# Patient Record
Sex: MALE | Race: WHITE | NOT HISPANIC OR LATINO | Employment: FULL TIME | URBAN - METROPOLITAN AREA
[De-identification: names, ages, dates, MRNs, and addresses within clinical notes are randomized per-mention and may not be internally consistent; named-entity substitution may affect disease eponyms.]

---

## 2017-07-31 ENCOUNTER — TRANSCRIBE ORDERS (OUTPATIENT)
Dept: ENDOCRINOLOGY | Facility: CLINIC | Age: 57
End: 2017-07-31

## 2017-07-31 DIAGNOSIS — IMO0001 DIABETES MELLITUS TYPE 1, UNCONTROLLED, WITHOUT COMPLICATIONS: Primary | ICD-10-CM

## 2018-02-09 ENCOUNTER — OFFICE VISIT (OUTPATIENT)
Dept: ENDOCRINOLOGY | Facility: CLINIC | Age: 58
End: 2018-02-09

## 2018-02-09 VITALS
OXYGEN SATURATION: 99 % | BODY MASS INDEX: 26.4 KG/M2 | WEIGHT: 174.2 LBS | HEIGHT: 68 IN | HEART RATE: 60 BPM | DIASTOLIC BLOOD PRESSURE: 72 MMHG | SYSTOLIC BLOOD PRESSURE: 130 MMHG

## 2018-02-09 DIAGNOSIS — I25.810 CORONARY ARTERY DISEASE INVOLVING CORONARY BYPASS GRAFT OF NATIVE HEART WITHOUT ANGINA PECTORIS: ICD-10-CM

## 2018-02-09 DIAGNOSIS — Z79.4 UNCONTROLLED TYPE 2 DIABETES MELLITUS WITH HYPERGLYCEMIA, WITH LONG-TERM CURRENT USE OF INSULIN (HCC): Primary | ICD-10-CM

## 2018-02-09 DIAGNOSIS — E78.5 HYPERLIPIDEMIA, UNSPECIFIED HYPERLIPIDEMIA TYPE: ICD-10-CM

## 2018-02-09 DIAGNOSIS — E03.9 HYPOTHYROIDISM, ADULT: ICD-10-CM

## 2018-02-09 DIAGNOSIS — E11.65 UNCONTROLLED TYPE 2 DIABETES MELLITUS WITH HYPERGLYCEMIA, WITH LONG-TERM CURRENT USE OF INSULIN (HCC): Primary | ICD-10-CM

## 2018-02-09 PROCEDURE — 99245 OFF/OP CONSLTJ NEW/EST HI 55: CPT | Performed by: INTERNAL MEDICINE

## 2018-02-09 PROCEDURE — 99406 BEHAV CHNG SMOKING 3-10 MIN: CPT | Performed by: INTERNAL MEDICINE

## 2018-02-09 RX ORDER — METOPROLOL TARTRATE 100 MG/1
TABLET ORAL
Refills: 0 | COMMUNITY
Start: 2017-11-27 | End: 2018-07-27 | Stop reason: SDUPTHER

## 2018-02-09 RX ORDER — LEVOTHYROXINE SODIUM 0.03 MG/1
TABLET ORAL
Refills: 0 | COMMUNITY
Start: 2017-11-27 | End: 2018-07-27 | Stop reason: SDUPTHER

## 2018-02-09 RX ORDER — VALSARTAN 320 MG/1
TABLET ORAL
Refills: 0 | COMMUNITY
Start: 2018-01-10 | End: 2019-04-17

## 2018-02-09 RX ORDER — INSULIN ASPART 100 [IU]/ML
40 INJECTION, SUSPENSION SUBCUTANEOUS 2 TIMES DAILY
Refills: 0 | COMMUNITY
Start: 2018-01-22 | End: 2018-05-21 | Stop reason: SDUPTHER

## 2018-02-09 RX ORDER — PEN NEEDLE, DIABETIC 29 G X1/2"
NEEDLE, DISPOSABLE MISCELLANEOUS
Refills: 0 | COMMUNITY
Start: 2017-12-19 | End: 2019-10-03 | Stop reason: SDUPTHER

## 2018-02-09 RX ORDER — BLOOD-GLUCOSE METER
KIT MISCELLANEOUS
Refills: 0 | COMMUNITY
Start: 2018-01-23 | End: 2022-04-18 | Stop reason: SDUPTHER

## 2018-02-09 RX ORDER — LEVOTHYROXINE SODIUM 0.2 MG/1
TABLET ORAL
Refills: 0 | COMMUNITY
Start: 2017-11-27 | End: 2018-07-27 | Stop reason: SDUPTHER

## 2018-02-09 RX ORDER — EZETIMIBE 10 MG/1
10 TABLET ORAL DAILY
Qty: 30 TABLET | Refills: 6 | Status: SHIPPED | OUTPATIENT
Start: 2018-02-09 | End: 2018-05-31 | Stop reason: SDUPTHER

## 2018-02-09 NOTE — PROGRESS NOTES
Subjective:     Chief Complaint   Patient presents with   • Diabetes     New pt for management of diabetes. Testing bs 2-3x qd. C/o having alot of fluctuations      Laron Pandey is a 57 y.o. male who is is being seen for consultation today at the request of  Nacho Omer MD for management of  Type 2 diabetes mellitus.  as  The initial diagnosis of diabetes was made in 2003. He was initially controlled with metformin and started insulin in 2010.   Diabetic complications: cardiovascular disease s/p CABG. Developed mild neuropathy sx with tingling and pain in the legs lately.      Current diabetic medications include novolog 70/30, morning and bedtime. He administers it after meals   Metformin BID     Monitoring  - checks glucose  1-2 times per day. In the last few weeks he reported large variability with glucose ranges 500 to 59.       Nutrition:   discussed  carb consistent diet 45-60 gm carb per meal.   Current diet: eats breakfast  (light) at 10 am, then lunch is at 11 am, snack 2:30-3 pm. AT that time he is more likely to get hypoglycemia. Dinner is the largest meal and occur at 6-8 pm.     Other med problems:CAD/CABG,   HL- intolerant to statins in the past. Then he was on Zetia and stopped it because of cost. LDL is 160 in the last year without medication.   Hypothyroidism - 225 mcg of levothyroxine. In July labs when TSh was abnormal he ran out of the medication. Otherwise stable on a current dose.       Patient c/o blurry vision with the high numbers. Numbness in the feet and pain in the feet for several months. Increased thirs, muscle cramps and weight loss.   He has a friend with DM type 1 who uses carb counting method. Asked about his goals of carbs.      HPI  Past Medical History:   Diagnosis Date   • Diabetes    • Heart disease    • Hyperlipidemia    • Hypertension    • Thyroid disease      The following portions of the patient's history were reviewed and updated as appropriate: allergies,  current medications, past family history, past social history, past surgical history and problem list.    MEDICATIONS    Current Outpatient Prescriptions:   •  BD ULTRA-FINE PEN NEEDLES 29G X 12.7MM misc, USE 3 TIMES A DAY AS DIRECTED, Disp: , Rfl: 0  •  Blood Glucose Monitoring Suppl (ONE TOUCH ULTRA MINI) w/Device kit, USE TO TEST TWICE A DAY AS DIRECTED, Disp: , Rfl: 0  •  ezetimibe (ZETIA) 10 MG tablet, Take 1 tablet by mouth Daily., Disp: 30 tablet, Rfl: 6  •  levothyroxine (SYNTHROID, LEVOTHROID) 200 MCG tablet, TAKE 1 TABLET BY MOUTH EVERY DAY, Disp: , Rfl: 0  •  levothyroxine (SYNTHROID, LEVOTHROID) 25 MCG tablet, TAKE 1 TABLET BY MOUTH EVERY DAY, Disp: , Rfl: 0  •  metFORMIN (GLUCOPHAGE) 1000 MG tablet, TAKE 1 TABLET BY MOUTH DAILY, Disp: , Rfl: 0  •  metoprolol tartrate (LOPRESSOR) 100 MG tablet, TAKE 1 TABLET BY MOUTH EVERY DAY, Disp: , Rfl: 0  •  NOVOLOG MIX 70/30 FLEXPEN (70-30) 100 UNIT/ML suspension pen-injector injection, Inject 40 Units under the skin 2 (Two) Times a Day., Disp: , Rfl: 0  •  ONE TOUCH ULTRA TEST test strip, 1 each by Other route 3 (Three) Times a Day. Use as instructed, Disp: 100 each, Rfl: 6  •  ONETOUCH DELICA LANCETS FINE misc, USE TO TEST TWICE A DAY AS DIRECTED, Disp: , Rfl: 6  •  valsartan (DIOVAN) 320 MG tablet, TAKE 1 TABLET BY MOUTH DAILY, Disp: , Rfl: 0    Review of Systems  Review of Systems   Constitutional: Positive for fatigue and unexpected weight change (weight loss). Negative for activity change, appetite change, chills and diaphoresis.   HENT: Negative for congestion, ear pain, facial swelling, hearing loss, postnasal drip, sore throat, trouble swallowing and voice change.    Eyes: Negative.  Negative for redness, itching and visual disturbance.   Respiratory: Negative for cough and shortness of breath.    Cardiovascular: Positive for palpitations. Negative for chest pain and leg swelling.   Gastrointestinal: Negative for abdominal distention, abdominal pain,  "constipation, diarrhea, nausea and vomiting.   Endocrine: Positive for polydipsia.        As listed in HPI   Genitourinary: Positive for frequency.   Musculoskeletal: Positive for myalgias. Negative for arthralgias, back pain, joint swelling, neck pain and neck stiffness.   Skin: Negative.    Allergic/Immunologic: Negative.    Neurological: Positive for weakness and numbness (tingling). Negative for dizziness, tremors, syncope, light-headedness and headaches.   Hematological: Negative.    Psychiatric/Behavioral: Negative.    All other systems reviewed and are negative.         Objective:      /72  Pulse 60  Ht 172.7 cm (68\")  Wt 79 kg (174 lb 3.2 oz)  SpO2 99%  BMI 26.49 kg/m2Body mass index is 26.49 kg/(m^2).  Physical Exam   Constitutional: He is oriented to person, place, and time. He appears well-developed and well-nourished.   HENT:   Head: Normocephalic and atraumatic.   Mouth/Throat: Oropharynx is clear and moist.   Eyes: Conjunctivae are normal.   Neck: Normal range of motion. No muscular tenderness present. Carotid bruit is not present. No thyroid mass and no thyromegaly present.   The neck is supple and no assimetry visualized   Cardiovascular: Normal rate, regular rhythm and normal heart sounds.    Pulmonary/Chest: Effort normal and breath sounds normal.   Musculoskeletal: He exhibits no edema.   Lymphadenopathy:     He has no cervical adenopathy.   Neurological: He is alert and oriented to person, place, and time.   Skin: Skin is warm. No rash noted.   Psychiatric: He has a normal mood and affect. Thought content normal.   Vitals reviewed.          LABS AND IMAGING    Labs:  A1C was >14 in 1/2018.   No results found for: HGBA1C  No results found for any previous visit.          Assessment:         Diagnoses and all orders for this visit:    Uncontrolled type 2 diabetes mellitus with hyperglycemia, with long-term current use of insulin  -     Cancel: POC Glucose Fingerstick  -     Cancel: POC " Glycosylated Hemoglobin (Hb A1C)    Hyperlipidemia, unspecified hyperlipidemia type    Hypothyroidism, adult    Coronary artery disease involving coronary bypass graft of native heart without angina pectoris    Other orders  -     Blood Glucose Monitoring Suppl (ONE TOUCH ULTRA MINI) w/Device kit; USE TO TEST TWICE A DAY AS DIRECTED  -     Discontinue: ONE TOUCH ULTRA TEST test strip; USE TO TEST TWICE A DAY AS DIRECTED  -     NOVOLOG MIX 70/30 FLEXPEN (70-30) 100 UNIT/ML suspension pen-injector injection; Inject 40 Units under the skin 2 (Two) Times a Day.  -     BD ULTRA-FINE PEN NEEDLES 29G X 12.7MM misc; USE 3 TIMES A DAY AS DIRECTED  -     levothyroxine (SYNTHROID, LEVOTHROID) 200 MCG tablet; TAKE 1 TABLET BY MOUTH EVERY DAY  -     levothyroxine (SYNTHROID, LEVOTHROID) 25 MCG tablet; TAKE 1 TABLET BY MOUTH EVERY DAY  -     ONETOUCH DELICA LANCETS FINE misc; USE TO TEST TWICE A DAY AS DIRECTED  -     metFORMIN (GLUCOPHAGE) 1000 MG tablet; TAKE 1 TABLET BY MOUTH DAILY  -     metoprolol tartrate (LOPRESSOR) 100 MG tablet; TAKE 1 TABLET BY MOUTH EVERY DAY  -     valsartan (DIOVAN) 320 MG tablet; TAKE 1 TABLET BY MOUTH DAILY  -     ezetimibe (ZETIA) 10 MG tablet; Take 1 tablet by mouth Daily.  -     ONE TOUCH ULTRA TEST test strip; 1 each by Other route 3 (Three) Times a Day. Use as instructed        Plan:       Patient has poorly controlled diabetes. He administers premixed insulin at random times, not associated with meals. I have explained to him in details the mechanism of action of Novolog 70/30, titration and the importance of carb consistent diet while on this medication. Dietary recommendations reviewed and he was advised 60-75 gm carbs per meal.            Patient Instructions   Diabetes Treatment Recommendations    [unfilled]                                                                                              Laron Pandey 1960     Your A1C is: > 14  ADA General Goals: A1c: < 7%                                                   Fasting/before meal glucose: <150 mg/dL                                    2 Hour after meal glucoses: < 180 mg/dL                                        Bedtime glucose:120-180                   Premixed Insulin Dose:         Novolog Mix 70/30  40 units before breakfast and 40 units before supper.     This insulin has a combination of meal insulin and long acting insulin. It should be taken before meals.        Adjust insulin doses every 3 - 4 days (on Mondays and Thursdays) based on the following:    For Breakfast Dose (controls blood sugar until supper)    If before supper blood sugar readings are consistently higher than 200 for 3-4 days, raise breakfast insulin dose by 2 units.    For Supper Dose (controls blood sugar until breakfast)    If before breakfast blood sugar readings are consistently higher than 200 for 3-4 days, raise supper insulin dose by 2  units.    If after 2 weeks, before meal blood sugars are not lower than 200, call the office.    If you are having frequent blood sugars lower than 70, call the office.    Meka Swan MD    Recent labs reviewed: LDL is not at goal.   -restart Zetia.     Importance of A1C reduction for cardiac risk prevention reviewed.   He smokes 10 cig a day and I have counseled him on the harm of smoking with CAD and diabetes. Advised to slowly reduce the consumption. 4 min spent counselling on smoking cessation.    Education performed during this visit: long term diabetic complications discussed. , annual eye examinations at Ophthalmology discussed, foot care discussed and Podiatry evaluation recommended, dental hygiene discussed  and foot care reviewed., diabetic Sick Day rules reviewed, handout given: maintain increased fluid intake, check glucose frequently and call the office if glucose is >400. If nausea/vomiting occurs, present to the ER. , home glucose monitoring emphasized, carbohydrate counting discussed in detail, written  exchange diet given, all medications, side effects and compliance discussed carefully, use and side effects of insulin is taught, Hypoglycemia management and prevention reviewed. and Dietary recommendations explained and examples of healthy meals provided       Follow up:  3 months.       42  min  of  80 min face-to-face visit time spent for coordination of care and counselling regarding identified problems as outlined in the objective, assessment and discussion portions of the documentation.

## 2018-02-09 NOTE — PATIENT INSTRUCTIONS
Diabetes Treatment Recommendations    [unfilled]                                                                                              Laron Pandey 1960     Your A1C is: > 14  ADA General Goals: A1c: < 7%                                                  Fasting/before meal glucose: <150 mg/dL                                    2 Hour after meal glucoses: < 180 mg/dL                                        Bedtime glucose:120-180                   Premixed Insulin Dose:         Novolog Mix 70/30  40 units before breakfast and 40 units before supper.     This insulin has a combination of meal insulin and long acting insulin. It should be taken before meals.        Adjust insulin doses every 3 - 4 days (on Mondays and Thursdays) based on the following:    For Breakfast Dose (controls blood sugar until supper)    If before supper blood sugar readings are consistently higher than 200 for 3-4 days, raise breakfast insulin dose by 2 units.    For Supper Dose (controls blood sugar until breakfast)    If before breakfast blood sugar readings are consistently higher than 200 for 3-4 days, raise supper insulin dose by 2  units.    If after 2 weeks, before meal blood sugars are not lower than 200, call the office.    If you are having frequent blood sugars lower than 70, call the office.    Meka Swan MD

## 2018-03-30 ENCOUNTER — OFFICE VISIT (OUTPATIENT)
Dept: ENDOCRINOLOGY | Facility: CLINIC | Age: 58
End: 2018-03-30

## 2018-03-30 VITALS
DIASTOLIC BLOOD PRESSURE: 82 MMHG | HEART RATE: 68 BPM | HEIGHT: 68 IN | OXYGEN SATURATION: 99 % | SYSTOLIC BLOOD PRESSURE: 132 MMHG | BODY MASS INDEX: 26.61 KG/M2 | WEIGHT: 175.6 LBS

## 2018-03-30 DIAGNOSIS — N52.9 ERECTILE DYSFUNCTION, UNSPECIFIED ERECTILE DYSFUNCTION TYPE: ICD-10-CM

## 2018-03-30 DIAGNOSIS — E78.5 HYPERLIPIDEMIA, UNSPECIFIED HYPERLIPIDEMIA TYPE: ICD-10-CM

## 2018-03-30 DIAGNOSIS — Z79.4 UNCONTROLLED TYPE 2 DIABETES MELLITUS WITH HYPERGLYCEMIA, WITH LONG-TERM CURRENT USE OF INSULIN (HCC): Primary | ICD-10-CM

## 2018-03-30 DIAGNOSIS — E11.65 UNCONTROLLED TYPE 2 DIABETES MELLITUS WITH HYPERGLYCEMIA, WITH LONG-TERM CURRENT USE OF INSULIN (HCC): Primary | ICD-10-CM

## 2018-03-30 DIAGNOSIS — E03.9 HYPOTHYROIDISM, ADULT: ICD-10-CM

## 2018-03-30 LAB
GLUCOSE BLDC GLUCOMTR-MCNC: 313 MG/DL (ref 70–130)
HBA1C MFR BLD: 9.3 %

## 2018-03-30 PROCEDURE — 99214 OFFICE O/P EST MOD 30 MIN: CPT | Performed by: INTERNAL MEDICINE

## 2018-03-30 PROCEDURE — 83036 HEMOGLOBIN GLYCOSYLATED A1C: CPT | Performed by: INTERNAL MEDICINE

## 2018-03-30 PROCEDURE — 82947 ASSAY GLUCOSE BLOOD QUANT: CPT | Performed by: INTERNAL MEDICINE

## 2018-03-30 NOTE — PATIENT INSTRUCTIONS
Diabetes Treatment Recommendations    [unfilled]                                                                                              Laron Pandey 1960     Your A1C is: > 14  Results for orders placed or performed in visit on 03/30/18   POC Glucose Fingerstick   Result Value Ref Range    Glucose 313 (A) 70 - 130 mg/dL   POC Glycosylated Hemoglobin (Hb A1C)   Result Value Ref Range    Hemoglobin A1C 9.3 %     ADA General Goals: A1c: < 7%                                                  Fasting/before meal glucose: <150 mg/dL                                    2 Hour after meal glucoses: < 180 mg/dL                                        Bedtime glucose:120-180                   Premixed Insulin Dose:         Novolog Mix 70/30  40 units before breakfast and 40 units before supper.     This insulin has a combination of meal insulin and long acting insulin. It should be taken before meals.        Adjust insulin doses every 3 - 4 days (on Mondays and Thursdays) based on the following:    For Breakfast Dose (controls blood sugar until supper)    If before supper blood sugar readings are consistently higher than 200 for 3-4 days, raise breakfast insulin dose by 2 units.    For Supper Dose (controls blood sugar until breakfast)    If before breakfast blood sugar readings are consistently higher than 200 for 3-4 days, raise supper insulin dose by 2  units.    If after 2 weeks, before meal blood sugars are not lower than 200, call the office.    If you are having frequent blood sugars lower than 70, call the office.    Meka Swan MD

## 2018-03-30 NOTE — PROGRESS NOTES
Subjective:     Chief Complaint   Patient presents with   • Diabetes     F/u for type 2 diabetes and hypothyroidism   • Hypothyroidism      Laron Pandey is a 57 y.o. male who is is being seen for follow-up for management of  Type 2 diabetes mellitus.  The initial diagnosis of diabetes was made in 2003. He was initially controlled with metformin and started insulin in 2010.   Diabetic complications: cardiovascular disease s/p CABG. Developed mild neuropathy sx with tingling and pain in the legs lately.      Current diabetic medications include novolog 70/30, morning and bedtime. He administers it after meals   Metformin BID     Monitoring  - checks glucose  1-2 times per day. In the last few weeks he reported large variability with glucose ranges 200 on average. 400 when forgets to take insulin.       Nutrition:   discussed  carb consistent diet 45-60 gm carb per meal.   Current diet: eats breakfast  (light) at 10 am, then lunch is at 11 am, snack 2:30-3 pm. AT that time he is more likely to get hypoglycemia. Dinner is the largest meal and occur at 6-8 pm.     Other med problems:CAD/CABG,     HL- intolerant to statins in the past. Restarted zetia last visit.     Hypothyroidism - 225 mcg of levothyroxine, stable on a current dose.     He is more consistent with the medications.     Patient c/o blurry vision with the high numbers. Numbness in the feet and pain in the feet for several months. Increased thirst, muscle cramps and weight loss resolved.   He is more consistent with the medications and the glucose improved. He feels better.     C/o erectile dysfunction for about a year.         Diabetes   Pertinent negatives for hypoglycemia include no dizziness, headaches or tremors. Associated symptoms include fatigue and weakness. Pertinent negatives for diabetes include no chest pain.   Hypothyroidism   Associated symptoms include fatigue, myalgias, numbness (tingling) and weakness. Pertinent negatives include no  abdominal pain, arthralgias, chest pain, chills, congestion, coughing, diaphoresis, headaches, joint swelling, nausea, neck pain, sore throat or vomiting.     Past Medical History:   Diagnosis Date   • Diabetes    • Heart disease    • Hyperlipidemia    • Hypertension    • Thyroid disease      The following portions of the patient's history were reviewed and updated as appropriate: allergies, current medications, past family history, past social history, past surgical history and problem list.    MEDICATIONS    Current Outpatient Prescriptions:   •  BD ULTRA-FINE PEN NEEDLES 29G X 12.7MM misc, USE 3 TIMES A DAY AS DIRECTED, Disp: , Rfl: 0  •  Blood Glucose Monitoring Suppl (ONE TOUCH ULTRA MINI) w/Device kit, USE TO TEST TWICE A DAY AS DIRECTED, Disp: , Rfl: 0  •  ezetimibe (ZETIA) 10 MG tablet, Take 1 tablet by mouth Daily., Disp: 30 tablet, Rfl: 6  •  levothyroxine (SYNTHROID, LEVOTHROID) 200 MCG tablet, TAKE 1 TABLET BY MOUTH EVERY DAY, Disp: , Rfl: 0  •  levothyroxine (SYNTHROID, LEVOTHROID) 25 MCG tablet, TAKE 1 TABLET BY MOUTH EVERY DAY, Disp: , Rfl: 0  •  metFORMIN (GLUCOPHAGE) 1000 MG tablet, TAKE 1 TABLET BY MOUTH DAILY, Disp: , Rfl: 0  •  metoprolol tartrate (LOPRESSOR) 100 MG tablet, TAKE 1 TABLET BY MOUTH EVERY DAY, Disp: , Rfl: 0  •  NOVOLOG MIX 70/30 FLEXPEN (70-30) 100 UNIT/ML suspension pen-injector injection, Inject 40 Units under the skin 2 (Two) Times a Day., Disp: , Rfl: 0  •  ONE TOUCH ULTRA TEST test strip, 1 each by Other route 3 (Three) Times a Day. Use as instructed, Disp: 100 each, Rfl: 6  •  ONETOUCH DELICA LANCETS FINE misc, USE TO TEST TWICE A DAY AS DIRECTED, Disp: , Rfl: 6  •  valsartan (DIOVAN) 320 MG tablet, TAKE 1 TABLET BY MOUTH DAILY, Disp: , Rfl: 0    Review of Systems  Review of Systems   Constitutional: Positive for fatigue. Negative for activity change, appetite change, chills and diaphoresis.   HENT: Negative for congestion, ear pain, facial swelling, hearing loss, postnasal  "drip, sore throat, trouble swallowing and voice change.    Eyes: Negative.  Negative for redness, itching and visual disturbance.   Respiratory: Negative for cough and shortness of breath.    Cardiovascular: Negative for chest pain and leg swelling.   Gastrointestinal: Negative for abdominal distention, abdominal pain, constipation, diarrhea, nausea and vomiting.   Endocrine:        As listed in HPI   Musculoskeletal: Positive for myalgias. Negative for arthralgias, back pain, joint swelling, neck pain and neck stiffness.   Skin: Negative.    Allergic/Immunologic: Negative.    Neurological: Positive for weakness and numbness (tingling). Negative for dizziness, tremors, syncope, light-headedness and headaches.   Hematological: Negative.    Psychiatric/Behavioral: Negative.    All other systems reviewed and are negative.         Objective:      /82   Pulse 68   Ht 172.7 cm (68\")   Wt 79.7 kg (175 lb 9.6 oz)   SpO2 99%   BMI 26.70 kg/m² Body mass index is 26.7 kg/m².  Physical Exam   Constitutional: He is oriented to person, place, and time. He appears well-developed and well-nourished.   HENT:   Head: Normocephalic and atraumatic.   Mouth/Throat: Oropharynx is clear and moist.   Eyes: Conjunctivae are normal.   Neck: Normal range of motion. No muscular tenderness present. Carotid bruit is not present. No thyroid mass and no thyromegaly present.   The neck is supple and no assimetry visualized   Cardiovascular: Normal rate, regular rhythm and normal heart sounds.    Pulmonary/Chest: Effort normal and breath sounds normal.   Musculoskeletal: He exhibits no edema.   Lymphadenopathy:     He has no cervical adenopathy.   Neurological: He is alert and oriented to person, place, and time.   Skin: Skin is warm. No rash noted.   Psychiatric: He has a normal mood and affect. Thought content normal.   Vitals reviewed.          LABS AND IMAGING    Labs:  A1C was >14 in 1/2018.   Lab Results   Component Value Date    " HGBA1C 9.3 03/30/2018     Office Visit on 03/30/2018   Component Date Value Ref Range Status   • Glucose 03/30/2018 313* 70 - 130 mg/dL Final   • Hemoglobin A1C 03/30/2018 9.3  % Final             Assessment:         Diagnoses and all orders for this visit:    Uncontrolled type 2 diabetes mellitus with hyperglycemia, with long-term current use of insulin  -     POC Glucose Fingerstick  -     POC Glycosylated Hemoglobin (Hb A1C)  -     Comprehensive Metabolic Panel; Future  -     Lipid Panel; Future  -     Hemoglobin A1c; Future    Hyperlipidemia, unspecified hyperlipidemia type    Hypothyroidism, adult  -     TSH; Future  -     T4, Free; Future    Erectile dysfunction, unspecified erectile dysfunction type        Plan:       Patient has poorly controlled diabetes.His A1C improved from > 14 to 9.3   I have explained to him in details the mechanism of action of Novolog 70/30, titration and the importance of carb consistent diet while on this medication. Dietary recommendations reviewed and he was advised 60-75 gm carbs per meal.            Patient Instructions     Diabetes Treatment Recommendations    [unfilled]                                                                                              Laron Pandey 1960     Your A1C is: > 14  Results for orders placed or performed in visit on 03/30/18   POC Glucose Fingerstick   Result Value Ref Range    Glucose 313 (A) 70 - 130 mg/dL   POC Glycosylated Hemoglobin (Hb A1C)   Result Value Ref Range    Hemoglobin A1C 9.3 %     ADA General Goals: A1c: < 7%                                                  Fasting/before meal glucose: <150 mg/dL                                    2 Hour after meal glucoses: < 180 mg/dL                                        Bedtime glucose:120-180                   Premixed Insulin Dose:         Novolog Mix 70/30  40 units before breakfast and 40 units before supper.     This insulin has a combination of meal insulin and long acting  insulin. It should be taken before meals.        Adjust insulin doses every 3 - 4 days (on Mondays and Thursdays) based on the following:    For Breakfast Dose (controls blood sugar until supper)    If before supper blood sugar readings are consistently higher than 200 for 3-4 days, raise breakfast insulin dose by 2 units.    For Supper Dose (controls blood sugar until breakfast)    If before breakfast blood sugar readings are consistently higher than 200 for 3-4 days, raise supper insulin dose by 2  units.    If after 2 weeks, before meal blood sugars are not lower than 200, call the office.    If you are having frequent blood sugars lower than 70, call the office.    Meka Swan MD    -cont [facility].   -fasting labs next visit   - for erectile dysfunction I have given a sample of Stendra. Coupon and rx provided as well. .      Follow up:  3 months.

## 2018-05-22 RX ORDER — INSULIN ASPART 100 [IU]/ML
40 INJECTION, SUSPENSION SUBCUTANEOUS 2 TIMES DAILY
Qty: 60 ML | Refills: 1 | Status: SHIPPED | OUTPATIENT
Start: 2018-05-22 | End: 2018-05-31 | Stop reason: SDUPTHER

## 2018-05-22 NOTE — TELEPHONE ENCOUNTER
PATIENT CALLED TO CHECK THE STATUS OF THE REFILL REQUEST ON HIS NOVOLOG 70/30 FLEXPEN AND WOULD LIKE THIS SENT TO HIS PHARMACY FOR A 90 DAY REFILL. IF THERE IS ANY QUESTIONS OR CONCERNS THE PATIENT CAN BE REACHED [phone]

## 2018-05-31 ENCOUNTER — OFFICE VISIT (OUTPATIENT)
Dept: ENDOCRINOLOGY | Facility: CLINIC | Age: 58
End: 2018-05-31

## 2018-05-31 VITALS
DIASTOLIC BLOOD PRESSURE: 76 MMHG | OXYGEN SATURATION: 99 % | HEIGHT: 68 IN | HEART RATE: 62 BPM | SYSTOLIC BLOOD PRESSURE: 130 MMHG | BODY MASS INDEX: 26.34 KG/M2 | WEIGHT: 173.8 LBS

## 2018-05-31 DIAGNOSIS — E03.9 HYPOTHYROIDISM, ADULT: ICD-10-CM

## 2018-05-31 DIAGNOSIS — Z79.4 UNCONTROLLED TYPE 2 DIABETES MELLITUS WITH HYPERGLYCEMIA, WITH LONG-TERM CURRENT USE OF INSULIN (HCC): Primary | ICD-10-CM

## 2018-05-31 DIAGNOSIS — E11.65 UNCONTROLLED TYPE 2 DIABETES MELLITUS WITH HYPERGLYCEMIA, WITH LONG-TERM CURRENT USE OF INSULIN (HCC): ICD-10-CM

## 2018-05-31 DIAGNOSIS — E78.5 HYPERLIPIDEMIA, UNSPECIFIED HYPERLIPIDEMIA TYPE: ICD-10-CM

## 2018-05-31 DIAGNOSIS — E11.65 UNCONTROLLED TYPE 2 DIABETES MELLITUS WITH HYPERGLYCEMIA, WITH LONG-TERM CURRENT USE OF INSULIN (HCC): Primary | ICD-10-CM

## 2018-05-31 DIAGNOSIS — Z79.4 UNCONTROLLED TYPE 2 DIABETES MELLITUS WITH HYPERGLYCEMIA, WITH LONG-TERM CURRENT USE OF INSULIN (HCC): ICD-10-CM

## 2018-05-31 PROCEDURE — 82043 UR ALBUMIN QUANTITATIVE: CPT | Performed by: INTERNAL MEDICINE

## 2018-05-31 PROCEDURE — 99214 OFFICE O/P EST MOD 30 MIN: CPT | Performed by: INTERNAL MEDICINE

## 2018-05-31 PROCEDURE — 82570 ASSAY OF URINE CREATININE: CPT | Performed by: INTERNAL MEDICINE

## 2018-05-31 RX ORDER — EZETIMIBE 10 MG/1
10 TABLET ORAL DAILY
Qty: 30 TABLET | Refills: 6 | Status: SHIPPED | OUTPATIENT
Start: 2018-05-31 | End: 2018-07-27 | Stop reason: SDUPTHER

## 2018-05-31 RX ORDER — INSULIN ASPART 100 [IU]/ML
46 INJECTION, SUSPENSION SUBCUTANEOUS 2 TIMES DAILY
Qty: 30 PEN | Refills: 6 | Status: SHIPPED | OUTPATIENT
Start: 2018-05-31 | End: 2018-08-09 | Stop reason: SDUPTHER

## 2018-05-31 NOTE — PROGRESS NOTES
Subjective:     Chief Complaint   Patient presents with   • Diabetes     F/u for type 2 diabetes and hypothyroidis, would like to discuss results of recent labs   • Hypothyroidism      Laron Pandey is a 57 y.o. male who is is being seen for follow-up for management of  Type 2 diabetes mellitus.  The initial diagnosis of diabetes was made in 2003. He was initially controlled with metformin and started insulin in 2010.   Diabetic complications: cardiovascular disease s/p CABG. Developed mild neuropathy sx with tingling and pain in the legs lately.      Current diabetic medications include novolog 70/30, morning and bedtime. He administers it after meals   Metformin BID     Monitoring  - checks glucose  1 times per day. In the last few weeks he reported large variability with glucose ranges 200 on average. >400 when forgets to take insulin.       Nutrition:   discussed  carb consistent diet 45-60 gm carb per meal.   Current diet: eats breakfast  (light) at 10 am, then lunch is at 11 am, snack 2:30-3 pm. AT that time he is more likely to get hypoglycemia. Dinner is the largest meal and occur at 6-8 pm.     Other med problems:CAD/CABG,     HL- intolerant to statins in the past. Restarted zetia last visit and LDL is 100 on 5/2018.     Hypothyroidism - 225 mcg of levothyroxine, stable on a current dose. TFT are  Normal 5/30/18    He is more consistent with the medications.     Patient c/o blurry vision with the high numbers. Numbness in the feet and pain in the feet for several months. Increased thirst, muscle cramps and weight loss resolved.   He is more consistent with the medications and the glucose improved. He feels better.     C/o erectile dysfunction for about a year.  Samples of stendra not effective,       Diabetes   Pertinent negatives for hypoglycemia include no dizziness, headaches or tremors. Associated symptoms include fatigue and weakness. Pertinent negatives for diabetes include no chest pain.    Hypothyroidism   Associated symptoms include fatigue, myalgias, numbness (tingling) and weakness. Pertinent negatives include no abdominal pain, arthralgias, chest pain, chills, congestion, coughing, diaphoresis, headaches, joint swelling, nausea, neck pain, sore throat or vomiting.     Past Medical History:   Diagnosis Date   • Diabetes    • Heart disease    • Hyperlipidemia    • Hypertension    • Thyroid disease      The following portions of the patient's history were reviewed and updated as appropriate: allergies, current medications, past family history, past social history, past surgical history and problem list.    MEDICATIONS    Current Outpatient Prescriptions:   •  BD ULTRA-FINE PEN NEEDLES 29G X 12.7MM misc, USE 3 TIMES A DAY AS DIRECTED, Disp: , Rfl: 0  •  Blood Glucose Monitoring Suppl (ONE TOUCH ULTRA MINI) w/Device kit, USE TO TEST TWICE A DAY AS DIRECTED, Disp: , Rfl: 0  •  ezetimibe (ZETIA) 10 MG tablet, Take 1 tablet by mouth Daily., Disp: 30 tablet, Rfl: 6  •  levothyroxine (SYNTHROID, LEVOTHROID) 200 MCG tablet, TAKE 1 TABLET BY MOUTH EVERY DAY, Disp: , Rfl: 0  •  levothyroxine (SYNTHROID, LEVOTHROID) 25 MCG tablet, TAKE 1 TABLET BY MOUTH EVERY DAY, Disp: , Rfl: 0  •  metFORMIN (GLUCOPHAGE) 1000 MG tablet, TAKE 1 TABLET BY MOUTH DAILY, Disp: , Rfl: 0  •  metoprolol tartrate (LOPRESSOR) 100 MG tablet, TAKE 1 TABLET BY MOUTH EVERY DAY, Disp: , Rfl: 0  •  NOVOLOG MIX 70/30 FLEXPEN (70-30) 100 UNIT/ML suspension pen-injector injection, Inject 0.46 mL under the skin 2 (Two) Times a Day., Disp: 30 pen, Rfl: 6  •  ONE TOUCH ULTRA TEST test strip, 1 each by Other route 3 (Three) Times a Day. Use as instructed, Disp: 100 each, Rfl: 6  •  ONETOUCH DELICA LANCETS FINE misc, USE TO TEST TWICE A DAY AS DIRECTED, Disp: , Rfl: 6  •  valsartan (DIOVAN) 320 MG tablet, TAKE 1 TABLET BY MOUTH DAILY, Disp: , Rfl: 0    Review of Systems  Review of Systems   Constitutional: Positive for fatigue. Negative for  "activity change, appetite change, chills and diaphoresis.   HENT: Negative for congestion, ear pain, facial swelling, hearing loss, postnasal drip, sore throat, trouble swallowing and voice change.    Eyes: Negative.  Negative for redness, itching and visual disturbance.   Respiratory: Negative for cough and shortness of breath.    Cardiovascular: Negative for chest pain and leg swelling.   Gastrointestinal: Negative for abdominal distention, abdominal pain, constipation, diarrhea, nausea and vomiting.   Endocrine:        As listed in HPI   Musculoskeletal: Positive for myalgias. Negative for arthralgias, back pain, joint swelling, neck pain and neck stiffness.   Skin: Negative.    Allergic/Immunologic: Negative.    Neurological: Positive for weakness and numbness (tingling). Negative for dizziness, tremors, syncope, light-headedness and headaches.   Hematological: Negative.    Psychiatric/Behavioral: Negative.    All other systems reviewed and are negative.         Objective:      /76   Pulse 62   Ht 172.7 cm (68\")   Wt 78.8 kg (173 lb 12.8 oz)   SpO2 99%   BMI 26.43 kg/m² Body mass index is 26.43 kg/m².  Physical Exam   Constitutional: He is oriented to person, place, and time. He appears well-developed and well-nourished.   HENT:   Head: Normocephalic and atraumatic.   Mouth/Throat: Oropharynx is clear and moist.   Eyes: Conjunctivae are normal.   Neck: Normal range of motion. No JVD present. No muscular tenderness present. Carotid bruit is not present. No thyroid mass and no thyromegaly present.   The neck is supple and no assimetry visualized   Cardiovascular: Normal rate, regular rhythm and normal heart sounds.    Pulmonary/Chest: Effort normal and breath sounds normal.   Musculoskeletal: Normal range of motion. He exhibits no edema.    Laron had a diabetic foot exam performed today.   During the foot exam he had a monofilament test performed.    Neurological Sensory Findings -  Unaltered " sharp/dull right ankle/foot discrimination and unaltered sharp/dull left ankle/foot discrimination. No right ankle/foot altered proprioception and no left ankle/foot altered proprioception  Vascular Status -  His right foot exhibits normal foot vasculature  and no edema. His left foot exhibits normal foot vasculature  and no edema.  Skin Integrity  -  His right foot skin is intact.His left foot skin is intact..  Lymphadenopathy:     He has no cervical adenopathy.   Neurological: He is alert and oriented to person, place, and time. He has normal reflexes.   Skin: Skin is warm and dry. No rash noted.   Psychiatric: He has a normal mood and affect. Thought content normal.   Vitals reviewed.          LABS AND IMAGING    Labs:  A1C was >14 in 1/2018.   Lab Results   Component Value Date    HGBA1C 9.3 03/30/2018     Office Visit on 03/30/2018   Component Date Value Ref Range Status   • Glucose 03/30/2018 313* 70 - 130 mg/dL Final   • Hemoglobin A1C 03/30/2018 9.3  % Final             Assessment:         Diagnoses and all orders for this visit:    Uncontrolled type 2 diabetes mellitus with hyperglycemia, with long-term current use of insulin  -     Microalbumin / Creatinine Urine Ratio - Urine, Clean Catch    Hypothyroidism, adult    Hyperlipidemia, unspecified hyperlipidemia type    Other orders  -     NOVOLOG MIX 70/30 FLEXPEN (70-30) 100 UNIT/ML suspension pen-injector injection; Inject 0.46 mL under the skin 2 (Two) Times a Day.  -     ezetimibe (ZETIA) 10 MG tablet; Take 1 tablet by mouth Daily.        Plan:       Patient has poorly controlled diabetes.His A1C improved from > 14 to 9.3   I have explained to him in details the mechanism of action of Novolog 70/30, titration and the importance of carb consistent diet while on this medication. Dietary recommendations reviewed and he was advised 60-75 gm carbs per meal.            Patient Instructions   44 UNits in am and 46 Units before supper.     -cont Zetia, LDL is  100  -fasting labs reviewed.   - for erectile dysfunction I have given a sample of Stendra and it was not effective..      Follow up:  3 months.

## 2018-06-02 LAB
CREAT 24H UR-MCNC: 40.1 MG/DL
MICROALBUMIN UR-MCNC: 7.6 UG/ML
MICROALBUMIN/CREAT UR: 19 MG/G CREAT (ref 0–30)

## 2018-07-31 RX ORDER — LEVOTHYROXINE SODIUM 0.03 MG/1
25 TABLET ORAL DAILY
Qty: 90 TABLET | Refills: 0 | Status: SHIPPED | OUTPATIENT
Start: 2018-07-31 | End: 2018-11-17 | Stop reason: SDUPTHER

## 2018-07-31 RX ORDER — LEVOTHYROXINE SODIUM 0.2 MG/1
200 TABLET ORAL DAILY
Qty: 90 TABLET | Refills: 0 | Status: SHIPPED | OUTPATIENT
Start: 2018-07-31 | End: 2021-12-28 | Stop reason: SDUPTHER

## 2018-07-31 RX ORDER — METOPROLOL TARTRATE 100 MG/1
100 TABLET ORAL DAILY
Qty: 90 TABLET | Refills: 0 | Status: SHIPPED | OUTPATIENT
Start: 2018-07-31 | End: 2018-11-17 | Stop reason: SDUPTHER

## 2018-07-31 RX ORDER — EZETIMIBE 10 MG/1
10 TABLET ORAL DAILY
Qty: 90 TABLET | Refills: 0 | Status: SHIPPED | OUTPATIENT
Start: 2018-07-31 | End: 2019-06-05 | Stop reason: SDUPTHER

## 2018-08-09 RX ORDER — INSULIN ASPART 100 [IU]/ML
46 INJECTION, SUSPENSION SUBCUTANEOUS 2 TIMES DAILY
Qty: 30 PEN | Refills: 2 | Status: SHIPPED | OUTPATIENT
Start: 2018-08-09 | End: 2019-01-03 | Stop reason: SDUPTHER

## 2018-09-28 NOTE — TELEPHONE ENCOUNTER
LOV: 05/31/18  Med refilled (so pt doesn't go without med over the weekend), Dr. Swan pls advise if ok to refill under your name for further refills? Med is under historical prescriber.

## 2018-11-19 RX ORDER — LEVOTHYROXINE SODIUM 0.03 MG/1
25 TABLET ORAL DAILY
Qty: 90 TABLET | Refills: 0 | Status: SHIPPED | OUTPATIENT
Start: 2018-11-19 | End: 2021-12-28 | Stop reason: SDUPTHER

## 2018-11-19 RX ORDER — METOPROLOL TARTRATE 100 MG/1
100 TABLET ORAL DAILY
Qty: 90 TABLET | Refills: 0 | Status: SHIPPED | OUTPATIENT
Start: 2018-11-19 | End: 2019-10-23

## 2019-01-05 RX ORDER — INSULIN ASPART 100 [IU]/ML
46 INJECTION, SUSPENSION SUBCUTANEOUS 2 TIMES DAILY
Qty: 30 PEN | Refills: 2 | Status: SHIPPED | OUTPATIENT
Start: 2019-01-05 | End: 2019-10-03 | Stop reason: SDUPTHER

## 2019-04-17 ENCOUNTER — OFFICE VISIT (OUTPATIENT)
Dept: ENDOCRINOLOGY | Facility: CLINIC | Age: 59
End: 2019-04-17

## 2019-04-17 VITALS
BODY MASS INDEX: 26.36 KG/M2 | HEIGHT: 68 IN | HEART RATE: 71 BPM | SYSTOLIC BLOOD PRESSURE: 114 MMHG | WEIGHT: 173.9 LBS | DIASTOLIC BLOOD PRESSURE: 64 MMHG | OXYGEN SATURATION: 99 %

## 2019-04-17 DIAGNOSIS — E78.5 HYPERLIPIDEMIA, UNSPECIFIED HYPERLIPIDEMIA TYPE: ICD-10-CM

## 2019-04-17 DIAGNOSIS — E03.9 HYPOTHYROIDISM, ADULT: ICD-10-CM

## 2019-04-17 DIAGNOSIS — Z79.4 UNCONTROLLED TYPE 2 DIABETES MELLITUS WITH HYPERGLYCEMIA, WITH LONG-TERM CURRENT USE OF INSULIN (HCC): Primary | ICD-10-CM

## 2019-04-17 DIAGNOSIS — E11.65 UNCONTROLLED TYPE 2 DIABETES MELLITUS WITH HYPERGLYCEMIA, WITH LONG-TERM CURRENT USE OF INSULIN (HCC): Primary | ICD-10-CM

## 2019-04-17 LAB
GLUCOSE BLDC GLUCOMTR-MCNC: 251 MG/DL (ref 70–130)
HBA1C MFR BLD: 9.1 %

## 2019-04-17 PROCEDURE — 99214 OFFICE O/P EST MOD 30 MIN: CPT | Performed by: INTERNAL MEDICINE

## 2019-04-17 PROCEDURE — 82962 GLUCOSE BLOOD TEST: CPT | Performed by: INTERNAL MEDICINE

## 2019-04-17 PROCEDURE — 83036 HEMOGLOBIN GLYCOSYLATED A1C: CPT | Performed by: INTERNAL MEDICINE

## 2019-04-17 RX ORDER — LOSARTAN POTASSIUM AND HYDROCHLOROTHIAZIDE 12.5; 1 MG/1; MG/1
TABLET ORAL
COMMUNITY
Start: 2019-04-16 | End: 2020-03-13

## 2019-04-17 RX ORDER — AMLODIPINE BESYLATE 10 MG/1
10 TABLET ORAL DAILY
Refills: 0 | COMMUNITY
Start: 2019-01-22 | End: 2021-05-04 | Stop reason: ALTCHOICE

## 2019-04-17 NOTE — PROGRESS NOTES
Subjective:     Chief Complaint   Patient presents with   • Diabetes     FOllow up  Blood sugars have been running low.  This AM was 58      Laron Pandey is a 58 y.o. male who is is being seen for follow-up for management of  Type 2 diabetes mellitus.  The initial diagnosis of diabetes was made in 2003. He was initially controlled with metformin and started insulin in 2010.   Diabetic complications: cardiovascular disease s/p CABG. Developed mild neuropathy sx with tingling and pain in the legs bilately.      Current diabetic medications include novolog 70/30, morning and bedtime. He administers it after meals   Metformin BID     Monitoring  - checks glucose  1 times per day. In the last few weeks he reported large variability with glucose ranges 200 on average. >400 when forgets to take insulin.       Nutrition:   discussed  carb consistent diet 45-60 gm carb per meal.   Current diet: eats breakfast  (light) at 10 am, then lunch is at 11 am, snack 2:30-3 pm. AT that time he is more likely to get hypoglycemia. Dinner is the largest meal and occur at 6-8 pm.     Other med problems:CAD/CABG,     HL- intolerant to statins in the past. Restarted zetia last visit and LDL is 100 on 5/2018.     Hypothyroidism - 225 mcg of levothyroxine, stable on a current dose. TFT are  Normal 5/30/18    He is more consistent with the medications.     Patient c/o blurry vision with the high numbers. Numbness in the feet and pain in the feet for several months. Increased thirst, muscle cramps and weight loss resolved.   He is more consistent with the medications and the glucose improved. He feels better.       Past Medical History:   Diagnosis Date   • Diabetes (CMS/[facility])    • Heart disease    • Hyperlipidemia    • Hypertension    • Thyroid disease      The following portions of the patient's history were reviewed and updated as appropriate: allergies, current medications, past family history, past social history, past surgical history and  problem list.    MEDICATIONS    Current Outpatient Medications:   •  amLODIPine (NORVASC) 10 MG tablet, Take 10 mg by mouth 2 (Two) Times a Day., Disp: , Rfl: 0  •  BD ULTRA-FINE PEN NEEDLES 29G X 12.7MM misc, USE 3 TIMES A DAY AS DIRECTED, Disp: , Rfl: 0  •  Blood Glucose Monitoring Suppl (ONE TOUCH ULTRA MINI) w/Device kit, USE TO TEST TWICE A DAY AS DIRECTED, Disp: , Rfl: 0  •  ezetimibe (ZETIA) 10 MG tablet, Take 1 tablet by mouth Daily., Disp: 90 tablet, Rfl: 0  •  levothyroxine (SYNTHROID, LEVOTHROID) 200 MCG tablet, Take 1 tablet by mouth Daily., Disp: 90 tablet, Rfl: 0  •  levothyroxine (SYNTHROID, LEVOTHROID) 25 MCG tablet, Take 1 tablet by mouth Daily. LAST REFILL WITHOUT APPT, Disp: 90 tablet, Rfl: 0  •  losartan-hydrochlorothiazide (HYZAAR) 100-12.5 MG per tablet, , Disp: , Rfl:   •  metFORMIN (GLUCOPHAGE) 1000 MG tablet, TAKE 1 TABLET BY MOUTH EVERY DAY, Disp: 90 tablet, Rfl: 0  •  metoprolol tartrate (LOPRESSOR) 100 MG tablet, Take 1 tablet by mouth Daily. LAST REFILL WITHOUT APPT, Disp: 90 tablet, Rfl: 0  •  NOVOLOG MIX 70/30 FLEXPEN (70-30) 100 UNIT/ML suspension pen-injector injection, Inject 0.46 mL under the skin into the appropriate area as directed 2 (Two) Times a Day., Disp: 30 pen, Rfl: 2  •  ONE TOUCH ULTRA TEST test strip, 1 each by Other route 3 (Three) Times a Day. Use as instructed, Disp: 100 each, Rfl: 6  •  ONETOUCH DELICA LANCETS FINE misc, USE TO TEST TWICE A DAY AS DIRECTED, Disp: , Rfl: 6    Review of Systems  Review of Systems   Constitutional: Positive for fatigue. Negative for activity change, appetite change, chills and diaphoresis.   HENT: Negative for congestion, ear pain, facial swelling, hearing loss, postnasal drip, sore throat, trouble swallowing and voice change.    Eyes: Negative.  Negative for redness, itching and visual disturbance.   Respiratory: Negative for cough and shortness of breath.    Cardiovascular: Negative for chest pain and leg swelling.  "  Gastrointestinal: Negative for abdominal distention, abdominal pain, constipation, diarrhea, nausea and vomiting.   Endocrine:        As listed in HPI   Musculoskeletal: Positive for myalgias. Negative for arthralgias, back pain, joint swelling, neck pain and neck stiffness.   Skin: Negative.    Allergic/Immunologic: Negative.    Neurological: Positive for weakness and numbness (tingling). Negative for dizziness, tremors, syncope, light-headedness and headaches.   Hematological: Negative.    Psychiatric/Behavioral: Negative.    All other systems reviewed and are negative.         Objective:      /64   Pulse 71   Ht 172.7 cm (68\")   Wt 78.9 kg (173 lb 14.4 oz)   SpO2 99%   BMI 26.44 kg/m² Body mass index is 26.44 kg/m².  Physical Exam   Constitutional: He is oriented to person, place, and time. He appears well-developed and well-nourished.   HENT:   Head: Normocephalic and atraumatic.   Mouth/Throat: Oropharynx is clear and moist.   Eyes: Conjunctivae are normal.   Neck: Normal range of motion. No JVD present. No muscular tenderness present. Carotid bruit is not present. No thyroid mass and no thyromegaly present.   The neck is supple and no assimetry visualized   Cardiovascular: Normal rate, regular rhythm and normal heart sounds.   Pulmonary/Chest: Effort normal and breath sounds normal.   Musculoskeletal: Normal range of motion. He exhibits no edema.      During the foot exam he had a monofilament test performed.  Lymphadenopathy:     He has no cervical adenopathy.   Neurological: He is alert and oriented to person, place, and time. He has normal reflexes.   Skin: Skin is warm and dry. No rash noted.   Psychiatric: He has a normal mood and affect. Thought content normal.   Vitals reviewed.          LABS AND IMAGING      Lab Results   Component Value Date    HGBA1C 9.1 04/17/2019    HGBA1C 9.3 03/30/2018     Office Visit on 04/17/2019   Component Date Value Ref Range Status   • Glucose 04/17/2019 251* " 70 - 130 mg/dL Final   • Hemoglobin A1C 04/17/2019 9.1  % Final             Assessment:         Diagnoses and all orders for this visit:    Uncontrolled type 2 diabetes mellitus with hyperglycemia, with long-term current use of insulin (CMS/[facility])  -     POC Glucose Fingerstick  -     POC Glycosylated Hemoglobin (Hb A1C)    Hypothyroidism, adult    Hyperlipidemia, unspecified hyperlipidemia type    Other orders  -     amLODIPine (NORVASC) 10 MG tablet; Take 10 mg by mouth 2 (Two) Times a Day.  -     losartan-hydrochlorothiazide (HYZAAR) 100-12.5 MG per tablet        Plan:       Patient has poorly controlled diabetes.   I have explained to him in details the mechanism of action of Novolog 70/30, titration and the importance of carb consistent diet while on this medication. Dietary recommendations reviewed and he was advised 60-75 gm carbs per meal.   -cont Zetia  -cont thyroid medications levothyroxine. Labs today.        Follow up:  3 months.

## 2019-06-05 RX ORDER — EZETIMIBE 10 MG/1
TABLET ORAL
Qty: 30 TABLET | Refills: 3 | Status: SHIPPED | OUTPATIENT
Start: 2019-06-05 | End: 2020-12-03 | Stop reason: HOSPADM

## 2019-10-03 RX ORDER — PEN NEEDLE, DIABETIC 29 G X1/2"
NEEDLE, DISPOSABLE MISCELLANEOUS
Qty: 300 EACH | Refills: 0 | Status: SHIPPED | OUTPATIENT
Start: 2019-10-03 | End: 2021-12-28 | Stop reason: SDUPTHER

## 2019-10-03 RX ORDER — INSULIN ASPART 100 [IU]/ML
46 INJECTION, SUSPENSION SUBCUTANEOUS 2 TIMES DAILY
Qty: 84 PEN | Refills: 0 | Status: SHIPPED | OUTPATIENT
Start: 2019-10-03 | End: 2020-03-13 | Stop reason: SDUPTHER

## 2019-10-23 ENCOUNTER — OFFICE VISIT (OUTPATIENT)
Dept: ENDOCRINOLOGY | Facility: CLINIC | Age: 59
End: 2019-10-23

## 2019-10-23 VITALS
SYSTOLIC BLOOD PRESSURE: 124 MMHG | BODY MASS INDEX: 26.01 KG/M2 | WEIGHT: 171.6 LBS | HEART RATE: 74 BPM | DIASTOLIC BLOOD PRESSURE: 72 MMHG | OXYGEN SATURATION: 98 % | HEIGHT: 68 IN

## 2019-10-23 DIAGNOSIS — E11.65 UNCONTROLLED TYPE 2 DIABETES MELLITUS WITH HYPERGLYCEMIA, WITH LONG-TERM CURRENT USE OF INSULIN (HCC): Primary | ICD-10-CM

## 2019-10-23 DIAGNOSIS — E03.9 HYPOTHYROIDISM, ADULT: ICD-10-CM

## 2019-10-23 DIAGNOSIS — Z79.4 UNCONTROLLED TYPE 2 DIABETES MELLITUS WITH HYPERGLYCEMIA, WITH LONG-TERM CURRENT USE OF INSULIN (HCC): Primary | ICD-10-CM

## 2019-10-23 DIAGNOSIS — I10 ESSENTIAL HYPERTENSION: ICD-10-CM

## 2019-10-23 LAB
GLUCOSE BLDC GLUCOMTR-MCNC: 240 MG/DL (ref 70–130)
HBA1C MFR BLD: 9.6 %

## 2019-10-23 PROCEDURE — 82947 ASSAY GLUCOSE BLOOD QUANT: CPT | Performed by: INTERNAL MEDICINE

## 2019-10-23 PROCEDURE — 83036 HEMOGLOBIN GLYCOSYLATED A1C: CPT | Performed by: INTERNAL MEDICINE

## 2019-10-23 PROCEDURE — 99214 OFFICE O/P EST MOD 30 MIN: CPT | Performed by: INTERNAL MEDICINE

## 2019-10-23 RX ORDER — VALSARTAN AND HYDROCHLOROTHIAZIDE 320; 12.5 MG/1; MG/1
1 TABLET, FILM COATED ORAL DAILY
Qty: 30 TABLET | Refills: 11 | Status: SHIPPED | OUTPATIENT
Start: 2019-10-23 | End: 2020-10-28

## 2019-10-23 NOTE — PROGRESS NOTES
Subjective:     Chief Complaint   Patient presents with   • Diabetes     Follow Up: Sometmes forgets to take a shot, falls asleep.  Needs to know how to adjust Insulin when goes low or high.  Needs losartan changed due to back order.  Needs [facility] fordms filled out    • Hypothyroidism      Laron Pandey is a 59 y.o. male who is is being seen for follow-up for management of  Type 2 diabetes mellitus.  The initial diagnosis of diabetes was made in 2003. He was initially controlled with metformin and started insulin in 2010.   Diabetic complications: cardiovascular disease s/p CABG. Developed mild neuropathy sx with tingling and pain in the legs bilately.      Current diabetic medications include novolog 70/30, morning and bedtime. He administers it after meals, frequently forgets to take evening dose and takes it at bedtime. He has hypoglycemia at night after that. His first dose is at lunch and not in the morning.  He also would like to know the instructions on insulin in the event he is not eating a large meal.  Metformin BID     Monitoring  - checks glucose  1 times per day.  In the morning and glucose is 100-180      Nutrition:   discussed  carb consistent diet 45-60 gm carb per meal.   Current diet: eats breakfast  (light) at 10 am, then lunch is at 11 am, snack 2:30-3 pm.  Dinner is the largest meal and occur at 6-8 pm.     Other med problems:CAD/CABG, HL, HTN    HL- intolerant to statins in the past. Restarted zetia last visit and LDL is 100 . He had recent fasting labs with the primary physician    Hypothyroidism - 225 mcg of levothyroxine, stable on a current dose. TFT were Normal on recent labs    HTN - uncontrolled when he ran out of the losartan. Pharmacy didn't have in stock.          Past Medical History:   Diagnosis Date   • Diabetes (CMS/[facility])    • Heart disease    • Hyperlipidemia    • Hypertension    • Thyroid disease      The following portions of the patient's history were reviewed and updated as  appropriate: allergies, current medications, past family history, past social history, past surgical history and problem list.    MEDICATIONS    Current Outpatient Medications:   •  amLODIPine (NORVASC) 10 MG tablet, Take 10 mg by mouth 2 (Two) Times a Day., Disp: , Rfl: 0  •  BD ULTRA-FINE PEN NEEDLES 29G X 12.7MM misc, Use with Insulin 3 times daily, Disp: 300 each, Rfl: 0  •  Blood Glucose Monitoring Suppl (ONE TOUCH ULTRA MINI) w/Device kit, USE TO TEST TWICE A DAY AS DIRECTED, Disp: , Rfl: 0  •  ezetimibe (ZETIA) 10 MG tablet, TAKE 1 TABLET BY MOUTH EVERY DAY, Disp: 30 tablet, Rfl: 3  •  levothyroxine (SYNTHROID, LEVOTHROID) 200 MCG tablet, Take 1 tablet by mouth Daily., Disp: 90 tablet, Rfl: 0  •  levothyroxine (SYNTHROID, LEVOTHROID) 25 MCG tablet, Take 1 tablet by mouth Daily. LAST REFILL WITHOUT APPT, Disp: 90 tablet, Rfl: 0  •  losartan-hydrochlorothiazide (HYZAAR) 100-12.5 MG per tablet, , Disp: , Rfl:   •  metFORMIN (GLUCOPHAGE) 1000 MG tablet, TAKE 1 TABLET BY MOUTH EVERY DAY, Disp: 90 tablet, Rfl: 0  •  NOVOLOG MIX 70/30 FLEXPEN (70-30) 100 UNIT/ML suspension pen-injector injection, Inject 0.46 mL under the skin into the appropriate area as directed 2 (Two) Times a Day., Disp: 84 pen, Rfl: 0  •  ONE TOUCH ULTRA TEST test strip, 1 each by Other route 3 (Three) Times a Day. Use as instructed, Disp: 300 each, Rfl: 0  •  ONETOUCH DELICA LANCETS FINE misc, USE TO TEST TWICE A DAY AS DIRECTED, Disp: , Rfl: 6  •  valsartan-hydrochlorothiazide (DIOVAN-HCT) 320-12.5 MG per tablet, Take 1 tablet by mouth Daily., Disp: 30 tablet, Rfl: 11    Review of Systems  Review of Systems   Constitutional: Positive for fatigue. Negative for activity change, appetite change, chills and diaphoresis.   HENT: Negative for congestion, ear pain, facial swelling, hearing loss, postnasal drip, sore throat, trouble swallowing and voice change.    Eyes: Negative.  Negative for redness, itching and visual disturbance.   Respiratory:  "Negative for cough and shortness of breath.    Cardiovascular: Negative for chest pain and leg swelling.   Gastrointestinal: Negative for abdominal distention, abdominal pain, constipation, diarrhea, nausea and vomiting.   Endocrine:        As listed in HPI   Musculoskeletal: Positive for myalgias. Negative for arthralgias, back pain, joint swelling, neck pain and neck stiffness.   Skin: Negative.    Allergic/Immunologic: Negative.    Neurological: Positive for weakness and numbness (tingling). Negative for dizziness, tremors, syncope, light-headedness and headaches.   Hematological: Negative.    Psychiatric/Behavioral: Negative.    All other systems reviewed and are negative.         Objective:      /72   Pulse 74   Ht 172.7 cm (68\")   Wt 77.8 kg (171 lb 9.6 oz)   SpO2 98%   BMI 26.09 kg/m² Body mass index is 26.09 kg/m².  Physical Exam   Constitutional: He is oriented to person, place, and time. He appears well-developed and well-nourished.   HENT:   Head: Normocephalic and atraumatic.   Mouth/Throat: Oropharynx is clear and moist.   Eyes: Conjunctivae are normal.   Neck: Normal range of motion. No JVD present. No muscular tenderness present. Carotid bruit is not present. No thyroid mass and no thyromegaly present.   The neck is supple and no assimetry visualized   Cardiovascular: Normal rate, regular rhythm and normal heart sounds.   Pulmonary/Chest: Effort normal and breath sounds normal.   Musculoskeletal: Normal range of motion. He exhibits no edema.   Lymphadenopathy:     He has no cervical adenopathy.   Neurological: He is alert and oriented to person, place, and time. He has normal reflexes.   Skin: Skin is warm and dry. No rash noted.   Psychiatric: He has a normal mood and affect. Thought content normal.   Vitals reviewed.          LABS AND IMAGING      Lab Results   Component Value Date    HGBA1C 9.6 10/23/2019    HGBA1C 9.1 04/17/2019    HGBA1C 9.3 03/30/2018     Office Visit on 10/23/2019 "   Component Date Value Ref Range Status   • Glucose 10/23/2019 240* 70 - 130 mg/dL Final   • Hemoglobin A1C 10/23/2019 9.6  % Final             Assessment:         Diagnoses and all orders for this visit:    Uncontrolled type 2 diabetes mellitus with hyperglycemia, with long-term current use of insulin (CMS/[facility])  -     POC Glucose Fingerstick  -     POC Glycosylated Hemoglobin (Hb A1C)  -     Cancel: Comprehensive Metabolic Panel  -     Cancel: Lipid Panel    Hypothyroidism, adult  -     Cancel: TSH    Essential hypertension  -     valsartan-hydrochlorothiazide (DIOVAN-HCT) 320-12.5 MG per tablet; Take 1 tablet by mouth Daily.        Plan:       Patient has poorly controlled diabetes. This is likely due to not taking it at appropriate time, I have reviewed in details the schedule of insulin administration, the mechanism of action of Novolog 70/30, titration and the importance of carb consistent diet while on this medication. Dietary recommendations reviewed. Insulin titration instructions provided.   Patient Instructions     Diabetes Treatment Recommendations    [unfilled]                                                                                              Laron Pandey 1960     Your A1C is:   Lab Results   Component Value Date    HGBA1C 9.6 10/23/2019    HGBA1C 9.1 04/17/2019    HGBA1C 9.3 03/30/2018       ADA General Goals: A1c: < 7%                                                  Fasting/before meal glucose: <150 mg/dL                                    2 Hour after meal glucoses: < 180 mg/dL                                        Bedtime glucose:120-180                   Premixed Insulin Dose:         Novolog Mix 70/30  46 units before breakfast and 46 units before supper. If you forgot to take supper dose and taking it later, take 20-30 units instead. If your meal is smaller than usual, then take 30 - 35 units of evening insulin.     If your sugars are high, then you can increase the dose as  stated:     Change insulin doses every 3 - 4 days (on Mondays and Thursdays) based on the following:    For Breakfast Dose (controls blood sugar until supper)    If before supper blood sugar readings are consistently higher than 150 for 3-4 days, raise breakfast insulin dose by 2 units.    For Supper Dose (controls blood sugar until breakfast)    If before breakfast blood sugar readings are consistently higher than 150 for 3-4 days, raise supper insulin dose by 2  units.    If after 2 weeks, before meal blood sugars are not lower than 150, call the office.    If you are having frequent blood sugars lower than 70, call the office.    Meka Swan MD    -cont Zetia  -cont thyroid medications levothyroxine. Labs today.   -change losartan to valsartan/HCT    FMLA paperwork completed. WIll fax to his HR department.     Hypoglycemia precautions reviewed.          Follow up:  3 -4 months.     17     min  of  25 min face-to-face visit time spent for coordination of care and counselling regarding identified problems as outlined in the objective, assessment and discussion portions of the documentation.

## 2019-10-23 NOTE — PATIENT INSTRUCTIONS
Diabetes Treatment Recommendations    [unfilled]                                                                                              Laron Pandey 1960     Your A1C is:   Lab Results   Component Value Date    HGBA1C 9.6 10/23/2019    HGBA1C 9.1 04/17/2019    HGBA1C 9.3 03/30/2018       ADA General Goals: A1c: < 7%                                                  Fasting/before meal glucose: <150 mg/dL                                    2 Hour after meal glucoses: < 180 mg/dL                                        Bedtime glucose:120-180                   Premixed Insulin Dose:         Novolog Mix 70/30  46 units before breakfast and 46 units before supper. If you forgot to take supper dose and taking it later, take 20-30 units instead. If your meal is smaller than usual, then take 30 - 35 units of evening insulin.     If your sugars are high, then you can increase the dose as stated:     Change insulin doses every 3 - 4 days (on Mondays and Thursdays) based on the following:    For Breakfast Dose (controls blood sugar until supper)    If before supper blood sugar readings are consistently higher than 150 for 3-4 days, raise breakfast insulin dose by 2 units.    For Supper Dose (controls blood sugar until breakfast)    If before breakfast blood sugar readings are consistently higher than 150 for 3-4 days, raise supper insulin dose by 2  units.    If after 2 weeks, before meal blood sugars are not lower than 150, call the office.    If you are having frequent blood sugars lower than 70, call the office.    Meka Swan MD

## 2020-03-13 ENCOUNTER — OFFICE VISIT (OUTPATIENT)
Dept: ENDOCRINOLOGY | Facility: CLINIC | Age: 60
End: 2020-03-13

## 2020-03-13 VITALS
WEIGHT: 181.6 LBS | BODY MASS INDEX: 27.52 KG/M2 | DIASTOLIC BLOOD PRESSURE: 64 MMHG | HEART RATE: 70 BPM | HEIGHT: 68 IN | OXYGEN SATURATION: 99 % | SYSTOLIC BLOOD PRESSURE: 110 MMHG

## 2020-03-13 DIAGNOSIS — E11.65 UNCONTROLLED TYPE 2 DIABETES MELLITUS WITH HYPERGLYCEMIA, WITH LONG-TERM CURRENT USE OF INSULIN (HCC): Primary | ICD-10-CM

## 2020-03-13 DIAGNOSIS — E78.5 HYPERLIPIDEMIA, UNSPECIFIED HYPERLIPIDEMIA TYPE: ICD-10-CM

## 2020-03-13 DIAGNOSIS — Z79.4 UNCONTROLLED TYPE 2 DIABETES MELLITUS WITH HYPERGLYCEMIA, WITH LONG-TERM CURRENT USE OF INSULIN (HCC): Primary | ICD-10-CM

## 2020-03-13 DIAGNOSIS — E03.9 HYPOTHYROIDISM, ADULT: ICD-10-CM

## 2020-03-13 LAB
EXPIRATION DATE: ABNORMAL
EXPIRATION DATE: NORMAL
GLUCOSE BLDC GLUCOMTR-MCNC: 187 MG/DL (ref 70–130)
HBA1C MFR BLD: 10.9 %
Lab: ABNORMAL
Lab: NORMAL

## 2020-03-13 PROCEDURE — 83036 HEMOGLOBIN GLYCOSYLATED A1C: CPT | Performed by: INTERNAL MEDICINE

## 2020-03-13 PROCEDURE — 82947 ASSAY GLUCOSE BLOOD QUANT: CPT | Performed by: INTERNAL MEDICINE

## 2020-03-13 PROCEDURE — 99214 OFFICE O/P EST MOD 30 MIN: CPT | Performed by: INTERNAL MEDICINE

## 2020-03-13 RX ORDER — FLASH GLUCOSE SCANNING READER
1 EACH MISCELLANEOUS ONCE
Qty: 1 DEVICE | Refills: 0 | Status: SHIPPED | OUTPATIENT
Start: 2020-03-13 | End: 2020-03-13

## 2020-03-13 RX ORDER — INSULIN ASPART 100 [IU]/ML
46 INJECTION, SUSPENSION SUBCUTANEOUS 2 TIMES DAILY
Qty: 30 PEN | Refills: 11 | Status: SHIPPED | OUTPATIENT
Start: 2020-03-13 | End: 2020-10-07 | Stop reason: SDUPTHER

## 2020-03-13 RX ORDER — FLASH GLUCOSE SENSOR
1 KIT MISCELLANEOUS
Qty: 2 EACH | Refills: 6 | Status: SHIPPED | OUTPATIENT
Start: 2020-03-13 | End: 2020-10-07

## 2020-03-13 NOTE — PROGRESS NOTES
Subjective:     Chief Complaint   Patient presents with   • Diabetes     Follow Up      Laron Pandey is a 59 y.o. male who is is being seen for follow-up for management of  Type 2 diabetes mellitus.  The initial diagnosis of diabetes was made in 2003. He was initially controlled with metformin and started insulin in 2010.   Diabetic complications: cardiovascular disease s/p CABG. Developed mild neuropathy sx with tingling and pain in the legs bilately.      Current diabetic medications include novolog 70/30, morning and bedtime. He administers it after meals, frequently forgets to take evening dose and takes it at bedtime. He has hypoglycemia at night after that. His first dose is at lunch and not in the morning.  He also would like to know the instructions on insulin in the event he is not eating a large meal.  Metformin BID     Monitoring  - checks glucose  1 times per day.  Variable glucose between 100-240  Nutrition:   discussed  carb consistent diet 45-60 gm carb per meal.   Current diet: eats breakfast  (light) at 10 am, then lunch is at 11 am, snack 2:30-3 pm.  Dinner is the largest meal and occur at 6-8 pm.     Other med problems:CAD/CABG, HL, HTN    HL- intolerant to statins in the past. Restarted zetia last visit and LDL is 100 . He had recent fasting labs with the primary physician    Hypothyroidism - 225 mcg of levothyroxine, stable on a current dose. TFT were Normal on recent labs    HTN -improved after changing to valsartan.     He works 6 days a week, frequently forgets to give insulin before meals. Glucose is variable       Past Medical History:   Diagnosis Date   • Diabetes (CMS/[facility])    • Heart disease    • Hyperlipidemia    • Hypertension    • Thyroid disease      The following portions of the patient's history were reviewed and updated as appropriate: allergies, current medications, past family history, past social history, past surgical history and problem list.    MEDICATIONS    Current  Outpatient Medications:   •  amLODIPine (NORVASC) 10 MG tablet, Take 10 mg by mouth 2 (Two) Times a Day., Disp: , Rfl: 0  •  BD ULTRA-FINE PEN NEEDLES 29G X 12.7MM misc, Use with Insulin 3 times daily, Disp: 300 each, Rfl: 0  •  Blood Glucose Monitoring Suppl (ONE TOUCH ULTRA MINI) w/Device kit, USE TO TEST TWICE A DAY AS DIRECTED, Disp: , Rfl: 0  •  ezetimibe (ZETIA) 10 MG tablet, TAKE 1 TABLET BY MOUTH EVERY DAY, Disp: 30 tablet, Rfl: 3  •  levothyroxine (SYNTHROID, LEVOTHROID) 200 MCG tablet, Take 1 tablet by mouth Daily., Disp: 90 tablet, Rfl: 0  •  levothyroxine (SYNTHROID, LEVOTHROID) 25 MCG tablet, Take 1 tablet by mouth Daily. LAST REFILL WITHOUT APPT, Disp: 90 tablet, Rfl: 0  •  metFORMIN (GLUCOPHAGE) 1000 MG tablet, TAKE 1 TABLET BY MOUTH EVERY DAY, Disp: 90 tablet, Rfl: 0  •  NOVOLOG MIX 70/30 FLEXPEN (70-30) 100 UNIT/ML suspension pen-injector injection, Inject 0.46 mL under the skin into the appropriate area as directed 2 (Two) Times a Day., Disp: 30 pen, Rfl: 11  •  ONE TOUCH ULTRA TEST test strip, USE TO TEST 3 TIMES A DAY AS DIRECTED, Disp: 300 each, Rfl: 2  •  ONETOUCH DELICA LANCETS FINE misc, USE TO TEST TWICE A DAY AS DIRECTED, Disp: , Rfl: 6  •  valsartan-hydrochlorothiazide (DIOVAN-HCT) 320-12.5 MG per tablet, Take 1 tablet by mouth Daily., Disp: 30 tablet, Rfl: 11  •  Continuous Blood Gluc  (FREESTYLE JS 14 DAY READER) device, 1 each 1 (One) Time for 1 dose., Disp: 1 Device, Rfl: 0  •  Continuous Blood Gluc Sensor (FREESTYLE JS 14 DAY SENSOR) misc, 1 each Every 14 (Fourteen) Days., Disp: 2 each, Rfl: 6    Review of Systems  Review of Systems   Constitutional: Positive for fatigue. Negative for activity change, appetite change, chills and diaphoresis.   HENT: Negative for congestion, ear pain, facial swelling, hearing loss, postnasal drip, sore throat, trouble swallowing and voice change.    Eyes: Negative.  Negative for redness, itching and visual disturbance.   Respiratory:  "Negative for cough and shortness of breath.    Cardiovascular: Negative for chest pain and leg swelling.   Gastrointestinal: Negative for abdominal distention, abdominal pain, constipation, diarrhea, nausea and vomiting.   Endocrine:        As listed in HPI   Musculoskeletal: Positive for myalgias (muscle cramps at night ). Negative for arthralgias, back pain, joint swelling, neck pain and neck stiffness.   Skin: Negative.    Allergic/Immunologic: Negative.    Neurological: Positive for weakness and numbness (tingling). Negative for dizziness, tremors, syncope, light-headedness and headaches.   Hematological: Negative.    Psychiatric/Behavioral: Negative.    All other systems reviewed and are negative.         Objective:      /64   Pulse 70   Ht 172.7 cm (68\")   Wt 82.4 kg (181 lb 9.6 oz)   SpO2 99%   BMI 27.61 kg/m² Body mass index is 27.61 kg/m².  Physical Exam   Constitutional: He is oriented to person, place, and time. He appears well-developed and well-nourished.   HENT:   Head: Normocephalic and atraumatic.   Mouth/Throat: Oropharynx is clear and moist.   Eyes: Conjunctivae are normal.   Neck: No muscular tenderness present. Carotid bruit is not present. No thyroid mass present.   The neck is supple and no assimetry visualized   Cardiovascular: Normal rate, regular rhythm and normal heart sounds.   Pulmonary/Chest: Effort normal and breath sounds normal.   Musculoskeletal: He exhibits no edema.   Lymphadenopathy:     He has no cervical adenopathy.   Neurological: He is alert and oriented to person, place, and time. He has normal reflexes.   Skin: Skin is warm and dry. No rash noted.   Psychiatric: He has a normal mood and affect. Thought content normal.   Vitals reviewed.          LABS AND IMAGING      Lab Results   Component Value Date    HGBA1C 10.9 03/13/2020    HGBA1C 9.6 10/23/2019    HGBA1C 9.1 04/17/2019     Office Visit on 03/13/2020   Component Date Value Ref Range Status   • Glucose " 03/13/2020 187* 70 - 130 mg/dL Final   • Lot Number 03/13/2020 [id number]   Final   • Expiration Date 03/13/2020 2020-09-19   Final   • Hemoglobin A1C 03/13/2020 10.9  % Final   • Lot Number 03/13/2020 [id number]   Final   • Expiration Date 03/13/2020 2021-12-26   Final             Assessment:         Diagnoses and all orders for this visit:    Uncontrolled type 2 diabetes mellitus with hyperglycemia, with long-term current use of insulin (CMS/[facility])  -     POC Glucose Fingerstick  -     POC Glycosylated Hemoglobin (Hb A1C)    Hypothyroidism, adult    Hyperlipidemia, unspecified hyperlipidemia type    Other orders  -     Continuous Blood Gluc  (FREESTYLE JS 14 DAY READER) device; 1 each 1 (One) Time for 1 dose.  -     Continuous Blood Gluc Sensor (FREESTYLE JS 14 DAY SENSOR) misc; 1 each Every 14 (Fourteen) Days.  -     NOVOLOG MIX 70/30 FLEXPEN (70-30) 100 UNIT/ML suspension pen-injector injection; Inject 0.46 mL under the skin into the appropriate area as directed 2 (Two) Times a Day.        Plan:       Patient has poorly controlled diabetes. This is likely due to not taking it at appropriate time, I have reviewed in details the schedule of insulin administration, the mechanism of action of Novolog 70/30, titration and the importance of carb consistent diet while on this medication. Dietary recommendations reviewed. Insulin titration instructions provided.   Patient Instructions     Diabetes Treatment Recommendations    [unfilled]                                                                                              Laron Pandey 1960     Your A1C is:   Lab Results   Component Value Date    HGBA1C 10.9 03/13/2020    HGBA1C 9.6 10/23/2019    HGBA1C 9.1 04/17/2019       ADA General Goals: A1c: < 7%                                                  Fasting/before meal glucose: <150 mg/dL                                    2 Hour after meal glucoses: < 180 mg/dL                                          Bedtime glucose:120-180                   Premixed Insulin Dose:         Novolog Mix 70/30  46 units before breakfast at 9 am and 40 units before supper. If you forgot to take supper dose and taking it later > 2 hours, take 20 units instead. If your meal is smaller than usual, then take 35 units of evening insulin.       If after 2 weeks, before meal blood sugars are not lower than 150, call the office.    If you are having frequent blood sugars lower than 70, call the office.    Meka Swan MD    -cont Zetia  -cont thyroid medications levothyroxine 225 mcg . Labs were done yesterday and were normal.     Hypoglycemia precautions reviewed.   He has hypoglycemia when takes insulin several hours after meals.        Follow up:  3 -4 months.     17     min  of  25 min face-to-face visit time spent for coordination of care and counselling regarding identified problems as outlined in the objective, assessment and discussion portions of the documentation.

## 2020-03-13 NOTE — PATIENT INSTRUCTIONS
Diabetes Treatment Recommendations    [unfilled]                                                                                              Laron Pandey 1960     Your A1C is:   Lab Results   Component Value Date    HGBA1C 10.9 03/13/2020    HGBA1C 9.6 10/23/2019    HGBA1C 9.1 04/17/2019       ADA General Goals: A1c: < 7%                                                  Fasting/before meal glucose: <150 mg/dL                                    2 Hour after meal glucoses: < 180 mg/dL                                        Bedtime glucose:120-180                   Premixed Insulin Dose:         Novolog Mix 70/30  46 units before breakfast at 9 am and 40 units before supper. If you forgot to take supper dose and taking it later > 2 hours, take 20 units instead. If your meal is smaller than usual, then take 35 units of evening insulin.       If after 2 weeks, before meal blood sugars are not lower than 150, call the office.    If you are having frequent blood sugars lower than 70, call the office.    Meka Swan MD

## 2020-06-29 ENCOUNTER — OFFICE VISIT (OUTPATIENT)
Dept: NEUROSURGERY | Facility: CLINIC | Age: 60
End: 2020-06-29

## 2020-06-29 VITALS
WEIGHT: 180.6 LBS | BODY MASS INDEX: 27.37 KG/M2 | HEIGHT: 68 IN | DIASTOLIC BLOOD PRESSURE: 90 MMHG | SYSTOLIC BLOOD PRESSURE: 120 MMHG | TEMPERATURE: 98.2 F

## 2020-06-29 DIAGNOSIS — M54.50 CHRONIC MIDLINE LOW BACK PAIN, UNSPECIFIED WHETHER SCIATICA PRESENT: Primary | ICD-10-CM

## 2020-06-29 DIAGNOSIS — G89.29 CHRONIC MIDLINE LOW BACK PAIN, UNSPECIFIED WHETHER SCIATICA PRESENT: Primary | ICD-10-CM

## 2020-06-29 PROCEDURE — 99213 OFFICE O/P EST LOW 20 MIN: CPT | Performed by: PHYSICIAN ASSISTANT

## 2020-06-29 NOTE — PROGRESS NOTES
Patient: Laron Pandey  : 1960  Gender: male    Primary Care Provider: Nacho Omer MD    Requesting Provider: As above    Chief Complaint:   Chief Complaint   Patient presents with   • Back Pain     chronic   • Leg Pain       History of Present Illness:  Laron Pandey is a 59-year-old gentleman who works for [employer] who presents today for evaluation of back pain.  He has a history of lumbar intervention by Dr. Dobbs in .  Patient cannot recall what surgery was performed but states that he has done well for greater than 20 years.  He reports a six-month history of increased low back pain with no fall or trauma prior to onset.  Pain radiates to the left hip and occasionally the lateral leg.  He reports some radiation to the right leg as well.  Over the last 3 months he has noted difficulty ambulating for greater than 15 minutes.  He also reports some paresthesias of his left foot.  He denies saddle anesthesia or bowel or bladder dysfunction.  Patient states that since  he has had significant standing and walking intolerance.  He was prescribed baclofen and steroids with some relief.  Patient has not been to formal therapy.  Of note, patient has a significant cardiovascular history including CABG and bilateral CEAs.  He is a diabetic with a recent A1c of 10.1.  He smokes 1/2 pack/day.      Past Medical and Surgical History:  Past Medical History:   Diagnosis Date   • Back pain    • Diabetes (CMS/HCC)    • Heart disease    • Hyperlipidemia    • Hypertension    • Thyroid disease    • Thyroid disease      Past Surgical History:   Procedure Laterality Date   • BACK SURGERY     • CARDIAC SURGERY         Current Medications:    Current Outpatient Medications:   •  amLODIPine (NORVASC) 10 MG tablet, Take 10 mg by mouth 2 (Two) Times a Day., Disp: , Rfl: 0  •  BD ULTRA-FINE PEN NEEDLES 29G X 12.7MM misc, Use with Insulin 3 times daily, Disp: 300 each, Rfl: 0  •  Blood Glucose Monitoring Suppl  (ONE TOUCH ULTRA MINI) w/Device kit, USE TO TEST TWICE A DAY AS DIRECTED, Disp: , Rfl: 0  •  Continuous Blood Gluc Sensor (FREESTYLE JS 14 DAY SENSOR) [facility] [place] 1 each Every 14 (Fourteen) Days., Disp: 2 each, Rfl: 6  •  ezetimibe (ZETIA) 10 MG tablet, TAKE 1 TABLET BY MOUTH EVERY DAY, Disp: 30 tablet, Rfl: 3  •  levothyroxine (SYNTHROID, LEVOTHROID) 200 MCG tablet, Take 1 tablet by mouth Daily., Disp: 90 tablet, Rfl: 0  •  levothyroxine (SYNTHROID, LEVOTHROID) 25 MCG tablet, Take 1 tablet by mouth Daily. LAST REFILL WITHOUT APPT, Disp: 90 tablet, Rfl: 0  •  metFORMIN (GLUCOPHAGE) 1000 MG tablet, TAKE 1 TABLET BY MOUTH EVERY DAY, Disp: 90 tablet, Rfl: 0  •  NOVOLOG MIX 70/30 FLEXPEN (70-30) 100 UNIT/ML suspension pen-injector injection, Inject 0.46 mL under the skin into the appropriate area as directed 2 (Two) Times a Day., Disp: 30 pen, Rfl: 11  •  ONE TOUCH ULTRA TEST test strip, USE TO TEST 3 TIMES A DAY AS DIRECTED, Disp: 300 each, Rfl: 2  •  ONETOUCH DELICA LANCETS FINE misc, USE TO TEST TWICE A DAY AS DIRECTED, Disp: , Rfl: 6  •  valsartan-hydrochlorothiazide (DIOVAN-HCT) 320-12.5 MG per tablet, Take 1 tablet by mouth Daily., Disp: 30 tablet, Rfl: 11    Allergies:  Allergies   Allergen Reactions   • Celebrex [Celecoxib] Swelling   • Morphine And Related Nausea Only         Review of Systems   Constitutional: Positive for activity change and fatigue.   HENT: Negative.    Eyes: Negative.    Respiratory: Negative.    Cardiovascular: Negative.    Gastrointestinal: Negative.    Endocrine: Negative.    Genitourinary: Negative.    Musculoskeletal: Positive for back pain and myalgias.   Skin: Negative.    Allergic/Immunologic: Negative.    Neurological: Positive for weakness and numbness.   Hematological: Negative.    Psychiatric/Behavioral: Negative.    All other systems reviewed and are negative.        Physical Exam   Constitutional: He is oriented to person, place, and time. He appears well-developed and  "well-nourished. No distress.   HENT:   Head: Normocephalic and atraumatic.   Neck: Normal range of motion. Neck supple.   Musculoskeletal: Normal range of motion. He exhibits no edema, tenderness or deformity.   Neurological: He is alert and oriented to person, place, and time. He has normal strength. He displays no atrophy and no tremor. No sensory deficit. He displays a negative Romberg sign. Coordination and gait normal.   Reflex Scores:       Bicep reflexes are 2+ on the right side and 2+ on the left side.       Brachioradialis reflexes are 2+ on the right side and 2+ on the left side.       Patellar reflexes are 1+ on the right side and 1+ on the left side.       Achilles reflexes are 1+ on the right side and 1+ on the left side.  Skin: Skin is warm and dry. He is not diaphoretic.   Psychiatric: He has a normal mood and affect.         Vitals:    06/29/20 1315   BP: 120/90   BP Location: Left arm   Patient Position: Sitting   Cuff Size: Adult   Temp: 98.2 °F (36.8 °C)   Weight: 81.9 kg (180 lb 9.6 oz)   Height: 172.7 cm (68\")       Patient's Body mass index is 27.46 kg/m². BMI is within normal parameters. No follow-up required..    Imaging Review:  X-ray lumbar spine dated 6/5/2020 demonstrates degenerative discs throughout the lumbar spine most notably at L4-5 and L5-S1.    Assessment:  1.  Low back pain with standing/walking intolerance    Plan:  Mr. Pandey is a 59-year-old gentleman with a history of lumbar surgery in 1999 by Dr. Dobbs who presents today for evaluation of back pain.  Symptoms appear to be progressive over the last 6 months with recent symptoms of standing and walking intolerance.  I have ordered a lumbar MRI with and without contrast for further evaluation.  I have also referred him to physical therapy.  I counseled him on the importance of diabetic control and lowering his A1c level as well as smoking cessation should surgical intervention become necessary.  Patient will return in 4 weeks " following some therapy for reevaluation.  Will attempt to obtain op note from 1999.  He will contact our office with any concerns or worsening symptoms in the interim.      Margarita Diallo PA-C

## 2020-07-09 ENCOUNTER — TELEPHONE (OUTPATIENT)
Dept: NEUROSURGERY | Facility: CLINIC | Age: 60
End: 2020-07-09

## 2020-07-09 NOTE — TELEPHONE ENCOUNTER
[facility] CALLED TO REQUEST ORDER FOR MRI TO BE RE-ROUTED TO [facility] ON  [address].  [phone].  PLEASE FAX A  COPY TO [facility] [phone]. PLEASE INCLUDE THE PROVIDER AND NPI# OF PATIENT'S DOCTOR.  IF YOU HAVE ANY QUESTIONS PLEASE PATRICIA @ [phone].  PATIENT CAN USE THIS NETWORK FREE OF CHARGE. ORDER HAS NOT BEEN RECEIVED BY [facility] AS OF TODAY

## 2020-07-30 ENCOUNTER — OFFICE VISIT (OUTPATIENT)
Dept: NEUROSURGERY | Facility: CLINIC | Age: 60
End: 2020-07-30

## 2020-07-30 VITALS
WEIGHT: 181 LBS | HEIGHT: 68 IN | DIASTOLIC BLOOD PRESSURE: 64 MMHG | BODY MASS INDEX: 27.43 KG/M2 | TEMPERATURE: 98 F | SYSTOLIC BLOOD PRESSURE: 140 MMHG

## 2020-07-30 DIAGNOSIS — M48.062 SPINAL STENOSIS, LUMBAR REGION, WITH NEUROGENIC CLAUDICATION: Primary | ICD-10-CM

## 2020-07-30 PROCEDURE — 99214 OFFICE O/P EST MOD 30 MIN: CPT | Performed by: PHYSICIAN ASSISTANT

## 2020-07-30 NOTE — PROGRESS NOTES
Patient: Laron Pandey  : 1960  Gender: male    Primary Care Provider: Nacho Omer MD      Chief Complaint:   Chief Complaint   Patient presents with   • Follow-up       History of Present Illness:  Mr. Pandey is a 59-year-old gentleman who works for [employer] who presents today for evaluation of back pain.  He has a history of previous lumbar intervention by Dr. Dobbs in .  He is seen in follow-up regarding a 6-month history of increased low back pain.  He denied fall or trauma prior to onset.  Pain radiates to his left hip and occasionally the left lateral leg.  He has occasional right leg pain.  Over the last 3-4 months he has noted increased symptoms of neurogenic claudication.  He also notes some paresthesias of the left foot.  He denies saddle anesthesia, bowel or bladder dysfunction.  He presents today with a new lumbar MRI.      Past Medical and Surgical History:  Past Medical History:   Diagnosis Date   • Back pain    • Diabetes (CMS/HCC)    • Heart disease    • Hyperlipidemia    • Hypertension    • Thyroid disease    • Thyroid disease      Past Surgical History:   Procedure Laterality Date   • BACK SURGERY     • CARDIAC SURGERY         Current Medications:    Current Outpatient Medications:   •  amLODIPine (NORVASC) 10 MG tablet, Take 10 mg by mouth 2 (Two) Times a Day., Disp: , Rfl: 0  •  BD ULTRA-FINE PEN NEEDLES 29G X 12.7MM misc, Use with Insulin 3 times daily, Disp: 300 each, Rfl: 0  •  Blood Glucose Monitoring Suppl (ONE TOUCH ULTRA MINI) w/Device kit, USE TO TEST TWICE A DAY AS DIRECTED, Disp: , Rfl: 0  •  Continuous Blood Gluc Sensor (FREESTYLE JS 14 DAY SENSOR) [facility] [place], 1 each Every 14 (Fourteen) Days., Disp: 2 each, Rfl: 6  •  ezetimibe (ZETIA) 10 MG tablet, TAKE 1 TABLET BY MOUTH EVERY DAY, Disp: 30 tablet, Rfl: 3  •  levothyroxine (SYNTHROID, LEVOTHROID) 200 MCG tablet, Take 1 tablet by mouth Daily., Disp: 90 tablet, Rfl: 0  •  levothyroxine (SYNTHROID, LEVOTHROID) 25 MCG  "tablet, Take 1 tablet by mouth Daily. LAST REFILL WITHOUT APPT, Disp: 90 tablet, Rfl: 0  •  metFORMIN (GLUCOPHAGE) 1000 MG tablet, TAKE 1 TABLET BY MOUTH EVERY DAY, Disp: 90 tablet, Rfl: 0  •  NOVOLOG MIX 70/30 FLEXPEN (70-30) 100 UNIT/ML suspension pen-injector injection, Inject 0.46 mL under the skin into the appropriate area as directed 2 (Two) Times a Day., Disp: 30 pen, Rfl: 11  •  ONE TOUCH ULTRA TEST test strip, USE TO TEST 3 TIMES A DAY AS DIRECTED, Disp: 300 each, Rfl: 2  •  ONETOUCH DELICA LANCETS FINE misc, USE TO TEST TWICE A DAY AS DIRECTED, Disp: , Rfl: 6  •  valsartan-hydrochlorothiazide (DIOVAN-HCT) 320-12.5 MG per tablet, Take 1 tablet by mouth Daily., Disp: 30 tablet, Rfl: 11    Allergies:  Allergies   Allergen Reactions   • Celebrex [Celecoxib] Swelling   • Morphine And Related Nausea Only         Review of Systems   Musculoskeletal: Positive for back pain.   All other systems reviewed and are negative.        Physical Exam   Constitutional: He is oriented to person, place, and time. He appears well-developed and well-nourished. No distress.   HENT:   Head: Normocephalic and atraumatic.   Neck: Normal range of motion. Neck supple.   Musculoskeletal: Normal range of motion. He exhibits no edema, tenderness or deformity.   Neurological: He is alert and oriented to person, place, and time.   Skin: Skin is warm and dry. He is not diaphoretic.   Psychiatric: He has a normal mood and affect.         Vitals:    07/30/20 1505   BP: 140/64   Temp: 98 °F (36.7 °C)   Weight: 82.1 kg (181 lb)   Height: 172.7 cm (68\")         Imaging Review:  MRI lumbar spine performed 7/21/2020 demonstrates severe disc space narrowing at the L4-5 level.  Likely bilateral laminotomy defects at this level consistent with previous bilateral minimal access laminectomies.  Severe degenerative changes at the L4-5 level resulting in moderate to severe canal stenosis as well as moderate bilateral foraminal narrowing with effacement " of bilateral nerve roots.    Assessment:  1.  Lumbar stenosis with neurogenic claudication  2.  Low back pain      Plan:  Mr. Pandey is seen today in follow-up.  I reviewed his new imaging with Dr. Keene with the above-noted findings.  I have ordered lumbar flexion-extension films to assess for instability at the L4-5 level.  Ultimately patient may be candidate for lumbar decompression due to progressive symptoms of neurogenic claudication.  I had a long discussion with him regarding smoking cessation and diabetic control should surgery become warranted.  I referred him to pain management for consideration of FARZANA.  He will follow-up with Dr. Keene after an injection with x-rays.  He will contact us with any concerns or worsening symptoms in the interim.        Margarita Diallo PA-C

## 2020-07-31 PROBLEM — M48.062 SPINAL STENOSIS, LUMBAR REGION, WITH NEUROGENIC CLAUDICATION: Status: ACTIVE | Noted: 2020-07-31

## 2020-09-11 PROBLEM — Z98.890 HISTORY OF LUMBAR SURGERY: Status: ACTIVE | Noted: 2020-09-11

## 2020-09-11 PROBLEM — M96.1 LUMBAR POSTLAMINECTOMY SYNDROME: Status: ACTIVE | Noted: 2020-09-11

## 2020-09-11 NOTE — PROGRESS NOTES
"Chief Complaint: \"Pain in my lower back and in my legs.\"      History of Present Illness:   Patient: Mr. Laron Pandey, 59 y.o. male   Referring Physician: Margarita Diallo PA-C  Reason for Referral: Consultation for chronic intractable lower back and lower extremity pain.   Pain History: Patient reports a several year history of lower back pain, which began without incident. Patient has a history of lumbar surgery by Dr. Yury Dobbs in 1999.  Approximately 8 months ago, he started experiencing increasing lower back pain radiating into his hips and lower extremities and associated with neurogenic claudication. MRI of the lumbar spine on 07/21/2020 revealed severe disc space narrowing at L4-L5.  Previous discectomy and laminectomies. Moderate to severe canal and neuroforaminal stenosis. Patient underwent neurosurgical consultation with Margarita Diallo PA-C on July 30, 2020, who recommended continuation of conservative measures and the addition of interventional pain management measures with future follow-up with Dr. Laron Keene. Patient needs to quit smoking, achieve control of his diabetes, and obtain cardiology clearance prior to consideration of surgery. Pain has progressed in intensity over the past months.   Pain Description: Constant pain with intermittent exacerbation, described as aching, burning and throbbing sensation.   Radiation of Pain: The pain radiates from the lower back into the hips, and into the posterior aspect of the thighs, left more than right, calves and the dorsum of his feet  Pain intensity today: 8/10  Average pain intensity last week: 8/10  Pain intensity ranges from: 5/10 to 10/10  Aggravating factors: Pain increases with standing, walking. Patient describes neurogenic claudication at about 50 yards  Alleviating factors: Pain decreases with sitting and lying down.   Associated Symptoms:   Patient pain, numbness and weakness in the LT> RT lower extremities.   Patient denies any " new bladder or bowel problems.   Patient reports difficulties with his balance but denies recent falls.     Review of previous therapies and additional medical records:  Laron Pandey has already failed the following measures, including:   Conservative Measures: Oral analgesics, topical analgesics and physical therapy   Interventional Measures: None  Surgical Measures: Lumbar decompression L4-L5 by Dr. Dobbs in 1999  Laron Pandey underwent neurosurgical consultation with Margarita Diallo PA-C on 7/30/2020, and was found to be a potential surgical candidate.  Laron Pandey presents with significant comorbidities including nicotine addiction, diabetes, hyperlipidemia, hypertension, CABG, Bilateral CAEs  In terms of current analgesics, Laron Pandey takes: ibuprofen 800 mg TID, Tylenol  I have reviewed Jayesh Report #[id number] consistent with medication reconciliation.  SOAPP: Low Risk    Global Pain Scale 09-15  2020          Pain 18          Feelings 13          Clinical outcomes 20          Activities 17          GPS Total: 68            Review of Diagnostic Studies:    X-rays of the lumbar spine June 5, 2020: Slight or wedging of L1.  Other lumbar vertebral body heights are maintained.  There is severe narrowing of the L4-L5 disc.  Moderately severe spurring at L4-L5.  Normal alignment  MRI of the lumbar spine on 07/21/2020, I have reviewed the images.  Severe disc space narrowing at L4-L5.  Moderate to severe canal and neuroforaminal stenosis.  Postoperative changes at L4-L5.    Review of Systems   Respiratory: Positive for cough.    Genitourinary: Positive for frequency.   Musculoskeletal: Positive for arthralgias, back pain and myalgias.   Allergic/Immunologic: Positive for environmental allergies.   All other systems reviewed and are negative.        Patient Active Problem List   Diagnosis   • Uncontrolled type 2 diabetes mellitus with hyperglycemia, with long-term current use of insulin (CMS/[facility])   •  Hyperlipidemia   • Hypothyroidism, adult   • Coronary artery disease involving coronary bypass graft of native heart without angina pectoris   • Erectile dysfunction   • Hypertension   • Lumbar stenosis with neurogenic claudication   • Lumbar postlaminectomy syndrome   • History of lumbar surgery   • PAD (peripheral artery disease) (CMS/[facility])   • Current every day smoker   • Tobacco abuse counseling       Past Medical History:   Diagnosis Date   • Back pain    • Diabetes (CMS/HCC)    • Heart disease    • Hyperlipidemia    • Hypertension    • Thyroid disease    • Thyroid disease          Past Surgical History:   Procedure Laterality Date   • BACK SURGERY     • CARDIAC SURGERY           Family History   Problem Relation Age of Onset   • Diabetes Mother    • Heart disease Mother    • Diabetes Father    • Heart disease Father    • Heart attack Neg Hx    • Hypertension Neg Hx    • Thyroid disease Neg Hx    • Stroke Neg Hx          Social History     Socioeconomic History   • Marital status:      Spouse name: Not on file   • Number of children: Not on file   • Years of education: Not on file   • Highest education level: Not on file   Tobacco Use   • Smoking status: Current Every Day Smoker     Packs/day: 0.50     Types: Cigarettes   • Smokeless tobacco: Never Used   Substance and Sexual Activity   • Alcohol use: Yes   • Sexual activity: Defer           Current Outpatient Medications:   •  amLODIPine (NORVASC) 10 MG tablet, Take 10 mg by mouth 2 (Two) Times a Day., Disp: , Rfl: 0  •  BD ULTRA-FINE PEN NEEDLES 29G X 12.7MM misc, Use with Insulin 3 times daily, Disp: 300 each, Rfl: 0  •  Blood Glucose Monitoring Suppl (ONE TOUCH ULTRA MINI) w/Device kit, USE TO TEST TWICE A DAY AS DIRECTED, Disp: , Rfl: 0  •  Continuous Blood Gluc Sensor (FREESTYLE JS 14 DAY SENSOR) misc, 1 each Every 14 (Fourteen) Days., Disp: 2 each, Rfl: 6  •  ezetimibe (ZETIA) 10 MG tablet, TAKE 1 TABLET BY MOUTH EVERY DAY, Disp: 30 tablet, Rfl:  "3  •  levothyroxine (SYNTHROID, LEVOTHROID) 200 MCG tablet, Take 1 tablet by mouth Daily., Disp: 90 tablet, Rfl: 0  •  levothyroxine (SYNTHROID, LEVOTHROID) 25 MCG tablet, Take 1 tablet by mouth Daily. LAST REFILL WITHOUT APPT, Disp: 90 tablet, Rfl: 0  •  metFORMIN (GLUCOPHAGE) 1000 MG tablet, TAKE 1 TABLET BY MOUTH EVERY DAY, Disp: 90 tablet, Rfl: 0  •  NOVOLOG MIX 70/30 FLEXPEN (70-30) 100 UNIT/ML suspension pen-injector injection, Inject 0.46 mL under the skin into the appropriate area as directed 2 (Two) Times a Day., Disp: 30 pen, Rfl: 11  •  ONE TOUCH ULTRA TEST test strip, USE TO TEST 3 TIMES A DAY AS DIRECTED, Disp: 300 each, Rfl: 2  •  ONETOUCH DELICA LANCETS FINE misc, USE TO TEST TWICE A DAY AS DIRECTED, Disp: , Rfl: 6  •  valsartan-hydrochlorothiazide (DIOVAN-HCT) 320-12.5 MG per tablet, Take 1 tablet by mouth Daily., Disp: 30 tablet, Rfl: 11  •  gabapentin (NEURONTIN) 100 MG capsule, Take 1 capsule by mouth 3 (Three) Times a Day., Disp: 90 capsule, Rfl: 1      Allergies   Allergen Reactions   • Celebrex [Celecoxib] Swelling   • Morphine And Related Nausea Only         /74 (BP Location: Left arm, Patient Position: Sitting)   Temp 97.1 °F (36.2 °C) (Infrared)   Ht 172.7 cm (68\")   Wt 85.3 kg (188 lb)   BMI 28.59 kg/m²       Physical Exam:  Constitutional: Patient is oriented to person, place, and time.   Patient appears well-developed and well-nourished.   HEENT: Head: Normocephalic and atraumatic.   Eyes: Conjunctivae and lids are normal.   Pupils: Equal, round, reactive to light.   Neck: Trachea normal. Neck supple. No JVD present.   Pulmonary Respiratory effort: No increased work of breathing or signs of respiratory distress. Auscultation of lungs: Clear to auscultation.   Cardiovascular Auscultation of heart: Normal rate and rhythm, normal S1 and S2, no murmurs.   Peripheral vascular exam: Femoral: right 2+, left 1+. Posterior tibialis: right 2+ and left 0+. Dorsalis pedis: right 1+ and left " 1+. CRT delayed on left. No edema.   Musculoskeletal   Gait and station: Gait evaluation demonstrated a normal gait   Lumbar spine: Passive and active range of motion are limited secondary to pain. Extension, flexion, lateral flexion, rotation of the lumbar spine increased and reproduced pain. Lumbar facet joint loading maneuvers are positive.  Anil test and Gaenslen's test are negative   Piriformis maneuvers are negative   Hip joints: The range of motion of the hip joints is almost full, symmetrical and without pain   Neurological:   Patient is alert and oriented to person, place, and time.   Speech: speech is normal.   Cortical function: Normal mental status.   Cranial nerves: Cranial nerves 2-12 intact.   Reflex Scores:  Right patellar: 1+  Left patellar: 1+  Right Achilles: 1+  Left Achilles: 1+  Motor strength: 5/5  Motor Tone: normal tone.   Involuntary movements: none.   Superficial/Primitive Reflexes: primitive reflexes were absent.   Right Garrett: absent  Left Garrett: absent  Right ankle clonus: absent  Left ankle clonus: absent   Babinsky: absent  Negative long tract signs. Straight leg raising test is positive at 30-40 degrees.   Sensation: No sensory loss. Sensory exam: intact to light touch, intact pain and temperature sensation, intact vibration sensation and normal proprioception.   Coordination: Normal finger to nose and heel to shin. Normal balance and  negative Romberg's sign   Skin and subcutaneous tissue: Skin is warm and intact. No rash noted. No cyanosis.   Psychiatric: Judgment and insight: Normal. Orientation to person, place and time: Normal. Recent and remote memory: Intact. Mood and affect: Normal.     ASSESSMENT:   1. Lumbar stenosis with neurogenic claudication    2. Lumbar postlaminectomy syndrome    3. History of lumbar surgery    4. Uncontrolled type 2 diabetes mellitus with hyperglycemia, with long-term current use of insulin (CMS/[facility])    5. Coronary artery disease involving  coronary bypass graft of native heart without angina pectoris    6. PAD (peripheral artery disease) (CMS/[facility])    7. Current every day smoker    8. Tobacco abuse counseling        PLAN/MEDICAL DECISION MAKING: Patient presents with a several year history of lower back pain. He has a history of L4-L5 lumbar decompression surgery by Dr. Yury Dobbs in 1999.  Approximately 9 months ago, he started experiencing increasing lower back pain radiating into his hips and down into his lower extremities associated with neurogenic claudication. MRI of the lumbar spine on 07/21/2020 revealed severe disc space narrowing at L4-L5 with moderate to severe canal and neuroforaminal stenosis. Patient underwent neurosurgical consultation with Margarita Diallo PA-C on July 30, 2020, who recommended continuation of conservative measures and the addition of interventional pain management measures with future follow-up with Dr. Laron Keene. Patient also presents with significant comorbidities including diabetes, coronary artery disease s/p CABG, history of carotid endarterectomy, PAD, nicotine addiction.  It appears that he may be facing some lumbar stenosis with claudication with superimposed vascular claudication in the left lower extremity and peripheral polyneuropathy.  Pain has progressed in intensity over the past months. Patient has failed to obtain pain relief with conservative measures, as referenced above. I have reviewed all available patient's medical records as well as previous therapies as referenced above. I had a lengthy conversation with Mr. Laron Pandey regarding his chronic pain condition and potential therapeutic options including risks, benefits, alternative therapies, to name a few. Therefore, I have proposed the following plan:  1. Diagnostic studies:  A.  Referral to Dr. Mychal Block in consultation for PAD and arterial duplex Doppler of the lower extremities with ABIs  B. EMG/NCV of the bilateral lower  extremities   C. Random UDS   2. Pharmacological measures: Reviewed. Discussed.   A. Patient takes ibuprofen 800 mg TID, Tylenol  B. Trial with gabapentin 100 mg three times per day, #90, 1 refill  Screening and compliance with controlled substances:  Patient has completed SOAPP and ORT questionnaires (revealing low risk). Jayesh report has been reviewed and appropriate. Patient has signed a consent for treatment with controlled substances after reviewing the document and verbalizing full understanding of the risks, benefits, alternatives, and consequences of compliance and adherence with treatment and comprehensive plan of care. Patient received in hand a copy of this document.  3. Interventional pain management measures: Patient will be scheduled for diagnostic and therapeutic bilateral L4-L5 transforaminal epidural steroid injections. We may repeat epidurals depending on patient's outcome.  Patient will follow-up with Dr. Keene thereafter for possible lumbar decompression.  Patient will need to achieve appropriate diabetic control and smoking cessation prior to surgical consideration.  If patient is found not to a surgical candidate, then, he could be a potential candidate for a spinal cord stimulator trial  4. Long-term rehabilitation efforts:  A. The patient does not have a history of falls. I did complete a risk assessment for falls  B. Patient will start a comprehensive physical therapy program for water therapy, therapeutic exercise, core strengthening, gait and balance training, neurodynamics, myofascial release, cupping and dry needling once his pain is under control  C. Start an exercise program such as Chair yoga, water therapy, swimming and daily walks for fitness  D. Contrast therapy: Apply ice-packs for 15-20 minutes, followed by heating pads for 15-20 minutes to affected area   E. Patient has been screened for tobacco use: Current tobacco user. Smoking Cessation: I have advised the patient regarding  the long-term deleterious effects of smoking and have provided the patient lifestyle modification strategies to facilitate smoking cessation.  In addition, we discussed nicotine replacement therapy.  Start nicotine patches 21 mg for 4 weeks, and then continue nicotine patches 14 mg for 2 weeks, then, continue nicotine patches 7 mg for 2 weeks, then discontinue.  I spent approximately 5 minutes advising the patient.   5. The patient has been instructed to contact my office with any questions or difficulties. The patient understands the plan and agrees to proceed accordingly.      Patient Care Team:  Nacho Omer MD as PCP - General (Family Medicine)     New Medications Ordered This Visit   Medications   • gabapentin (NEURONTIN) 100 MG capsule     Sig: Take 1 capsule by mouth 3 (Three) Times a Day.     Dispense:  90 capsule     Refill:  1         Future Appointments   Date Time Provider Department Center   10/7/2020 10:00 AM Meka Swan MD [facility] None         Marlon Muniz MD     EMR Dragon/Transcription disclaimer:  Much of this encounter note is an electronic transcription of spoken language to printed text. Electronic transcription of spoken language may permit erroneous, or at times, nonsensical words or phrases to be inadvertently transcribed. Although I have reviewed the note for such errors, some may still exist.

## 2020-09-15 ENCOUNTER — LAB (OUTPATIENT)
Dept: LAB | Facility: HOSPITAL | Age: 60
End: 2020-09-15

## 2020-09-15 ENCOUNTER — OFFICE VISIT (OUTPATIENT)
Dept: PAIN MEDICINE | Facility: CLINIC | Age: 60
End: 2020-09-15

## 2020-09-15 VITALS
DIASTOLIC BLOOD PRESSURE: 74 MMHG | TEMPERATURE: 97.1 F | HEIGHT: 68 IN | BODY MASS INDEX: 28.49 KG/M2 | SYSTOLIC BLOOD PRESSURE: 150 MMHG | WEIGHT: 188 LBS

## 2020-09-15 DIAGNOSIS — Z79.4 UNCONTROLLED TYPE 2 DIABETES MELLITUS WITH HYPERGLYCEMIA, WITH LONG-TERM CURRENT USE OF INSULIN (HCC): ICD-10-CM

## 2020-09-15 DIAGNOSIS — I73.9 PAD (PERIPHERAL ARTERY DISEASE) (HCC): ICD-10-CM

## 2020-09-15 DIAGNOSIS — Z98.890 HISTORY OF LUMBAR SURGERY: ICD-10-CM

## 2020-09-15 DIAGNOSIS — Z51.81 THERAPEUTIC DRUG MONITORING: ICD-10-CM

## 2020-09-15 DIAGNOSIS — M48.062 LUMBAR STENOSIS WITH NEUROGENIC CLAUDICATION: ICD-10-CM

## 2020-09-15 DIAGNOSIS — Z51.81 THERAPEUTIC DRUG MONITORING: Primary | ICD-10-CM

## 2020-09-15 DIAGNOSIS — E11.65 UNCONTROLLED TYPE 2 DIABETES MELLITUS WITH HYPERGLYCEMIA, WITH LONG-TERM CURRENT USE OF INSULIN (HCC): ICD-10-CM

## 2020-09-15 DIAGNOSIS — M96.1 LUMBAR POSTLAMINECTOMY SYNDROME: ICD-10-CM

## 2020-09-15 DIAGNOSIS — I25.810 CORONARY ARTERY DISEASE INVOLVING CORONARY BYPASS GRAFT OF NATIVE HEART WITHOUT ANGINA PECTORIS: ICD-10-CM

## 2020-09-15 DIAGNOSIS — F17.200 CURRENT EVERY DAY SMOKER: ICD-10-CM

## 2020-09-15 DIAGNOSIS — Z71.6 TOBACCO ABUSE COUNSELING: ICD-10-CM

## 2020-09-15 PROCEDURE — 80307 DRUG TEST PRSMV CHEM ANLYZR: CPT

## 2020-09-15 PROCEDURE — 99406 BEHAV CHNG SMOKING 3-10 MIN: CPT | Performed by: ANESTHESIOLOGY

## 2020-09-15 PROCEDURE — 99204 OFFICE O/P NEW MOD 45 MIN: CPT | Performed by: ANESTHESIOLOGY

## 2020-09-15 RX ORDER — GABAPENTIN 100 MG/1
100 CAPSULE ORAL 3 TIMES DAILY
Qty: 90 CAPSULE | Refills: 1 | Status: SHIPPED | OUTPATIENT
Start: 2020-09-15 | End: 2021-05-04 | Stop reason: SDUPTHER

## 2020-09-16 LAB
AMPHETAMINES UR QL SCN: NEGATIVE NG/ML
BARBITURATES UR QL SCN: NEGATIVE NG/ML
BENZODIAZ UR QL SCN: NEGATIVE NG/ML
BZE UR QL SCN: NEGATIVE NG/ML
CANNABINOIDS UR QL SCN: NEGATIVE NG/ML
CREAT 24H UR-MCNC: 50.5 MG/DL (ref 20–300)
FENTANYL+NORFENTANYL UR QL SCN: NEGATIVE PG/ML
Lab: NORMAL
MEPERIDINE UR CFM-MCNC: NEGATIVE NG/ML
METHADONE UR QL SCN: NEGATIVE NG/ML
OPIATES TESTED UR SCN: NEGATIVE NG/ML
OXYCODONE/OXYMORPHONE, URINE: NEGATIVE NG/ML
PCP UR QL: NEGATIVE NG/ML
PH UR STRIP.AUTO: 6.2 [PH] (ref 4.5–8.9)
PROPOXYPH UR QL SCN: NEGATIVE NG/ML
SP GR UR: 1.01
TRAMADOL UR QL SCN: NEGATIVE NG/ML

## 2020-09-30 DIAGNOSIS — I73.9 PAD (PERIPHERAL ARTERY DISEASE) (HCC): Primary | ICD-10-CM

## 2020-10-01 ENCOUNTER — TELEPHONE (OUTPATIENT)
Dept: NEUROSURGERY | Facility: CLINIC | Age: 60
End: 2020-10-01

## 2020-10-01 NOTE — TELEPHONE ENCOUNTER
Patient is calling about his disability forms. Margarita only took him from until he was seen by Dr. Muniz. Dr. Muniz's office will now have to take over the time off.

## 2020-10-02 ENCOUNTER — APPOINTMENT (OUTPATIENT)
Dept: PREADMISSION TESTING | Facility: HOSPITAL | Age: 60
End: 2020-10-02

## 2020-10-02 PROCEDURE — C9803 HOPD COVID-19 SPEC COLLECT: HCPCS

## 2020-10-02 PROCEDURE — U0004 COV-19 TEST NON-CDC HGH THRU: HCPCS

## 2020-10-03 LAB — SARS-COV-2 RNA NOSE QL NAA+PROBE: NOT DETECTED

## 2020-10-05 ENCOUNTER — OUTSIDE FACILITY SERVICE (OUTPATIENT)
Dept: PAIN MEDICINE | Facility: CLINIC | Age: 60
End: 2020-10-05

## 2020-10-05 PROCEDURE — 64483 NJX AA&/STRD TFRM EPI L/S 1: CPT | Performed by: ANESTHESIOLOGY

## 2020-10-05 PROCEDURE — 99152 MOD SED SAME PHYS/QHP 5/>YRS: CPT | Performed by: ANESTHESIOLOGY

## 2020-10-06 ENCOUNTER — TELEPHONE (OUTPATIENT)
Dept: PAIN MEDICINE | Facility: CLINIC | Age: 60
End: 2020-10-06

## 2020-10-06 ENCOUNTER — TELEPHONE (OUTPATIENT)
Dept: NEUROSURGERY | Facility: CLINIC | Age: 60
End: 2020-10-06

## 2020-10-06 NOTE — TELEPHONE ENCOUNTER
Spoke with patient and advised that we had filled out his paperwork for STD until his follow up appointment with our office 11/17/2020 and that it would be faxed after it was signed. Patient verbalizes understanding. No further needs expresed.

## 2020-10-06 NOTE — TELEPHONE ENCOUNTER
diagnostic and therapeutic bilateral L4-L5 transforaminal epidural steroid injections. 10/05/2020.    Spoke with patient. He reports he is doing well. No questions/concerns.  Appointment card placed in outgoing mail.

## 2020-10-06 NOTE — TELEPHONE ENCOUNTER
Patient was to follow-up with Dr. Keene after an injection.  Can we please arrange this follow-up?  We can extend his time off until that visit.

## 2020-10-06 NOTE — TELEPHONE ENCOUNTER
PATIENT CALL STATING HE NEEDS DIRECTION AS WHETHER HE IS TO GO BACK TO WORK OR NOT.  HE WAS EVALUATED BY DR ZAMORA AND WAS STARTED ON  EPIDURALS (WHICH HIS FIRST WAS ONE WAS 10/05/20).  DR ZAMORA WILL NOT RENDER A DECISION ON WHETHER TO RETURN BACK TO WORK OR NOT.  HE EITHER NEEDS DOCUMENTATION TO REMAIN OFF OF WORK OR RETURN TO WORK.  DR. ZAMORA IS ALSO REFERRING HIM TO A CARDIOLOGIST.  HIS SHORT TERM DISABILITY WAS TERMINATED ON 9/29.  PLEASE REVIEW HIS CASE AND DETERMINE.  HIS NEXT VISIT WITH DR ZAMORA IS IN 6 WEEKS.    [phone]

## 2020-10-07 ENCOUNTER — OFFICE VISIT (OUTPATIENT)
Dept: ENDOCRINOLOGY | Facility: CLINIC | Age: 60
End: 2020-10-07

## 2020-10-07 ENCOUNTER — TELEPHONE (OUTPATIENT)
Dept: NEUROSURGERY | Facility: CLINIC | Age: 60
End: 2020-10-07

## 2020-10-07 VITALS
OXYGEN SATURATION: 99 % | SYSTOLIC BLOOD PRESSURE: 132 MMHG | HEIGHT: 68 IN | DIASTOLIC BLOOD PRESSURE: 82 MMHG | BODY MASS INDEX: 27.99 KG/M2 | HEART RATE: 69 BPM | WEIGHT: 184.7 LBS

## 2020-10-07 DIAGNOSIS — Z98.890 HISTORY OF LUMBAR SURGERY: Primary | ICD-10-CM

## 2020-10-07 DIAGNOSIS — E03.9 HYPOTHYROIDISM, ADULT: ICD-10-CM

## 2020-10-07 DIAGNOSIS — E11.65 UNCONTROLLED TYPE 2 DIABETES MELLITUS WITH HYPERGLYCEMIA, WITH LONG-TERM CURRENT USE OF INSULIN (HCC): Primary | ICD-10-CM

## 2020-10-07 DIAGNOSIS — Z79.4 UNCONTROLLED TYPE 2 DIABETES MELLITUS WITH HYPERGLYCEMIA, WITH LONG-TERM CURRENT USE OF INSULIN (HCC): Primary | ICD-10-CM

## 2020-10-07 DIAGNOSIS — E78.5 HYPERLIPIDEMIA, UNSPECIFIED HYPERLIPIDEMIA TYPE: ICD-10-CM

## 2020-10-07 LAB
EXPIRATION DATE: ABNORMAL
EXPIRATION DATE: NORMAL
GLUCOSE BLDC GLUCOMTR-MCNC: 165 MG/DL (ref 70–130)
HBA1C MFR BLD: 9.2 %
Lab: ABNORMAL
Lab: NORMAL

## 2020-10-07 PROCEDURE — 99214 OFFICE O/P EST MOD 30 MIN: CPT | Performed by: INTERNAL MEDICINE

## 2020-10-07 PROCEDURE — 82947 ASSAY GLUCOSE BLOOD QUANT: CPT | Performed by: INTERNAL MEDICINE

## 2020-10-07 PROCEDURE — 83036 HEMOGLOBIN GLYCOSYLATED A1C: CPT | Performed by: INTERNAL MEDICINE

## 2020-10-07 RX ORDER — INSULIN ASPART 100 [IU]/ML
50 INJECTION, SUSPENSION SUBCUTANEOUS 2 TIMES DAILY
Qty: 30 PEN | Refills: 3 | Status: SHIPPED | OUTPATIENT
Start: 2020-10-07 | End: 2021-05-04 | Stop reason: SDUPTHER

## 2020-10-07 RX ORDER — FLASH GLUCOSE SENSOR
1 KIT MISCELLANEOUS
Qty: 2 EACH | Refills: 6 | Status: SHIPPED | OUTPATIENT
Start: 2020-10-07 | End: 2021-05-04 | Stop reason: SDUPTHER

## 2020-10-07 NOTE — PROGRESS NOTES
Subjective:     Chief Complaint   Patient presents with   • Diabetes     Follow Up:  Statesd blood sugars have been up and down. No meter or log book presented.  States Blood suagrs have been between 80 and 300.  Had Steriod Injection for back on Monday       Laron Pandey is a 60 y.o. male who is is being seen for follow-up for management of  Type 2 diabetes mellitus.  The initial diagnosis of diabetes was made in 2003. He was initially controlled with metformin and started insulin in 2010.   Diabetic complications: cardiovascular disease s/p CABG. He has neuropathy sx with tingling and pain in the legs bilaterally.       Current diabetic medications include novolog 70/30, morning and bedtime. He administers it after meals, frequently forgets to take evening dose and takes it at bedtime. He has hypoglycemia at night after that. His first dose is at lunch and not in the morning.  He also would like to know the instructions on insulin in the event he is not eating a large meal.  Metformin BID     Monitoring  - checks glucose  1 times per day.  Variable glucose between 100-240  Nutrition:   discussed  carb consistent diet 45-60 gm carb per meal.   Current diet: eats breakfast  (light) at 10 am, then lunch is at 11 am, snack 2:30-3 pm.  Dinner is the largest meal and occur at 6-8 pm.     Other med problems:CAD/CABG, HL, HTN    HL- intolerant to statins in the past. Restarted zetia. Had fasting labs with physician   Hypothyroidism - 225 mcg of levothyroxine, stable on a current dose. TFT were Normal on recent labs    HTN -improved after changing to valsartan.         Past Medical History:   Diagnosis Date   • Back pain    • Diabetes (CMS/HCC)    • Heart disease    • Hyperlipidemia    • Hypertension    • Thyroid disease    • Thyroid disease      The following portions of the patient's history were reviewed and updated as appropriate: allergies, current medications, past family history, past social history, past surgical  history and problem list.    MEDICATIONS    Current Outpatient Medications:   •  amLODIPine (NORVASC) 10 MG tablet, Take 10 mg by mouth 2 (Two) Times a Day., Disp: , Rfl: 0  •  BD ULTRA-FINE PEN NEEDLES 29G X 12.7MM misc, Use with Insulin 3 times daily, Disp: 300 each, Rfl: 0  •  Blood Glucose Monitoring Suppl (ONE TOUCH ULTRA MINI) w/Device kit, USE TO TEST TWICE A DAY AS DIRECTED, Disp: , Rfl: 0  •  ezetimibe (ZETIA) 10 MG tablet, TAKE 1 TABLET BY MOUTH EVERY DAY, Disp: 30 tablet, Rfl: 3  •  gabapentin (NEURONTIN) 100 MG capsule, Take 1 capsule by mouth 3 (Three) Times a Day., Disp: 90 capsule, Rfl: 1  •  levothyroxine (SYNTHROID, LEVOTHROID) 200 MCG tablet, Take 1 tablet by mouth Daily., Disp: 90 tablet, Rfl: 0  •  levothyroxine (SYNTHROID, LEVOTHROID) 25 MCG tablet, Take 1 tablet by mouth Daily. LAST REFILL WITHOUT APPT, Disp: 90 tablet, Rfl: 0  •  metFORMIN (GLUCOPHAGE) 1000 MG tablet, TAKE 1 TABLET BY MOUTH EVERY DAY, Disp: 90 tablet, Rfl: 0  •  NOVOLOG MIX 70/30 FLEXPEN (70-30) 100 UNIT/ML suspension pen-injector injection, Inject 0.46 mL under the skin into the appropriate area as directed 2 (Two) Times a Day., Disp: 30 pen, Rfl: 11  •  ONE TOUCH ULTRA TEST test strip, USE TO TEST 3 TIMES A DAY AS DIRECTED, Disp: 300 each, Rfl: 2  •  ONETOUCH DELICA LANCETS FINE misc, USE TO TEST TWICE A DAY AS DIRECTED, Disp: , Rfl: 6  •  valsartan-hydrochlorothiazide (DIOVAN-HCT) 320-12.5 MG per tablet, Take 1 tablet by mouth Daily., Disp: 30 tablet, Rfl: 11    Review of Systems  Review of Systems   Constitutional: Positive for fatigue. Negative for activity change, appetite change, chills and diaphoresis.   HENT: Negative for congestion, ear pain, facial swelling, hearing loss, postnasal drip, sore throat, trouble swallowing and voice change.    Eyes: Negative.  Negative for redness, itching and visual disturbance.   Respiratory: Negative for cough and shortness of breath.    Cardiovascular: Negative for chest pain and  "leg swelling.   Gastrointestinal: Negative for abdominal distention, abdominal pain, constipation, diarrhea, nausea and vomiting.   Endocrine:        As listed in HPI   Musculoskeletal: Positive for back pain and myalgias (muscle cramps at night ). Negative for arthralgias, joint swelling, neck pain and neck stiffness.   Skin: Negative.    Allergic/Immunologic: Negative.    Neurological: Positive for weakness and numbness (tingling). Negative for dizziness, tremors, syncope, light-headedness and headaches.   Hematological: Negative.    Psychiatric/Behavioral: Negative.    All other systems reviewed and are negative.         Objective:      /82   Pulse 69   Ht 172.7 cm (68\")   Wt 83.8 kg (184 lb 11.2 oz)   SpO2 99%   BMI 28.08 kg/m² Body mass index is 28.08 kg/m².  Physical Exam   Constitutional: He is oriented to person, place, and time. He appears well-developed.   HENT:   Head: Normocephalic and atraumatic.   Eyes: Conjunctivae are normal.   Neck: No muscular tenderness present. Carotid bruit is not present. No thyroid mass present.   The neck is supple and no assimetry visualized   Cardiovascular: Normal rate, regular rhythm and normal heart sounds.   Pulmonary/Chest: Effort normal and breath sounds normal.   Lymphadenopathy:     He has no cervical adenopathy.   Neurological: He is alert and oriented to person, place, and time. He has normal reflexes.   Skin: Skin is warm and dry. No rash noted.   Psychiatric: Thought content normal.   Vitals reviewed.          LABS AND IMAGING      Lab Results   Component Value Date    HGBA1C 9.2 10/07/2020    HGBA1C 10.9 03/13/2020    HGBA1C 9.6 10/23/2019     Office Visit on 10/07/2020   Component Date Value Ref Range Status   • Glucose 10/07/2020 165* 70 - 130 mg/dL Final   • Lot Number 10/07/2020 [id number]   Final   • Expiration Date 10/07/2020 06/02/2021   Final   • Hemoglobin A1C 10/07/2020 9.2  % Final   • Lot Number 10/07/2020 [id number]   Final   • Expiration " Date 10/07/2020 05/06/2022   Final   Appointment on 10/02/2020   Component Date Value Ref Range Status   • SARS-CoV-2 JORDAN 10/02/2020 Not Detected  Not Detected Final   Lab on 09/15/2020   Component Date Value Ref Range Status   • Amphetamine, Urine Qual 09/15/2020 Negative  Hegzuu=1011 ng/mL Final   • Barbiturates Screen, Urine 09/15/2020 Negative  Hxexrn=852 ng/mL Final   • Benzodiazepine Screen, Urine 09/15/2020 Negative  Wkpvfh=769 ng/mL Final   • THC Screen, Urine 09/15/2020 Negative  Cutoff=20 ng/mL Final   • Cocaine Screen, Urine 09/15/2020 Negative  Ymcxtx=757 ng/mL Final   • Opiate Screen, Urine 09/15/2020 Negative  Ztbzuz=644 ng/mL Final    Opiate test includes Codeine, Morphine, Hydromorphone, Hydrocodone.   • Oxycodone/Oxymorphone, Urine 09/15/2020 Negative  Shhsex=214 ng/mL Final    Test includes Oxycodone and Oxymorphone   • Phencyclidine (PCP), Urine 09/15/2020 Negative  Cutoff=25 ng/mL Final   • Methadone Screen, Urine 09/15/2020 Negative  Arcbaz=381 ng/mL Final   • Propoxyphene Screen 09/15/2020 Negative  Mczlly=396 ng/mL Final   • Meperidine, Urine 09/15/2020 Negative  Asfzkc=441 ng/mL Final    This test was developed and its performance characteristics  determined by [facility]. It has not been cleared or approved  by the Food and Drug Administration.   • Fentanyl, Urine 09/15/2020 Negative  Qlpbvt=6326 pg/mL Final    Test includes Fentanyl and Norfentanyl  This test was developed and its performance characteristics  determined by [facility]. It has not been cleared or approved  by the Food and Drug Administration.   • Tramadol Screen, Urine 09/15/2020 Negative  Plnkdo=661 ng/mL Final   • Creatinine, Urine 09/15/2020 50.5  20.0 - 300.0 mg/dL Final   • Specific Gravity, UA 09/15/2020 1.013   Final   • pH, UA 09/15/2020 6.2  4.5 - 8.9 Final   • Please note 09/15/2020 Comment   Final    Drug-test results should be interpreted in the context of clinical  information. Patient metabolic variables, specific  drug chemistry, and  specimen characteristics can affect test outcome. Technical  consultation is available if a test result is inconsistent with an  expected outcome. ([email or web address] or call toll-free  [phone])  Drug brands, if listed herein, are trademarks of their respective  owners.             Assessment:         Diagnoses and all orders for this visit:    Uncontrolled type 2 diabetes mellitus with hyperglycemia, with long-term current use of insulin (CMS/[facility])  -     POC Glucose, Blood  -     POC Glycosylated Hemoglobin (Hb A1C)    Hypothyroidism, adult    Hyperlipidemia, unspecified hyperlipidemia type        Plan:       Patient has poorly controlled diabetes. This is likely due to not taking it at appropriate time, I have reviewed in details the schedule of insulin administration, the mechanism of action of Novolog 70/30, titration and the importance of carb consistent diet while on this medication. Dietary recommendations reviewed. Insulin titration instructions provided.     Novolog 70/30 46 units twice a day --->increase to 50 unit in the morning and 46 units with dinner.     Patient Instructions     Results for orders placed or performed in visit on 10/07/20   POC Glucose, Blood    Specimen: Blood   Result Value Ref Range    Glucose 165 (A) 70 - 130 mg/dL    Lot Number [id number]     Expiration Date 06/02/2021    POC Glycosylated Hemoglobin (Hb A1C)    Specimen: Blood   Result Value Ref Range    Hemoglobin A1C 9.2 %    Lot Number [id number]     Expiration Date 05/06/2022      Increase Novolog to 50 units in the morning and 46 units with dinner.     Hypoglycemia precautions reviewed.   He has hypoglycemia when takes insulin several hours after meals.   I have sent rx for Freestyle Jude and we would need more frequent monitoring. The goal of A1C for surgery should be lower.   -cont Zetia    -cont thyroid medications levothyroxine 225 mcg .   He had labs with primary doctor and levels  were normal. Will request labs form him.    Follow up:  3 -4 months.     17 min of  25 min face-to-face visit time spent for coordination of care and counselling regarding identified problems as outlined in the objective, assessment and discussion portions of the documentation.

## 2020-10-07 NOTE — PATIENT INSTRUCTIONS
Results for orders placed or performed in visit on 10/07/20   POC Glucose, Blood    Specimen: Blood   Result Value Ref Range    Glucose 165 (A) 70 - 130 mg/dL    Lot Number [id number]     Expiration Date 06/02/2021    POC Glycosylated Hemoglobin (Hb A1C)    Specimen: Blood   Result Value Ref Range    Hemoglobin A1C 9.2 %    Lot Number [id number]     Expiration Date 05/06/2022      Increase Novolog to 50 units in the morning and 46 units with dinner.

## 2020-10-19 ENCOUNTER — CONSULT (OUTPATIENT)
Dept: CARDIOLOGY | Facility: CLINIC | Age: 60
End: 2020-10-19

## 2020-10-19 ENCOUNTER — HOSPITAL ENCOUNTER (OUTPATIENT)
Dept: GENERAL RADIOLOGY | Facility: HOSPITAL | Age: 60
Discharge: HOME | End: 2020-10-19
Admitting: PHYSICIAN ASSISTANT

## 2020-10-19 VITALS
OXYGEN SATURATION: 98 % | DIASTOLIC BLOOD PRESSURE: 72 MMHG | HEIGHT: 68 IN | BODY MASS INDEX: 28.04 KG/M2 | WEIGHT: 185 LBS | HEART RATE: 74 BPM | SYSTOLIC BLOOD PRESSURE: 136 MMHG

## 2020-10-19 DIAGNOSIS — E78.2 MIXED HYPERLIPIDEMIA: ICD-10-CM

## 2020-10-19 DIAGNOSIS — F17.200 TOBACCO DEPENDENCE: ICD-10-CM

## 2020-10-19 DIAGNOSIS — Z98.890 HISTORY OF LUMBAR SURGERY: ICD-10-CM

## 2020-10-19 DIAGNOSIS — I25.10 CORONARY ARTERY DISEASE INVOLVING NATIVE CORONARY ARTERY OF NATIVE HEART WITHOUT ANGINA PECTORIS: ICD-10-CM

## 2020-10-19 DIAGNOSIS — I10 ESSENTIAL HYPERTENSION: ICD-10-CM

## 2020-10-19 DIAGNOSIS — I73.9 CLAUDICATION OF BOTH LOWER EXTREMITIES (HCC): Primary | ICD-10-CM

## 2020-10-19 DIAGNOSIS — I20.8 ANGINAL EQUIVALENT (HCC): ICD-10-CM

## 2020-10-19 DIAGNOSIS — R09.89 BILATERAL CAROTID BRUITS: ICD-10-CM

## 2020-10-19 DIAGNOSIS — I65.23 CAROTID STENOSIS, BILATERAL: ICD-10-CM

## 2020-10-19 PROCEDURE — 72110 X-RAY EXAM L-2 SPINE 4/>VWS: CPT

## 2020-10-19 PROCEDURE — 99245 OFF/OP CONSLTJ NEW/EST HI 55: CPT | Performed by: INTERNAL MEDICINE

## 2020-10-19 PROCEDURE — 93000 ELECTROCARDIOGRAM COMPLETE: CPT | Performed by: INTERNAL MEDICINE

## 2020-10-19 PROCEDURE — 99406 BEHAV CHNG SMOKING 3-10 MIN: CPT | Performed by: INTERNAL MEDICINE

## 2020-10-19 RX ORDER — NICOTINE 21 MG/24HR
1 PATCH, TRANSDERMAL 24 HOURS TRANSDERMAL EVERY 24 HOURS
Qty: 14 PATCH | Refills: 0 | Status: ON HOLD | OUTPATIENT
Start: 2020-10-19 | End: 2020-12-03

## 2020-10-19 NOTE — PROGRESS NOTES
[facility]  [address], [address]  [place], [place], [address] [phone]  [email or web address]           OUTPATIENT CLINIC CONSULTATION NOTE    Patient care team:  Patient Care Team:  Nacho Omer MD as PCP - General (Family Medicine)    Requesting Provider and Reason for consultation: The patient is being seen today at the request of Marlon Muniz MD for PAD.     Subjective:   Chief complaint:   Chief Complaint   Patient presents with   • Coronary Artery Disease       HPI:    Laron Pandey is a 60 y.o. male.  Partial problem list, including cardiac problems:  1. CAD  a. Status post three-vessel CABG in 2003 with Dr. Myers.  2. Carotid artery disease  a. Status post bilateral CEA in 2009 with Dr. Myers  3. Possible PVD  4. Lower back pain  a. History of lumbar surgery, Dr. Dobbs, 1999  b. Acute worsening spring 2020, severe disc space narrowing and L4/L5, moderate to severe canal and neuroforaminal stenosis  5. Hypertension  6. Hyperlipidemia  7. Diabetes mellitus  8. Hypothyroidism  9. Tobacco dependence    The patient presents today for consultation for possible PVD.    He reports he has ongoing claudication with left greater than right after walking 20 to 30 yards.  He also reports bilateral numbness and tingling in the lower extremities.  He has intermittent lower extremity edema and back pain.  He also reports intermittent lightheadedness with position changes that is limited in nature.  He denies chest pain, but is unable to walk more than 20 to 30 yards at a time.  Denies exertional shortness of breath, palpitations, syncope, orthopnea, or PND.  He is scheduled to have ABIs on Friday that were previously ordered by Dr. Muniz.  He denies any recent cardiac testing.  He is also hopeful to undergo back surgery with Dr. Keene possibly in the near future.    He is a current half pack per day smoker.  He drinks alcohol socially.    Review of  Systems:  Positive for exertional claudication, back pain  Negative for chest pain, dyspnea with exertion, orthopnea, PND, lower extremity edema, palpitations, lightheadedness, syncope. All other systems reviewed and are negative.    PFSH:  Patient Active Problem List   Diagnosis   • Uncontrolled type 2 diabetes mellitus with hyperglycemia, with long-term current use of insulin (CMS/[facility])   • Hyperlipidemia   • Hypothyroidism, adult   • Coronary artery disease involving coronary bypass graft of native heart without angina pectoris   • Erectile dysfunction   • Hypertension   • Lumbar stenosis with neurogenic claudication   • Lumbar postlaminectomy syndrome   • History of lumbar surgery   • PAD (peripheral artery disease) (CMS/[facility])   • Current every day smoker   • Tobacco abuse counseling         Current Outpatient Medications:   •  amLODIPine (NORVASC) 10 MG tablet, Take 10 mg by mouth 2 (Two) Times a Day., Disp: , Rfl: 0  •  BD ULTRA-FINE PEN NEEDLES 29G X 12.7MM misc, Use with Insulin 3 times daily, Disp: 300 each, Rfl: 0  •  Blood Glucose Monitoring Suppl (ONE TOUCH ULTRA MINI) w/Device kit, USE TO TEST TWICE A DAY AS DIRECTED, Disp: , Rfl: 0  •  Continuous Blood Gluc Sensor (FreeStyle Jude 14 Day Sensor) misc, 1 each Every 14 (Fourteen) Days., Disp: 2 each, Rfl: 6  •  ezetimibe (ZETIA) 10 MG tablet, TAKE 1 TABLET BY MOUTH EVERY DAY, Disp: 30 tablet, Rfl: 3  •  gabapentin (NEURONTIN) 100 MG capsule, Take 1 capsule by mouth 3 (Three) Times a Day., Disp: 90 capsule, Rfl: 1  •  levothyroxine (SYNTHROID, LEVOTHROID) 200 MCG tablet, Take 1 tablet by mouth Daily., Disp: 90 tablet, Rfl: 0  •  levothyroxine (SYNTHROID, LEVOTHROID) 25 MCG tablet, Take 1 tablet by mouth Daily. LAST REFILL WITHOUT APPT, Disp: 90 tablet, Rfl: 0  •  metFORMIN (GLUCOPHAGE) 1000 MG tablet, TAKE 1 TABLET BY MOUTH EVERY DAY, Disp: 90 tablet, Rfl: 0  •  NovoLOG Mix 70/30 FlexPen (70-30) 100 UNIT/ML suspension pen-injector injection, Inject 0.5 mL  "under the skin into the appropriate area as directed 2 (Two) Times a Day. (Patient taking differently: Inject 50 Units under the skin into the appropriate area as directed 2 (Two) Times a Day. 50 am 46 pm), Disp: 30 pen, Rfl: 3  •  ONE TOUCH ULTRA TEST test strip, USE TO TEST 3 TIMES A DAY AS DIRECTED, Disp: 300 each, Rfl: 2  •  ONETOUCH DELICA LANCETS FINE misc, USE TO TEST TWICE A DAY AS DIRECTED, Disp: , Rfl: 6  •  valsartan-hydrochlorothiazide (DIOVAN-HCT) 320-12.5 MG per tablet, Take 1 tablet by mouth Daily., Disp: 30 tablet, Rfl: 11    Allergies   Allergen Reactions   • Celebrex [Celecoxib] Swelling   • Morphine And Related Nausea Only       Social History     Socioeconomic History   • Marital status:      Spouse name: Not on file   • Number of children: Not on file   • Years of education: Not on file   • Highest education level: Not on file   Tobacco Use   • Smoking status: Current Every Day Smoker     Packs/day: 0.50     Types: Cigarettes   • Smokeless tobacco: Never Used   Substance and Sexual Activity   • Alcohol use: Yes   • Sexual activity: Defer     Family History   Problem Relation Age of Onset   • Diabetes Mother    • Heart disease Mother    • Diabetes Father    • Heart disease Father    • Stroke Father    • Heart attack Neg Hx    • Hypertension Neg Hx    • Thyroid disease Neg Hx          Objective:   Physical Exam:  /72 (BP Location: Right arm, Patient Position: Sitting, Cuff Size: Adult)   Pulse 74   Ht 172.7 cm (68\")   Wt 83.9 kg (185 lb)   SpO2 98%   BMI 28.13 kg/m²   CONSTITUTIONAL: Well-nourished. In no acute distress.   SKIN: Warm and dry. No rashes noted  HEENT: Head is normocephalic and atraumatic.  Mucous membranes are pink and moist.   NECK: Supple without masses or thyromegaly. There is no jugular venous distention   LUNGS: Normal effort. Clear to auscultation bilaterally without wheezing, rhonchi, or rales noted.   CARDIOVASCULAR: The heart has a regular rate and rhythm " with a normal S1 and S2. There is no murmur, gallop, rub, or click appreciated.   PERIPHERAL VASCULAR: Carotid bruits bilaterally. Radial pulses are 2+ bilaterally. There is no lower extremity edema.    ABDOMEN: Normal bowel sounds.  Soft with no tenderness with palpitation. No hepatosplenomegaly  MUSCULOSKELETAL: Normal gait. No digital cyanosis  NEUROLOGICAL: CN II - XII grossly intact  PSYCHIATRIC: Alert, orientated x 3, appropriate affect     10/2020 exam: Right posterior tibial pulse 2+, nonpalpable right dorsalis pedis pulse.  Left posterior tibial pulse 0-1+, nonpalpable dorsalis pedis pulse    Labs:  No results found for: BUN, CREATININE, K, ALT, AST  No results found for: WBC, HGB, HCT, PLT    No results found for: CHOL  No results found for: TRIG  No results found for: HDL  No results found for: LDL  No components found for: LDLDIRECTC    Diagnostic Data:      ECG 12 Lead    Date/Time: 10/19/2020 3:46 PM  Performed by: Mychal Block MD  Authorized by: Mychal Block MD   Comparison: compared with previous ECG from 4/15/2009  Comparison to previous ECG: ST-T wave abnormality now present  Rhythm: sinus rhythm  Rate: normal  BPM: 74  Pacing: atrial sensed rhythmComments: QRS 94 ms    ms  Inferolateral ST depression in T wave inversion, new compared to 2009 ECG                   Assessment and Plan:   Diagnoses and all orders for this visit:    All the diagnoses below are new to me today    1. Claudication of both lower extremities (CMS/HCC) (Primary)  -Ongoing claudication with left being greater than the right.  Likely component of PVD.  -Exercise ABIs with multilevel Doppler ordered to replace resting multilevel ABIs previously ordered by Dr. Muniz.  -Continue statin therapy.    -Discussed the option of a peripheral angiogram if his ABIs are found to be significantly abnormal.  Before proceeding with a peripheral angiogram will await nuclear stress test results.  If nuclear stress test is also  abnormal, would perform a heart catheterization concomitantly    2. Coronary artery disease involving native coronary artery of native heart without angina pectoris  3. Mixed hyperlipidemia  4. Anginal equivalent (CMS/HCC)  -Status post CABG x3 vessel in 2003.  -Begin aspirin 81 mg p.o. daily.  -Continue amlodipine, valsartan.  -Not on a beta-blocker for unclear reasons.  -Unable to assess angina fully due to limited mobility secondary to lower back pain and claudication.  EKG with new ST and T wave abnormalities concerning for ischemia in the inferolateral leads  -Recommend Lexiscan nuclear stress test to evaluate for ischemia.    -Future addition of statin depending on the above testing results    5. Bilateral carotid bruits  6. Carotid stenosis, bilateral  -Status post bilateral CEA in 2009.  -Repeat carotid duplex ultrasound due to bilateral bruits on exam  -Begin aspirin as above.  Continue statin therapy.    7. Essential hypertension  -Continue current related medications.    8. Tobacco dependence  -Laron Pandey  reports that he has been smoking cigarettes. He has been smoking about 0.50 packs per day. He has never used smokeless tobacco.. I have educated him on the risk of diseases from using tobacco products such as cancer, COPD and heart disease.     I advised him to quit and he is Willing to Quit Tobacco Products with nicotine patches.  He will be given 14 mg/24hr patch daily x2 weeks and then decrease to 7 mg daily x6 weeks.    I spent 5 minutes counseling the patient.        - Return in about 3 months (around 1/19/2021).    Scribed for Mychal Block MD by CARLI Manuel   10/19/2020  17:01 EDT    I, Mychal Block MD, personally performed the services as scribed by the above named individual. I have made any necessary edits and it is both accurate and complete.     Mychal Block MD, MSc, [facility]  Interventional Cardiology  [facility] at [facility]

## 2020-10-22 ENCOUNTER — OFFICE VISIT (OUTPATIENT)
Dept: NEUROSURGERY | Facility: CLINIC | Age: 60
End: 2020-10-22

## 2020-10-22 VITALS — WEIGHT: 185 LBS | HEIGHT: 68 IN | BODY MASS INDEX: 28.04 KG/M2

## 2020-10-22 DIAGNOSIS — I73.9 PAD (PERIPHERAL ARTERY DISEASE) (HCC): ICD-10-CM

## 2020-10-22 DIAGNOSIS — M96.1 LUMBAR POSTLAMINECTOMY SYNDROME: Primary | ICD-10-CM

## 2020-10-22 DIAGNOSIS — M48.062 LUMBAR STENOSIS WITH NEUROGENIC CLAUDICATION: ICD-10-CM

## 2020-10-22 PROCEDURE — 99441 PR PHYS/QHP TELEPHONE EVALUATION 5-10 MIN: CPT | Performed by: NEUROLOGICAL SURGERY

## 2020-10-22 NOTE — PROGRESS NOTES
Laron Pandey  1960  [id number]    Verbal permission was granted for this telemedicine telephone encounter.                    CHIEF COMPLAINT: Back and leg pain         MEDICAL HISTORY SINCE LAST ENCOUNTER: This 60-year-old male is well-known to the neurosurgical service and has been followed with spinal stenosis disc degeneration L4-L5 with evidence of instability.  He has symptoms of back and bilateral pain consistent with the clinical diagnosis of claudication.  However he has coronary vascular disease, diabetes, dyslipidemia and hypertension all of which are risk factors for peripheral vascular disease.  He has seen Dr. Muniz who has ordered arterial Dopplers for his lower extremity.  He has also been scheduled for stress test and carotid  DUS as well.           Past Medical History:   Diagnosis Date   • Arthritis    • Back pain    • Coronary artery disease    • Diabetes (CMS/[facility])    • Heart disease    • Hyperlipidemia    • Hypertension    • Thyroid disease    • Thyroid disease               Past Surgical History:   Procedure Laterality Date   • BACK SURGERY     • CARDIAC SURGERY     • CAROTID ENDARTERECTOMY     • CORONARY ARTERY BYPASS GRAFT                Family History   Problem Relation Age of Onset   • Diabetes Mother    • Heart disease Mother    • Diabetes Father    • Heart disease Father    • Stroke Father    • Heart attack Neg Hx    • Hypertension Neg Hx    • Thyroid disease Neg Hx               Social History     Socioeconomic History   • Marital status:      Spouse name: Not on file   • Number of children: Not on file   • Years of education: Not on file   • Highest education level: Not on file   Tobacco Use   • Smoking status: Current Every Day Smoker     Packs/day: 0.50     Types: Cigarettes   • Smokeless tobacco: Never Used   Substance and Sexual Activity   • Alcohol use: Yes   • Sexual activity: Defer              Allergies   Allergen Reactions   • Celebrex [Celecoxib] Swelling   •  Morphine And Related Nausea Only              Current Outpatient Medications:   •  amLODIPine (NORVASC) 10 MG tablet, Take 10 mg by mouth 2 (Two) Times a Day., Disp: , Rfl: 0  •  BD ULTRA-FINE PEN NEEDLES 29G X 12.7MM misc, Use with Insulin 3 times daily, Disp: 300 each, Rfl: 0  •  Blood Glucose Monitoring Suppl (ONE TOUCH ULTRA MINI) w/Device kit, USE TO TEST TWICE A DAY AS DIRECTED, Disp: , Rfl: 0  •  Continuous Blood Gluc Sensor (FreeStyle Jude 14 Day Sensor) [facility], 1 each Every 14 (Fourteen) Days., Disp: 2 each, Rfl: 6  •  ezetimibe (ZETIA) 10 MG tablet, TAKE 1 TABLET BY MOUTH EVERY DAY, Disp: 30 tablet, Rfl: 3  •  gabapentin (NEURONTIN) 100 MG capsule, Take 1 capsule by mouth 3 (Three) Times a Day., Disp: 90 capsule, Rfl: 1  •  levothyroxine (SYNTHROID, LEVOTHROID) 200 MCG tablet, Take 1 tablet by mouth Daily., Disp: 90 tablet, Rfl: 0  •  levothyroxine (SYNTHROID, LEVOTHROID) 25 MCG tablet, Take 1 tablet by mouth Daily. LAST REFILL WITHOUT APPT, Disp: 90 tablet, Rfl: 0  •  metFORMIN (GLUCOPHAGE) 1000 MG tablet, TAKE 1 TABLET BY MOUTH EVERY DAY, Disp: 90 tablet, Rfl: 0  •  nicotine (Nicotine Step 2) 14 MG/24HR patch, Place 1 patch on the skin as directed by provider Daily., Disp: 14 patch, Rfl: 0  •  nicotine (Nicotine Step 3) 7 MG/24HR patch, Place 1 patch on the skin as directed by provider Daily. Begin after completing 14 mg patches., Disp: 42 patch, Rfl: 0  •  NovoLOG Mix 70/30 FlexPen (70-30) 100 UNIT/ML suspension pen-injector injection, Inject 0.5 mL under the skin into the appropriate area as directed 2 (Two) Times a Day. (Patient taking differently: Inject 50 Units under the skin into the appropriate area as directed 2 (Two) Times a Day. 50 am 46 pm), Disp: 30 pen, Rfl: 3  •  ONE TOUCH ULTRA TEST test strip, USE TO TEST 3 TIMES A DAY AS DIRECTED, Disp: 300 each, Rfl: 2  •  ONETOUCH DELICA LANCETS FINE misc, USE TO TEST TWICE A DAY AS DIRECTED, Disp: , Rfl: 6  •  valsartan-hydrochlorothiazide  (DIOVAN-HCT) 320-12.5 MG per tablet, Take 1 tablet by mouth Daily., Disp: 30 tablet, Rfl: 11         Review of Systems   Constitutional: Negative for activity change, appetite change, chills, diaphoresis, fatigue, fever and unexpected weight change.   HENT: Negative for congestion, dental problem, drooling, ear discharge, ear pain, facial swelling, hearing loss, mouth sores, nosebleeds, postnasal drip, rhinorrhea, sinus pressure, sneezing, sore throat, tinnitus, trouble swallowing and voice change.    Eyes: Negative for photophobia, pain, discharge, redness, itching and visual disturbance.   Respiratory: Negative for apnea, cough, choking, chest tightness, shortness of breath, wheezing and stridor.    Cardiovascular: Negative for chest pain, palpitations and leg swelling.   Gastrointestinal: Negative for abdominal distention, abdominal pain, anal bleeding, blood in stool, constipation, diarrhea, nausea, rectal pain and vomiting.   Endocrine: Negative for cold intolerance, heat intolerance, polydipsia, polyphagia and polyuria.   Genitourinary: Negative for decreased urine volume, difficulty urinating, dysuria, enuresis, flank pain, frequency, genital sores, hematuria and urgency.   Musculoskeletal: Positive for back pain and gait problem. Negative for arthralgias, joint swelling, myalgias, neck pain and neck stiffness.        Bilateral hips and leg pain   Skin: Negative for color change, pallor, rash and wound.   Allergic/Immunologic: Negative for environmental allergies, food allergies and immunocompromised state.   Neurological: Positive for weakness and numbness. Negative for dizziness, tremors, seizures, syncope, facial asymmetry, speech difficulty, light-headedness and headaches.   Hematological: Negative for adenopathy. Does not bruise/bleed easily.   Psychiatric/Behavioral: Negative for agitation, behavioral problems, confusion, decreased concentration, dysphoric mood, hallucinations, self-injury, sleep  "disturbance and suicidal ideas. The patient is not nervous/anxious and is not hyperactive.                Vitals:    10/22/20 1622   Weight: 83.9 kg (185 lb)   Height: 172.7 cm (68\")               EXAMINATION: N/A            MEDICAL DECISION MAKING: I have reviewed his diagnostic studies and discussed the differential diagnosis of claudication.           ASSESSMENT/DISPOSITION: Review of his diagnostic studies indicate that he is a candidate for PLIF L4-L5 for neurogenic claudication should the diagnosis be confirmed by additional studies to rule out peripheral vascular disease.  Epidural steroid injection has helped the back pain somewhat but have not helped claudication.  He is clearly is at risk for peripheral vascular disease as he remains a smoker.  I discussed this with him and stressed the necessity of tobacco cessation for successful spinal surgery.  I will continue to follow him and keep you informed.              I APPRECIATE THE OPPORTUNITY OF THIS REFERRAL. PLEASE CALL IF ANY       QUESTIONS [phone]          "

## 2020-10-23 ENCOUNTER — APPOINTMENT (OUTPATIENT)
Dept: CARDIOLOGY | Facility: HOSPITAL | Age: 60
End: 2020-10-23

## 2020-10-23 ENCOUNTER — HOSPITAL ENCOUNTER (OUTPATIENT)
Dept: CARDIOLOGY | Facility: HOSPITAL | Age: 60
Discharge: HOME | End: 2020-10-23
Admitting: NURSE PRACTITIONER

## 2020-10-23 DIAGNOSIS — I73.9 CLAUDICATION OF BOTH LOWER EXTREMITIES (HCC): ICD-10-CM

## 2020-10-23 PROCEDURE — 93924 LWR XTR VASC STDY BILAT: CPT

## 2020-10-23 PROCEDURE — 93924 LWR XTR VASC STDY BILAT: CPT | Performed by: INTERNAL MEDICINE

## 2020-10-25 LAB
BH CV LOWER ARTERIAL LEFT ABI RATIO: 0.64
BH CV LOWER ARTERIAL LEFT DORSALIS PEDIS SYS MAX: 64 MMHG
BH CV LOWER ARTERIAL LEFT GREAT TOE SYS MAX: 73 MMHG
BH CV LOWER ARTERIAL LEFT POST EX ABI RATIO: 0.37
BH CV LOWER ARTERIAL LEFT POST TIBIAL SYS MAX: 112 MMHG
BH CV LOWER ARTERIAL LEFT TBI RATIO: 0.42
BH CV LOWER ARTERIAL RIGHT ABI RATIO: 0.82
BH CV LOWER ARTERIAL RIGHT DORSALIS PEDIS SYS MAX: 120 MMHG
BH CV LOWER ARTERIAL RIGHT GREAT TOE SYS MAX: 109 MMHG
BH CV LOWER ARTERIAL RIGHT POST EX ABI RATIO: 0.58
BH CV LOWER ARTERIAL RIGHT POST TIBIAL SYS MAX: 142 MMHG
BH CV LOWER ARTERIAL RIGHT TBI RATIO: 0.63
UPPER ARTERIAL LEFT ARM BRACHIAL SYS MAX: 174 MMHG
UPPER ARTERIAL RIGHT ARM BRACHIAL SYS MAX: 158 MMHG

## 2020-10-26 ENCOUNTER — DOCUMENTATION (OUTPATIENT)
Dept: NEUROSURGERY | Facility: CLINIC | Age: 60
End: 2020-10-26

## 2020-10-26 NOTE — PROGRESS NOTES
Per Dr. Keene' request, I called and informed pt that his arterial duplex was normal.  We will work on moving pt's appt for Echo and cardiology eval up so pt may proceed with laminectomy sooner.  Pt verbalized understanding.

## 2020-10-27 ENCOUNTER — TELEPHONE (OUTPATIENT)
Dept: CARDIOLOGY | Facility: CLINIC | Age: 60
End: 2020-10-27

## 2020-10-27 DIAGNOSIS — I10 ESSENTIAL HYPERTENSION: ICD-10-CM

## 2020-10-27 NOTE — TELEPHONE ENCOUNTER
----- Message from Casimiro Li sent at 10/27/2020 12:06 PM EDT -----  Pt LOV on 10/19/20. Pt has a Corotid sched on 10/30 & stress test on 11/2. Dr Keene office is requesting for CC for spine SX. Once the patient has completed these testing. Can you call Dr Keene office to give the CC.  Sabino [phone] ( Neurosurgery)

## 2020-10-28 RX ORDER — VALSARTAN AND HYDROCHLOROTHIAZIDE 320; 12.5 MG/1; MG/1
TABLET, FILM COATED ORAL
Qty: 90 TABLET | Refills: 3 | Status: SHIPPED | OUTPATIENT
Start: 2020-10-28 | End: 2021-12-28 | Stop reason: SDUPTHER

## 2020-10-30 ENCOUNTER — HOSPITAL ENCOUNTER (OUTPATIENT)
Dept: CARDIOLOGY | Facility: HOSPITAL | Age: 60
Discharge: HOME | End: 2020-10-30
Admitting: NURSE PRACTITIONER

## 2020-10-30 ENCOUNTER — APPOINTMENT (OUTPATIENT)
Dept: PREADMISSION TESTING | Facility: HOSPITAL | Age: 60
End: 2020-10-30

## 2020-10-30 DIAGNOSIS — I65.23 CAROTID STENOSIS, BILATERAL: ICD-10-CM

## 2020-10-30 DIAGNOSIS — R09.89 BILATERAL CAROTID BRUITS: ICD-10-CM

## 2020-10-30 LAB
BH CV XLRA MEAS LEFT DIST CCA EDV: 29.9 CM/SEC
BH CV XLRA MEAS LEFT DIST CCA PSV: 149 CM/SEC
BH CV XLRA MEAS LEFT DIST ICA EDV: 50.1 CM/SEC
BH CV XLRA MEAS LEFT DIST ICA PSV: 154 CM/SEC
BH CV XLRA MEAS LEFT ICA/CCA RATIO: 2.5
BH CV XLRA MEAS LEFT MID CCA EDV: 27.5 CM/SEC
BH CV XLRA MEAS LEFT MID CCA PSV: 99.8 CM/SEC
BH CV XLRA MEAS LEFT MID ICA EDV: 108 CM/SEC
BH CV XLRA MEAS LEFT MID ICA PSV: 373 CM/SEC
BH CV XLRA MEAS LEFT PROX CCA EDV: 17.3 CM/SEC
BH CV XLRA MEAS LEFT PROX CCA PSV: 126 CM/SEC
BH CV XLRA MEAS LEFT PROX ECA EDV: 26.7 CM/SEC
BH CV XLRA MEAS LEFT PROX ECA PSV: 266 CM/SEC
BH CV XLRA MEAS LEFT PROX ICA EDV: 22.4 CM/SEC
BH CV XLRA MEAS LEFT PROX ICA PSV: 169 CM/SEC
BH CV XLRA MEAS LEFT PROX SCLA EDV: 0 CM/SEC
BH CV XLRA MEAS LEFT PROX SCLA PSV: 112 CM/SEC
BH CV XLRA MEAS LEFT VERTEBRAL A EDV: 17.5 CM/SEC
BH CV XLRA MEAS LEFT VERTEBRAL A PSV: 58.9 CM/SEC
BH CV XLRA MEAS RIGHT DIST CCA EDV: 27.1 CM/SEC
BH CV XLRA MEAS RIGHT DIST CCA PSV: 66 CM/SEC
BH CV XLRA MEAS RIGHT DIST ICA EDV: 30.6 CM/SEC
BH CV XLRA MEAS RIGHT DIST ICA PSV: 85.6 CM/SEC
BH CV XLRA MEAS RIGHT ICA/CCA RATIO: 2.5
BH CV XLRA MEAS RIGHT MID CCA EDV: 22.8 CM/SEC
BH CV XLRA MEAS RIGHT MID CCA PSV: 64 CM/SEC
BH CV XLRA MEAS RIGHT MID ICA EDV: 56.6 CM/SEC
BH CV XLRA MEAS RIGHT MID ICA PSV: 164 CM/SEC
BH CV XLRA MEAS RIGHT PROX CCA EDV: 19.3 CM/SEC
BH CV XLRA MEAS RIGHT PROX CCA PSV: 55.8 CM/SEC
BH CV XLRA MEAS RIGHT PROX ECA EDV: 9.8 CM/SEC
BH CV XLRA MEAS RIGHT PROX ECA PSV: 30.6 CM/SEC
BH CV XLRA MEAS RIGHT PROX ICA EDV: 19.6 CM/SEC
BH CV XLRA MEAS RIGHT PROX ICA PSV: 63.3 CM/SEC
BH CV XLRA MEAS RIGHT PROX SCLA EDV: 0 CM/SEC
BH CV XLRA MEAS RIGHT PROX SCLA PSV: 214 CM/SEC
BH CV XLRA MEAS RIGHT VERTEBRAL A EDV: 20 CM/SEC
BH CV XLRA MEAS RIGHT VERTEBRAL A PSV: 59.3 CM/SEC

## 2020-10-30 PROCEDURE — U0004 COV-19 TEST NON-CDC HGH THRU: HCPCS

## 2020-10-30 PROCEDURE — 93880 EXTRACRANIAL BILAT STUDY: CPT

## 2020-10-30 PROCEDURE — 93880 EXTRACRANIAL BILAT STUDY: CPT | Performed by: INTERNAL MEDICINE

## 2020-10-30 PROCEDURE — C9803 HOPD COVID-19 SPEC COLLECT: HCPCS

## 2020-10-31 LAB — SARS-COV-2 RNA RESP QL NAA+PROBE: NOT DETECTED

## 2020-11-02 ENCOUNTER — HOSPITAL ENCOUNTER (OUTPATIENT)
Dept: CARDIOLOGY | Facility: HOSPITAL | Age: 60
Discharge: HOME | End: 2020-11-02

## 2020-11-02 VITALS — WEIGHT: 185 LBS | BODY MASS INDEX: 28.04 KG/M2 | HEIGHT: 68 IN

## 2020-11-02 DIAGNOSIS — I20.8 ANGINAL EQUIVALENT (HCC): ICD-10-CM

## 2020-11-02 LAB
BH CV NUCLEAR PRIOR STUDY: 2
BH CV STRESS BP STAGE 2: NORMAL
BH CV STRESS BP STAGE 4: NORMAL
BH CV STRESS COMMENTS STAGE 1: NORMAL
BH CV STRESS DOSE REGADENOSON STAGE 1: 0.4
BH CV STRESS DURATION MIN STAGE 1: 1
BH CV STRESS DURATION MIN STAGE 2: 1
BH CV STRESS DURATION MIN STAGE 3: 1
BH CV STRESS DURATION MIN STAGE 4: 1
BH CV STRESS DURATION SEC STAGE 1: 0
BH CV STRESS DURATION SEC STAGE 2: 0
BH CV STRESS DURATION SEC STAGE 3: 0
BH CV STRESS DURATION SEC STAGE 4: 0
BH CV STRESS HR STAGE 1: 82
BH CV STRESS HR STAGE 2: 98
BH CV STRESS HR STAGE 3: 91
BH CV STRESS HR STAGE 4: 95
BH CV STRESS PROTOCOL 1: NORMAL
BH CV STRESS RECOVERY BP: NORMAL MMHG
BH CV STRESS RECOVERY HR: 95 BPM
BH CV STRESS STAGE 1: 1
BH CV STRESS STAGE 2: 2
BH CV STRESS STAGE 3: 3
BH CV STRESS STAGE 4: 4
LV EF NUC BP: 51 %
MAXIMAL PREDICTED HEART RATE: 160 BPM
PERCENT MAX PREDICTED HR: 63.13 %
STRESS BASELINE BP: NORMAL MMHG
STRESS BASELINE HR: 69 BPM
STRESS O2 SAT REST: 98 %
STRESS PERCENT HR: 74 %
STRESS POST PEAK BP: NORMAL MMHG
STRESS POST PEAK HR: 101 BPM
STRESS TARGET HR: 136 BPM

## 2020-11-02 PROCEDURE — 25010000002 REGADENOSON 0.4 MG/5ML SOLUTION: Performed by: INTERNAL MEDICINE

## 2020-11-02 PROCEDURE — 93018 CV STRESS TEST I&R ONLY: CPT | Performed by: INTERNAL MEDICINE

## 2020-11-02 PROCEDURE — 0 TECHNETIUM SESTAMIBI: Performed by: NURSE PRACTITIONER

## 2020-11-02 PROCEDURE — 93017 CV STRESS TEST TRACING ONLY: CPT

## 2020-11-02 PROCEDURE — 78452 HT MUSCLE IMAGE SPECT MULT: CPT

## 2020-11-02 PROCEDURE — 78452 HT MUSCLE IMAGE SPECT MULT: CPT | Performed by: INTERNAL MEDICINE

## 2020-11-02 PROCEDURE — A9500 TC99M SESTAMIBI: HCPCS | Performed by: NURSE PRACTITIONER

## 2020-11-02 RX ADMIN — TECHNETIUM TC 99M SESTAMIBI 1 DOSE: 1 INJECTION INTRAVENOUS at 09:51

## 2020-11-02 RX ADMIN — TECHNETIUM TC 99M SESTAMIBI 1 DOSE: 1 INJECTION INTRAVENOUS at 08:05

## 2020-11-02 RX ADMIN — REGADENOSON 0.4 MG: 0.08 INJECTION, SOLUTION INTRAVENOUS at 09:45

## 2020-11-05 ENCOUNTER — HOSPITAL ENCOUNTER (OUTPATIENT)
Dept: NEUROLOGY | Facility: HOSPITAL | Age: 60
Discharge: HOME | End: 2020-11-05
Admitting: ANESTHESIOLOGY

## 2020-11-05 ENCOUNTER — TELEPHONE (OUTPATIENT)
Dept: CARDIOLOGY | Facility: CLINIC | Age: 60
End: 2020-11-05

## 2020-11-05 DIAGNOSIS — I73.9 CLAUDICATION OF BOTH LOWER EXTREMITIES (HCC): Primary | ICD-10-CM

## 2020-11-05 DIAGNOSIS — R94.39 ABNORMAL STRESS TEST: ICD-10-CM

## 2020-11-05 PROCEDURE — 95886 MUSC TEST DONE W/N TEST COMP: CPT

## 2020-11-05 PROCEDURE — 95910 NRV CNDJ TEST 7-8 STUDIES: CPT

## 2020-11-05 NOTE — TELEPHONE ENCOUNTER
Patient contacted to review results and recommendations per MJS. Patient agreeable to left heart cath and peripheral angiogram. All questions were answered at this time.

## 2020-11-05 NOTE — TELEPHONE ENCOUNTER
----- Message from Mychal Block MD sent at 11/3/2020  4:14 PM EST -----  Please let the patient know that his stress test showed some mild abnormalities.  I recommend we move forward with a heart/coronary angiogram as well as a peripheral angiogram.  Please let the patient know that if there are blockages in his heart arteries/grafts, we will fix those first and then bring him back to fix the leg arteries at a separate nereyda due to contrast limits and the length of the procedure e.  If his heart arteries/grafts look okay, then will work on his legs during the first procedure.  Thanks

## 2020-11-06 PROBLEM — I73.9 CLAUDICATION OF BOTH LOWER EXTREMITIES: Status: ACTIVE | Noted: 2020-11-06

## 2020-11-06 PROBLEM — R94.39 ABNORMAL STRESS TEST: Status: ACTIVE | Noted: 2020-11-06

## 2020-11-08 ENCOUNTER — APPOINTMENT (OUTPATIENT)
Dept: PREADMISSION TESTING | Facility: HOSPITAL | Age: 60
End: 2020-11-08

## 2020-11-08 PROCEDURE — C9803 HOPD COVID-19 SPEC COLLECT: HCPCS

## 2020-11-08 PROCEDURE — U0004 COV-19 TEST NON-CDC HGH THRU: HCPCS

## 2020-11-09 ENCOUNTER — PREP FOR SURGERY (OUTPATIENT)
Dept: OTHER | Facility: HOSPITAL | Age: 60
End: 2020-11-09

## 2020-11-09 DIAGNOSIS — I73.9 PVD (PERIPHERAL VASCULAR DISEASE) WITH CLAUDICATION (HCC): Primary | ICD-10-CM

## 2020-11-09 LAB — SARS-COV-2 RNA RESP QL NAA+PROBE: NOT DETECTED

## 2020-11-09 RX ORDER — ASPIRIN 81 MG/1
81 TABLET ORAL DAILY
Status: CANCELLED | OUTPATIENT
Start: 2020-11-10

## 2020-11-09 RX ORDER — SODIUM CHLORIDE 0.9 % (FLUSH) 0.9 %
10 SYRINGE (ML) INJECTION AS NEEDED
Status: CANCELLED | OUTPATIENT
Start: 2020-11-09

## 2020-11-09 RX ORDER — ASPIRIN 81 MG/1
324 TABLET, CHEWABLE ORAL ONCE
Status: CANCELLED | OUTPATIENT
Start: 2020-11-09 | End: 2020-11-09

## 2020-11-09 RX ORDER — SODIUM CHLORIDE 0.9 % (FLUSH) 0.9 %
3 SYRINGE (ML) INJECTION EVERY 12 HOURS SCHEDULED
Status: CANCELLED | OUTPATIENT
Start: 2020-11-09

## 2020-11-11 ENCOUNTER — HOSPITAL ENCOUNTER (OUTPATIENT)
Facility: HOSPITAL | Age: 60
Discharge: HOME | End: 2020-11-12
Attending: INTERNAL MEDICINE | Admitting: INTERNAL MEDICINE

## 2020-11-11 DIAGNOSIS — I73.9 CLAUDICATION OF BOTH LOWER EXTREMITIES (HCC): ICD-10-CM

## 2020-11-11 DIAGNOSIS — I73.9 PVD (PERIPHERAL VASCULAR DISEASE) WITH CLAUDICATION (HCC): ICD-10-CM

## 2020-11-11 DIAGNOSIS — R94.39 ABNORMAL STRESS TEST: ICD-10-CM

## 2020-11-11 LAB
ALBUMIN SERPL-MCNC: 4.3 G/DL (ref 3.5–5.2)
ALBUMIN/GLOB SERPL: 2 G/DL
ALP SERPL-CCNC: 117 U/L (ref 39–117)
ALT SERPL W P-5'-P-CCNC: 17 U/L (ref 1–41)
ANION GAP SERPL CALCULATED.3IONS-SCNC: 10 MMOL/L (ref 5–15)
AST SERPL-CCNC: 25 U/L (ref 1–40)
BILIRUB SERPL-MCNC: 0.3 MG/DL (ref 0–1.2)
BUN SERPL-MCNC: 13 MG/DL (ref 8–23)
BUN/CREAT SERPL: 19.7 (ref 7–25)
CALCIUM SPEC-SCNC: 9.3 MG/DL (ref 8.6–10.5)
CHLORIDE SERPL-SCNC: 101 MMOL/L (ref 98–107)
CHOLEST SERPL-MCNC: 201 MG/DL (ref 0–200)
CO2 SERPL-SCNC: 22 MMOL/L (ref 22–29)
CREAT SERPL-MCNC: 0.66 MG/DL (ref 0.76–1.27)
DEPRECATED RDW RBC AUTO: 43.8 FL (ref 37–54)
ERYTHROCYTE [DISTWIDTH] IN BLOOD BY AUTOMATED COUNT: 12.4 % (ref 12.3–15.4)
GFR SERPL CREATININE-BSD FRML MDRD: 123 ML/MIN/1.73
GLOBULIN UR ELPH-MCNC: 2.2 GM/DL
GLUCOSE BLDC GLUCOMTR-MCNC: 129 MG/DL (ref 70–130)
GLUCOSE BLDC GLUCOMTR-MCNC: 325 MG/DL (ref 70–130)
GLUCOSE SERPL-MCNC: 171 MG/DL (ref 65–99)
HBA1C MFR BLD: 10.1 % (ref 4.8–5.6)
HCT VFR BLD AUTO: 43.5 % (ref 37.5–51)
HDLC SERPL-MCNC: 47 MG/DL (ref 40–60)
HGB BLD-MCNC: 14.6 G/DL (ref 13–17.7)
LDLC SERPL CALC-MCNC: 139 MG/DL (ref 0–100)
LDLC/HDLC SERPL: 2.93 {RATIO}
MCH RBC QN AUTO: 32.2 PG (ref 26.6–33)
MCHC RBC AUTO-ENTMCNC: 33.6 G/DL (ref 31.5–35.7)
MCV RBC AUTO: 96 FL (ref 79–97)
PLATELET # BLD AUTO: 272 10*3/MM3 (ref 140–450)
PMV BLD AUTO: 10 FL (ref 6–12)
POTASSIUM SERPL-SCNC: 4.6 MMOL/L (ref 3.5–5.2)
PROT SERPL-MCNC: 6.5 G/DL (ref 6–8.5)
RBC # BLD AUTO: 4.53 10*6/MM3 (ref 4.14–5.8)
SODIUM SERPL-SCNC: 133 MMOL/L (ref 136–145)
TRIGL SERPL-MCNC: 81 MG/DL (ref 0–150)
VLDLC SERPL-MCNC: 15 MG/DL (ref 5–40)
WBC # BLD AUTO: 8.81 10*3/MM3 (ref 3.4–10.8)

## 2020-11-11 PROCEDURE — 93571 IV DOP VEL&/PRESS C FLO 1ST: CPT | Performed by: INTERNAL MEDICINE

## 2020-11-11 PROCEDURE — 83036 HEMOGLOBIN GLYCOSYLATED A1C: CPT | Performed by: INTERNAL MEDICINE

## 2020-11-11 PROCEDURE — 25010000002 FENTANYL CITRATE (PF) 100 MCG/2ML SOLUTION: Performed by: INTERNAL MEDICINE

## 2020-11-11 PROCEDURE — C1894 INTRO/SHEATH, NON-LASER: HCPCS | Performed by: INTERNAL MEDICINE

## 2020-11-11 PROCEDURE — 99152 MOD SED SAME PHYS/QHP 5/>YRS: CPT | Performed by: INTERNAL MEDICINE

## 2020-11-11 PROCEDURE — C1887 CATHETER, GUIDING: HCPCS | Performed by: INTERNAL MEDICINE

## 2020-11-11 PROCEDURE — 75625 CONTRAST EXAM ABDOMINL AORTA: CPT | Performed by: INTERNAL MEDICINE

## 2020-11-11 PROCEDURE — C1725 CATH, TRANSLUMIN NON-LASER: HCPCS | Performed by: INTERNAL MEDICINE

## 2020-11-11 PROCEDURE — 75716 ARTERY X-RAYS ARMS/LEGS: CPT | Performed by: INTERNAL MEDICINE

## 2020-11-11 PROCEDURE — 82962 GLUCOSE BLOOD TEST: CPT

## 2020-11-11 PROCEDURE — 36245 INS CATH ABD/L-EXT ART 1ST: CPT | Performed by: INTERNAL MEDICINE

## 2020-11-11 PROCEDURE — 93005 ELECTROCARDIOGRAM TRACING: CPT | Performed by: INTERNAL MEDICINE

## 2020-11-11 PROCEDURE — 25010000002 MIDAZOLAM PER 1 MG: Performed by: INTERNAL MEDICINE

## 2020-11-11 PROCEDURE — C1874 STENT, COATED/COV W/DEL SYS: HCPCS | Performed by: INTERNAL MEDICINE

## 2020-11-11 PROCEDURE — C1769 GUIDE WIRE: HCPCS | Performed by: INTERNAL MEDICINE

## 2020-11-11 PROCEDURE — C9600 PERC DRUG-EL COR STENT SING: HCPCS | Performed by: INTERNAL MEDICINE

## 2020-11-11 PROCEDURE — 0 IOPAMIDOL PER 1 ML: Performed by: INTERNAL MEDICINE

## 2020-11-11 PROCEDURE — 85347 COAGULATION TIME ACTIVATED: CPT

## 2020-11-11 PROCEDURE — 25010000002 HEPARIN (PORCINE) PER 1000 UNITS: Performed by: INTERNAL MEDICINE

## 2020-11-11 PROCEDURE — 85027 COMPLETE CBC AUTOMATED: CPT | Performed by: INTERNAL MEDICINE

## 2020-11-11 PROCEDURE — 93459 L HRT ART/GRFT ANGIO: CPT | Performed by: INTERNAL MEDICINE

## 2020-11-11 PROCEDURE — 99153 MOD SED SAME PHYS/QHP EA: CPT | Performed by: INTERNAL MEDICINE

## 2020-11-11 PROCEDURE — 80061 LIPID PANEL: CPT | Performed by: INTERNAL MEDICINE

## 2020-11-11 PROCEDURE — 92928 PRQ TCAT PLMT NTRAC ST 1 LES: CPT | Performed by: INTERNAL MEDICINE

## 2020-11-11 PROCEDURE — 80053 COMPREHEN METABOLIC PANEL: CPT | Performed by: INTERNAL MEDICINE

## 2020-11-11 DEVICE — EVEROLIMUS-ELUTING PLATINUM CHROMIUM CORONARY STENT SYSTEM
Type: IMPLANTABLE DEVICE | Status: FUNCTIONAL
Brand: SYNERGY™

## 2020-11-11 RX ORDER — CLOPIDOGREL BISULFATE 75 MG/1
75 TABLET ORAL DAILY
Status: DISCONTINUED | OUTPATIENT
Start: 2020-11-12 | End: 2020-11-12 | Stop reason: HOSPADM

## 2020-11-11 RX ORDER — METOPROLOL SUCCINATE 25 MG/1
25 TABLET, EXTENDED RELEASE ORAL
Status: DISCONTINUED | OUTPATIENT
Start: 2020-11-12 | End: 2020-11-12 | Stop reason: HOSPADM

## 2020-11-11 RX ORDER — HEPARIN SODIUM 1000 [USP'U]/ML
INJECTION, SOLUTION INTRAVENOUS; SUBCUTANEOUS AS NEEDED
Status: DISCONTINUED | OUTPATIENT
Start: 2020-11-11 | End: 2020-11-11 | Stop reason: HOSPADM

## 2020-11-11 RX ORDER — SODIUM CHLORIDE 9 MG/ML
INJECTION, SOLUTION INTRAVENOUS CONTINUOUS PRN
Status: COMPLETED | OUTPATIENT
Start: 2020-11-11 | End: 2020-11-11

## 2020-11-11 RX ORDER — ASPIRIN 81 MG/1
81 TABLET ORAL DAILY
COMMUNITY
End: 2022-06-17 | Stop reason: HOSPADM

## 2020-11-11 RX ORDER — DEXTROSE MONOHYDRATE 25 G/50ML
25 INJECTION, SOLUTION INTRAVENOUS
Status: DISCONTINUED | OUTPATIENT
Start: 2020-11-11 | End: 2020-11-12 | Stop reason: HOSPADM

## 2020-11-11 RX ORDER — SODIUM CHLORIDE 9 MG/ML
3 INJECTION, SOLUTION INTRAVENOUS CONTINUOUS
Status: ACTIVE | OUTPATIENT
Start: 2020-11-11 | End: 2020-11-11

## 2020-11-11 RX ORDER — AMLODIPINE BESYLATE 10 MG/1
10 TABLET ORAL 2 TIMES DAILY
Status: DISCONTINUED | OUTPATIENT
Start: 2020-11-11 | End: 2020-11-12 | Stop reason: HOSPADM

## 2020-11-11 RX ORDER — SODIUM CHLORIDE 0.9 % (FLUSH) 0.9 %
10 SYRINGE (ML) INJECTION AS NEEDED
Status: DISCONTINUED | OUTPATIENT
Start: 2020-11-11 | End: 2020-11-12 | Stop reason: HOSPADM

## 2020-11-11 RX ORDER — LIDOCAINE HYDROCHLORIDE 10 MG/ML
INJECTION, SOLUTION EPIDURAL; INFILTRATION; INTRACAUDAL; PERINEURAL AS NEEDED
Status: DISCONTINUED | OUTPATIENT
Start: 2020-11-11 | End: 2020-11-11 | Stop reason: HOSPADM

## 2020-11-11 RX ORDER — CLOPIDOGREL BISULFATE 75 MG/1
TABLET ORAL AS NEEDED
Status: DISCONTINUED | OUTPATIENT
Start: 2020-11-11 | End: 2020-11-11 | Stop reason: HOSPADM

## 2020-11-11 RX ORDER — SODIUM CHLORIDE 0.9 % (FLUSH) 0.9 %
3 SYRINGE (ML) INJECTION EVERY 12 HOURS SCHEDULED
Status: DISCONTINUED | OUTPATIENT
Start: 2020-11-11 | End: 2020-11-12 | Stop reason: HOSPADM

## 2020-11-11 RX ORDER — ASPIRIN 81 MG/1
81 TABLET ORAL DAILY
Status: DISCONTINUED | OUTPATIENT
Start: 2020-11-12 | End: 2020-11-12 | Stop reason: HOSPADM

## 2020-11-11 RX ORDER — MIDAZOLAM HYDROCHLORIDE 1 MG/ML
INJECTION INTRAMUSCULAR; INTRAVENOUS AS NEEDED
Status: DISCONTINUED | OUTPATIENT
Start: 2020-11-11 | End: 2020-11-11 | Stop reason: HOSPADM

## 2020-11-11 RX ORDER — LEVOTHYROXINE SODIUM 0.03 MG/1
25 TABLET ORAL
Status: DISCONTINUED | OUTPATIENT
Start: 2020-11-12 | End: 2020-11-12 | Stop reason: HOSPADM

## 2020-11-11 RX ORDER — LEVOTHYROXINE SODIUM 0.1 MG/1
200 TABLET ORAL
Status: DISCONTINUED | OUTPATIENT
Start: 2020-11-12 | End: 2020-11-12 | Stop reason: HOSPADM

## 2020-11-11 RX ORDER — ASPIRIN 81 MG/1
324 TABLET, CHEWABLE ORAL ONCE
Status: COMPLETED | OUTPATIENT
Start: 2020-11-11 | End: 2020-11-11

## 2020-11-11 RX ORDER — NICOTINE POLACRILEX 4 MG
15 LOZENGE BUCCAL
Status: DISCONTINUED | OUTPATIENT
Start: 2020-11-11 | End: 2020-11-12 | Stop reason: HOSPADM

## 2020-11-11 RX ORDER — SODIUM CHLORIDE 9 MG/ML
1 INJECTION, SOLUTION INTRAVENOUS CONTINUOUS
Status: ACTIVE | OUTPATIENT
Start: 2020-11-11 | End: 2020-11-11

## 2020-11-11 RX ORDER — ASPIRIN 81 MG/1
81 TABLET ORAL DAILY
Status: DISCONTINUED | OUTPATIENT
Start: 2020-11-12 | End: 2020-11-11

## 2020-11-11 RX ORDER — SODIUM CHLORIDE 9 MG/ML
250 INJECTION, SOLUTION INTRAVENOUS ONCE AS NEEDED
Status: DISCONTINUED | OUTPATIENT
Start: 2020-11-11 | End: 2020-11-12 | Stop reason: HOSPADM

## 2020-11-11 RX ORDER — FENTANYL CITRATE 50 UG/ML
INJECTION, SOLUTION INTRAMUSCULAR; INTRAVENOUS AS NEEDED
Status: DISCONTINUED | OUTPATIENT
Start: 2020-11-11 | End: 2020-11-11 | Stop reason: HOSPADM

## 2020-11-11 RX ORDER — ACETAMINOPHEN 325 MG/1
650 TABLET ORAL EVERY 4 HOURS PRN
Status: DISCONTINUED | OUTPATIENT
Start: 2020-11-11 | End: 2020-11-12 | Stop reason: HOSPADM

## 2020-11-11 RX ADMIN — SODIUM CHLORIDE, PRESERVATIVE FREE 3 ML: 5 INJECTION INTRAVENOUS at 21:39

## 2020-11-11 RX ADMIN — AMLODIPINE BESYLATE 10 MG: 10 TABLET ORAL at 21:39

## 2020-11-11 RX ADMIN — ASPIRIN 324 MG: 81 TABLET, CHEWABLE ORAL at 10:19

## 2020-11-11 RX ADMIN — SODIUM CHLORIDE 3 ML/KG/HR: 9 INJECTION, SOLUTION INTRAVENOUS at 09:58

## 2020-11-11 NOTE — INTERVAL H&P NOTE
H&P reviewed. The patient was examined and there are no changes to the H&P.   Today's labs pending will review when available. Right radial Barbeau type A.  Planning right femoral pulse. Will check pulse. Plan for LHC +/- PCI and AA with run off +/- CBI today with Dr. Block. The risks, benefits, and alternatives of the procedure have been reviewed and the patient wishes to proceed.     Mickie Carpenter, APRN  11/11/20 09:42 EST

## 2020-11-12 ENCOUNTER — APPOINTMENT (OUTPATIENT)
Dept: CARDIOLOGY | Facility: HOSPITAL | Age: 60
End: 2020-11-12

## 2020-11-12 VITALS
DIASTOLIC BLOOD PRESSURE: 84 MMHG | OXYGEN SATURATION: 97 % | RESPIRATION RATE: 16 BRPM | HEIGHT: 68 IN | TEMPERATURE: 98.1 F | HEART RATE: 69 BPM | SYSTOLIC BLOOD PRESSURE: 144 MMHG | BODY MASS INDEX: 28.49 KG/M2 | WEIGHT: 188 LBS

## 2020-11-12 LAB
ANION GAP SERPL CALCULATED.3IONS-SCNC: 10 MMOL/L (ref 5–15)
BH CV ECHO MEAS - AO MAX PG (FULL): 8.1 MMHG
BH CV ECHO MEAS - AO MAX PG: 13.4 MMHG
BH CV ECHO MEAS - AO MEAN PG (FULL): 4.1 MMHG
BH CV ECHO MEAS - AO MEAN PG: 6.4 MMHG
BH CV ECHO MEAS - AO ROOT AREA (BSA CORRECTED): 1.4
BH CV ECHO MEAS - AO ROOT AREA: 6 CM^2
BH CV ECHO MEAS - AO ROOT DIAM: 2.8 CM
BH CV ECHO MEAS - AO V2 MAX: 183.2 CM/SEC
BH CV ECHO MEAS - AO V2 MEAN: 118.8 CM/SEC
BH CV ECHO MEAS - AO V2 VTI: 32.2 CM
BH CV ECHO MEAS - ASC AORTA: 2.9 CM
BH CV ECHO MEAS - AVA(I,A): 2 CM^2
BH CV ECHO MEAS - AVA(I,D): 2 CM^2
BH CV ECHO MEAS - AVA(V,A): 2.1 CM^2
BH CV ECHO MEAS - AVA(V,D): 2.1 CM^2
BH CV ECHO MEAS - BSA(HAYCOCK): 2 M^2
BH CV ECHO MEAS - BSA: 2 M^2
BH CV ECHO MEAS - BZI_BMI: 28.6 KILOGRAMS/M^2
BH CV ECHO MEAS - BZI_METRIC_HEIGHT: 172.7 CM
BH CV ECHO MEAS - BZI_METRIC_WEIGHT: 85.3 KG
BH CV ECHO MEAS - EDV(CUBED): 85.8 ML
BH CV ECHO MEAS - EDV(MOD-SP2): 86 ML
BH CV ECHO MEAS - EDV(MOD-SP4): 104 ML
BH CV ECHO MEAS - EDV(TEICH): 88.2 ML
BH CV ECHO MEAS - EF(CUBED): 75.2 %
BH CV ECHO MEAS - EF(MOD-BP): 56 %
BH CV ECHO MEAS - EF(MOD-SP2): 60.5 %
BH CV ECHO MEAS - EF(MOD-SP4): 52.9 %
BH CV ECHO MEAS - EF(TEICH): 67.3 %
BH CV ECHO MEAS - ESV(CUBED): 21.3 ML
BH CV ECHO MEAS - ESV(MOD-SP2): 34 ML
BH CV ECHO MEAS - ESV(MOD-SP4): 49 ML
BH CV ECHO MEAS - ESV(TEICH): 28.8 ML
BH CV ECHO MEAS - FS: 37.2 %
BH CV ECHO MEAS - IVS/LVPW: 1
BH CV ECHO MEAS - IVSD: 1.4 CM
BH CV ECHO MEAS - LA DIMENSION: 4.3 CM
BH CV ECHO MEAS - LA/AO: 1.6
BH CV ECHO MEAS - LAD MAJOR: 5.2 CM
BH CV ECHO MEAS - LAT PEAK E' VEL: 6.9 CM/SEC
BH CV ECHO MEAS - LATERAL E/E' RATIO: 8.1
BH CV ECHO MEAS - LV DIASTOLIC VOL/BSA (35-75): 52.2 ML/M^2
BH CV ECHO MEAS - LV IVRT: 0.1 SEC
BH CV ECHO MEAS - LV MASS(C)D: 239.9 GRAMS
BH CV ECHO MEAS - LV MASS(C)DI: 120.5 GRAMS/M^2
BH CV ECHO MEAS - LV MAX PG: 5.4 MMHG
BH CV ECHO MEAS - LV MEAN PG: 2.3 MMHG
BH CV ECHO MEAS - LV SYSTOLIC VOL/BSA (12-30): 24.6 ML/M^2
BH CV ECHO MEAS - LV V1 MAX: 115.7 CM/SEC
BH CV ECHO MEAS - LV V1 MEAN: 67 CM/SEC
BH CV ECHO MEAS - LV V1 VTI: 19.6 CM
BH CV ECHO MEAS - LVIDD: 4.4 CM
BH CV ECHO MEAS - LVIDS: 2.8 CM
BH CV ECHO MEAS - LVLD AP2: 8 CM
BH CV ECHO MEAS - LVLD AP4: 7.7 CM
BH CV ECHO MEAS - LVLS AP2: 6.6 CM
BH CV ECHO MEAS - LVLS AP4: 6.2 CM
BH CV ECHO MEAS - LVOT AREA (M): 3.1 CM^2
BH CV ECHO MEAS - LVOT AREA: 3.3 CM^2
BH CV ECHO MEAS - LVOT DIAM: 2 CM
BH CV ECHO MEAS - LVPWD: 1.4 CM
BH CV ECHO MEAS - MED PEAK E' VEL: 5.2 CM/SEC
BH CV ECHO MEAS - MEDIAL E/E' RATIO: 10.8
BH CV ECHO MEAS - MV A MAX VEL: 83.4 CM/SEC
BH CV ECHO MEAS - MV DEC SLOPE: 566.9 CM/SEC^2
BH CV ECHO MEAS - MV DEC TIME: 0.18 SEC
BH CV ECHO MEAS - MV E MAX VEL: 57.3 CM/SEC
BH CV ECHO MEAS - MV E/A: 0.69
BH CV ECHO MEAS - MV P1/2T MAX VEL: 104.5 CM/SEC
BH CV ECHO MEAS - MV P1/2T: 54 MSEC
BH CV ECHO MEAS - MVA P1/2T LCG: 2.1 CM^2
BH CV ECHO MEAS - MVA(P1/2T): 4.1 CM^2
BH CV ECHO MEAS - PA ACC SLOPE: 973.6 CM/SEC^2
BH CV ECHO MEAS - PA ACC TIME: 0.12 SEC
BH CV ECHO MEAS - PA MAX PG: 8.9 MMHG
BH CV ECHO MEAS - PA PR(ACCEL): 23.5 MMHG
BH CV ECHO MEAS - PA V2 MAX: 149.3 CM/SEC
BH CV ECHO MEAS - SI(AO): 97.7 ML/M^2
BH CV ECHO MEAS - SI(CUBED): 32.4 ML/M^2
BH CV ECHO MEAS - SI(LVOT): 32.4 ML/M^2
BH CV ECHO MEAS - SI(MOD-SP2): 26.1 ML/M^2
BH CV ECHO MEAS - SI(MOD-SP4): 27.6 ML/M^2
BH CV ECHO MEAS - SI(TEICH): 29.8 ML/M^2
BH CV ECHO MEAS - SV(AO): 194.5 ML
BH CV ECHO MEAS - SV(CUBED): 64.5 ML
BH CV ECHO MEAS - SV(LVOT): 64.4 ML
BH CV ECHO MEAS - SV(MOD-SP2): 52 ML
BH CV ECHO MEAS - SV(MOD-SP4): 55 ML
BH CV ECHO MEAS - SV(TEICH): 59.4 ML
BH CV ECHO MEAS - TAPSE (>1.6): 1.6 CM
BH CV ECHO MEASUREMENTS AVERAGE E/E' RATIO: 9.47
BH CV VAS BP RIGHT ARM: NORMAL MMHG
BH CV XLRA - RV BASE: 2.9 CM
BH CV XLRA - RV LENGTH: 6.5 CM
BH CV XLRA - RV MID: 2.7 CM
BH CV XLRA - TDI S': 10.7 CM/SEC
BUN SERPL-MCNC: 14 MG/DL (ref 8–23)
BUN/CREAT SERPL: 23.3 (ref 7–25)
CALCIUM SPEC-SCNC: 8.8 MG/DL (ref 8.6–10.5)
CHLORIDE SERPL-SCNC: 99 MMOL/L (ref 98–107)
CO2 SERPL-SCNC: 22 MMOL/L (ref 22–29)
CREAT SERPL-MCNC: 0.6 MG/DL (ref 0.76–1.27)
GFR SERPL CREATININE-BSD FRML MDRD: 137 ML/MIN/1.73
GLUCOSE BLDC GLUCOMTR-MCNC: 343 MG/DL (ref 70–130)
GLUCOSE BLDC GLUCOMTR-MCNC: 378 MG/DL (ref 70–130)
GLUCOSE SERPL-MCNC: 390 MG/DL (ref 65–99)
LEFT ATRIUM VOLUME INDEX: 25.1 ML/M^2
LEFT ATRIUM VOLUME: 50 ML
MAXIMAL PREDICTED HEART RATE: 160 BPM
POTASSIUM SERPL-SCNC: 4.4 MMOL/L (ref 3.5–5.2)
SODIUM SERPL-SCNC: 131 MMOL/L (ref 136–145)
STRESS TARGET HR: 136 BPM

## 2020-11-12 PROCEDURE — G0108 DIAB MANAGE TRN  PER INDIV: HCPCS

## 2020-11-12 PROCEDURE — 93306 TTE W/DOPPLER COMPLETE: CPT | Performed by: INTERNAL MEDICINE

## 2020-11-12 PROCEDURE — 80048 BASIC METABOLIC PNL TOTAL CA: CPT | Performed by: INTERNAL MEDICINE

## 2020-11-12 PROCEDURE — 99213 OFFICE O/P EST LOW 20 MIN: CPT | Performed by: NURSE PRACTITIONER

## 2020-11-12 PROCEDURE — 63710000001 INSULIN LISPRO (HUMAN) PER 5 UNITS: Performed by: INTERNAL MEDICINE

## 2020-11-12 PROCEDURE — 82962 GLUCOSE BLOOD TEST: CPT

## 2020-11-12 PROCEDURE — 93306 TTE W/DOPPLER COMPLETE: CPT

## 2020-11-12 RX ORDER — METOPROLOL SUCCINATE 25 MG/1
25 TABLET, EXTENDED RELEASE ORAL
Qty: 30 TABLET | Refills: 11 | Status: SHIPPED | OUTPATIENT
Start: 2020-11-13 | End: 2021-12-28 | Stop reason: SDUPTHER

## 2020-11-12 RX ORDER — CLOPIDOGREL BISULFATE 75 MG/1
75 TABLET ORAL DAILY
Qty: 30 TABLET | Refills: 11 | Status: SHIPPED | OUTPATIENT
Start: 2020-11-13 | End: 2021-06-16

## 2020-11-12 RX ADMIN — CLOPIDOGREL BISULFATE 75 MG: 75 TABLET ORAL at 08:26

## 2020-11-12 RX ADMIN — INSULIN LISPRO 12 UNITS: 100 INJECTION, SOLUTION INTRAVENOUS; SUBCUTANEOUS at 08:26

## 2020-11-12 RX ADMIN — METOPROLOL SUCCINATE 25 MG: 25 TABLET, EXTENDED RELEASE ORAL at 08:26

## 2020-11-12 RX ADMIN — LEVOTHYROXINE SODIUM 25 MCG: 25 TABLET ORAL at 06:10

## 2020-11-12 RX ADMIN — ASPIRIN 81 MG: 81 TABLET, FILM COATED ORAL at 08:26

## 2020-11-12 RX ADMIN — LEVOTHYROXINE SODIUM 200 MCG: 25 TABLET ORAL at 06:09

## 2020-11-12 RX ADMIN — ACETAMINOPHEN 650 MG: 325 TABLET, FILM COATED ORAL at 03:47

## 2020-11-12 RX ADMIN — HYDROCHLOROTHIAZIDE: 12.5 CAPSULE ORAL at 08:25

## 2020-11-12 RX ADMIN — AMLODIPINE BESYLATE 10 MG: 10 TABLET ORAL at 08:26

## 2020-11-12 NOTE — PROGRESS NOTES
Pt. Identified as qualifier for Phase II Cardiac Rehab. Staff discussed benefits of exercise, program protocol, and educational material provided. Teach back verified.  Patient is not interested in Cardiac Rehab at this time because he is awaiting back surgery. Patient will contact staff to schedule an appointment if he changes his mind. Teach back verified.

## 2020-11-12 NOTE — DISCHARGE SUMMARY
[facility] Cardiology  DISCHARGE SUMMARY    Admission Date: 11/11/2020       Date of Discharge:  11/12/2020    Discharge Diagnosis: CAD, PAD    Presenting Problem/History of Present Illness  Claudication of both lower extremities (CMS/HCC) [I73.9]  Abnormal stress test [R94.39]  Claudication of both lower extremities (CMS/HCC) [I73.9]  Claudication of both lower extremities (CMS/HCC) [I73.9]    Hospital Course  Patient is a 60 y.o. male who presented to [facility] for a cardiac catheterization and abdominal aortogram with runoff after having abnormal ABIs and nuclear stress testing.  Cardiac catheterization revealed a functionally significant proximal circumflex stenosis now status post tandem PRINCE.  He has a 60% left common iliac artery and left SFA stenosis.  The patient will return in 2 to 4 weeks for planned catheter-based intervention.  He was initiated on dual antiplatelet therapy and started on Toprol-XL.  He underwent transthoracic echocardiogram which is noted to have a normal EF and mild valvular heart disease.  On the day of discharge the patient was without chest pain, shortness of breath, or palpitations.  He denied pain or tenderness at his right femoral artery access site.  Discharge instructions were reviewed with the patient.    Consults:   Consults     No orders found for last 30 day(s).          Pertinent Test/Procedure Results:   TTE 11/12/2020  · Left ventricular systolic function is normal. Left ventricular ejection fraction appears to be 56 - 60%.  · Left ventricular wall thickness is consistent with concentric hypertrophy.  · Left ventricular diastolic function is consistent with (grade Ia w/high LAP) impaired relaxation.  · There is moderate calcification of the aortic valve mainly affecting the non-coronary cusp(s).  · Mild aortic valve regurgitation is present.  · There is mild calcification of the mitral valve.  · Mild mitral valve regurgitation is present.    [facility]  11/11/2020  · There was a 30 to 50% proximal circumflex stenosis followed by a 70% mid segment stenosis.  Lesions were found to be functionally significant with an IFR ratio of 0.60.  The lesions are status post tandem stents with a Synergy 2.25 x 16 mm drug-eluting stent proximally and a Synergy 2.25 x 32 mm drug-eluting stent distally with a brief segment of overlap.  · There is a 100% mid LAD stenosis with an widely patent LIMA.  · There is a 100% ostial RCA stenosis with an occluded SVG to RCA, but with moderate left to left collaterals.  · There is an occluded SVG to OM branch.  · Left ventricular ejection fraction 55% with akinesis of the basal to mid inferior segments.  · There is a 60% left common iliac artery stenosis with a peak to peak pullback gradient of 35 mmHg on one pullback and 57 mmHg on another.    Condition on Discharge:  Stable    Physical Exam at Discharge  Vital Signs  Temp:  [97.7 °F (36.5 °C)-98.1 °F (36.7 °C)] 98.1 °F (36.7 °C)  Heart Rate:  [60-94] 69  Resp:  [16-18] 16  BP: (111-179)/() 144/84  Physical Exam:  CONSTITUTIONAL: No acute distress  RESPIRATORY: Normal effort.  CARDIOVASCULAR: Regular rate and rhythm with normal S1 and S2. Without murmur, gallop or rub.  PERIPHERAL VASCULAR: Normal radial pulse. There is no lower extremity edema bilaterally.  Right femoral artery access site C/D/I.  PSYCH: Normal affect and mood      Discharge Disposition: Home    Discharge Diet: Cardiac heart healthy diet    Activity at Discharge: As tolerated    Follow-up Appointments  Future Appointments   Date Time Provider Department Center   11/17/2020  1:30 PM Salome Bates APRN [facility]   1/29/2021 11:45 AM Meka Swan MD [facility] None   2/1/2021  3:45 PM Mychal Block MD [facility] None     Additional Instructions for the Follow-ups that You Need to Schedule     Discharge Follow-up with Specialty: Dr. Block   As directed      Specialty: Dr. Block    Follow Up Details: Dr. Scott  office will call to arrange peripheral angiogram in 2-4 weeks.               Discharge Medications     Discharge Medications      New Medications      Instructions Start Date   clopidogrel 75 MG tablet  Commonly known as: PLAVIX   75 mg, Oral, Daily   Start Date: November 13, 2020     metoprolol succinate XL 25 MG 24 hr tablet  Commonly known as: TOPROL-XL   25 mg, Oral, Every 24 Hours Scheduled   Start Date: November 13, 2020        Changes to Medications      Instructions Start Date   gabapentin 100 MG capsule  Commonly known as: NEURONTIN  What changed:   · when to take this  · reasons to take this   100 mg, Oral, 3 Times Daily      metFORMIN 1000 MG tablet  Commonly known as: GLUCOPHAGE  What changed: when to take this   TAKE 1 TABLET BY MOUTH EVERY DAY      NovoLOG Mix 70/30 FlexPen (70-30) 100 UNIT/ML suspension pen-injector injection  Generic drug: insulin aspart prot & aspart  What changed:   · how much to take  · additional instructions   50 Units, Subcutaneous, 2 Times Daily         Continue These Medications      Instructions Start Date   amLODIPine 10 MG tablet  Commonly known as: NORVASC   10 mg, Oral, 2 Times Daily      aspirin 81 MG EC tablet   81 mg, Oral, Daily      BD ULTRA-FINE PEN NEEDLES 29G X 12.7MM misc  Generic drug: Insulin Pen Needle   Use with Insulin 3 times daily      ezetimibe 10 MG tablet  Commonly known as: ZETIA   TAKE 1 TABLET BY MOUTH EVERY DAY      FreeStyle Jude 14 Day Sensor misc   1 each, Does not apply, Every 14 Days      levothyroxine 200 MCG tablet  Commonly known as: SYNTHROID, LEVOTHROID   200 mcg, Oral, Daily      levothyroxine 25 MCG tablet  Commonly known as: SYNTHROID, LEVOTHROID   25 mcg, Oral, Daily, LAST REFILL WITHOUT APPT      nicotine 14 MG/24HR patch  Commonly known as: Nicotine Step 2   1 patch, Transdermal, Every 24 Hours      nicotine 7 MG/24HR patch  Commonly known as: Nicotine Step 3   1 patch, Transdermal, Every 24 Hours, Begin after completing 14 mg  patches.      ONE TOUCH ULTRA MINI w/Device kit   USE TO TEST TWICE A DAY AS DIRECTED      ONE TOUCH ULTRA TEST test strip  Generic drug: glucose blood   USE TO TEST 3 TIMES A DAY AS DIRECTED      OneTouch Delica Lancets Fine misc   USE TO TEST TWICE A DAY AS DIRECTED      valsartan-hydrochlorothiazide 320-12.5 MG per tablet  Commonly known as: DIOVAN-HCT   TAKE 1 TABLET BY MOUTH EVERY DAY              Mickie Carpenter, APRN  11/12/20  12:38 EST    Time: I spent 25 minutes on this discharge activity which included: face-to-face encounter with the patient, reviewing the data in the system, coordination of the care with the nursing staff as well as consultants, documentation, and entering orders.

## 2020-11-12 NOTE — PROGRESS NOTES
Discharge Planning Assessment  [facility]     Patient Name: Laron Pandey  MRN: [id number]  Today's Date: 11/12/2020    Admit Date: 11/11/2020    Discharge Needs Assessment     Row Name 11/12/20 1134       Living Environment    Lives With  alone    Current Living Arrangements  home/apartment/condo    Primary Care Provided by  self    Provides Primary Care For  no one    Family Caregiver if Needed  child(anup), adult    Family Caregiver Names  Chloé-dtr    Quality of Family Relationships  involved;supportive;helpful    Able to Return to Prior Arrangements  yes       Resource/Environmental Concerns    Resource/Environmental Concerns  none    Transportation Concerns  car, none       Transition Planning    Patient/Family Anticipates Transition to  home    Patient/Family Anticipated Services at Transition  none    Transportation Anticipated  family or friend will provide       Discharge Needs Assessment    Anticipated Changes Related to Illness  none    Equipment Needed After Discharge  none    Current Discharge Risk  lives alone        Discharge Plan     Row Name 11/12/20 1134       Plan    Plan  Home    Patient/Family in Agreement with Plan  yes    Plan Comments  Plan is home. No d/c needs Daughter is at bs and will transport home.    Final Discharge Disposition Code  01 - home or self-care        Continued Care and Services - Admitted Since 11/11/2020    Coordination has not been started for this encounter.       Expected Discharge Date and Time     Expected Discharge Date Expected Discharge Time    Nov 12, 2020         Demographic Summary     Row Name 11/12/20 1133       General Information    Referral Source  admission list    Preferred Language  English     Used During This Interaction  no    General Information Comments  PCP Nacho Omer. No POA.       Contact Information    Permission Granted to Share Info With          Functional Status     Row Name 11/12/20 1133       Functional  Status    Usual Activity Tolerance  good    Current Activity Tolerance  good       Functional Status, IADL    Medications  independent    Meal Preparation  independent    Housekeeping  independent    Laundry  independent    Shopping  independent       Employment/    Employment Status  employed full-time    Employment/ Comments  Has [employer]. No issues with meds.        Psychosocial    No documentation.       Abuse/Neglect    No documentation.       Legal    No documentation.       Substance Abuse    No documentation.       Patient Forms    No documentation.           Meka Dietrich RN

## 2020-11-12 NOTE — PLAN OF CARE
Goal Outcome Evaluation:  VSS on RA. A&O. RA. C/O back pain, PRN tylenol given. Bedrest maintained. Will continue to monitor.

## 2020-11-12 NOTE — CONSULTS
Diabetes Education    Patient Name:  Laron Pandey  YOB: 1960  MRN: [id number]  Admit Date:  11/11/2020        Saw Mr. Pandey at bedside for diabetes education consult for A1c >7--pt gave permission for visit. Pt states he was diagnosed with diabetes in 2004. He sees Dr. Swan @  Endocrinology and has follow up appt in January, last appt was 10/7. He has Freestyle Jude CGM system at home to start using, states he has not started bc sensors are 14 day wear and he has had to have so many procedures lately that he would need to remove for--he did not want to waste sensors. Explained that company will replace sensor if necessary to remove for procedure before 14 day wear is up. He states he has read directions on how to set up sensor and reader for first use but has not yet placed a sensor. Provided contact info for resource, as well as discussed that he can reach out to Dr. Swan's office if he needs help setting up, including appt can be arranged with RN diabetes educator there in office. He feels confident he can set up at home on his own but will reach out if he needs assistance. Reviewed w/ him that Jude kit comes w/ quick start guide, as well as website has videos for set up help. Reviewed current A1c of 10.1--he states was 9.2 in October--noted per Epic records was 9.2 10/7, 10.9 3/13. He does endorse increased pain, stress over the past month related to his back issues, preparation for surgery. Explained relationship between elevated blood sugar and increased risk for CV disease and PVD. Stressed importance of glucose control to help prevent further complications. He shares that he often forgets to take his insulin at home, also is unsure when to take insulin, how often to rotate injection site, and has questions about insulin resistance. Reviewed 70/30 insulin, MOA, onset, and the 2 types of insulin that this combo contains; reviewed importance of taking around 30 min before meal, and  "taking at the same time each morning and evening. He states he sometimes has decreased appetite and often eats smaller meals/snacks throughout the day rather than big meals. Stressed importance of taking insulin as prescribed. Discussed importance of rotating sites. Explained insulin resistance and how physical activity (within restrictions and limitations) helps decrease insulin resistance. Provided with [employer]'s \"what is diabetes\" handout and contact info for questions or needs. Thank you for the consult.       Electronically signed by:  Cammy Arce RN  11/12/20 11:56 EST  "

## 2020-11-13 LAB
ACT BLD: 158 SECONDS (ref 82–152)
ACT BLD: 197 SECONDS (ref 82–152)

## 2020-11-16 ENCOUNTER — TELEPHONE (OUTPATIENT)
Dept: CARDIOLOGY | Facility: CLINIC | Age: 60
End: 2020-11-16

## 2020-11-16 DIAGNOSIS — I73.9 CLAUDICATION OF BOTH LOWER EXTREMITIES (HCC): Primary | ICD-10-CM

## 2020-11-16 LAB
QT INTERVAL: 450 MS
QTC INTERVAL: 495 MS

## 2020-11-16 NOTE — PROGRESS NOTES
"Chief Complaint: \"I still have pain when I am walking.\"      History of Present Illness:   Patient: Mr. Laron Pandey, 60 y.o. male originally referred by Margarita Diallo PA-C in consultation for chronic intractable lower back and lower extremity pain. Patient reports a several year history of lower back pain, which began without incident. Patient has a history of back surgery by Dr. Yury Dobbs in 1999. He has an extensive history of uncontrolled diabetes, tobacco abuse and cardiac disease, in order for him to be considered a surgical candidate he would need to sustain better control of his comorbidities.  He was last seen on October 5, 2020, when he underwent diagnostic and therapeutic bilateral L4-L5 transforaminal epidural steroid injections, for which he reports experiencing 60% pain relief and functional improvement that is ongoing.  He continues to suffer with pain while standing and walking, but since epidural he reports less pain at rest.  He did not begin physical therapy as medically advised.  He was started on a trial of gabapentin 100 mg 3 times daily, he reports no side effects, with some benefit.  He underwent follow-up consultation with Dr. Laron Keene on October 22, 2020, and remained a candidate for a PLIF at L4-L5 pending cardiac clearance.  He did undergo consultation with Dr. Mychal Block with cardiology, and has since underwent an echocardiogram, with a normal EF, although, he did have an abnormal stress test which did reveal symptoms of exercise-induced claudication and ischemia.  In addition, he underwent cardiac catheterization with aortogram with runoffs with stent placement, with Dr. Block, and will soon undergo intervention at the left iliac and left SFA stent placement.  He was started on anticoagulation, Plavix 75 mg.  He does continue to smoke but has cut back, despite findings.  He returns today for post procedure follow-up and evaluation.  Pain Description: intermittent " exacerbation (previously constant), described as aching, burning and throbbing sensation.   Radiation of Pain: The pain radiates from the lower back into the hips, and into the posterior aspect of the thighs, left more than right, calves and the dorsum of his feet  Pain intensity today: 3/10  Average pain intensity last week: 5/10  Pain intensity ranges from: 0/10 (sitting) to 7/10 (walking)  Aggravating factors: Pain increases with standing, walking. Patient describes neurogenic claudication at about 50 yards  Alleviating factors: Pain decreases with sitting and lying down.   Associated Symptoms:   Patient reports pain, numbness and weakness in the LT> RT lower extremities.   Patient denies any new bladder or bowel problems.   Patient reports difficulties with his balance but denies recent falls.     Review of previous therapies and additional medical records:  Laron Pandey has already failed the following measures, including:   Conservative Measures: Oral analgesics, topical analgesics and physical therapy   Interventional Measures: 10/05/2020: DxTx bilateral L4-L5 TESI  Surgical Measures: Lumbar decompression L4-L5 by Dr. Dobbs in 1999  Laron Pandey underwent neurosurgical consultation with Margarita Diallo PA-C on 7/30/2020, and Dr. Laron Keene on 10/22/2020 and was found to be a potential surgical candidate.  Laron Pandey presents with significant comorbidities including nicotine addiction, diabetes, hyperlipidemia, hypertension, CABG, Bilateral CAEs, On Plavix.  In terms of current analgesics, Laron Pandey takes: ibuprofen, Tylenol  I have reviewed Jayesh Report #[id number] consistent with medication reconciliation.  SOAPP: Low Risk    Global Pain Scale 09-15  2020 11-17  2020         Pain 18 13         Feelings 13 3         Clinical outcomes 20 9         Activities 17 14         GPS Total: 68 39           Review of New Diagnostic Studies:  Carotid duplex 10/30/2020: Right internal carotid artery stenosis  of 50-69%, Left internal carotid artery stenosis of >70%.  Doppler Arterial Lower Extremity Stress CAR 10/23/2020: The right KARISSA is mildly reduced. Mild digital ischemia. The test is positive for exercise induced claudication. The left KARISSA is moderately reduced. Moderate digital ischemia. The test is positive for exercise induced claudication.   XR SPINE LUMBAR AP AND LAT W FLEX AND EXT- 10/19/2020: No subluxation or significant spondylolisthesis is evident.  Stable alignment in flexion and extension views, there are spondylitic changes most significant at L4-L5.     EMG/NCV of the bilateral lower extremities 11/5/2020: Mild peripheral neuropathy, evidence of moderate peroneal neuropathy at the knee on the left.  Chronic L5 radiculopathy bilaterally.      Review of Diagnostic Studies:    X-rays of the lumbar spine June 5, 2020: Slight wedging of L1.  Other lumbar vertebral body heights are maintained.  There is severe narrowing of the L4-L5 disc space.  Moderately severe spurring at L4-L5.  Normal alignment  MRI of the lumbar spine on 07/21/2020: I have reviewed the images.  Severe disc space narrowing at L4-L5.  Moderate to severe canal and neuroforaminal stenosis.  Postoperative changes at L4-L5.    Review of Systems   Respiratory: Positive for cough.    Musculoskeletal: Positive for arthralgias and back pain.   All other systems reviewed and are negative.        Patient Active Problem List   Diagnosis   • Uncontrolled type 2 diabetes mellitus with hyperglycemia, with long-term current use of insulin (CMS/[facility])   • Hyperlipidemia   • Hypothyroidism, adult   • Coronary artery disease involving coronary bypass graft of native heart without angina pectoris   • Erectile dysfunction   • Hypertension   • Lumbar stenosis with neurogenic claudication   • Lumbar postlaminectomy syndrome   • History of lumbar surgery   • PAD (peripheral artery disease) (CMS/[facility])   • Current every day smoker   • Tobacco abuse counseling   •  Claudication of both lower extremities (CMS/HCC)   • Abnormal stress test       Past Medical History:   Diagnosis Date   • Arthritis    • Back pain    • Coronary artery disease    • Diabetes (CMS/HCC)    • Heart disease    • Hyperlipidemia    • Hypertension    • Thyroid disease    • Thyroid disease          Past Surgical History:   Procedure Laterality Date   • BACK SURGERY     • CARDIAC CATHETERIZATION N/A 11/11/2020    Procedure: Left Heart Cath;  Surgeon: Mychal Blokc MD;  Location:  [facility];  Service: Cardiology;  Laterality: N/A;   • CARDIAC SURGERY     • CAROTID ENDARTERECTOMY     • CORONARY ARTERY BYPASS GRAFT     • INTERVENTIONAL RADIOLOGY PROCEDURE N/A 11/11/2020    Procedure: Abdominal Aortogram;  Surgeon: Mychal Block MD;  Location:  [facility];  Service: Cardiology;  Laterality: N/A;         Family History   Problem Relation Age of Onset   • Diabetes Mother    • Heart disease Mother    • Diabetes Father    • Heart disease Father    • Stroke Father    • Heart attack Neg Hx    • Hypertension Neg Hx    • Thyroid disease Neg Hx          Social History     Socioeconomic History   • Marital status:      Spouse name: Not on file   • Number of children: Not on file   • Years of education: Not on file   • Highest education level: Not on file   Tobacco Use   • Smoking status: Current Every Day Smoker     Packs/day: 0.00     Types: Cigarettes   • Smokeless tobacco: Never Used   • Tobacco comment: 2 CIGS/DAY   Substance and Sexual Activity   • Alcohol use: Yes     Comment: 12 BEERS/WEEK (3-4 AT A TIME)   • Drug use: Never   • Sexual activity: Defer           Current Outpatient Medications:   •  amLODIPine (NORVASC) 10 MG tablet, Take 10 mg by mouth 2 (Two) Times a Day., Disp: , Rfl: 0  •  aspirin 81 MG EC tablet, Take 81 mg by mouth Daily., Disp: , Rfl:   •  BD ULTRA-FINE PEN NEEDLES 29G X 12.7MM misc, Use with Insulin 3 times daily, Disp: 300 each, Rfl: 0  •  Blood  Glucose Monitoring Suppl (ONE TOUCH ULTRA MINI) w/Device kit, USE TO TEST TWICE A DAY AS DIRECTED, Disp: , Rfl: 0  •  clopidogrel (PLAVIX) 75 MG tablet, Take 1 tablet by mouth Daily., Disp: 30 tablet, Rfl: 11  •  Continuous Blood Gluc Sensor (FreeStyle Jude 14 Day Sensor) [facility], 1 each Every 14 (Fourteen) Days., Disp: 2 each, Rfl: 6  •  ezetimibe (ZETIA) 10 MG tablet, TAKE 1 TABLET BY MOUTH EVERY DAY, Disp: 30 tablet, Rfl: 3  •  gabapentin (NEURONTIN) 100 MG capsule, Take 1 capsule by mouth 3 (Three) Times a Day. (Patient taking differently: Take 100 mg by mouth 3 (Three) Times a Day As Needed.), Disp: 90 capsule, Rfl: 1  •  levothyroxine (SYNTHROID, LEVOTHROID) 200 MCG tablet, Take 1 tablet by mouth Daily., Disp: 90 tablet, Rfl: 0  •  levothyroxine (SYNTHROID, LEVOTHROID) 25 MCG tablet, Take 1 tablet by mouth Daily. LAST REFILL WITHOUT APPT, Disp: 90 tablet, Rfl: 0  •  metFORMIN (GLUCOPHAGE) 1000 MG tablet, TAKE 1 TABLET BY MOUTH EVERY DAY (Patient taking differently: Take 1,000 mg by mouth Daily With Breakfast.), Disp: 90 tablet, Rfl: 0  •  metoprolol succinate XL (TOPROL-XL) 25 MG 24 hr tablet, Take 1 tablet by mouth Daily., Disp: 30 tablet, Rfl: 11  •  nicotine (Nicotine Step 2) 14 MG/24HR patch, Place 1 patch on the skin as directed by provider Daily., Disp: 14 patch, Rfl: 0  •  nicotine (Nicotine Step 3) 7 MG/24HR patch, Place 1 patch on the skin as directed by provider Daily. Begin after completing 14 mg patches., Disp: 42 patch, Rfl: 0  •  NovoLOG Mix 70/30 FlexPen (70-30) 100 UNIT/ML suspension pen-injector injection, Inject 0.5 mL under the skin into the appropriate area as directed 2 (Two) Times a Day. (Patient taking differently: Inject  under the skin into the appropriate area as directed 2 (Two) Times a Day. 50 UNITS SQ qam 46 UNITS SQ  qpm), Disp: 30 pen, Rfl: 3  •  ONE TOUCH ULTRA TEST test strip, USE TO TEST 3 TIMES A DAY AS DIRECTED, Disp: 300 each, Rfl: 2  •  ONETOUCH DELICA LANCETS FINE misc, USE  "TO TEST TWICE A DAY AS DIRECTED, Disp: , Rfl: 6  •  valsartan-hydrochlorothiazide (DIOVAN-HCT) 320-12.5 MG per tablet, TAKE 1 TABLET BY MOUTH EVERY DAY, Disp: 90 tablet, Rfl: 3      Allergies   Allergen Reactions   • Celebrex [Celecoxib] Swelling     Hands   • Morphine And Related Nausea Only         /70   Pulse 108   Temp 97.8 °F (36.6 °C)   Resp 16   Ht 172.7 cm (67.99\")   Wt 87.2 kg (192 lb 3.2 oz)   SpO2 98%   BMI 29.23 kg/m²       Physical Exam:  Constitutional: Patient is oriented to person, place, and time.   Patient appears well-developed and well-nourished.   HEENT: Head: Normocephalic and atraumatic.   Eyes: Conjunctivae and lids are normal.   Pupils: Equal, round, reactive to light.   Neck: Trachea normal. Neck supple. No JVD present.   Pulmonary Respiratory effort: No increased work of breathing or signs of respiratory distress. Auscultation of lungs: Clear to auscultation.   Cardiovascular Auscultation of heart: Normal rate and rhythm, normal S1 and S2, no murmurs.   Peripheral vascular exam: Femoral: right 2+, left 1+. Posterior tibialis: right 2+ and left 0+. Dorsalis pedis: right 1+ and left 1+. CRT delayed on left. No edema.   Musculoskeletal   Gait and station: Gait evaluation demonstrated a normal gait   Lumbar spine: Passive and active range of motion are improved with some pain. Extension, flexion, lateral flexion, rotation of the lumbar spine increased and reproduced minimal pain. Lumbar facet joint loading maneuvers are equivocal.  Anil test and Gaenslen's test are negative   Piriformis maneuvers are negative   Hip joints: The range of motion of the hip joints is almost full, symmetrical and without pain   Neurological:   Patient is alert and oriented to person, place, and time.   Speech: speech is normal.   Cortical function: Normal mental status.   Cranial nerves: Cranial nerves 2-12 intact.   Reflex Scores:  Right patellar: 1+  Left patellar: 1+  Right Achilles: 1+  Left " Achilles: 1+  Motor strength: 5/5  Motor Tone: normal tone.   Involuntary movements: none.   Superficial/Primitive Reflexes: primitive reflexes were absent.   Right Garrett: absent  Left Garrett: absent  Right ankle clonus: absent  Left ankle clonus: absent   Babinsky: absent  Negative long tract signs. Straight leg raising test is positive.   Sensation: No sensory loss. Sensory exam: intact to light touch, intact pain and temperature sensation, intact vibration sensation and normal proprioception.   Coordination: Normal finger to nose and heel to shin. Normal balance and  negative Romberg's sign   Skin and subcutaneous tissue: Skin is warm and intact. No rash noted. No cyanosis.   Psychiatric: Judgment and insight: Normal. Orientation to person, place and time: Normal. Recent and remote memory: Intact. Mood and affect: Normal.     ASSESSMENT:   1. Lumbar stenosis with neurogenic claudication    2. Lumbar postlaminectomy syndrome    3. History of lumbar surgery    4. Uncontrolled type 2 diabetes mellitus with hyperglycemia, with long-term current use of insulin (CMS/[facility])    5. Coronary artery disease involving coronary bypass graft of native heart without angina pectoris    6. PAD (peripheral artery disease) (CMS/[facility])    7. Current every day smoker        PLAN/MEDICAL DECISION MAKING: Patient presents with a several year history of lower back pain. He has a history of L4-L5 lumbar decompression surgery by Dr. Yury Dobbs in 1999.  He has an extensive history of uncontrolled diabetes, tobacco abuse and cardiac disease, in order for him to be considered a surgical candidate he would need to sustain better control of his comorbidities. He underwent follow-up consultation with Dr. Laron Keene on October 22, 2020, and remained a candidate for a PLIF at L4-L5 pending cardiac clearance.  He did undergo consultation with Dr. Mychal Block with cardiology,, and has since underwent an echocardiogram, with a normal EF,  although, he did have an abnormal stress test which did reveal symptoms of exercise-induced claudication and ischemia.  In addition, he underwent cardiac catheterization with aortogram with runoffs with stent placement, with Dr. Block, and will soon undergo intervention left iliac and left SFA stent placement.  He was started on anticoagulation, Plavix 75 mg.  He does continue to smoke, despite findings. Patient has failed to obtain pain relief with conservative measures, as referenced above. I have reviewed all available patient's medical records as well as previous therapies as referenced above. I had a lengthy conversation with Mr. Laron Pandey regarding his chronic pain condition and potential therapeutic options including risks, benefits, alternative therapies, to name a few. Therefore, I have proposed the following plan:  1. Pharmacological measures: Reviewed. Discussed.   A. Patient takes ibuprofen 800 mg TID, Tylenol  B. Continue gabapentin 100 mg three times per day.  2. Interventional pain management measures: None indicated at this time.  I advised the patient to call us at a later date when he is taking care of some of his health issues, in addition I am not sure with his most recent vascular studies he would be able to come off his Plavix for another epidural.  We can consider repeating bilateral L4-L5 transforaminal epidural steroid injections in the future when he is more vascularly stable. Patient will follow-up with Dr. Keene thereafter for possible lumbar decompression.  Patient will need to achieve appropriate diabetic control and smoking cessation prior to surgical consideration.  If patient is found not to a surgical candidate, then, he could be a potential candidate for a spinal cord stimulator trial  3. Long-term rehabilitation efforts:  A. The patient does not have a history of falls. I did complete a risk assessment for falls  B. Patient will start a comprehensive physical therapy program  for water therapy, therapeutic exercise, core strengthening, gait and balance training, neurodynamics, myofascial release, cupping and dry needling once his pain is under control  C. Start an exercise program such as Chair yoga, water therapy, swimming and daily walks for fitness  D. Contrast therapy: Apply ice-packs for 15-20 minutes, followed by heating pads for 15-20 minutes to affected area   E. Patient has been screened for tobacco use: Current tobacco user. Smoking Cessation: I have advised the patient regarding the long-term deleterious effects of smoking and have provided the patient lifestyle modification strategies to facilitate smoking cessation.  In addition, we discussed nicotine replacement therapy.  Start nicotine patches 21 mg for 4 weeks, and then continue nicotine patches 14 mg for 2 weeks, then, continue nicotine patches 7 mg for 2 weeks, then discontinue.  I spent approximately 3 minutes advising the patient.  He continues on nicotine patches, and has cut back his smoking significantly.  4. The patient has been instructed to contact my office with any questions or difficulties. The patient understands the plan and agrees to proceed accordingly.      Patient Care Team:  Nacho Omer MD as PCP - General (Family Medicine)     No orders of the defined types were placed in this encounter.        Future Appointments   Date Time Provider Department Center   1/29/2021 11:45 AM Meka Swan MD [facility] None   2/1/2021  3:45 PM Mychal Block MD [facility] None         CARLI Deluca Dragon/Transcription disclaimer:  Much of this encounter note is an electronic transcription of spoken language to printed text. Electronic transcription of spoken language may permit erroneous, or at times, nonsensical words or phrases to be inadvertently transcribed. Although I have reviewed the note for such errors, some may still exist.

## 2020-11-16 NOTE — TELEPHONE ENCOUNTER
----- Message from Mychal Block MD sent at 11/13/2020 12:39 PM EST -----  Can you set up the patient for an elective left iliac artery and left SFA intervention in about 2 weeks? Patient aware and expecting call. Thanks

## 2020-11-17 ENCOUNTER — OFFICE VISIT (OUTPATIENT)
Dept: PAIN MEDICINE | Facility: CLINIC | Age: 60
End: 2020-11-17

## 2020-11-17 VITALS
SYSTOLIC BLOOD PRESSURE: 128 MMHG | TEMPERATURE: 97.8 F | HEART RATE: 108 BPM | DIASTOLIC BLOOD PRESSURE: 70 MMHG | RESPIRATION RATE: 16 BRPM | OXYGEN SATURATION: 98 % | BODY MASS INDEX: 29.13 KG/M2 | WEIGHT: 192.2 LBS | HEIGHT: 68 IN

## 2020-11-17 DIAGNOSIS — M48.062 LUMBAR STENOSIS WITH NEUROGENIC CLAUDICATION: ICD-10-CM

## 2020-11-17 DIAGNOSIS — I73.9 PAD (PERIPHERAL ARTERY DISEASE) (HCC): ICD-10-CM

## 2020-11-17 DIAGNOSIS — Z98.890 HISTORY OF LUMBAR SURGERY: ICD-10-CM

## 2020-11-17 DIAGNOSIS — E11.65 UNCONTROLLED TYPE 2 DIABETES MELLITUS WITH HYPERGLYCEMIA, WITH LONG-TERM CURRENT USE OF INSULIN (HCC): ICD-10-CM

## 2020-11-17 DIAGNOSIS — I25.810 CORONARY ARTERY DISEASE INVOLVING CORONARY BYPASS GRAFT OF NATIVE HEART WITHOUT ANGINA PECTORIS: ICD-10-CM

## 2020-11-17 DIAGNOSIS — Z79.4 UNCONTROLLED TYPE 2 DIABETES MELLITUS WITH HYPERGLYCEMIA, WITH LONG-TERM CURRENT USE OF INSULIN (HCC): ICD-10-CM

## 2020-11-17 DIAGNOSIS — M96.1 LUMBAR POSTLAMINECTOMY SYNDROME: ICD-10-CM

## 2020-11-17 DIAGNOSIS — F17.200 CURRENT EVERY DAY SMOKER: ICD-10-CM

## 2020-11-17 LAB
ACT BLD: 235 SECONDS (ref 82–152)
ACT BLD: 257 SECONDS (ref 82–152)
ACT BLD: 312 SECONDS (ref 82–152)
ACT BLD: 324 SECONDS (ref 82–152)

## 2020-11-17 PROCEDURE — 99213 OFFICE O/P EST LOW 20 MIN: CPT | Performed by: NURSE PRACTITIONER

## 2020-11-22 ENCOUNTER — APPOINTMENT (OUTPATIENT)
Dept: PREADMISSION TESTING | Facility: HOSPITAL | Age: 60
End: 2020-11-22

## 2020-11-30 ENCOUNTER — APPOINTMENT (OUTPATIENT)
Dept: PREADMISSION TESTING | Facility: HOSPITAL | Age: 60
End: 2020-11-30

## 2020-11-30 PROCEDURE — C9803 HOPD COVID-19 SPEC COLLECT: HCPCS

## 2020-11-30 PROCEDURE — U0004 COV-19 TEST NON-CDC HGH THRU: HCPCS

## 2020-12-01 ENCOUNTER — PREP FOR SURGERY (OUTPATIENT)
Dept: OTHER | Facility: HOSPITAL | Age: 60
End: 2020-12-01

## 2020-12-01 DIAGNOSIS — I73.9 PVD (PERIPHERAL VASCULAR DISEASE) WITH CLAUDICATION (HCC): Primary | ICD-10-CM

## 2020-12-01 LAB — SARS-COV-2 RNA RESP QL NAA+PROBE: NOT DETECTED

## 2020-12-01 RX ORDER — ASPIRIN 81 MG/1
324 TABLET, CHEWABLE ORAL ONCE
Status: CANCELLED | OUTPATIENT
Start: 2020-12-01 | End: 2020-12-01

## 2020-12-01 RX ORDER — ASPIRIN 81 MG/1
81 TABLET ORAL DAILY
Status: CANCELLED | OUTPATIENT
Start: 2020-12-02

## 2020-12-01 RX ORDER — SODIUM CHLORIDE 0.9 % (FLUSH) 0.9 %
10 SYRINGE (ML) INJECTION AS NEEDED
Status: CANCELLED | OUTPATIENT
Start: 2020-12-01

## 2020-12-01 RX ORDER — SODIUM CHLORIDE 0.9 % (FLUSH) 0.9 %
3 SYRINGE (ML) INJECTION EVERY 12 HOURS SCHEDULED
Status: CANCELLED | OUTPATIENT
Start: 2020-12-01

## 2020-12-03 ENCOUNTER — HOSPITAL ENCOUNTER (OUTPATIENT)
Facility: HOSPITAL | Age: 60
Setting detail: HOSPITAL OUTPATIENT SURGERY
Discharge: HOME | End: 2020-12-03
Attending: INTERNAL MEDICINE | Admitting: INTERNAL MEDICINE

## 2020-12-03 VITALS
SYSTOLIC BLOOD PRESSURE: 164 MMHG | BODY MASS INDEX: 29.04 KG/M2 | TEMPERATURE: 97.3 F | OXYGEN SATURATION: 99 % | WEIGHT: 191.58 LBS | RESPIRATION RATE: 16 BRPM | HEART RATE: 85 BPM | DIASTOLIC BLOOD PRESSURE: 81 MMHG | HEIGHT: 68 IN

## 2020-12-03 DIAGNOSIS — I73.9 CLAUDICATION OF BOTH LOWER EXTREMITIES (HCC): ICD-10-CM

## 2020-12-03 DIAGNOSIS — I73.9 PVD (PERIPHERAL VASCULAR DISEASE) WITH CLAUDICATION (HCC): ICD-10-CM

## 2020-12-03 LAB
ALBUMIN SERPL-MCNC: 4 G/DL (ref 3.5–5.2)
ALBUMIN/GLOB SERPL: 1.6 G/DL
ALP SERPL-CCNC: 118 U/L (ref 39–117)
ALT SERPL W P-5'-P-CCNC: 17 U/L (ref 1–41)
ANION GAP SERPL CALCULATED.3IONS-SCNC: 14 MMOL/L (ref 5–15)
AST SERPL-CCNC: 21 U/L (ref 1–40)
BILIRUB SERPL-MCNC: 0.2 MG/DL (ref 0–1.2)
BUN SERPL-MCNC: 14 MG/DL (ref 8–23)
BUN/CREAT SERPL: 21.5 (ref 7–25)
CALCIUM SPEC-SCNC: 9.4 MG/DL (ref 8.6–10.5)
CHLORIDE SERPL-SCNC: 103 MMOL/L (ref 98–107)
CO2 SERPL-SCNC: 22 MMOL/L (ref 22–29)
CREAT SERPL-MCNC: 0.65 MG/DL (ref 0.76–1.27)
DEPRECATED RDW RBC AUTO: 43 FL (ref 37–54)
ERYTHROCYTE [DISTWIDTH] IN BLOOD BY AUTOMATED COUNT: 12.5 % (ref 12.3–15.4)
GFR SERPL CREATININE-BSD FRML MDRD: 125 ML/MIN/1.73
GLOBULIN UR ELPH-MCNC: 2.5 GM/DL
GLUCOSE SERPL-MCNC: 144 MG/DL (ref 65–99)
HCT VFR BLD AUTO: 39.2 % (ref 37.5–51)
HGB BLD-MCNC: 13.1 G/DL (ref 13–17.7)
MCH RBC QN AUTO: 31.4 PG (ref 26.6–33)
MCHC RBC AUTO-ENTMCNC: 33.4 G/DL (ref 31.5–35.7)
MCV RBC AUTO: 94 FL (ref 79–97)
PLATELET # BLD AUTO: 264 10*3/MM3 (ref 140–450)
PMV BLD AUTO: 10.2 FL (ref 6–12)
POTASSIUM SERPL-SCNC: 4.1 MMOL/L (ref 3.5–5.2)
PROT SERPL-MCNC: 6.5 G/DL (ref 6–8.5)
RBC # BLD AUTO: 4.17 10*6/MM3 (ref 4.14–5.8)
SODIUM SERPL-SCNC: 139 MMOL/L (ref 136–145)
WBC # BLD AUTO: 9.48 10*3/MM3 (ref 3.4–10.8)

## 2020-12-03 PROCEDURE — 37221 PR REVSC OPN/PRQ ILIAC ART W/STNT PLMT & ANGIOPLSTY: CPT | Performed by: INTERNAL MEDICINE

## 2020-12-03 PROCEDURE — 25010000002 FENTANYL CITRATE (PF) 100 MCG/2ML SOLUTION: Performed by: INTERNAL MEDICINE

## 2020-12-03 PROCEDURE — C1887 CATHETER, GUIDING: HCPCS | Performed by: INTERNAL MEDICINE

## 2020-12-03 PROCEDURE — 37224 PR REVSC OPN/PRG FEM/POP W/ANGIOPLASTY UNI: CPT | Performed by: INTERNAL MEDICINE

## 2020-12-03 PROCEDURE — 80053 COMPREHEN METABOLIC PANEL: CPT | Performed by: INTERNAL MEDICINE

## 2020-12-03 PROCEDURE — C1894 INTRO/SHEATH, NON-LASER: HCPCS | Performed by: INTERNAL MEDICINE

## 2020-12-03 PROCEDURE — 85027 COMPLETE CBC AUTOMATED: CPT | Performed by: INTERNAL MEDICINE

## 2020-12-03 PROCEDURE — 0 IOPAMIDOL PER 1 ML: Performed by: INTERNAL MEDICINE

## 2020-12-03 PROCEDURE — C1725 CATH, TRANSLUMIN NON-LASER: HCPCS | Performed by: INTERNAL MEDICINE

## 2020-12-03 PROCEDURE — C1769 GUIDE WIRE: HCPCS | Performed by: INTERNAL MEDICINE

## 2020-12-03 PROCEDURE — C1876 STENT, NON-COA/NON-COV W/DEL: HCPCS | Performed by: INTERNAL MEDICINE

## 2020-12-03 PROCEDURE — 85347 COAGULATION TIME ACTIVATED: CPT

## 2020-12-03 PROCEDURE — 25010000002 HEPARIN (PORCINE) PER 1000 UNITS: Performed by: INTERNAL MEDICINE

## 2020-12-03 PROCEDURE — 25010000002 MIDAZOLAM PER 1 MG: Performed by: INTERNAL MEDICINE

## 2020-12-03 PROCEDURE — C1760 CLOSURE DEV, VASC: HCPCS | Performed by: INTERNAL MEDICINE

## 2020-12-03 DEVICE — STNT BIL VISIPRO .035 7F 27MM 80CM: Type: IMPLANTABLE DEVICE | Status: FUNCTIONAL

## 2020-12-03 RX ORDER — CLOPIDOGREL BISULFATE 75 MG/1
TABLET ORAL AS NEEDED
Status: DISCONTINUED | OUTPATIENT
Start: 2020-12-03 | End: 2020-12-03 | Stop reason: HOSPADM

## 2020-12-03 RX ORDER — LIDOCAINE HYDROCHLORIDE 10 MG/ML
INJECTION, SOLUTION EPIDURAL; INFILTRATION; INTRACAUDAL; PERINEURAL AS NEEDED
Status: DISCONTINUED | OUTPATIENT
Start: 2020-12-03 | End: 2020-12-03 | Stop reason: HOSPADM

## 2020-12-03 RX ORDER — ASPIRIN 81 MG/1
81 TABLET ORAL DAILY
Status: DISCONTINUED | OUTPATIENT
Start: 2020-12-04 | End: 2020-12-03 | Stop reason: HOSPADM

## 2020-12-03 RX ORDER — HEPARIN SODIUM 1000 [USP'U]/ML
INJECTION, SOLUTION INTRAVENOUS; SUBCUTANEOUS AS NEEDED
Status: DISCONTINUED | OUTPATIENT
Start: 2020-12-03 | End: 2020-12-03 | Stop reason: HOSPADM

## 2020-12-03 RX ORDER — SODIUM CHLORIDE 0.9 % (FLUSH) 0.9 %
3 SYRINGE (ML) INJECTION EVERY 12 HOURS SCHEDULED
Status: DISCONTINUED | OUTPATIENT
Start: 2020-12-03 | End: 2020-12-03 | Stop reason: HOSPADM

## 2020-12-03 RX ORDER — FENTANYL CITRATE 50 UG/ML
INJECTION, SOLUTION INTRAMUSCULAR; INTRAVENOUS AS NEEDED
Status: DISCONTINUED | OUTPATIENT
Start: 2020-12-03 | End: 2020-12-03 | Stop reason: HOSPADM

## 2020-12-03 RX ORDER — MIDAZOLAM HYDROCHLORIDE 1 MG/ML
INJECTION INTRAMUSCULAR; INTRAVENOUS AS NEEDED
Status: DISCONTINUED | OUTPATIENT
Start: 2020-12-03 | End: 2020-12-03 | Stop reason: HOSPADM

## 2020-12-03 RX ORDER — ACETAMINOPHEN 325 MG/1
650 TABLET ORAL EVERY 4 HOURS PRN
Status: DISCONTINUED | OUTPATIENT
Start: 2020-12-03 | End: 2020-12-03 | Stop reason: HOSPADM

## 2020-12-03 RX ORDER — ROSUVASTATIN CALCIUM 20 MG/1
20 TABLET, COATED ORAL DAILY
Qty: 30 TABLET | Refills: 11 | Status: SHIPPED | OUTPATIENT
Start: 2020-12-03 | End: 2022-07-06

## 2020-12-03 RX ORDER — SODIUM CHLORIDE 0.9 % (FLUSH) 0.9 %
10 SYRINGE (ML) INJECTION AS NEEDED
Status: DISCONTINUED | OUTPATIENT
Start: 2020-12-03 | End: 2020-12-03 | Stop reason: HOSPADM

## 2020-12-03 RX ORDER — ASPIRIN 81 MG/1
324 TABLET, CHEWABLE ORAL ONCE
Status: COMPLETED | OUTPATIENT
Start: 2020-12-03 | End: 2020-12-03

## 2020-12-03 RX ADMIN — ASPIRIN 324 MG: 81 TABLET, CHEWABLE ORAL at 08:32

## 2020-12-03 NOTE — H&P
[facility] Cardiology   [address], [address]  [place], [place], [address]    [phone]  WWW.[facility]           INPATIENT CONSULTATION NOTE    Patient Care Team:  Patient Care Team:  Nacho Omer MD as PCP - General (Family Medicine)      Chief complaint: Bilateral leg pain         HPI:    Laron Pandey is a 60 y.o. male.    Partial problem list, including cardiac problems:  1. CAD  a. Status post three-vessel CABG in 2003 with Dr. Myers.  2. Carotid artery disease  a. Status post bilateral CEA in 2009 with Dr. Myers  3. Possible PVD  4. Lower back pain  a. History of lumbar surgery, Dr. Dobbs, 1999  b. Acute worsening spring 2020, severe disc space narrowing and L4/L5, moderate to severe canal and neuroforaminal stenosis  5. Hypertension  6. Hyperlipidemia  7. Diabetes mellitus  8. Hypothyroidism  9. Tobacco dependence    The patient presents today for peripheral angiogram with possible stent placement to the left common iliac artery and left SFA.  He has continued to have bilateral lower extremity and back pain.  He has otherwise been doing well since undergoing cardiac intervention on 11/11/2020.  He denies any issues with dual antiplatelet therapy.  He is still being followed by Dr. Keene for possible back surgery.    Review of Systems:  Positive for bilateral lower extremity pain, back pain  All other systems reviewed and are negative.    PFSH:  Patient Active Problem List   Diagnosis   • Uncontrolled type 2 diabetes mellitus with hyperglycemia, with long-term current use of insulin (CMS/[facility])   • Hyperlipidemia   • Hypothyroidism, adult   • Coronary artery disease involving coronary bypass graft of native heart without angina pectoris   • Erectile dysfunction   • Hypertension   • Lumbar stenosis with neurogenic claudication   • Lumbar postlaminectomy syndrome   • History of lumbar surgery   • PAD (peripheral artery disease) (CMS/[facility])   • Current every day  smoker   • Tobacco abuse counseling   • Claudication of both lower extremities (CMS/HCC)   • Abnormal stress test       No current facility-administered medications on file prior to encounter.      Current Outpatient Medications on File Prior to Encounter   Medication Sig Dispense Refill   • amLODIPine (NORVASC) 10 MG tablet Take 10 mg by mouth Daily.  0   • aspirin 81 MG EC tablet Take 81 mg by mouth Daily.     • BD ULTRA-FINE PEN NEEDLES 29G X 12.7MM misc Use with Insulin 3 times daily 300 each 0   • Blood Glucose Monitoring Suppl (ONE TOUCH ULTRA MINI) w/Device kit USE TO TEST TWICE A DAY AS DIRECTED  0   • clopidogrel (PLAVIX) 75 MG tablet Take 1 tablet by mouth Daily. 30 tablet 11   • Continuous Blood Gluc Sensor (FreeStyle Jude 14 Day Sensor) misc 1 each Every 14 (Fourteen) Days. 2 each 6   • ezetimibe (ZETIA) 10 MG tablet TAKE 1 TABLET BY MOUTH EVERY DAY 30 tablet 3   • gabapentin (NEURONTIN) 100 MG capsule Take 1 capsule by mouth 3 (Three) Times a Day. (Patient taking differently: Take 100 mg by mouth 3 (Three) Times a Day As Needed.) 90 capsule 1   • levothyroxine (SYNTHROID, LEVOTHROID) 200 MCG tablet Take 1 tablet by mouth Daily. 90 tablet 0   • levothyroxine (SYNTHROID, LEVOTHROID) 25 MCG tablet Take 1 tablet by mouth Daily. LAST REFILL WITHOUT APPT 90 tablet 0   • metFORMIN (GLUCOPHAGE) 1000 MG tablet TAKE 1 TABLET BY MOUTH EVERY DAY (Patient taking differently: Take 1,000 mg by mouth Daily With Breakfast.) 90 tablet 0   • metoprolol succinate XL (TOPROL-XL) 25 MG 24 hr tablet Take 1 tablet by mouth Daily. 30 tablet 11   • nicotine (Nicotine Step 2) 14 MG/24HR patch Place 1 patch on the skin as directed by provider Daily. 14 patch 0   • nicotine (Nicotine Step 3) 7 MG/24HR patch Place 1 patch on the skin as directed by provider Daily. Begin after completing 14 mg patches. 42 patch 0   • NovoLOG Mix 70/30 FlexPen (70-30) 100 UNIT/ML suspension pen-injector injection Inject 0.5 mL under the skin into the  appropriate area as directed 2 (Two) Times a Day. (Patient taking differently: Inject  under the skin into the appropriate area as directed 2 (Two) Times a Day. 50 UNITS SQ qam 46 UNITS SQ  qpm) 30 pen 3   • ONE TOUCH ULTRA TEST test strip USE TO TEST 3 TIMES A DAY AS DIRECTED 300 each 2   • ONETOUCH DELICA LANCETS FINE misc USE TO TEST TWICE A DAY AS DIRECTED  6   • valsartan-hydrochlorothiazide (DIOVAN-HCT) 320-12.5 MG per tablet TAKE 1 TABLET BY MOUTH EVERY DAY 90 tablet 3     Allergies   Allergen Reactions   • Celebrex [Celecoxib] Swelling     Hands   • Morphine And Related Nausea Only       Social History     Socioeconomic History   • Marital status:      Spouse name: Not on file   • Number of children: Not on file   • Years of education: Not on file   • Highest education level: Not on file   Tobacco Use   • Smoking status: Current Every Day Smoker     Packs/day: 0.00     Types: Cigarettes   • Smokeless tobacco: Never Used   • Tobacco comment: 2 CIGS/DAY   Substance and Sexual Activity   • Alcohol use: Yes     Comment: 12 BEERS/WEEK (3-4 AT A TIME)   • Drug use: Never   • Sexual activity: Defer     Family History   Problem Relation Age of Onset   • Diabetes Mother    • Heart disease Mother    • Diabetes Father    • Heart disease Father    • Stroke Father    • Heart attack Neg Hx    • Hypertension Neg Hx    • Thyroid disease Neg Hx             Objective:     Vital Sign Min/Max for last 24 hours  Temp  Min: 97.3 °F (36.3 °C)  Max: 97.3 °F (36.3 °C)   BP  Min: 128/73  Max: 128/73   Pulse  Min: 77  Max: 77   Resp  Min: 18  Max: 18   SpO2  Min: 98 %  Max: 98 %   No data recorded    No intake or output data in the 24 hours ending 12/03/20 0802        Vitals:    12/03/20 0755   BP: 128/73   Pulse: 77   Resp: 18   Temp: 97.3 °F (36.3 °C)   SpO2: 98%       CONSTITUTIONAL: Well-nourished. In no acute distress.   LUNGS: Normal effort. Clear to auscultation bilaterally without wheezing, rhonchi, or rales noted.    CARDIOVASCULAR: The heart has a regular rate and rhythm with a normal S1 and S2. There is no murmur, gallop, rub, or click appreciated.   PERIPHERAL VASCULAR: Radial pulses are 2+ bilaterally.  Right posterior tibial pulses is 2+, left posterior tibial pulse 0-1+.  There is no lower extremity edema.   NEUROLOGICAL: Nonfocal.  PSYCHIATRIC: Alert, orientated x 3, appropriate affect     Labs, results reviewed by myself:  No results found for: CKTOTAL, CKMB, CKMBINDEX, TROPONINI, TROPONINT    No results found for: GLUCOSE, BUN, CREATININE, NA, K, CL, CO2, CALCIUM, PROTEINTOT, ALBUMIN, ALT, AST, ALKPHOS, BILITOT, EGFRIFNONA, LABIL2, BCR, ANIONGAP    Lab Results   Component Value Date    CHOL 201 (H) 11/11/2020     Lab Results   Component Value Date    TRIG 81 11/11/2020     Lab Results   Component Value Date    HDL 47 11/11/2020     Lab Results   Component Value Date     (H) 11/11/2020     No components found for: LDLDIRECTC      No results found for: WBC, RBC, HGB, HCT, MCV, MCH, MCHC, RDW, RDWSD, MPV, PLT, NEUTRORELPCT, LYMPHORELPCT, MONORELPCT, EOSRELPCT, BASORELPCT, AUTOIGPER, NEUTROABS, LYMPHSABS, MONOSABS, EOSABS, BASOSABS, AUTOIGNUM, NRBC      Diagnostic Data:      Results for orders placed during the hospital encounter of 11/11/20   Adult Transthoracic Echo Complete W/ Cont if Necessary Per Protocol    Narrative · Left ventricular systolic function is normal. Left ventricular ejection   fraction appears to be 56 - 60%.  · Left ventricular wall thickness is consistent with concentric   hypertrophy.  · Left ventricular diastolic function is consistent with (grade Ia w/high   LAP) impaired relaxation.  · There is moderate calcification of the aortic valve mainly affecting the   non-coronary cusp(s).  · Mild aortic valve regurgitation is present.  · There is mild calcification of the mitral valve.  · Mild mitral valve regurgitation is present.        [facility] 11/11/2020  · There was a 30 to 50% proximal  circumflex stenosis followed by a 70% mid segment stenosis.  Lesions were found to be functionally significant with an IFR ratio of 0.60.  The lesions are status post tandem stents with a Synergy 2.25 x 16 mm drug-eluting stent proximally and a Synergy 2.25 x 32 mm drug-eluting stent distally with a brief segment of overlap.  · There is a 100% mid LAD stenosis with an widely patent LIMA.  · There is a 100% ostial RCA stenosis with an occluded SVG to RCA, but with moderate left to left collaterals.  · There is an occluded SVG to OM branch.  · Left ventricular ejection fraction 55% with akinesis of the basal to mid inferior segments.  · There is a 60% left common iliac artery stenosis with a peak to peak pullback gradient of 35 mmHg on one pullback and 57 mmHg on another    Tele: Normal sinus rhythm         Assessment and Plan:   ASSESSMENT:  1. PAD  a. 60% left common iliac artery stenosis with a peak to peak pullback gradient of 35 mmHg on 1 pullback of 57 mmHg on another.  2. CAD  a. Status post CABG and PRINCE to the circumflex.  3. Essential hypertension  a. Stable on current outpatient medications  4. Hyperlipidemia  5. Carotid stenosis  a. R ICA stenosis 50 to 69%, LICA stenosis greater than 70%  6. Tobacco dependence    PLAN:  1. Peripheral angiogram today with possible intervention to the left common iliac artery and left SFA with Dr. Block. The risks, benefits, and alternatives of the procedure have been reviewed and the patient wishes to proceed.   2. Today's labs pending will review when available.  3. Further recommendations pending results.    Mickie Carpenter, APRN  12/03/20 08:14 EST

## 2020-12-03 NOTE — PROGRESS NOTES
Brief cardiology update  -2+ PT pulses bilaterally post procedurally  -Off of work for at least the next week.  Nursing staff to touch base with patient.  If feeling well, okay to return to work.  If still having bilateral hip and lower extremity discomfort, will hold off a little bit longer until he follows up in the cardiology clinic.    Mychal Block MD, MSc, FACC, [facility]  Interventional Cardiology  [facility]

## 2020-12-04 ENCOUNTER — DOCUMENTATION (OUTPATIENT)
Dept: CARDIAC REHAB | Facility: HOSPITAL | Age: 60
End: 2020-12-04

## 2020-12-07 LAB
ACT BLD: 296 SECONDS (ref 82–152)
ACT BLD: 351 SECONDS (ref 82–152)

## 2020-12-28 ENCOUNTER — TELEPHONE (OUTPATIENT)
Dept: ENDOCRINOLOGY | Facility: CLINIC | Age: 60
End: 2020-12-28

## 2021-01-08 ENCOUNTER — TELEPHONE (OUTPATIENT)
Dept: CARDIOLOGY | Facility: CLINIC | Age: 61
End: 2021-01-08

## 2021-01-08 NOTE — TELEPHONE ENCOUNTER
----- Message from Mychal Block MD sent at 1/4/2021  8:23 PM EST -----  Is his back okay for work? If so, okay to go back to work. If not, file another extension, have him get his KARISSA and have him see Dr Keene about his back. Thanks!  ----- Message -----  From: Nicole Jimenez RN  Sent: 1/4/2021   2:51 PM EST  To: Mychal Block MD    Patient was to return to work on 01/05/21 after follow up and KARISSA that was suppose to be on 01/04/21. Because he was to go back to work on 01/05/21, his extension was denied and he lost insurance as of 01/02/21....therefore he had to cancel his appointment today. He is also quarantined due to COVID-19 exposure. He is to get tested today.       We can file another extension for the month of January to get his insurance back and get him scheduled for KARISSA/follow up prior to returning to work... OR ... he can return to work and have follow up and repeat KARISSA after 30 days of starting back to work (when insurance resumes).       He states that he is currently having no issues with his legs and the only symptom he is having is mild edema in his lower extremities due to the Amlodipine 10 mg. He states that this is tolerable and can wait until follow up to discuss alternatives.       Let me know what you think and I can get the plan going. Thanks.

## 2021-01-08 NOTE — TELEPHONE ENCOUNTER
Patient returned call. Patient states he is okay to return to work without restrictions. Patient to call office to reschedule appointment for follow up and ABIs when insurance is available after 30 days.

## 2021-03-18 ENCOUNTER — TELEPHONE (OUTPATIENT)
Dept: PAIN MEDICINE | Facility: CLINIC | Age: 61
End: 2021-03-18

## 2021-03-18 NOTE — TELEPHONE ENCOUNTER
TRIED WARM TRANSFERRING W/NO ANSWER      Caller: PAIGE MIMS    Relationship to patient: SELF    Best call back number: [phone]    Patient is needing: PATIENT IS RETURNING CRYSTAL'S CALL CONCERNING MEDICATION.  DUE TO BEING AT WORK, HE WOULD LIKE A CALL BACK BETWEEN 11:45 - 12:00.

## 2021-03-23 NOTE — TELEPHONE ENCOUNTER
Caller: PAIGE MIMS     Relationship: PATIENT     Best call back number: [phone]    What is the best time to reach you: BETWEEN 11:40-12:00    Who are you requesting to speak with (clinical staff, provider,  specific staff member): ALLYSON EM     What was the call regarding: PT IS RETURNING A PHONE CALL ABOUT HIS GABAPENTIN, HE REQUESTED A CALL BACK FROM ALLYSON AS HE HAS SOME THINGS HE WOULD LIKE TO DISCUSS W/ HER AS WELL-

## 2021-03-25 NOTE — TELEPHONE ENCOUNTER
Attempted to contact patient about how exactly he is taking his gabapentin. Requested that if he calls back or send a Ayas[employer] message that it include how he has been taking the medication.

## 2021-03-25 NOTE — TELEPHONE ENCOUNTER
PATIENT CALLED BACK RETURNING CALL TO JORDI MOORE REG HIS MEDICATION, TRIED TO WARM TRANSFER TO CLINIC NO ANSWER PATIENT SAID HE WILL BE AVAILABLE @ 3:30PM TODAY

## 2021-04-28 ENCOUNTER — TELEPHONE (OUTPATIENT)
Dept: CARDIOLOGY | Facility: CLINIC | Age: 61
End: 2021-04-28

## 2021-04-28 NOTE — TELEPHONE ENCOUNTER
Attempted to contact patient regarding appointment request. LVM requesting return call. Will await.

## 2021-05-04 ENCOUNTER — OFFICE VISIT (OUTPATIENT)
Dept: ENDOCRINOLOGY | Facility: CLINIC | Age: 61
End: 2021-05-04

## 2021-05-04 VITALS
HEART RATE: 66 BPM | OXYGEN SATURATION: 99 % | WEIGHT: 201.1 LBS | DIASTOLIC BLOOD PRESSURE: 84 MMHG | HEIGHT: 68 IN | TEMPERATURE: 97.3 F | SYSTOLIC BLOOD PRESSURE: 126 MMHG | BODY MASS INDEX: 30.48 KG/M2

## 2021-05-04 DIAGNOSIS — M48.062 LUMBAR STENOSIS WITH NEUROGENIC CLAUDICATION: ICD-10-CM

## 2021-05-04 DIAGNOSIS — I10 ESSENTIAL HYPERTENSION: ICD-10-CM

## 2021-05-04 DIAGNOSIS — Z79.4 UNCONTROLLED TYPE 2 DIABETES MELLITUS WITH HYPERGLYCEMIA, WITH LONG-TERM CURRENT USE OF INSULIN (HCC): Primary | ICD-10-CM

## 2021-05-04 DIAGNOSIS — E03.9 HYPOTHYROIDISM, ADULT: ICD-10-CM

## 2021-05-04 DIAGNOSIS — E11.65 UNCONTROLLED TYPE 2 DIABETES MELLITUS WITH HYPERGLYCEMIA, WITH LONG-TERM CURRENT USE OF INSULIN (HCC): Primary | ICD-10-CM

## 2021-05-04 LAB
EXPIRATION DATE: ABNORMAL
EXPIRATION DATE: NORMAL
GLUCOSE BLDC GLUCOMTR-MCNC: 187 MG/DL (ref 70–130)
HBA1C MFR BLD: 9.3 %
Lab: ABNORMAL
Lab: NORMAL

## 2021-05-04 PROCEDURE — 83036 HEMOGLOBIN GLYCOSYLATED A1C: CPT | Performed by: INTERNAL MEDICINE

## 2021-05-04 PROCEDURE — 82947 ASSAY GLUCOSE BLOOD QUANT: CPT | Performed by: INTERNAL MEDICINE

## 2021-05-04 PROCEDURE — 99214 OFFICE O/P EST MOD 30 MIN: CPT | Performed by: INTERNAL MEDICINE

## 2021-05-04 RX ORDER — INSULIN ASPART 100 [IU]/ML
INJECTION, SUSPENSION SUBCUTANEOUS
Qty: 10 PEN | Refills: 10 | Status: SHIPPED | OUTPATIENT
Start: 2021-05-04 | End: 2021-12-28 | Stop reason: SDUPTHER

## 2021-05-04 RX ORDER — FLASH GLUCOSE SENSOR
1 KIT MISCELLANEOUS
Qty: 2 EACH | Refills: 6 | Status: SHIPPED | OUTPATIENT
Start: 2021-05-04 | End: 2022-08-22

## 2021-05-04 RX ORDER — INSULIN ASPART 100 [IU]/ML
INJECTION, SUSPENSION SUBCUTANEOUS
Qty: 30 ML | Refills: 10 | Status: SHIPPED | OUTPATIENT
Start: 2021-05-04 | End: 2021-05-04 | Stop reason: SDUPTHER

## 2021-05-04 RX ORDER — GABAPENTIN 100 MG/1
100 CAPSULE ORAL 3 TIMES DAILY
Qty: 90 CAPSULE | Refills: 1 | Status: SHIPPED | OUTPATIENT
Start: 2021-05-04 | End: 2021-11-23

## 2021-06-16 ENCOUNTER — OFFICE VISIT (OUTPATIENT)
Dept: CARDIOLOGY | Facility: CLINIC | Age: 61
End: 2021-06-16

## 2021-06-16 VITALS
OXYGEN SATURATION: 98 % | HEART RATE: 70 BPM | SYSTOLIC BLOOD PRESSURE: 150 MMHG | BODY MASS INDEX: 29.25 KG/M2 | WEIGHT: 193 LBS | DIASTOLIC BLOOD PRESSURE: 84 MMHG | HEIGHT: 68 IN

## 2021-06-16 DIAGNOSIS — I73.9 PVD (PERIPHERAL VASCULAR DISEASE) WITH CLAUDICATION (HCC): ICD-10-CM

## 2021-06-16 DIAGNOSIS — I65.23 CAROTID STENOSIS, BILATERAL: ICD-10-CM

## 2021-06-16 DIAGNOSIS — I10 ESSENTIAL HYPERTENSION: ICD-10-CM

## 2021-06-16 DIAGNOSIS — E78.2 MIXED HYPERLIPIDEMIA: ICD-10-CM

## 2021-06-16 DIAGNOSIS — I25.10 CORONARY ARTERY DISEASE INVOLVING NATIVE CORONARY ARTERY OF NATIVE HEART WITHOUT ANGINA PECTORIS: Primary | ICD-10-CM

## 2021-06-16 DIAGNOSIS — I73.9 CLAUDICATION OF BOTH LOWER EXTREMITIES (HCC): ICD-10-CM

## 2021-06-16 PROCEDURE — 99214 OFFICE O/P EST MOD 30 MIN: CPT | Performed by: INTERNAL MEDICINE

## 2021-06-16 NOTE — PROGRESS NOTES
[facility] Cardiology   [address], [address]  [place], [place], [address]    [phone]  WWW.[facility]           OUTPATIENT CLINIC FOLLOW UP NOTE    Patient Care Team:  Patient Care Team:  Nacho Omer MD as PCP - General (Family Medicine)    Subjective:      Chief Complaint   Patient presents with   • Coronary Artery Disease   • Hyperlipidemia       HPI:    Laron Pandey is a 60 y.o. male.  Problem list:  1. CAD  a. Status post three-vessel CABG in 2003 with Dr. Myers.  b. Status post PRINCE x2 to the circumflex with Dr. Block, 11/2020  2. Carotid artery disease  a. Status post bilateral CEA in 2009 with Dr. Myers  3. PAD  1. Peripheral angiogram 12/3/2020: Status post 7 x 27 mm VISI pro to the left common iliac artery.  Left SFA status post angioplasty with a Chocolate balloon   1. Back pain significant improved post procedurally  4. Lower back pain  a. History of lumbar surgery, Dr. Dobbs, 1999  b. Acute worsening spring 2020, severe disc space narrowing and L4/L5, moderate to severe canal and neuroforaminal stenosis  5. Hypertension  6. Hyperlipidemia  7. Diabetes mellitus  8. Hypothyroidism  9. Tobacco dependence    The patient presents today for follow-up.    Since the patient underwent peripheral intervention in December 2020 he reports that his back symptoms have significantly improved.  He does however have complaints of his calves feeling tender to touch and intermittent claudication.  He has also had intermittent lower extremity edema.  He denies chest pain, shortness of breath, lightheadedness, syncope.  He continues to smoke a couple cigarettes daily.    Review of Systems:  Positive for claudication, lower extremity edema, calf tenderness  Negative for exertional chest pain, dyspnea with exertion, orthopnea, PND, palpitations, lightheadedness, syncope.     PFSH:  Patient Active Problem List   Diagnosis   • Uncontrolled type 2 diabetes mellitus with  hyperglycemia, with long-term current use of insulin (CMS/[facility])   • Hyperlipidemia   • Hypothyroidism, adult   • Coronary artery disease involving coronary bypass graft of native heart without angina pectoris   • Erectile dysfunction   • Hypertension   • Lumbar stenosis with neurogenic claudication   • Lumbar postlaminectomy syndrome   • History of lumbar surgery   • PAD (peripheral artery disease) (CMS/[facility])   • Current every day smoker   • Tobacco abuse counseling   • Claudication of both lower extremities (CMS/[facility])   • Abnormal stress test         Current Outpatient Medications:   •  aspirin 81 MG EC tablet, Take 81 mg by mouth Daily., Disp: , Rfl:   •  BD ULTRA-FINE PEN NEEDLES 29G X 12.7MM misc, Use with Insulin 3 times daily, Disp: 300 each, Rfl: 0  •  Blood Glucose Monitoring Suppl (ONE TOUCH ULTRA MINI) w/Device kit, USE TO TEST TWICE A DAY AS DIRECTED, Disp: , Rfl: 0  •  Continuous Blood Gluc Sensor (FreeStyle Jued 14 Day Sensor) [facility], 1 each Every 14 (Fourteen) Days., Disp: 2 each, Rfl: 6  •  gabapentin (NEURONTIN) 100 MG capsule, Take 1 capsule by mouth 3 (Three) Times a Day., Disp: 90 capsule, Rfl: 1  •  levothyroxine (SYNTHROID, LEVOTHROID) 200 MCG tablet, Take 1 tablet by mouth Daily., Disp: 90 tablet, Rfl: 0  •  levothyroxine (SYNTHROID, LEVOTHROID) 25 MCG tablet, Take 1 tablet by mouth Daily. LAST REFILL WITHOUT APPT, Disp: 90 tablet, Rfl: 0  •  metFORMIN (GLUCOPHAGE) 1000 MG tablet, TAKE 1 TABLET BY MOUTH EVERY DAY (Patient taking differently: Take 1,000 mg by mouth Daily With Breakfast.), Disp: 90 tablet, Rfl: 0  •  metoprolol succinate XL (TOPROL-XL) 25 MG 24 hr tablet, Take 1 tablet by mouth Daily., Disp: 30 tablet, Rfl: 11  •  NovoLOG Mix 70/30 FlexPen (70-30) 100 UNIT/ML suspension pen-injector injection, 50 UNITS SQ qam 46 UNITS SQ  qpm, Disp: 10 pen, Rfl: 10  •  ONE TOUCH ULTRA TEST test strip, USE TO TEST 3 TIMES A DAY AS DIRECTED, Disp: 300 each, Rfl: 2  •  ONETOUCH DELICA LANCETS FINE [facility],  "USE TO TEST TWICE A DAY AS DIRECTED, Disp: , Rfl: 6  •  rosuvastatin (CRESTOR) 20 MG tablet, Take 1 tablet by mouth Daily., Disp: 30 tablet, Rfl: 11  •  valsartan-hydrochlorothiazide (DIOVAN-HCT) 320-12.5 MG per tablet, TAKE 1 TABLET BY MOUTH EVERY DAY (Patient taking differently: Take 1 tablet by mouth Daily.), Disp: 90 tablet, Rfl: 3  •  Rivaroxaban (XARELTO) 2.5 MG tablet, Take 1 tablet by mouth 2 (Two) Times a Day., Disp: 60 tablet, Rfl: 11    Allergies   Allergen Reactions   • Celebrex [Celecoxib] Swelling     Hands   • Morphine And Related Nausea Only        reports that he has been smoking cigarettes. He has been smoking about 0.00 packs per day. He has never used smokeless tobacco.      Objective:   Physical exam:  /84 (BP Location: Left arm, Patient Position: Sitting)   Pulse 70   Ht 172.7 cm (68\")   Wt 87.5 kg (193 lb)   SpO2 98%   BMI 29.35 kg/m²   CONSTITUTIONAL: No acute distress  RESPIRATORY: Normal effort. Clear to auscultation bilaterally without wheezing or rales  CARDIOVASCULAR: Carotids with normal upstrokes without bruits.  Regular rate and rhythm with normal S1 and S2. Without murmur, gallop or rub. Normal radial pulse. There is no lower extremity edema bilaterally.    Exam 6/16/2021: Difficult to palpate pedal pulses  Exam, 12/2020: 2+ PT pulses bilaterally post procedurally    Labs:    BUN   Date Value Ref Range Status   12/03/2020 14 8 - 23 mg/dL Final     Creatinine   Date Value Ref Range Status   12/03/2020 0.65 (L) 0.76 - 1.27 mg/dL Final     Potassium   Date Value Ref Range Status   12/03/2020 4.1 3.5 - 5.2 mmol/L Final     Comment:     Slight hemolysis detected by analyzer. Results may be affected.     ALT (SGPT)   Date Value Ref Range Status   12/03/2020 17 1 - 41 U/L Final     AST (SGOT)   Date Value Ref Range Status   12/03/2020 21 1 - 40 U/L Final       Lab Results   Component Value Date    CHOL 201 (H) 11/11/2020     Lab Results   Component Value Date    TRIG 81 " 11/11/2020     Lab Results   Component Value Date    HDL 47 11/11/2020     Lab Results   Component Value Date     (H) 11/11/2020     No components found for: LDLDIRECTC    Diagnostic Data:    Procedures    Results for orders placed during the hospital encounter of 11/11/20    Adult Transthoracic Echo Complete W/ Cont if Necessary Per Protocol    Interpretation Summary  · Left ventricular systolic function is normal. Left ventricular ejection fraction appears to be 56 - 60%.  · Left ventricular wall thickness is consistent with concentric hypertrophy.  · Left ventricular diastolic function is consistent with (grade Ia w/high LAP) impaired relaxation.  · There is moderate calcification of the aortic valve mainly affecting the non-coronary cusp(s).  · Mild aortic valve regurgitation is present.  · There is mild calcification of the mitral valve.  · Mild mitral valve regurgitation is present.    Stress test 11/2/2020  · Left ventricular ejection fraction is normal. (Calculated EF = 51%).  · Myocardial perfusion imaging indicates a small-sized infarct located in the inferior wall with mild yeyo-infarct ischemia.  · Impressions are consistent with a low to intermediate risk study.    [facility] 11/11/2020  · There was a 30 to 50% proximal circumflex stenosis followed by a 70% mid segment stenosis.  Lesions were found to be functionally significant with an IFR ratio of 0.60.  The lesions are status post tandem stents with a Synergy 2.25 x 16 mm drug-eluting stent proximally and a Synergy 2.25 x 32 mm drug-eluting stent distally with a brief segment of overlap.  · There is a 100% mid LAD stenosis with an widely patent LIMA.  · There is a 100% ostial RCA stenosis with an occluded SVG to RCA, but with moderate left to left collaterals.  · There is an occluded SVG to OM branch.  · Left ventricular ejection fraction 55% with akinesis of the basal to mid inferior segments.  · There is a 60% left common iliac artery stenosis with  a peak to peak pullback gradient of 35 mmHg on one pullback and 57 mmHg on another.    Peripheral angiogram 12/3/2020  · The 60% eccentric, hemodynamically significant distal left common iliac artery stenosis is status post intervention with a 7 x 27 mm Visipro balloon expandable, bare-metal stent.  · The tandem 70% mid left SFA stenoses are status post angioplasty with a 5 mm Chocolate nitinol caged, controlled dilatation balloon with multiple inflations.    Assessment and Plan:   Diagnoses and all orders for this visit:    Coronary artery disease involving native coronary artery of native heart without angina pectoris (Primary)  Mixed hyperlipidemia  -Without angina.  -Continue aspirin, high intensity statin, beta-blocker.  -Discontinuing clopidogrel  -Repeat lipid panel and LFTs at time of next visit if not completed prior to.    PVD (peripheral vascular disease) with claudication   -Intermittent claudication symptoms.  Patient is able to walk 50 yards before developing symptoms.  -Repeat exercise ABIs  -Discontinuing clopidogrel.  Begin Xarelto 2.5 mg p.o. twice daily.    Carotid stenosis, bilateral  -Status post bilateral CEA in 2009.  -Continue aspirin, statin    Essential hypertension  -Stable continue current related medications.      - Return in about 6 months (around 12/16/2021) for Next scheduled follow-up with an ECG.    Scribed for Mychal Block MD by CARLI Sánchez 6/16/2021  12:21 EDT    I, Mychal Block MD, personally performed the services as scribed by the above named individual. I have made any necessary edits and it is both accurate and complete.     Mychal Block MD, MSc, FACC, [facility]  Interventional Cardiology  [facility]

## 2021-07-27 ENCOUNTER — HOSPITAL ENCOUNTER (OUTPATIENT)
Dept: CARDIOLOGY | Facility: HOSPITAL | Age: 61
Discharge: HOME | End: 2021-07-27
Admitting: INTERNAL MEDICINE

## 2021-07-27 DIAGNOSIS — I73.9 PVD (PERIPHERAL VASCULAR DISEASE) WITH CLAUDICATION (HCC): ICD-10-CM

## 2021-07-27 DIAGNOSIS — I73.9 CLAUDICATION OF BOTH LOWER EXTREMITIES (HCC): ICD-10-CM

## 2021-07-27 LAB
BH CV LOWER ARTERIAL LEFT ABI RATIO: 0.48
BH CV LOWER ARTERIAL LEFT DORSALIS PEDIS SYS MAX: 71 MMHG
BH CV LOWER ARTERIAL LEFT GREAT TOE SYS MAX: 38 MMHG
BH CV LOWER ARTERIAL LEFT POST TIBIAL SYS MAX: 77 MMHG
BH CV LOWER ARTERIAL LEFT TBI RATIO: 0.32
BH CV LOWER ARTERIAL RIGHT ABI RATIO: 0.58
BH CV LOWER ARTERIAL RIGHT DORSALIS PEDIS SYS MAX: 68 MMHG
BH CV LOWER ARTERIAL RIGHT GREAT TOE SYS MAX: 52 MMHG
BH CV LOWER ARTERIAL RIGHT POST TIBIAL SYS MAX: 94 MMHG
BH CV LOWER ARTERIAL RIGHT TBI RATIO: 0.23
MAXIMAL PREDICTED HEART RATE: 160 BPM
STRESS TARGET HR: 136 BPM
UPPER ARTERIAL LEFT ARM BRACHIAL SYS MAX: 161 MMHG
UPPER ARTERIAL RIGHT ARM BRACHIAL SYS MAX: 153 MMHG

## 2021-07-27 PROCEDURE — 93924 LWR XTR VASC STDY BILAT: CPT

## 2021-07-27 PROCEDURE — 93924 LWR XTR VASC STDY BILAT: CPT | Performed by: INTERNAL MEDICINE

## 2021-07-29 ENCOUNTER — TELEPHONE (OUTPATIENT)
Dept: CARDIOLOGY | Facility: CLINIC | Age: 61
End: 2021-07-29

## 2021-07-29 NOTE — TELEPHONE ENCOUNTER
Reviewed KARISSA results and recommendations. Patient states that his legs have not improved much. Patient advised we will move his appt up to first available to discuss next plan of care with MJS. Pt is agreeable to plan.

## 2021-11-18 DIAGNOSIS — M48.062 LUMBAR STENOSIS WITH NEUROGENIC CLAUDICATION: ICD-10-CM

## 2021-11-18 RX ORDER — GABAPENTIN 100 MG/1
CAPSULE ORAL
Qty: 90 CAPSULE | OUTPATIENT
Start: 2021-11-18

## 2021-11-23 DIAGNOSIS — M48.062 LUMBAR STENOSIS WITH NEUROGENIC CLAUDICATION: ICD-10-CM

## 2021-11-24 RX ORDER — GABAPENTIN 100 MG/1
100 CAPSULE ORAL 3 TIMES DAILY
Qty: 90 CAPSULE | Refills: 0 | Status: SHIPPED | OUTPATIENT
Start: 2021-11-24 | End: 2021-12-28 | Stop reason: SDUPTHER

## 2021-12-21 ENCOUNTER — TELEPHONE (OUTPATIENT)
Dept: ENDOCRINOLOGY | Facility: CLINIC | Age: 61
End: 2021-12-21

## 2021-12-21 NOTE — TELEPHONE ENCOUNTER
PT called and said he needs to schedule an appt for FMLA and a refill. PT cannot have refill on gabapentin per notes from 11/23/21 telephone call until he can come in and see the MD. He runs out of medication as of the first week of January and Dr. Swan is not in until mid January. Please advise PT of what to do until then via phone or [employer].    No F/U set as there is nothing open for several weeks with Beny

## 2021-12-21 NOTE — TELEPHONE ENCOUNTER
Please advise Last OV 5/4/21 No Future appt scheduled, cancelled last 2 office visits.  Should Pt contact another provider for this medication?      States RX is for:Lumbar stenosis with neurogenic claudication  -     gabapentin (NEURONTIN) 100 MG capsule; Take 1 capsule by mouth 3 (Three) Times a Day.

## 2021-12-22 NOTE — TELEPHONE ENCOUNTER
I still see 2 pm and 3:30 pm appointment on Dec 28. We can schedule him at this time (I have sent 2 other patients on this spots, but if they cant come then this patient could be scheduled there)

## 2021-12-28 ENCOUNTER — OFFICE VISIT (OUTPATIENT)
Dept: ENDOCRINOLOGY | Facility: CLINIC | Age: 61
End: 2021-12-28

## 2021-12-28 VITALS
HEIGHT: 68 IN | WEIGHT: 188.6 LBS | OXYGEN SATURATION: 99 % | SYSTOLIC BLOOD PRESSURE: 132 MMHG | BODY MASS INDEX: 28.58 KG/M2 | HEART RATE: 86 BPM | DIASTOLIC BLOOD PRESSURE: 82 MMHG

## 2021-12-28 DIAGNOSIS — E11.65 UNCONTROLLED TYPE 2 DIABETES MELLITUS WITH HYPERGLYCEMIA, WITH LONG-TERM CURRENT USE OF INSULIN (HCC): Primary | ICD-10-CM

## 2021-12-28 DIAGNOSIS — Z79.4 UNCONTROLLED TYPE 2 DIABETES MELLITUS WITH HYPERGLYCEMIA, WITH LONG-TERM CURRENT USE OF INSULIN (HCC): Primary | ICD-10-CM

## 2021-12-28 DIAGNOSIS — I10 ESSENTIAL HYPERTENSION: ICD-10-CM

## 2021-12-28 DIAGNOSIS — M48.062 LUMBAR STENOSIS WITH NEUROGENIC CLAUDICATION: ICD-10-CM

## 2021-12-28 LAB
EXPIRATION DATE: ABNORMAL
EXPIRATION DATE: NORMAL
GLUCOSE BLDC GLUCOMTR-MCNC: 232 MG/DL (ref 70–130)
HBA1C MFR BLD: 10.9 %
Lab: ABNORMAL
Lab: NORMAL

## 2021-12-28 PROCEDURE — 82947 ASSAY GLUCOSE BLOOD QUANT: CPT | Performed by: INTERNAL MEDICINE

## 2021-12-28 PROCEDURE — 99214 OFFICE O/P EST MOD 30 MIN: CPT | Performed by: INTERNAL MEDICINE

## 2021-12-28 PROCEDURE — 83036 HEMOGLOBIN GLYCOSYLATED A1C: CPT | Performed by: INTERNAL MEDICINE

## 2021-12-28 RX ORDER — VALSARTAN AND HYDROCHLOROTHIAZIDE 320; 12.5 MG/1; MG/1
1 TABLET, FILM COATED ORAL DAILY
Qty: 90 TABLET | Refills: 3 | Status: SHIPPED | OUTPATIENT
Start: 2021-12-28 | End: 2022-10-27 | Stop reason: HOSPADM

## 2021-12-28 RX ORDER — GABAPENTIN 100 MG/1
100 CAPSULE ORAL 3 TIMES DAILY
Qty: 90 CAPSULE | Refills: 0 | Status: SHIPPED | OUTPATIENT
Start: 2021-12-28 | End: 2022-02-21 | Stop reason: SDUPTHER

## 2021-12-28 RX ORDER — METOPROLOL SUCCINATE 25 MG/1
25 TABLET, EXTENDED RELEASE ORAL
Qty: 90 TABLET | Refills: 2 | Status: SHIPPED | OUTPATIENT
Start: 2021-12-28 | End: 2022-10-27 | Stop reason: HOSPADM

## 2021-12-28 RX ORDER — LEVOTHYROXINE SODIUM 0.2 MG/1
200 TABLET ORAL DAILY
Qty: 90 TABLET | Refills: 2 | Status: ON HOLD | OUTPATIENT
Start: 2021-12-28 | End: 2022-06-13

## 2021-12-28 RX ORDER — LANCETS 33 GAUGE
1 EACH MISCELLANEOUS 3 TIMES DAILY
Qty: 300 EACH | Refills: 2 | Status: SHIPPED | OUTPATIENT
Start: 2021-12-28 | End: 2022-06-28 | Stop reason: SDUPTHER

## 2021-12-28 RX ORDER — INSULIN ASPART 100 [IU]/ML
INJECTION, SUSPENSION SUBCUTANEOUS
Qty: 30 ML | Refills: 0 | Status: SHIPPED | OUTPATIENT
Start: 2021-12-28 | End: 2022-02-21 | Stop reason: SDUPTHER

## 2021-12-28 RX ORDER — LEVOTHYROXINE SODIUM 0.03 MG/1
25 TABLET ORAL DAILY
Qty: 90 TABLET | Refills: 2 | Status: SHIPPED | OUTPATIENT
Start: 2021-12-28 | End: 2022-06-17 | Stop reason: HOSPADM

## 2021-12-28 RX ORDER — PEN NEEDLE, DIABETIC 29 G X1/2"
NEEDLE, DISPOSABLE MISCELLANEOUS
Qty: 300 EACH | Refills: 2 | Status: SHIPPED | OUTPATIENT
Start: 2021-12-28

## 2021-12-28 NOTE — PATIENT INSTRUCTIONS
Results for orders placed or performed in visit on 12/28/21   POC Glycosylated Hemoglobin (Hb A1C)    Specimen: Blood   Result Value Ref Range    Hemoglobin A1C 10.9 %    Lot Number [id number]     Expiration Date 08/30/2023    POC Glucose, Blood    Specimen: Blood   Result Value Ref Range    Glucose 232 (A) 70 - 130 mg/dL    Lot Number [id number]     Expiration Date 05/25/2022      Novolog 70/30 55 unit in the morning and 48 units with dinner, if you have a late dinner, take lower dose of insulin at dinner - 44 units.

## 2021-12-28 NOTE — PROGRESS NOTES
Subjective:     Chief Complaint   Patient presents with   • Diabetes     Follow UP:  Having a lot of issues with Neuropathy, feel, leg, hands   • Hypothyroidism      Laron Pandey is a 61 y.o. male who is is being seen for follow-up for management of  Type 2 diabetes mellitus.  The initial diagnosis of diabetes was made in 2003. He was initially controlled with metformin and started insulin in 2010.   Diabetic complications: cardiovascular disease s/p CABG. He has neuropathy sx with tingling and pain in the legs bilaterally.       Current diabetic medications include novolog 70/30  Metformin - he stopped this medication due to side effects of diarrhea.     Monitoring  -Freestyle Ang - he wasn't able to use it because cant use any device at work.   Reported occasional hypoglycemia at night when his dinner is late.     Other med problems:CAD/CABG, HL, HTN    HL- intolerant to statins in the past. Restarted zetia and doing well with that.   Hypothyroidism - 225 mcg of levothyroxine  HTN -improved after changing to valsartan.   Back pain improved after epidural injection   His sx of neuopathy are worse.       MEDICATIONS    Current Outpatient Medications:   •  aspirin 81 MG EC tablet, Take 81 mg by mouth Daily., Disp: , Rfl:   •  BD ULTRA-FINE PEN NEEDLES 29G X 12.7MM misc, Use with Insulin 3 times daily, Disp: 300 each, Rfl: 2  •  Blood Glucose Monitoring Suppl (ONE TOUCH ULTRA MINI) w/Device kit, USE TO TEST TWICE A DAY AS DIRECTED, Disp: , Rfl: 0  •  Continuous Blood Gluc Sensor (FreeStyle Ang 14 Day Sensor) misc, 1 each Every 14 (Fourteen) Days., Disp: 2 each, Rfl: 6  •  gabapentin (NEURONTIN) 100 MG capsule, Take 1 capsule by mouth 3 (Three) Times a Day. NEEDS APPT. PRIOR FURTHER REFILLS, Disp: 90 capsule, Rfl: 0  •  glucose blood (ONE TOUCH ULTRA TEST) test strip, Test three times daily, Disp: 300 each, Rfl: 2  •  levothyroxine (SYNTHROID, LEVOTHROID) 200 MCG tablet, Take 1 tablet by mouth Daily., Disp: 90  "tablet, Rfl: 2  •  levothyroxine (SYNTHROID, LEVOTHROID) 25 MCG tablet, Take 1 tablet by mouth Daily. LAST REFILL WITHOUT APPT, Disp: 90 tablet, Rfl: 2  •  metoprolol succinate XL (TOPROL-XL) 25 MG 24 hr tablet, Take 1 tablet by mouth Daily., Disp: 90 tablet, Rfl: 2  •  NovoLOG Mix 70/30 FlexPen (70-30) 100 UNIT/ML suspension pen-injector injection, 50 UNITS SQ qam 46 UNITS SQ  qpm, Disp: 30 mL, Rfl: 0  •  Rivaroxaban (XARELTO) 2.5 MG tablet, Take 1 tablet by mouth 2 (Two) Times a Day., Disp: 60 tablet, Rfl: 11  •  rosuvastatin (CRESTOR) 20 MG tablet, Take 1 tablet by mouth Daily., Disp: 30 tablet, Rfl: 11  •  valsartan-hydrochlorothiazide (DIOVAN-HCT) 320-12.5 MG per tablet, Take 1 tablet by mouth Daily., Disp: 90 tablet, Rfl: 3  •  Lancets (OneTouch Delica Plus [id number]) misc, 1 each 3 (Three) Times a Day., Disp: 300 each, Rfl: 2    Review of Systems  Review of Systems   Constitutional: Positive for fatigue.   Eyes: Negative.    Endocrine:        As listed in HPI   Neurological: Positive for weakness and numbness (tingling).   All other systems reviewed and are negative.         Objective:      /82   Pulse 86   Ht 172.7 cm (68\")   Wt 85.5 kg (188 lb 9.6 oz)   SpO2 99%   BMI 28.68 kg/m² Body mass index is 28.68 kg/m².  Physical Exam   Constitutional: He is oriented to person, place, and time. He appears well-developed.   HENT:   Head: Normocephalic and atraumatic.   Eyes: Conjunctivae are normal.   Neck: No thyroid mass present.   The neck is supple and no assimetry visualized   Cardiovascular: Normal rate, regular rhythm, normal heart sounds and normal pulses.   Pulmonary/Chest: Effort normal and breath sounds normal.   Neurological: He is alert and oriented to person, place, and time. He has normal reflexes.   Psychiatric: Thought content normal.   Vitals reviewed.          LABS AND IMAGING      Lab Results   Component Value Date    HGBA1C 10.9 12/28/2021    HGBA1C 9.3 05/04/2021    BA 10.10 (H) " 11/11/2020     Office Visit on 12/28/2021   Component Date Value Ref Range Status   • Hemoglobin A1C 12/28/2021 10.9  % Final   • Lot Number 12/28/2021 [id number]   Final   • Expiration Date 12/28/2021 08/30/2023   Final   • Glucose 12/28/2021 232* 70 - 130 mg/dL Final   • Lot Number 12/28/2021 [id number]   Final   • Expiration Date 12/28/2021 05/25/2022   Final             Assessment:         Diagnoses and all orders for this visit:    Uncontrolled type 2 diabetes mellitus with hyperglycemia, with long-term current use of insulin (HCC)  -     POC Glycosylated Hemoglobin (Hb A1C)  -     POC Glucose, Blood    Essential hypertension  -     valsartan-hydrochlorothiazide (DIOVAN-HCT) 320-12.5 MG per tablet; Take 1 tablet by mouth Daily.    Lumbar stenosis with neurogenic claudication  -     gabapentin (NEURONTIN) 100 MG capsule; Take 1 capsule by mouth 3 (Three) Times a Day. NEEDS APPT. PRIOR FURTHER REFILLS    Other orders  -     glucose blood (ONE TOUCH ULTRA TEST) test strip; Test three times daily  -     Lancets (OneTouch Delica Plus [id number]) misc; 1 each 3 (Three) Times a Day.  -     BD ULTRA-FINE PEN NEEDLES 29G X 12.7MM misc; Use with Insulin 3 times daily  -     levothyroxine (SYNTHROID, LEVOTHROID) 25 MCG tablet; Take 1 tablet by mouth Daily. LAST REFILL WITHOUT APPT  -     levothyroxine (SYNTHROID, LEVOTHROID) 200 MCG tablet; Take 1 tablet by mouth Daily.  -     metoprolol succinate XL (TOPROL-XL) 25 MG 24 hr tablet; Take 1 tablet by mouth Daily.  -     NovoLOG Mix 70/30 FlexPen (70-30) 100 UNIT/ML suspension pen-injector injection; 50 UNITS SQ qam 46 UNITS SQ  qpm        Plan:       Patient has poorly controlled diabetes. Dietary recommendations reviewed. Insulin titration instructions provided.   He is not using Freestyle - not allowed to use it at work. Plan to retire soon and will change the regimen to MDI at this time.     Novolog 70/30 50 --> 55 unit in the morning and 48 units with dinner, if you have a  late dinner, take lower dose of insulin at dinner - 44 units.    He is not taking metformin.     Hypoglycemia precautions reviewed.   He has hypoglycemia when takes insulin several hours after meals. Precautions reviewed.   .   -cont Zetia  -gabapentin for neuropathy. He had a lot if neuropathy sx.      -cont thyroid medications levothyroxine 225 mcg . Repeat levels.        Follow up:  3 -4 months.

## 2022-01-05 ENCOUNTER — OFFICE VISIT (OUTPATIENT)
Dept: CARDIOLOGY | Facility: CLINIC | Age: 62
End: 2022-01-05

## 2022-01-05 VITALS
HEART RATE: 88 BPM | OXYGEN SATURATION: 99 % | HEIGHT: 68 IN | BODY MASS INDEX: 28.64 KG/M2 | DIASTOLIC BLOOD PRESSURE: 94 MMHG | WEIGHT: 189 LBS | SYSTOLIC BLOOD PRESSURE: 154 MMHG

## 2022-01-05 DIAGNOSIS — E11.65 UNCONTROLLED TYPE 2 DIABETES MELLITUS WITH HYPERGLYCEMIA, WITH LONG-TERM CURRENT USE OF INSULIN: ICD-10-CM

## 2022-01-05 DIAGNOSIS — I10 PRIMARY HYPERTENSION: ICD-10-CM

## 2022-01-05 DIAGNOSIS — E78.5 HYPERLIPIDEMIA, UNSPECIFIED HYPERLIPIDEMIA TYPE: Primary | ICD-10-CM

## 2022-01-05 DIAGNOSIS — Z79.4 UNCONTROLLED TYPE 2 DIABETES MELLITUS WITH HYPERGLYCEMIA, WITH LONG-TERM CURRENT USE OF INSULIN: ICD-10-CM

## 2022-01-05 DIAGNOSIS — I25.810 CORONARY ARTERY DISEASE INVOLVING CORONARY BYPASS GRAFT OF NATIVE HEART WITHOUT ANGINA PECTORIS: ICD-10-CM

## 2022-01-05 DIAGNOSIS — I73.9 CLAUDICATION OF BOTH LOWER EXTREMITIES: ICD-10-CM

## 2022-01-05 DIAGNOSIS — I73.9 PAD (PERIPHERAL ARTERY DISEASE): ICD-10-CM

## 2022-01-05 DIAGNOSIS — F17.200 TOBACCO DEPENDENCE: ICD-10-CM

## 2022-01-05 PROCEDURE — 99214 OFFICE O/P EST MOD 30 MIN: CPT | Performed by: INTERNAL MEDICINE

## 2022-01-05 PROCEDURE — 93000 ELECTROCARDIOGRAM COMPLETE: CPT | Performed by: INTERNAL MEDICINE

## 2022-01-05 NOTE — PROGRESS NOTES
[facility]   [address], [address]  [place], [place], [address]    [phone]  [email or web address] [email or web address]           OUTPATIENT CLINIC FOLLOW UP NOTE    Patient Care Team:  Patient Care Team:  Nacho Omer MD as PCP - General (Family Medicine)    Subjective:      Chief Complaint   Patient presents with   • Coronary artery disease involving native coronary artery of        HPI:    Laron Pandey is a 61 y.o. male.  Problem list:  1. CAD  a. Status post three-vessel CABG in 2003 with Dr. Myers.  b. Status post PRINCE x2 to the circumflex with Dr. Block, 11/2020  2. Carotid artery disease  a. Status post bilateral CEA in 2009 with Dr. Myers  3. PAD  1. Peripheral angiogram 12/3/2020: Status post 7 x 27 mm VISI pro to the left common iliac artery.  Left SFA status post angioplasty with a Chocolate balloon   1. Back pain significant improved post procedurally  4. Lower back pain  a. History of lumbar surgery, Dr. Dobbs, 1999  b. Acute worsening spring 2020, severe disc space narrowing and L4/L5, moderate to severe canal and neuroforaminal stenosis  5. Hypertension  6. Hyperlipidemia  7. Diabetes mellitus  8. Hypothyroidism  9. Tobacco dependence    The patient presents today for follow-up.    Since his last visit that the patient has continued to have pain in his feet, left worse than right.  Also has numbness and tingling in both hands and feet.  Taking gabapentin, seems to help some.  He does note that his claudication symptoms have improved since his last visit.  Is occasional lower extremity edema.  Denies chest pain, shortness of breath, palpitations, lightheadedness or syncope.    Review of Systems:  As noted in above HPI.      PFSH:  Patient Active Problem List   Diagnosis   • Uncontrolled type 2 diabetes mellitus with hyperglycemia, with long-term current use of insulin (HCC)   • Hyperlipidemia   • Hypothyroidism, adult   • Coronary artery disease involving coronary  bypass graft of native heart without angina pectoris   • Erectile dysfunction   • Hypertension   • Lumbar stenosis with neurogenic claudication   • Lumbar postlaminectomy syndrome   • History of lumbar surgery   • PAD (peripheral artery disease) ([facility])   • Current every day smoker   • Tobacco abuse counseling   • Claudication of both lower extremities ([facility])   • Abnormal stress test         Current Outpatient Medications:   •  aspirin 81 MG EC tablet, Take 81 mg by mouth Daily., Disp: , Rfl:   •  BD ULTRA-FINE PEN NEEDLES 29G X 12.7MM misc, Use with Insulin 3 times daily, Disp: 300 each, Rfl: 2  •  Blood Glucose Monitoring Suppl (ONE TOUCH ULTRA MINI) w/Device kit, USE TO TEST TWICE A DAY AS DIRECTED, Disp: , Rfl: 0  •  Continuous Blood Gluc Sensor (FreeStyle Jude 14 Day Sensor) misc, 1 each Every 14 (Fourteen) Days., Disp: 2 each, Rfl: 6  •  gabapentin (NEURONTIN) 100 MG capsule, Take 1 capsule by mouth 3 (Three) Times a Day. NEEDS APPT. PRIOR FURTHER REFILLS, Disp: 90 capsule, Rfl: 0  •  glucose blood (ONE TOUCH ULTRA TEST) test strip, Test three times daily, Disp: 300 each, Rfl: 2  •  Lancets (OneTouch Delica Plus [id number]) misc, 1 each 3 (Three) Times a Day., Disp: 300 each, Rfl: 2  •  levothyroxine (SYNTHROID, LEVOTHROID) 200 MCG tablet, Take 1 tablet by mouth Daily., Disp: 90 tablet, Rfl: 2  •  levothyroxine (SYNTHROID, LEVOTHROID) 25 MCG tablet, Take 1 tablet by mouth Daily. LAST REFILL WITHOUT APPT, Disp: 90 tablet, Rfl: 2  •  metoprolol succinate XL (TOPROL-XL) 25 MG 24 hr tablet, Take 1 tablet by mouth Daily., Disp: 90 tablet, Rfl: 2  •  NovoLOG Mix 70/30 FlexPen (70-30) 100 UNIT/ML suspension pen-injector injection, 50 UNITS SQ qam 46 UNITS SQ  qpm (Patient taking differently: 55 UNITS SQ qam 48 UNITS SQ  qpm), Disp: 30 mL, Rfl: 0  •  Rivaroxaban (XARELTO) 2.5 MG tablet, Take 1 tablet by mouth 2 (Two) Times a Day., Disp: 60 tablet, Rfl: 11  •  rosuvastatin (CRESTOR) 20 MG tablet, Take 1 tablet by mouth  "Daily., Disp: 30 tablet, Rfl: 11  •  valsartan-hydrochlorothiazide (DIOVAN-HCT) 320-12.5 MG per tablet, Take 1 tablet by mouth Daily., Disp: 90 tablet, Rfl: 3    Allergies   Allergen Reactions   • Celebrex [Celecoxib] Swelling     Hands   • Morphine And Related Nausea Only        reports that he has been smoking cigarettes. He has been smoking about 0.00 packs per day. He has never used smokeless tobacco.      Objective:   Physical exam:  /94 (BP Location: Left arm, Patient Position: Sitting, Cuff Size: Adult)   Pulse 88   Ht 172.7 cm (68\")   Wt 85.7 kg (189 lb)   SpO2 99%   BMI 28.74 kg/m²   CONSTITUTIONAL: No acute distress  RESPIRATORY: Normal effort. Clear to auscultation bilaterally without wheezing or rales  CARDIOVASCULAR: Bilateral bruit, left greater than right.  Regular rate and rhythm with normal S1 and S2. Without murmur, gallop or rub. Normal radial pulse. There is no lower extremity edema bilaterally.    Exam, 12/2020: 2+ PT pulses bilaterally post procedurally  Exam 6/16/2021: Difficult to palpate pedal pulses  Exam 1/5/2022: Right PT pulse 1+, left PT pulse difficult to palpate.    Labs:    BUN   Date Value Ref Range Status   12/03/2020 14 8 - 23 mg/dL Final     Creatinine   Date Value Ref Range Status   12/03/2020 0.65 (L) 0.76 - 1.27 mg/dL Final     Potassium   Date Value Ref Range Status   12/03/2020 4.1 3.5 - 5.2 mmol/L Final     Comment:     Slight hemolysis detected by analyzer. Results may be affected.     ALT (SGPT)   Date Value Ref Range Status   12/03/2020 17 1 - 41 U/L Final     AST (SGOT)   Date Value Ref Range Status   12/03/2020 21 1 - 40 U/L Final       Lab Results   Component Value Date    CHOL 201 (H) 11/11/2020     Lab Results   Component Value Date    TRIG 81 11/11/2020     Lab Results   Component Value Date    HDL 47 11/11/2020     Lab Results   Component Value Date     (H) 11/11/2020     No components found for: LDLDIRECTC    Diagnostic Data:      ECG 12 " Lead    Date/Time: 1/5/2022 1:18 PM  Performed by: Mychal Block MD  Authorized by: Mychal Block MD   Comparison: compared with previous ECG from 11/11/2020  Comparison to previous ECG: Current EKG with possible limb lead reversal when compared to previous EKG  Rhythm: sinus rhythm  Rate: normal  BPM: 88  Other findings: T wave abnormality            KARISSA 7/27/2021  · Right Conclusion: The right KARISSA is moderately reduced at 0.58. Moderate digital ischemia with a TBI 0.32.  · Left Conclusion: The left KARISSA is severely reduced at 0.48. Moderate digital ischemia with a TBI 0.24.        Results for orders placed during the hospital encounter of 11/11/20    Adult Transthoracic Echo Complete W/ Cont if Necessary Per Protocol    Interpretation Summary  · Left ventricular systolic function is normal. Left ventricular ejection fraction appears to be 56 - 60%.  · Left ventricular wall thickness is consistent with concentric hypertrophy.  · Left ventricular diastolic function is consistent with (grade Ia w/high LAP) impaired relaxation.  · There is moderate calcification of the aortic valve mainly affecting the non-coronary cusp(s).  · Mild aortic valve regurgitation is present.  · There is mild calcification of the mitral valve.  · Mild mitral valve regurgitation is present.    Stress test 11/2/2020  · Left ventricular ejection fraction is normal. (Calculated EF = 51%).  · Myocardial perfusion imaging indicates a small-sized infarct located in the inferior wall with mild yeyo-infarct ischemia.  · Impressions are consistent with a low to intermediate risk study.    [facility] 11/11/2020  · There was a 30 to 50% proximal circumflex stenosis followed by a 70% mid segment stenosis.  Lesions were found to be functionally significant with an IFR ratio of 0.60.  The lesions are status post tandem stents with a Synergy 2.25 x 16 mm drug-eluting stent proximally and a Synergy 2.25 x 32 mm drug-eluting stent distally with a brief segment of  overlap.  · There is a 100% mid LAD stenosis with an widely patent LIMA.  · There is a 100% ostial RCA stenosis with an occluded SVG to RCA, but with moderate left to left collaterals.  · There is an occluded SVG to OM branch.  · Left ventricular ejection fraction 55% with akinesis of the basal to mid inferior segments.  · There is a 60% left common iliac artery stenosis with a peak to peak pullback gradient of 35 mmHg on one pullback and 57 mmHg on another.    Peripheral angiogram 12/3/2020  · The 60% eccentric, hemodynamically significant distal left common iliac artery stenosis is status post intervention with a 7 x 27 mm Visipro balloon expandable, bare-metal stent.  · The tandem 70% mid left SFA stenoses are status post angioplasty with a 5 mm Chocolate nitinol caged, controlled dilatation balloon with multiple inflations.    Carotid duplex 10/30/2020  · Right internal carotid artery stenosis of 50-69%.  · Left internal carotid artery stenosis of >70%.  · There is antegrade flow in vertebral arteries bilaterally.        Assessment and Plan:     Coronary artery disease involving native coronary artery of native heart without angina pectoris   Mixed hyperlipidemia  -Without angina.  -Continue aspirin, high intensity statin, beta-blocker.  -We will attempt to obtain recent labs from PCP.    PVD (peripheral vascular disease) with claudication   Diabetic neuropathy  Tobacco dependence  -Intermittent claudication symptoms improved since last visit.  -Continue ASA, Xarelto 2.5 mg twice daily, statin.    -Encourage patient to continue risk factor modification including smoking cessation, heart healthy diet, exercise and better glucose control.    -Patient to discuss potential increase in Neurontin dose with upcoming endocrine appointment.    -Patient to let us know if symptoms worsen.  If so we will have him undergo CT angiogram with runoff    Carotid stenosis, bilateral  -Status post bilateral CEA in 2009.  -Continue  aspirin, statin  -Will repeat carotid duplex at next follow-up in 6 months.    Essential hypertension  -Continue current related medications.      - Return in about 6 months (around 7/5/2022) for Next scheduled follow up.    Scribed for Mychal Block MD by Alcira Warren, APRN. 1/5/2022  13:51 EST    I, Mychal Block MD, personally performed the services as scribed by the above named individual. I have made any necessary edits and it is both accurate and complete.     Mychal Block MD, MSc, FACC, [facility]  Interventional Cardiology  [facility]

## 2022-02-21 DIAGNOSIS — M48.062 LUMBAR STENOSIS WITH NEUROGENIC CLAUDICATION: ICD-10-CM

## 2022-02-21 NOTE — TELEPHONE ENCOUNTER
Pt called requesting a refill on Gabapentin 100 mg also pt needs Novolog 70/30 mix flexpen 100 m/L suspension pen injector injection. Pt last seen 12/28/21 pt next appt 04/12/22

## 2022-02-22 RX ORDER — INSULIN ASPART 100 [IU]/ML
INJECTION, SUSPENSION SUBCUTANEOUS
Qty: 90 ML | Refills: 1 | Status: SHIPPED | OUTPATIENT
Start: 2022-02-22 | End: 2022-06-20

## 2022-02-22 RX ORDER — GABAPENTIN 100 MG/1
100 CAPSULE ORAL 3 TIMES DAILY
Qty: 90 CAPSULE | Refills: 1 | Status: SHIPPED | OUTPATIENT
Start: 2022-02-22 | End: 2022-05-06 | Stop reason: SDUPTHER

## 2022-04-12 ENCOUNTER — OFFICE VISIT (OUTPATIENT)
Dept: ENDOCRINOLOGY | Facility: CLINIC | Age: 62
End: 2022-04-12

## 2022-04-12 VITALS
DIASTOLIC BLOOD PRESSURE: 75 MMHG | HEART RATE: 79 BPM | BODY MASS INDEX: 29.27 KG/M2 | OXYGEN SATURATION: 98 % | HEIGHT: 68 IN | WEIGHT: 193.1 LBS | SYSTOLIC BLOOD PRESSURE: 130 MMHG

## 2022-04-12 DIAGNOSIS — E03.9 HYPOTHYROIDISM, ADULT: ICD-10-CM

## 2022-04-12 DIAGNOSIS — E78.5 HYPERLIPIDEMIA, UNSPECIFIED HYPERLIPIDEMIA TYPE: ICD-10-CM

## 2022-04-12 DIAGNOSIS — I10 PRIMARY HYPERTENSION: ICD-10-CM

## 2022-04-12 DIAGNOSIS — Z79.4 UNCONTROLLED TYPE 2 DIABETES MELLITUS WITH HYPERGLYCEMIA, WITH LONG-TERM CURRENT USE OF INSULIN: Primary | ICD-10-CM

## 2022-04-12 DIAGNOSIS — E11.65 UNCONTROLLED TYPE 2 DIABETES MELLITUS WITH HYPERGLYCEMIA, WITH LONG-TERM CURRENT USE OF INSULIN: Primary | ICD-10-CM

## 2022-04-12 LAB
EXPIRATION DATE: ABNORMAL
EXPIRATION DATE: NORMAL
GLUCOSE BLDC GLUCOMTR-MCNC: 136 MG/DL (ref 70–130)
HBA1C MFR BLD: 10.1 %
Lab: ABNORMAL
Lab: NORMAL

## 2022-04-12 PROCEDURE — 83036 HEMOGLOBIN GLYCOSYLATED A1C: CPT | Performed by: INTERNAL MEDICINE

## 2022-04-12 PROCEDURE — 82947 ASSAY GLUCOSE BLOOD QUANT: CPT | Performed by: INTERNAL MEDICINE

## 2022-04-12 PROCEDURE — 99214 OFFICE O/P EST MOD 30 MIN: CPT | Performed by: INTERNAL MEDICINE

## 2022-04-12 NOTE — PROGRESS NOTES
Subjective:     Chief Complaint   Patient presents with   • Diabetes   • Hypothyroidism     Follow UP      Laron Pandey is a 61 y.o. male who is is being seen for follow-up for management of  Type 2 diabetes mellitus.  The initial diagnosis of diabetes was made in 2003. He was initially controlled with metformin and started insulin in 2010.   Diabetic complications: cardiovascular disease s/p CABG. He has neuropathy sx with tingling and pain in the legs bilaterally.       Current diabetic medications include novolog 70/30  Metformin - he stopped this medication due to side effects of diarrhea.     Monitoring  -Freestyle Ang - he wasn't able to use it because cant use any device at work.   Reported occasional hypoglycemia at night when his dinner is late.     Other med problems:CAD/CABG, HL, HTN    HL- intolerant to statins in the past. Restarted zetia and doing well with that.   Hypothyroidism - 225 mcg of levothyroxine  HTN -improved after changing to valsartan.   Back pain improved after epidural injection   His sx of neuopathy are worse. He thinks that the glucose levels are improved      MEDICATIONS    Current Outpatient Medications:   •  aspirin 81 MG EC tablet, Take 81 mg by mouth Daily., Disp: , Rfl:   •  BD ULTRA-FINE PEN NEEDLES 29G X 12.7MM misc, Use with Insulin 3 times daily, Disp: 300 each, Rfl: 2  •  Blood Glucose Monitoring Suppl (ONE TOUCH ULTRA MINI) w/Device kit, USE TO TEST TWICE A DAY AS DIRECTED, Disp: , Rfl: 0  •  Continuous Blood Gluc Sensor (FreeStyle Ang 14 Day Sensor) misc, 1 each Every 14 (Fourteen) Days., Disp: 2 each, Rfl: 6  •  gabapentin (NEURONTIN) 100 MG capsule, Take 1 capsule by mouth 3 (Three) Times a Day., Disp: 90 capsule, Rfl: 1  •  glucose blood (ONE TOUCH ULTRA TEST) test strip, Test three times daily, Disp: 300 each, Rfl: 2  •  Lancets (OneTouch Delica Plus [id number]) misc, 1 each 3 (Three) Times a Day., Disp: 300 each, Rfl: 2  •  levothyroxine (SYNTHROID, LEVOTHROID)  "200 MCG tablet, Take 1 tablet by mouth Daily., Disp: 90 tablet, Rfl: 2  •  levothyroxine (SYNTHROID, LEVOTHROID) 25 MCG tablet, Take 1 tablet by mouth Daily. LAST REFILL WITHOUT APPT, Disp: 90 tablet, Rfl: 2  •  metoprolol succinate XL (TOPROL-XL) 25 MG 24 hr tablet, Take 1 tablet by mouth Daily., Disp: 90 tablet, Rfl: 2  •  NovoLOG Mix 70/30 FlexPen (70-30) 100 UNIT/ML suspension pen-injector injection, 55 UNITS SQ qam 50 UNITS SQ  qpm, Disp: 90 mL, Rfl: 1  •  Rivaroxaban (XARELTO) 2.5 MG tablet, Take 1 tablet by mouth 2 (Two) Times a Day., Disp: 60 tablet, Rfl: 11  •  rosuvastatin (CRESTOR) 20 MG tablet, Take 1 tablet by mouth Daily., Disp: 30 tablet, Rfl: 11  •  valsartan-hydrochlorothiazide (DIOVAN-HCT) 320-12.5 MG per tablet, Take 1 tablet by mouth Daily., Disp: 90 tablet, Rfl: 3    Review of Systems  Review of Systems   Constitutional: Positive for fatigue.   Eyes: Negative.    Endocrine:        As listed in HPI   Neurological: Positive for weakness and numbness (tingling).   All other systems reviewed and are negative.         Objective:      /75   Pulse 79   Ht 172.7 cm (68\")   Wt 87.6 kg (193 lb 1.6 oz)   SpO2 98%   BMI 29.36 kg/m² Body mass index is 29.36 kg/m².  Physical Exam   Constitutional: He is oriented to person, place, and time. He appears well-developed.   HENT:   Head: Normocephalic and atraumatic.   Eyes: Conjunctivae are normal.   Neck: No thyroid mass present.   The neck is supple and no assimetry visualized   Cardiovascular: Normal rate, regular rhythm, normal heart sounds and normal pulses.   Pulmonary/Chest: Effort normal and breath sounds normal.   Neurological: He is alert and oriented to person, place, and time. He has normal reflexes.   Psychiatric: Thought content normal.   Vitals reviewed.          LABS AND IMAGING      Lab Results   Component Value Date    HGBA1C 10.1 04/12/2022    HGBA1C 10.9 12/28/2021    HGBA1C 9.3 05/04/2021     Office Visit on 04/12/2022   Component " Date Value Ref Range Status   • Hemoglobin A1C 04/12/2022 10.1  % Final   • Lot Number 04/12/2022 [id number]   Final   • Expiration Date 04/12/2022 01/07/2024   Final   • Glucose 04/12/2022 136 (A) 70 - 130 mg/dL Final   • Lot Number 04/12/2022 [id number]   Final   • Expiration Date 04/12/2022 07/14/2022   Final             Assessment:         Diagnoses and all orders for this visit:    Uncontrolled type 2 diabetes mellitus with hyperglycemia, with long-term current use of insulin (HCC)  -     POC Glycosylated Hemoglobin (Hb A1C)  -     POC Glucose, Blood    Hypothyroidism, adult  -     T4, Free; Future  -     TSH; Future    Hyperlipidemia, unspecified hyperlipidemia type    Primary hypertension        Plan:       Patient has poorly controlled diabetes. Dietary recommendations reviewed. Insulin titration instructions provided.   He is not using Freestyle - not allowed to use it at work. He asked if he could use sensor on the lower part of the body. The work is not allowing to use anything above the waist.    Novolog 70/30 55 unit in the morning and 50 units with dinner, if you have a late dinner, take lower dose of insulin at dinner - 45 units.    He is not taking metformin.     Hypoglycemia precautions reviewed.   He has hypoglycemia when takes insulin several hours after meals. Precautions reviewed.   -cont Zetia  -gabapentin for neuropathy. He had a lot if neuropathy sx.      -cont thyroid medications levothyroxine 225 mcg . Repeat levels.        Follow up:  3 -4 months.

## 2022-04-18 ENCOUNTER — TELEPHONE (OUTPATIENT)
Dept: ENDOCRINOLOGY | Facility: CLINIC | Age: 62
End: 2022-04-18

## 2022-04-18 RX ORDER — BLOOD-GLUCOSE METER
KIT MISCELLANEOUS
Qty: 1 EACH | Refills: 0 | Status: SHIPPED | OUTPATIENT
Start: 2022-04-18 | End: 2022-05-23 | Stop reason: ALTCHOICE

## 2022-04-18 NOTE — TELEPHONE ENCOUNTER
Please call in today   He needs a new one touch ultra mini called to [facility] in [facility]    pts number  [phone]

## 2022-05-06 DIAGNOSIS — M48.062 LUMBAR STENOSIS WITH NEUROGENIC CLAUDICATION: ICD-10-CM

## 2022-05-06 NOTE — TELEPHONE ENCOUNTER
Last OV 4/12/22 Future appt 8/26/22  jose  Dated 12/29/21 Please let me know if you would like a more updated one

## 2022-05-09 RX ORDER — GABAPENTIN 100 MG/1
100 CAPSULE ORAL 3 TIMES DAILY
Qty: 90 CAPSULE | Refills: 3 | Status: SHIPPED | OUTPATIENT
Start: 2022-05-09 | End: 2022-08-22 | Stop reason: SDUPTHER

## 2022-05-23 ENCOUNTER — TELEPHONE (OUTPATIENT)
Dept: ENDOCRINOLOGY | Facility: CLINIC | Age: 62
End: 2022-05-23

## 2022-05-23 RX ORDER — BLOOD-GLUCOSE METER
1 EACH MISCELLANEOUS 3 TIMES DAILY
Qty: 1 EACH | Refills: 0 | Status: SHIPPED | OUTPATIENT
Start: 2022-05-23 | End: 2022-06-28 | Stop reason: ALTCHOICE

## 2022-05-23 NOTE — TELEPHONE ENCOUNTER
----- Message from Meka LOGAN MD sent at 5/20/2022  4:51 PM EDT -----  Please call patient with lab results.  Thyroid function is low and we need to increase the thyroid medication dose.  I will send prescription for 250 mcg daily.  Please make sure he takes medication on an empty stomach 30 minutes before meals and not along with other medicines.  Cholesterol panel, blood counts and metabolic panel are normal

## 2022-05-23 NOTE — TELEPHONE ENCOUNTER
Spoke with pt about results. He stated that he also needs his [facility] paperwork adjusted to make allowance for Dr Appointment, and at least have 1 day off a month.  Today he had to miss work due to high sugars.  I advised I would reprint what was scanned into his chart and se eif you could make changes to it.

## 2022-05-23 NOTE — TELEPHONE ENCOUNTER
Spoke with pt about Lab results he stated pharmacy never received his request for a new glucose meter. I advised that it was sent 4/18/22 with receive confirmed, I will receive in event they did not

## 2022-05-23 NOTE — TELEPHONE ENCOUNTER
I looked over the [facility] paperwork and it was clearly filled out with those specific wording pt mentioned. I attempted to contact him to let him know.  I LMOM advising that I would place a copy of this in the mail to him or he can stop by and pick a copy up.

## 2022-05-24 ENCOUNTER — TELEPHONE (OUTPATIENT)
Dept: ENDOCRINOLOGY | Facility: CLINIC | Age: 62
End: 2022-05-24

## 2022-05-24 NOTE — TELEPHONE ENCOUNTER
Returned [place] call. She needed a few corrections on the form. Submitted those and refaxed them back to her

## 2022-05-24 NOTE — TELEPHONE ENCOUNTER
Broderick, with patients employer has called to clarify details on patients FMLA form that was sent in on 4/7/22. Please advise.     Her # is [phone]

## 2022-06-13 ENCOUNTER — APPOINTMENT (OUTPATIENT)
Dept: GENERAL RADIOLOGY | Facility: HOSPITAL | Age: 62
End: 2022-06-13

## 2022-06-13 ENCOUNTER — APPOINTMENT (OUTPATIENT)
Dept: MRI IMAGING | Facility: HOSPITAL | Age: 62
End: 2022-06-13

## 2022-06-13 ENCOUNTER — TELEPHONE (OUTPATIENT)
Dept: CARDIOLOGY | Facility: CLINIC | Age: 62
End: 2022-06-13

## 2022-06-13 ENCOUNTER — HOSPITAL ENCOUNTER (INPATIENT)
Facility: HOSPITAL | Age: 62
LOS: 4 days | Discharge: HOME | End: 2022-06-17
Attending: EMERGENCY MEDICINE | Admitting: INTERNAL MEDICINE

## 2022-06-13 ENCOUNTER — APPOINTMENT (OUTPATIENT)
Dept: CARDIOLOGY | Facility: HOSPITAL | Age: 62
End: 2022-06-13

## 2022-06-13 ENCOUNTER — APPOINTMENT (OUTPATIENT)
Dept: CT IMAGING | Facility: HOSPITAL | Age: 62
End: 2022-06-13

## 2022-06-13 DIAGNOSIS — I65.21 SYMPTOMATIC STENOSIS OF RIGHT CAROTID ARTERY: Primary | ICD-10-CM

## 2022-06-13 DIAGNOSIS — M75.120 COMPLETE TEAR OF ROTATOR CUFF, UNSPECIFIED LATERALITY, UNSPECIFIED WHETHER TRAUMATIC: ICD-10-CM

## 2022-06-13 DIAGNOSIS — I63.9 CEREBROVASCULAR ACCIDENT (CVA), UNSPECIFIED MECHANISM: ICD-10-CM

## 2022-06-13 DIAGNOSIS — R29.898 LEFT ARM WEAKNESS: ICD-10-CM

## 2022-06-13 PROBLEM — Z72.0 TOBACCO ABUSE: Status: ACTIVE | Noted: 2022-06-13

## 2022-06-13 LAB
ALT SERPL W P-5'-P-CCNC: 21 U/L (ref 1–41)
APTT PPP: 34.5 SECONDS (ref 60–90)
AST SERPL-CCNC: 35 U/L (ref 1–40)
BASOPHILS # BLD AUTO: 0.06 10*3/MM3 (ref 0–0.2)
BASOPHILS NFR BLD AUTO: 0.6 % (ref 0–1.5)
BH CV ECHO MEAS - AI P1/2T: 599 MSEC
BH CV ECHO MEAS - AO MAX PG: 12.7 MMHG
BH CV ECHO MEAS - AO MEAN PG: 6 MMHG
BH CV ECHO MEAS - AO ROOT DIAM: 2.9 CM
BH CV ECHO MEAS - AO V2 MAX: 178 CM/SEC
BH CV ECHO MEAS - AO V2 VTI: 32.8 CM
BH CV ECHO MEAS - AVA(I,D): 1.36 CM2
BH CV ECHO MEAS - EDV(CUBED): 91.1 ML
BH CV ECHO MEAS - EDV(MOD-SP2): 160 ML
BH CV ECHO MEAS - EDV(MOD-SP4): 154 ML
BH CV ECHO MEAS - EF(MOD-BP): 55.9 %
BH CV ECHO MEAS - EF(MOD-SP2): 57.4 %
BH CV ECHO MEAS - EF(MOD-SP4): 58.4 %
BH CV ECHO MEAS - ESV(CUBED): 24.4 ML
BH CV ECHO MEAS - ESV(MOD-SP2): 68.2 ML
BH CV ECHO MEAS - ESV(MOD-SP4): 64.1 ML
BH CV ECHO MEAS - FS: 35.6 %
BH CV ECHO MEAS - IVS/LVPW: 0.72 CM
BH CV ECHO MEAS - IVSD: 1.3 CM
BH CV ECHO MEAS - LA DIMENSION: 4.3 CM
BH CV ECHO MEAS - LAT PEAK E' VEL: 5.5 CM/SEC
BH CV ECHO MEAS - LV MASS(C)D: 290 GRAMS
BH CV ECHO MEAS - LV MAX PG: 2.8 MMHG
BH CV ECHO MEAS - LV MEAN PG: 1 MMHG
BH CV ECHO MEAS - LV V1 MAX: 83.8 CM/SEC
BH CV ECHO MEAS - LV V1 VTI: 15.7 CM
BH CV ECHO MEAS - LVIDD: 4.5 CM
BH CV ECHO MEAS - LVIDS: 2.9 CM
BH CV ECHO MEAS - LVOT AREA: 2.8 CM2
BH CV ECHO MEAS - LVOT DIAM: 1.9 CM
BH CV ECHO MEAS - LVPWD: 1.8 CM
BH CV ECHO MEAS - MED PEAK E' VEL: 5.4 CM/SEC
BH CV ECHO MEAS - MV A MAX VEL: 42.4 CM/SEC
BH CV ECHO MEAS - MV DEC TIME: 0.15 MSEC
BH CV ECHO MEAS - MV E MAX VEL: 83.1 CM/SEC
BH CV ECHO MEAS - MV E/A: 1.96
BH CV ECHO MEAS - MV MAX PG: 4.5 MMHG
BH CV ECHO MEAS - MV MEAN PG: 1 MMHG
BH CV ECHO MEAS - MV V2 VTI: 31.1 CM
BH CV ECHO MEAS - MVA(VTI): 1.43 CM2
BH CV ECHO MEAS - PA ACC TIME: 0.15 SEC
BH CV ECHO MEAS - PA PR(ACCEL): 12.4 MMHG
BH CV ECHO MEAS - PA V2 MAX: 108 CM/SEC
BH CV ECHO MEAS - RAP SYSTOLE: 3 MMHG
BH CV ECHO MEAS - RVSP: 33 MMHG
BH CV ECHO MEAS - SV(LVOT): 44.5 ML
BH CV ECHO MEAS - SV(MOD-SP2): 91.8 ML
BH CV ECHO MEAS - SV(MOD-SP4): 89.9 ML
BH CV ECHO MEAS - TAPSE (>1.6): 2.03 CM
BH CV ECHO MEAS - TR MAX PG: 29.4 MMHG
BH CV ECHO MEAS - TR MAX VEL: 271 CM/SEC
BH CV ECHO MEASUREMENTS AVERAGE E/E' RATIO: 15.25
BH CV XLRA - RV BASE: 3.8 CM
BH CV XLRA - RV LENGTH: 8 CM
BH CV XLRA - RV MID: 2.8 CM
BH CV XLRA - TDI S': 6.8 CM/SEC
BH CV XLRA MEAS LEFT DIST CCA EDV: 39 CM/SEC
BH CV XLRA MEAS LEFT DIST CCA PSV: 148 CM/SEC
BH CV XLRA MEAS LEFT DIST ICA EDV: 48 CM/SEC
BH CV XLRA MEAS LEFT DIST ICA PSV: 202 CM/SEC
BH CV XLRA MEAS LEFT ICA/CCA RATIO: 2.65
BH CV XLRA MEAS LEFT MID CCA EDV: 28 CM/SEC
BH CV XLRA MEAS LEFT MID CCA PSV: 132 CM/SEC
BH CV XLRA MEAS LEFT MID ICA EDV: 106 CM/SEC
BH CV XLRA MEAS LEFT MID ICA PSV: 350 CM/SEC
BH CV XLRA MEAS LEFT PROX CCA EDV: 22.3 CM/SEC
BH CV XLRA MEAS LEFT PROX CCA PSV: 113 CM/SEC
BH CV XLRA MEAS LEFT PROX ECA EDV: 40 CM/SEC
BH CV XLRA MEAS LEFT PROX ECA PSV: 336 CM/SEC
BH CV XLRA MEAS LEFT PROX ICA EDV: 64 CM/SEC
BH CV XLRA MEAS LEFT PROX ICA PSV: 182 CM/SEC
BH CV XLRA MEAS LEFT PROX SCLA PSV: 201 CM/SEC
BH CV XLRA MEAS LEFT VERTEBRAL A EDV: 18 CM/SEC
BH CV XLRA MEAS LEFT VERTEBRAL A PSV: 58 CM/SEC
BH CV XLRA MEAS RIGHT DIST CCA EDV: 22 CM/SEC
BH CV XLRA MEAS RIGHT DIST CCA PSV: 61 CM/SEC
BH CV XLRA MEAS RIGHT DIST ICA EDV: 99 CM/SEC
BH CV XLRA MEAS RIGHT DIST ICA PSV: 263 CM/SEC
BH CV XLRA MEAS RIGHT ICA/CCA RATIO: 6.15
BH CV XLRA MEAS RIGHT MID CCA EDV: 22 CM/SEC
BH CV XLRA MEAS RIGHT MID CCA PSV: 54 CM/SEC
BH CV XLRA MEAS RIGHT MID ICA EDV: 125 CM/SEC
BH CV XLRA MEAS RIGHT MID ICA PSV: 375 CM/SEC
BH CV XLRA MEAS RIGHT PROX CCA EDV: 16.2 CM/SEC
BH CV XLRA MEAS RIGHT PROX CCA PSV: 67.6 CM/SEC
BH CV XLRA MEAS RIGHT PROX ECA EDV: 6 CM/SEC
BH CV XLRA MEAS RIGHT PROX ECA PSV: 21 CM/SEC
BH CV XLRA MEAS RIGHT PROX ICA EDV: 23 CM/SEC
BH CV XLRA MEAS RIGHT PROX ICA PSV: 72 CM/SEC
BH CV XLRA MEAS RIGHT PROX SCLA PSV: 154 CM/SEC
BH CV XLRA MEAS RIGHT VERTEBRAL A EDV: 24 CM/SEC
BH CV XLRA MEAS RIGHT VERTEBRAL A PSV: 70 CM/SEC
BUN BLDA-MCNC: 13 MG/DL (ref 8–26)
CA-I BLDA-SCNC: 1.16 MMOL/L (ref 1.2–1.32)
CHLORIDE BLDA-SCNC: 99 MMOL/L (ref 98–109)
CO2 BLDA-SCNC: 23 MMOL/L (ref 24–29)
CREAT BLDA-MCNC: 0.9 MG/DL (ref 0.6–1.3)
DEPRECATED RDW RBC AUTO: 46.2 FL (ref 37–54)
EGFRCR SERPLBLD CKD-EPI 2021: 97.2 ML/MIN/1.73
EOSINOPHIL # BLD AUTO: 0.1 10*3/MM3 (ref 0–0.4)
EOSINOPHIL NFR BLD AUTO: 0.9 % (ref 0.3–6.2)
ERYTHROCYTE [DISTWIDTH] IN BLOOD BY AUTOMATED COUNT: 13.2 % (ref 12.3–15.4)
GLUCOSE BLDC GLUCOMTR-MCNC: 126 MG/DL (ref 70–130)
GLUCOSE BLDC GLUCOMTR-MCNC: 308 MG/DL (ref 70–130)
GLUCOSE BLDC GLUCOMTR-MCNC: 355 MG/DL (ref 70–130)
GLUCOSE BLDC GLUCOMTR-MCNC: 394 MG/DL (ref 70–130)
GLUCOSE BLDC GLUCOMTR-MCNC: 440 MG/DL (ref 70–130)
GLUCOSE BLDC GLUCOMTR-MCNC: 83 MG/DL (ref 70–130)
HCT VFR BLD AUTO: 42.6 % (ref 37.5–51)
HCT VFR BLDA CALC: 46 % (ref 38–51)
HGB BLD-MCNC: 14.5 G/DL (ref 13–17.7)
HGB BLDA-MCNC: 15.6 G/DL (ref 12–17)
HOLD SPECIMEN: NORMAL
IMM GRANULOCYTES # BLD AUTO: 0.04 10*3/MM3 (ref 0–0.05)
IMM GRANULOCYTES NFR BLD AUTO: 0.4 % (ref 0–0.5)
INR PPP: 1.1 (ref 0.8–1.2)
LEFT ATRIUM VOLUME INDEX: 26.4 ML/M2
LYMPHOCYTES # BLD AUTO: 1.89 10*3/MM3 (ref 0.7–3.1)
LYMPHOCYTES NFR BLD AUTO: 17.6 % (ref 19.6–45.3)
MAXIMAL PREDICTED HEART RATE: 159 BPM
MAXIMAL PREDICTED HEART RATE: 159 BPM
MCH RBC QN AUTO: 32.4 PG (ref 26.6–33)
MCHC RBC AUTO-ENTMCNC: 34 G/DL (ref 31.5–35.7)
MCV RBC AUTO: 95.1 FL (ref 79–97)
MONOCYTES # BLD AUTO: 0.64 10*3/MM3 (ref 0.1–0.9)
MONOCYTES NFR BLD AUTO: 6 % (ref 5–12)
NEUTROPHILS NFR BLD AUTO: 74.5 % (ref 42.7–76)
NEUTROPHILS NFR BLD AUTO: 8.01 10*3/MM3 (ref 1.7–7)
NRBC BLD AUTO-RTO: 0 /100 WBC (ref 0–0.2)
PLATELET # BLD AUTO: 277 10*3/MM3 (ref 140–450)
PMV BLD AUTO: 10.3 FL (ref 6–12)
POTASSIUM BLDA-SCNC: 4 MMOL/L (ref 3.5–4.9)
PROTHROMBIN TIME: 12.8 SECONDS (ref 12.8–15.2)
RBC # BLD AUTO: 4.48 10*6/MM3 (ref 4.14–5.8)
RIGHT ARM BP: NORMAL MMHG
SARS-COV-2 RDRP RESP QL NAA+PROBE: NORMAL
SODIUM BLD-SCNC: 137 MMOL/L (ref 138–146)
STRESS TARGET HR: 135 BPM
STRESS TARGET HR: 135 BPM
TROPONIN T SERPL-MCNC: <0.01 NG/ML (ref 0–0.03)
WBC NRBC COR # BLD: 10.74 10*3/MM3 (ref 3.4–10.8)
WHOLE BLOOD HOLD COAG: NORMAL
WHOLE BLOOD HOLD SPECIMEN: NORMAL

## 2022-06-13 PROCEDURE — 84450 TRANSFERASE (AST) (SGOT): CPT | Performed by: EMERGENCY MEDICINE

## 2022-06-13 PROCEDURE — 93880 EXTRACRANIAL BILAT STUDY: CPT | Performed by: INTERNAL MEDICINE

## 2022-06-13 PROCEDURE — 99223 1ST HOSP IP/OBS HIGH 75: CPT | Performed by: INTERNAL MEDICINE

## 2022-06-13 PROCEDURE — 85730 THROMBOPLASTIN TIME PARTIAL: CPT | Performed by: EMERGENCY MEDICINE

## 2022-06-13 PROCEDURE — 99284 EMERGENCY DEPT VISIT MOD MDM: CPT

## 2022-06-13 PROCEDURE — 84460 ALANINE AMINO (ALT) (SGPT): CPT | Performed by: EMERGENCY MEDICINE

## 2022-06-13 PROCEDURE — 99223 1ST HOSP IP/OBS HIGH 75: CPT

## 2022-06-13 PROCEDURE — 87635 SARS-COV-2 COVID-19 AMP PRB: CPT | Performed by: INTERNAL MEDICINE

## 2022-06-13 PROCEDURE — 93005 ELECTROCARDIOGRAM TRACING: CPT | Performed by: EMERGENCY MEDICINE

## 2022-06-13 PROCEDURE — 85014 HEMATOCRIT: CPT

## 2022-06-13 PROCEDURE — 85025 COMPLETE CBC W/AUTO DIFF WBC: CPT | Performed by: EMERGENCY MEDICINE

## 2022-06-13 PROCEDURE — 82962 GLUCOSE BLOOD TEST: CPT

## 2022-06-13 PROCEDURE — 0 GADOBENATE DIMEGLUMINE 529 MG/ML SOLUTION: Performed by: INTERNAL MEDICINE

## 2022-06-13 PROCEDURE — 70547 MR ANGIOGRAPHY NECK W/O DYE: CPT

## 2022-06-13 PROCEDURE — 70544 MR ANGIOGRAPHY HEAD W/O DYE: CPT

## 2022-06-13 PROCEDURE — 70551 MRI BRAIN STEM W/O DYE: CPT

## 2022-06-13 PROCEDURE — 63710000001 INSULIN DETEMIR PER 5 UNITS: Performed by: INTERNAL MEDICINE

## 2022-06-13 PROCEDURE — 70450 CT HEAD/BRAIN W/O DYE: CPT

## 2022-06-13 PROCEDURE — 84484 ASSAY OF TROPONIN QUANT: CPT | Performed by: EMERGENCY MEDICINE

## 2022-06-13 PROCEDURE — 93306 TTE W/DOPPLER COMPLETE: CPT | Performed by: INTERNAL MEDICINE

## 2022-06-13 PROCEDURE — 93880 EXTRACRANIAL BILAT STUDY: CPT

## 2022-06-13 PROCEDURE — 71045 X-RAY EXAM CHEST 1 VIEW: CPT

## 2022-06-13 PROCEDURE — 70548 MR ANGIOGRAPHY NECK W/DYE: CPT

## 2022-06-13 PROCEDURE — A9577 INJ MULTIHANCE: HCPCS | Performed by: INTERNAL MEDICINE

## 2022-06-13 PROCEDURE — 93306 TTE W/DOPPLER COMPLETE: CPT

## 2022-06-13 PROCEDURE — 85610 PROTHROMBIN TIME: CPT

## 2022-06-13 PROCEDURE — 80047 BASIC METABLC PNL IONIZED CA: CPT

## 2022-06-13 RX ORDER — AMOXICILLIN 250 MG
2 CAPSULE ORAL 2 TIMES DAILY
Status: DISCONTINUED | OUTPATIENT
Start: 2022-06-13 | End: 2022-06-17 | Stop reason: HOSPADM

## 2022-06-13 RX ORDER — POLYETHYLENE GLYCOL 3350 17 G/17G
17 POWDER, FOR SOLUTION ORAL DAILY PRN
Status: DISCONTINUED | OUTPATIENT
Start: 2022-06-13 | End: 2022-06-17 | Stop reason: HOSPADM

## 2022-06-13 RX ORDER — ACETAMINOPHEN 325 MG/1
650 TABLET ORAL EVERY 4 HOURS PRN
Status: DISCONTINUED | OUTPATIENT
Start: 2022-06-13 | End: 2022-06-16

## 2022-06-13 RX ORDER — METOPROLOL SUCCINATE 25 MG/1
25 TABLET, EXTENDED RELEASE ORAL
Status: DISCONTINUED | OUTPATIENT
Start: 2022-06-13 | End: 2022-06-17 | Stop reason: HOSPADM

## 2022-06-13 RX ORDER — SODIUM CHLORIDE 0.9 % (FLUSH) 0.9 %
10 SYRINGE (ML) INJECTION AS NEEDED
Status: DISCONTINUED | OUTPATIENT
Start: 2022-06-13 | End: 2022-06-17 | Stop reason: HOSPADM

## 2022-06-13 RX ORDER — ROSUVASTATIN CALCIUM 20 MG/1
20 TABLET, COATED ORAL NIGHTLY
Status: DISCONTINUED | OUTPATIENT
Start: 2022-06-13 | End: 2022-06-13

## 2022-06-13 RX ORDER — EZETIMIBE 10 MG/1
10 TABLET ORAL DAILY
COMMUNITY
End: 2022-10-27 | Stop reason: HOSPADM

## 2022-06-13 RX ORDER — ACETAMINOPHEN 160 MG/5ML
650 SOLUTION ORAL EVERY 4 HOURS PRN
Status: DISCONTINUED | OUTPATIENT
Start: 2022-06-13 | End: 2022-06-16

## 2022-06-13 RX ORDER — SODIUM CHLORIDE 0.9 % (FLUSH) 0.9 %
10 SYRINGE (ML) INJECTION EVERY 12 HOURS SCHEDULED
Status: DISCONTINUED | OUTPATIENT
Start: 2022-06-13 | End: 2022-06-17 | Stop reason: HOSPADM

## 2022-06-13 RX ORDER — CLOPIDOGREL BISULFATE 75 MG/1
300 TABLET ORAL ONCE
Status: COMPLETED | OUTPATIENT
Start: 2022-06-13 | End: 2022-06-13

## 2022-06-13 RX ORDER — SODIUM CHLORIDE 0.9 % (FLUSH) 0.9 %
10 SYRINGE (ML) INJECTION AS NEEDED
Status: DISCONTINUED | OUTPATIENT
Start: 2022-06-13 | End: 2022-06-15

## 2022-06-13 RX ORDER — LEVOTHYROXINE SODIUM 0.1 MG/1
200 TABLET ORAL
Status: DISCONTINUED | OUTPATIENT
Start: 2022-06-14 | End: 2022-06-15

## 2022-06-13 RX ORDER — POTASSIUM CHLORIDE 1.5 G/1.77G
40 POWDER, FOR SOLUTION ORAL AS NEEDED
Status: DISCONTINUED | OUTPATIENT
Start: 2022-06-13 | End: 2022-06-17 | Stop reason: HOSPADM

## 2022-06-13 RX ORDER — INSULIN LISPRO 100 [IU]/ML
0-9 INJECTION, SOLUTION INTRAVENOUS; SUBCUTANEOUS
Status: DISCONTINUED | OUTPATIENT
Start: 2022-06-13 | End: 2022-06-15

## 2022-06-13 RX ORDER — ROSUVASTATIN CALCIUM 20 MG/1
40 TABLET, COATED ORAL NIGHTLY
Status: DISCONTINUED | OUTPATIENT
Start: 2022-06-13 | End: 2022-06-17 | Stop reason: HOSPADM

## 2022-06-13 RX ORDER — BISACODYL 10 MG
10 SUPPOSITORY, RECTAL RECTAL DAILY PRN
Status: DISCONTINUED | OUTPATIENT
Start: 2022-06-13 | End: 2022-06-17 | Stop reason: HOSPADM

## 2022-06-13 RX ORDER — SODIUM CHLORIDE 0.9 % (FLUSH) 0.9 %
10 SYRINGE (ML) INJECTION EVERY 12 HOURS SCHEDULED
Status: DISCONTINUED | OUTPATIENT
Start: 2022-06-13 | End: 2022-06-15

## 2022-06-13 RX ORDER — LEVOTHYROXINE SODIUM 0.03 MG/1
25 TABLET ORAL
Status: DISCONTINUED | OUTPATIENT
Start: 2022-06-14 | End: 2022-06-15

## 2022-06-13 RX ORDER — DEXTROSE MONOHYDRATE 25 G/50ML
25 INJECTION, SOLUTION INTRAVENOUS
Status: DISCONTINUED | OUTPATIENT
Start: 2022-06-13 | End: 2022-06-15

## 2022-06-13 RX ORDER — POTASSIUM CHLORIDE 750 MG/1
40 CAPSULE, EXTENDED RELEASE ORAL AS NEEDED
Status: DISCONTINUED | OUTPATIENT
Start: 2022-06-13 | End: 2022-06-17 | Stop reason: HOSPADM

## 2022-06-13 RX ORDER — ASPIRIN 81 MG/1
81 TABLET ORAL DAILY
Status: DISCONTINUED | OUTPATIENT
Start: 2022-06-13 | End: 2022-06-13

## 2022-06-13 RX ORDER — ROSUVASTATIN CALCIUM 20 MG/1
20 TABLET, COATED ORAL DAILY
Status: DISCONTINUED | OUTPATIENT
Start: 2022-06-13 | End: 2022-06-13

## 2022-06-13 RX ORDER — CLOPIDOGREL BISULFATE 75 MG/1
75 TABLET ORAL DAILY
Status: DISCONTINUED | OUTPATIENT
Start: 2022-06-14 | End: 2022-06-17 | Stop reason: HOSPADM

## 2022-06-13 RX ORDER — GABAPENTIN 100 MG/1
100 CAPSULE ORAL 3 TIMES DAILY
Status: DISCONTINUED | OUTPATIENT
Start: 2022-06-13 | End: 2022-06-17 | Stop reason: HOSPADM

## 2022-06-13 RX ORDER — ACETAMINOPHEN 650 MG/1
650 SUPPOSITORY RECTAL EVERY 4 HOURS PRN
Status: DISCONTINUED | OUTPATIENT
Start: 2022-06-13 | End: 2022-06-16

## 2022-06-13 RX ORDER — ASPIRIN 300 MG/1
300 SUPPOSITORY RECTAL DAILY
Status: CANCELLED | OUTPATIENT
Start: 2022-06-13

## 2022-06-13 RX ORDER — NICOTINE POLACRILEX 4 MG
15 LOZENGE BUCCAL
Status: DISCONTINUED | OUTPATIENT
Start: 2022-06-13 | End: 2022-06-15

## 2022-06-13 RX ORDER — ASPIRIN 81 MG/1
81 TABLET, CHEWABLE ORAL DAILY
Status: DISCONTINUED | OUTPATIENT
Start: 2022-06-13 | End: 2022-06-17 | Stop reason: HOSPADM

## 2022-06-13 RX ORDER — BISACODYL 5 MG/1
5 TABLET, DELAYED RELEASE ORAL DAILY PRN
Status: DISCONTINUED | OUTPATIENT
Start: 2022-06-13 | End: 2022-06-17 | Stop reason: HOSPADM

## 2022-06-13 RX ORDER — POTASSIUM CHLORIDE 7.45 MG/ML
10 INJECTION INTRAVENOUS
Status: DISCONTINUED | OUTPATIENT
Start: 2022-06-13 | End: 2022-06-17 | Stop reason: HOSPADM

## 2022-06-13 RX ORDER — ASPIRIN 81 MG/1
81 TABLET, CHEWABLE ORAL DAILY
Status: CANCELLED | OUTPATIENT
Start: 2022-06-13

## 2022-06-13 RX ADMIN — GADOBENATE DIMEGLUMINE 15 ML: 529 INJECTION, SOLUTION INTRAVENOUS at 22:11

## 2022-06-13 RX ADMIN — GABAPENTIN 100 MG: 100 CAPSULE ORAL at 22:28

## 2022-06-13 RX ADMIN — Medication 10 ML: at 22:28

## 2022-06-13 RX ADMIN — GABAPENTIN 100 MG: 100 CAPSULE ORAL at 16:12

## 2022-06-13 RX ADMIN — ACETAMINOPHEN 325MG 650 MG: 325 TABLET ORAL at 16:12

## 2022-06-13 RX ADMIN — CLOPIDOGREL BISULFATE 300 MG: 75 TABLET ORAL at 14:54

## 2022-06-13 RX ADMIN — INSULIN DETEMIR 20 UNITS: 100 INJECTION, SOLUTION SUBCUTANEOUS at 22:28

## 2022-06-13 RX ADMIN — Medication 10 ML: at 16:12

## 2022-06-13 RX ADMIN — RIVAROXABAN 2.5 MG: 2.5 TABLET, FILM COATED ORAL at 22:28

## 2022-06-13 RX ADMIN — ASPIRIN 81 MG CHEWABLE TABLET 81 MG: 81 TABLET CHEWABLE at 22:28

## 2022-06-13 RX ADMIN — ROSUVASTATIN 40 MG: 20 TABLET, FILM COATED ORAL at 22:28

## 2022-06-13 RX ADMIN — SENNOSIDES AND DOCUSATE SODIUM 2 TABLET: 50; 8.6 TABLET ORAL at 22:28

## 2022-06-13 NOTE — H&P
[facility] Medicine Services  HISTORY AND PHYSICAL    Patient Name: Laron Pandey  : 1960  MRN: [id number]  Primary Care Physician: Nacho Omer MD  Date of admission: 2022      Subjective   Subjective     Chief Complaint:  Syncope, left arm weakness and numbness    HPI:  Laron Pandey is a 61 y.o. male with history of coronary artery disease, peripheral arterial disease status post stents, carotid stenosis status post CEA, hypertension, hyperlipidemia, type 2 diabetes who presents with left arm numbness and discoordination.  Patient states that on  morning woke up in the morning with spasms in his legs bilaterally and passed out.  He lost consciousness he was found by his girlfriend.  He then was vomiting with diarrhea and sweatiness.  He started having lower extremity spasms.  He also noticed poor coordination of his left arm.  He then went to the bathroom.  He did have an episode of incontinence of his bowels but he was awake at the time.  He also states that he regurgitated food.  Since this episode he has had left arm numbness and lack of control although it is getting better.  He waited today to come to the hospital because he thought it would get better on its own.  Hospital medicine was called for admission      Review of Systems   Constitutional: Negative for chills and fever.   HENT: Negative for trouble swallowing.    Eyes: Negative for visual disturbance.   Respiratory: Negative for shortness of breath.    Cardiovascular: Negative for chest pain.   Gastrointestinal: Positive for vomiting. Negative for abdominal pain.   Genitourinary: Negative for dysuria.   Musculoskeletal:        Bilateral leg cramping   Skin: Negative for rash.   Neurological: Positive for dizziness, weakness, light-headedness and numbness.   Psychiatric/Behavioral: Negative for confusion.          Personal History     Past Medical History:   Diagnosis Date   • Arthritis    •  Back pain    • Claudication (HCC)    • Coronary artery disease    • Diabetes (HCC)    • ED (erectile dysfunction)    • Heart disease    • Hyperlipidemia    • Hypertension    • Lumbar post-laminectomy syndrome    • Lumbar stenosis with neurogenic claudication    • PAD (peripheral artery disease) ([facility])    • Thyroid disease    • Thyroid disease              Past Surgical History:   Procedure Laterality Date   • BACK SURGERY     • CARDIAC CATHETERIZATION N/A 11/11/2020    Procedure: Left Heart Cath;  Surgeon: Mychal Block MD;  Location:  [facility];  Service: Cardiology;  Laterality: N/A;   • CARDIAC CATHETERIZATION N/A 12/3/2020    Procedure: Peripheral angiography: left iliac artery and left SFA intervention;  Surgeon: Mychal Block MD;  Location:  [facility];  Service: Cardiovascular;  Laterality: N/A;  about 2 weeks.    • CARDIAC SURGERY     • CAROTID ENDARTERECTOMY     • CORONARY ARTERY BYPASS GRAFT     • INTERVENTIONAL RADIOLOGY PROCEDURE N/A 11/11/2020    Procedure: Abdominal Aortogram;  Surgeon: Mychal Block MD;  Location:  [facility];  Service: Cardiology;  Laterality: N/A;       Family History:  family history includes Diabetes in his father and mother; Heart disease in his father and mother; Stroke in his father. Otherwise pertinent FHx was reviewed and unremarkable.     Social History:  reports that he has been smoking cigarettes. He has been smoking about 0.00 packs per day. He has never used smokeless tobacco. He reports current alcohol use of about 12.0 standard drinks of alcohol per week. He reports that he does not use drugs.  Social History     Social History Narrative   • Not on file       Medications:  Available home medication information reviewed.  (Not in a hospital admission)      Allergies   Allergen Reactions   • Celebrex [Celecoxib] Swelling     Hands   • Morphine And Related Nausea Only       Objective   Objective     Vital Signs:    Temp:  [97.9 °F (36.6 °C)] 97.9 °F (36.6 °C)  Heart Rate:  [65-94] 83  Resp:  [16] 16  BP: (147-186)/() 147/78  Total (NIH Stroke Scale): 1    Physical Exam   Constitutional: Awake, alert, no acute distress  Eyes: PERRLA, sclerae anicteric, no conjunctival injection  HENT: NCAT, mucous membranes moist  Neck: Supple, trachea midline  Respiratory: Clear to auscultation bilaterally, nonlabored respirations   Cardiovascular: RRR, no murmurs, rubs, or gallops, palpable pedal pulses bilaterally  Gastrointestinal: Positive bowel sounds, soft, nontender, nondistended  Musculoskeletal: No bilateral ankle edema, no clubbing or cyanosis to extremities  Psychiatric: Appropriate affect, cooperative  Neurologic: Oriented x 3, no significant weakness in  or arm Flexon on extension. Symmetric. No weakness noted in lower extremities  Skin: No rashes    Result Review:  I have personally reviewed the results from the time of this admission to 6/13/2022 13:13 EDT and agree with these findings:  [x]  Laboratory list / accordion  [x]  Microbiology  [x]  Radiology  [x]  EKG/Telemetry   []  Cardiology/Vascular   []  Pathology  []  Old records  []  Other:          LAB RESULTS:      Lab 06/13/22  1114 06/13/22  1101   WBC 10.74  --    HEMOGLOBIN 14.5  --    HEMATOCRIT 42.6  --    PLATELETS 277  --    NEUTROS ABS 8.01*  --    IMMATURE GRANS (ABS) 0.04  --    LYMPHS ABS 1.89  --    MONOS ABS 0.64  --    EOS ABS 0.10  --    MCV 95.1  --    PROTIME  --  12.8   INR  --  1.1   APTT 34.5*  --              Lab 06/13/22  1114   ALT (SGPT) 21   AST (SGOT) 35         Lab 06/13/22  1114   TROPONIN T <0.010                     Microbiology Results (last 10 days)     ** No results found for the last 240 hours. **          MRI Angiogram Head Without Contrast    Result Date: 6/13/2022  DATE OF EXAM: 6/13/2022 12:17 PM  PROCEDURE: MRI BRAIN WO CONTRAST-, MRI ANGIOGRAM HEAD WO CONTRAST-, MRI ANGIOGRAM NECK WO CONTRAST-  INDICATIONS: Neuro deficit,  acute, stroke suspected; R29.898-Other symptoms and signs involving the musculoskeletal system; I63.9-Cerebral infarction, unspecified  COMPARISON: CT earlier the same day  TECHNIQUE: Multiplanar multisequence MRI of the brain performed without IV contrast. Noncontrast time-of-flight 3-dimensional MR angiogram of the head and neck with three-dimensional maximum intensity projections postprocessed  FINDINGS: MRI brain: Small areas of cortical diffusion restriction are present along the right paracentral region near the vertex as well as in the right frontal lobe compatible with areas of acute infarct. There is no evidence of associated mass effect or hemorrhage. Minimal age-related changes are otherwise present with some typical T2 hyperintense periventricular white matter changes favored to relate to chronic small vessel ischemia. There is no evidence of hemorrhage, mass or mass effect. The ventricles are normal in size and configuration. The orbits are unremarkable and the paranasal sinuses are grossly clear. Intracranial arterial flow voids appear maintained.  MRA head: The carotid siphons demonstrate expected flow related signal without evidence of atherosclerotic narrowing or aneurysmal dilatation. The anterior cerebral arteries are normal in course and caliber bilaterally. The right middle cerebral artery demonstrates no evidence of flow-limiting stenosis, large vessel occlusion or aneurysmal dilatation. The left middle cerebral artery is similarly normal in course and caliber. The vertebrobasilar system is patent. The posterior cerebral arteries are normal in course and caliber bilaterally.  MRA NECK: Limited time-of-flight noncontrast MR angiogram of the neck demonstrates focal atherosclerotic narrowing of the right ICA origin, likely 50-70% by NASCET criteria. Atherosclerotic changes also present involving the left ICA origin with similar moderate narrowing, and addition to a focal area of eccentric,  somewhat linear signal concerning for possible dissection. The vertebral arteries are normal in course and caliber bilaterally.      Impression: Focal areas of acute cortical infarct are present involving the right pre and postcentral gyri near the vertex, as well as involving a small area of right frontal lobe cortex. There is no evidence of associated significant mass effect or hemorrhage.  Essentially normal MR angiogram of the head with no evidence of flow-limiting stenosis, large vessel occlusion or aneurysmal dilatation.  MR angiogram of the neck is somewhat limited by noncontrast technique. There is apparent 50-70% atherosclerotic narrowing of the right ICA origin. Similar moderate narrowing is present on the left, with additional focal area of luminal irregularity concerning for either dissection or possible irregular eccentric plaque. CT angiogram would be useful to more specifically evaluate, as indicated.  This report was finalized on 6/13/2022 1:04 PM by Laron Retana.      MRI Angiogram Neck Without Contrast    Result Date: 6/13/2022  DATE OF EXAM: 6/13/2022 12:17 PM  PROCEDURE: MRI BRAIN WO CONTRAST-, MRI ANGIOGRAM HEAD WO CONTRAST-, MRI ANGIOGRAM NECK WO CONTRAST-  INDICATIONS: Neuro deficit, acute, stroke suspected; R29.898-Other symptoms and signs involving the musculoskeletal system; I63.9-Cerebral infarction, unspecified  COMPARISON: CT earlier the same day  TECHNIQUE: Multiplanar multisequence MRI of the brain performed without IV contrast. Noncontrast time-of-flight 3-dimensional MR angiogram of the head and neck with three-dimensional maximum intensity projections postprocessed  FINDINGS: MRI brain: Small areas of cortical diffusion restriction are present along the right paracentral region near the vertex as well as in the right frontal lobe compatible with areas of acute infarct. There is no evidence of associated mass effect or hemorrhage. Minimal age-related changes are otherwise present  with some typical T2 hyperintense periventricular white matter changes favored to relate to chronic small vessel ischemia. There is no evidence of hemorrhage, mass or mass effect. The ventricles are normal in size and configuration. The orbits are unremarkable and the paranasal sinuses are grossly clear. Intracranial arterial flow voids appear maintained.  MRA head: The carotid siphons demonstrate expected flow related signal without evidence of atherosclerotic narrowing or aneurysmal dilatation. The anterior cerebral arteries are normal in course and caliber bilaterally. The right middle cerebral artery demonstrates no evidence of flow-limiting stenosis, large vessel occlusion or aneurysmal dilatation. The left middle cerebral artery is similarly normal in course and caliber. The vertebrobasilar system is patent. The posterior cerebral arteries are normal in course and caliber bilaterally.  MRA NECK: Limited time-of-flight noncontrast MR angiogram of the neck demonstrates focal atherosclerotic narrowing of the right ICA origin, likely 50-70% by NASCET criteria. Atherosclerotic changes also present involving the left ICA origin with similar moderate narrowing, and addition to a focal area of eccentric, somewhat linear signal concerning for possible dissection. The vertebral arteries are normal in course and caliber bilaterally.      Impression: Focal areas of acute cortical infarct are present involving the right pre and postcentral gyri near the vertex, as well as involving a small area of right frontal lobe cortex. There is no evidence of associated significant mass effect or hemorrhage.  Essentially normal MR angiogram of the head with no evidence of flow-limiting stenosis, large vessel occlusion or aneurysmal dilatation.  MR angiogram of the neck is somewhat limited by noncontrast technique. There is apparent 50-70% atherosclerotic narrowing of the right ICA origin. Similar moderate narrowing is present on the  left, with additional focal area of luminal irregularity concerning for either dissection or possible irregular eccentric plaque. CT angiogram would be useful to more specifically evaluate, as indicated.  This report was finalized on 6/13/2022 1:04 PM by Laron Retana.      MRI Brain Without Contrast    Result Date: 6/13/2022  DATE OF EXAM: 6/13/2022 12:17 PM  PROCEDURE: MRI BRAIN WO CONTRAST-, MRI ANGIOGRAM HEAD WO CONTRAST-, MRI ANGIOGRAM NECK WO CONTRAST-  INDICATIONS: Neuro deficit, acute, stroke suspected; R29.898-Other symptoms and signs involving the musculoskeletal system; I63.9-Cerebral infarction, unspecified  COMPARISON: CT earlier the same day  TECHNIQUE: Multiplanar multisequence MRI of the brain performed without IV contrast. Noncontrast time-of-flight 3-dimensional MR angiogram of the head and neck with three-dimensional maximum intensity projections postprocessed  FINDINGS: MRI brain: Small areas of cortical diffusion restriction are present along the right paracentral region near the vertex as well as in the right frontal lobe compatible with areas of acute infarct. There is no evidence of associated mass effect or hemorrhage. Minimal age-related changes are otherwise present with some typical T2 hyperintense periventricular white matter changes favored to relate to chronic small vessel ischemia. There is no evidence of hemorrhage, mass or mass effect. The ventricles are normal in size and configuration. The orbits are unremarkable and the paranasal sinuses are grossly clear. Intracranial arterial flow voids appear maintained.  MRA head: The carotid siphons demonstrate expected flow related signal without evidence of atherosclerotic narrowing or aneurysmal dilatation. The anterior cerebral arteries are normal in course and caliber bilaterally. The right middle cerebral artery demonstrates no evidence of flow-limiting stenosis, large vessel occlusion or aneurysmal dilatation. The left middle  cerebral artery is similarly normal in course and caliber. The vertebrobasilar system is patent. The posterior cerebral arteries are normal in course and caliber bilaterally.  MRA NECK: Limited time-of-flight noncontrast MR angiogram of the neck demonstrates focal atherosclerotic narrowing of the right ICA origin, likely 50-70% by NASCET criteria. Atherosclerotic changes also present involving the left ICA origin with similar moderate narrowing, and addition to a focal area of eccentric, somewhat linear signal concerning for possible dissection. The vertebral arteries are normal in course and caliber bilaterally.      Impression: Focal areas of acute cortical infarct are present involving the right pre and postcentral gyri near the vertex, as well as involving a small area of right frontal lobe cortex. There is no evidence of associated significant mass effect or hemorrhage.  Essentially normal MR angiogram of the head with no evidence of flow-limiting stenosis, large vessel occlusion or aneurysmal dilatation.  MR angiogram of the neck is somewhat limited by noncontrast technique. There is apparent 50-70% atherosclerotic narrowing of the right ICA origin. Similar moderate narrowing is present on the left, with additional focal area of luminal irregularity concerning for either dissection or possible irregular eccentric plaque. CT angiogram would be useful to more specifically evaluate, as indicated.  This report was finalized on 6/13/2022 1:04 PM by Laron Retana.      XR Chest 1 View    Result Date: 6/13/2022  DATE OF EXAM: 6/13/2022 12:40 PM  PROCEDURE: XR CHEST 1 VW-  INDICATIONS: Acute Stroke Protocol (onset < 12 hrs); R29.898-Other symptoms and signs involving the musculoskeletal system; I63.9-Cerebral infarction, unspecified  COMPARISON: No comparisons available.  TECHNIQUE: Single radiographic AP view of the chest was obtained.  FINDINGS: Sternotomy wires are noted. The heart looks enlarged. The lungs seem  clear. There are no pleural effusions.      Impression: 1.  Cardiomegaly suggested.  This report was finalized on 6/13/2022 12:49 PM by Russell Gonzalez MD.      CT Head Without Contrast Stroke Protocol    Result Date: 6/13/2022  DATE OF EXAM: 6/13/2022 10:50 AM  PROCEDURE: CT HEAD WO CONTRAST STROKE PROTOCOL-  INDICATIONS: Neuro deficit, acute, stroke suspected  COMPARISON: No comparisons available.  TECHNIQUE: Routine transaxial and coronal reconstruction images were obtained through the head without the administration of contrast. Automated exposure control and iterative reconstruction methods were used.    FINDINGS: There is a hypodense area in the left frontal lobe that could relate to more chronic area of ischemia. There is no underlying hemorrhage. There is no midline shift. There are no abnormal extra-axial fluid collections. The paranasal sinuses seem clear. There are multiple small hyperdense areas within the scalp in the frontal area. Correlation with patient's history with respect to the scalp would be suggested. These may relate to small foreign bodies or calcification.      Impression: 1.  There is a hypodense area within the left frontal lobe that could be reflective of more chronic small vessel ischemic change. 2.  Tiny hyperdense areas within the scalp in the frontal area which are nonspecific. Correlation with patient's history with respect to this area would be suggested.  Study was reviewed on the CT scan monitor and discussed with the stroke team navigator at 10:49 AM.  This report was finalized on 6/13/2022 11:03 AM by Russell Gonzalez MD.        Results for orders placed during the hospital encounter of 11/11/20    Adult Transthoracic Echo Complete W/ Cont if Necessary Per Protocol    Interpretation Summary  · Left ventricular systolic function is normal. Left ventricular ejection fraction appears to be 56 - 60%.  · Left ventricular wall thickness is consistent with concentric hypertrophy.  · Left  ventricular diastolic function is consistent with (grade Ia w/high LAP) impaired relaxation.  · There is moderate calcification of the aortic valve mainly affecting the non-coronary cusp(s).  · Mild aortic valve regurgitation is present.  · There is mild calcification of the mitral valve.  · Mild mitral valve regurgitation is present.      Assessment & Plan   Assessment & Plan     Active Hospital Problems    Diagnosis  POA   • Left arm weakness [R29.898]  Yes   • Tobacco abuse [Z72.0]  Yes   • PAD (peripheral artery disease) ([facility]) [I73.9]  Yes   • Hypertension [I10]  Yes   • Coronary artery disease involving coronary bypass graft of native heart without angina pectoris [I25.810]  Yes   • Uncontrolled type 2 diabetes mellitus with hyperglycemia, with long-term current use of insulin ([facility]) [E11.65, Z79.4]  Not Applicable   • Hyperlipidemia [E78.5]  Yes       Laron Pandey is a 61 y.o. male with history of coronary artery disease, peripheral arterial disease status post stents, carotid stenosis status post CEA, hypertension, hyperlipidemia, type 2 diabetes who presents with left arm numbness and discoordination.     Acute CVA  -Unclear etiology  -CT head suggestive of changes of chronic small vessel ischemic disease  -MRI of the brain shows focal areas of acute cortical infarct involving the right pre and precentral gyri near the vertex as well as involving a small area of the right frontal lobe cortex.  No mass-effect or hemorrhage.  -MRA of the head was normal.  MRA of the neck with 50-75 atherosclerosis of the right ICA, moderate narrowing present on the left with luminal irregularity concerning for either dissection or plaque  -Echo and carotid duplex pending  -Hold Xarelto  -Permissive hypertension  -Every 4 neurochecks  -Stroke neurology following  -PT/OT/speech-language pathology  -Continue ASA and Crestor    Hypothyroidism  -Continue Synthroid    Coronary artery disease  -Continue metoprolol, ASA, statin  -EKG  reviewed.  Notable for T wave inversions similar to prior EKGs    Hypertension  -Hold Diovan    Type 2 diabetes  -Check A1c  -Takes 55 units of 70/30  - Start lower dose of Levemir 20 units nightly  -Start sliding scale insulin          DVT prophylaxis:  SCds      CODE STATUS:  Full code  There are no questions and answers to display.         Brittany Salgado MD  06/13/22

## 2022-06-13 NOTE — PLAN OF CARE
Goal Outcome Evaluation:  Plan of Care Reviewed With: patient        Progress: no change  Outcome Evaluation: Pt admitted to 3E today. A+Ox4, NSR- sinus bradycardia, room air. NIHSS=2. Calm, cooperative, pleasant. complains of LUE pain, see MAR. possible DTI on L foot 4th toe, WOC consulted.

## 2022-06-13 NOTE — ED PROVIDER NOTES
Subjective   61-year-old male who presents with a complaint of left arm weakness, poor coordination, and syncopal episode.  Patient reports that at 1 AM Sunday morning he had sudden cramping in his bilateral legs that awoke him from sleep.  He does report a known previous history of diabetes and secondary peripheral neuropathy.  He reports that he sat up at bed for short period time before he stood to go to the bathroom because he had the urge to defecate.  He states that upon standing he must of passed out but does not definitively remember the incident.  He reports that he pulled a rack of holding down on himself and next awoke with his wife over him.  He then was able to stand and go to the restroom.  Went back to the room for short period time before he had the urge to defecate again.  He states that he did have an accident on himself at that time but then finally went to the restroom a second time.  When he was sitting on the toilet he began to notice that his left arm had a pinprick sensation primarily throughout the forearm he also reports that he was weak in that left forearm and had poor coordination in that left hand and arm.  He states that those symptoms have continued to persist since the incident.  He denies striking his head or injuring his neck back chest or abdomen with the given fall.  The only area of pain was below the knees where he had muscle cramping.  He reports that his oral fluid intake has been good.  He he does report a previous history of paresthesias and peripheral neuropathy symptoms in the arm the past.  He reports that approxi-2 weeks ago he had 1/32 episode of similar symptoms in his left arm.  They resolved without any residual deficit.  He also reports 2 weeks prior suffered a syncopal episode.  The syncopal episode Sunday was his second syncopal episode.  Denies new or different medication.  He denies fever, bodies, or chills.  He denies new cough or shortness of breath.  No  reported sick contacts.  He exhibits normal mentation currently and answers all questions appropriate.  GCS 15.  He denies any facial weakness or deficits or any change in speech.          Review of Systems   Constitutional: Negative for chills, fatigue and fever.   HENT: Negative for congestion, ear pain, postnasal drip, sinus pressure and sore throat.    Eyes: Negative for pain, redness and visual disturbance.   Respiratory: Negative for cough, chest tightness and shortness of breath.    Cardiovascular: Negative for chest pain, palpitations and leg swelling.   Gastrointestinal: Negative for abdominal pain, anal bleeding, blood in stool, diarrhea, nausea and vomiting.   Endocrine: Negative for polydipsia and polyuria.   Genitourinary: Negative for difficulty urinating, dysuria, frequency and urgency.   Musculoskeletal: Negative for arthralgias, back pain and neck pain.   Skin: Negative for pallor and rash.   Allergic/Immunologic: Negative for environmental allergies and immunocompromised state.   Neurological: Positive for weakness. Negative for dizziness and headaches.        Paresthesias/ataxia to left arm   Hematological: Negative for adenopathy.   Psychiatric/Behavioral: Negative for confusion, self-injury and suicidal ideas. The patient is not nervous/anxious.    All other systems reviewed and are negative.      Past Medical History:   Diagnosis Date   • Arthritis    • Back pain    • Claudication (HCC)    • Coronary artery disease    • Diabetes ([facility])    • ED (erectile dysfunction)    • Heart disease    • Hyperlipidemia    • Hypertension    • Lumbar post-laminectomy syndrome    • Lumbar stenosis with neurogenic claudication    • PAD (peripheral artery disease) ([facility])    • Thyroid disease    • Thyroid disease        Allergies   Allergen Reactions   • Celebrex [Celecoxib] Swelling     Hands   • Morphine And Related Nausea Only       Past Surgical History:   Procedure Laterality Date   • BACK SURGERY     • CARDIAC  CATHETERIZATION N/A 11/11/2020    Procedure: Left Heart Cath;  Surgeon: Mychal Block MD;  Location:  [facility];  Service: Cardiology;  Laterality: N/A;   • CARDIAC CATHETERIZATION N/A 12/3/2020    Procedure: Peripheral angiography: left iliac artery and left SFA intervention;  Surgeon: Mychal Block MD;  Location:  [facility];  Service: Cardiovascular;  Laterality: N/A;  about 2 weeks.    • CARDIAC SURGERY     • CAROTID ENDARTERECTOMY     • CORONARY ARTERY BYPASS GRAFT     • INTERVENTIONAL RADIOLOGY PROCEDURE N/A 11/11/2020    Procedure: Abdominal Aortogram;  Surgeon: Mychal Block MD;  Location:  [facility];  Service: Cardiology;  Laterality: N/A;       Family History   Problem Relation Age of Onset   • Diabetes Mother    • Heart disease Mother    • Diabetes Father    • Heart disease Father    • Stroke Father    • Heart attack Neg Hx    • Hypertension Neg Hx    • Thyroid disease Neg Hx        Social History     Socioeconomic History   • Marital status:    Tobacco Use   • Smoking status: Current Every Day Smoker     Packs/day: 0.00     Types: Cigarettes   • Smokeless tobacco: Never Used   • Tobacco comment: 2 CIGS/DAY   Vaping Use   • Vaping Use: Never used   Substance and Sexual Activity   • Alcohol use: Yes     Alcohol/week: 12.0 standard drinks     Types: 12 Cans of beer per week     Comment: 12 BEERS/WEEK (3-4 AT A TIME)   • Drug use: Never   • Sexual activity: Defer           Objective   Physical Exam  Vitals and nursing note reviewed.   Constitutional:       General: He is not in acute distress.     Appearance: Normal appearance. He is well-developed. He is not toxic-appearing or diaphoretic.   HENT:      Head: Normocephalic and atraumatic.      Right Ear: External ear normal.      Left Ear: External ear normal.      Nose: Nose normal.   Eyes:      General: Lids are normal.      Pupils: Pupils are equal, round, and reactive to light.   Neck:       Trachea: No tracheal deviation.   Cardiovascular:      Rate and Rhythm: Regular rhythm. Tachycardia present.      Pulses: No decreased pulses.      Heart sounds: Normal heart sounds. No murmur heard.    No friction rub. No gallop.   Pulmonary:      Effort: Pulmonary effort is normal. No respiratory distress.      Breath sounds: Normal breath sounds. No decreased breath sounds, wheezing, rhonchi or rales.   Abdominal:      General: Bowel sounds are normal.      Palpations: Abdomen is soft.      Tenderness: There is no abdominal tenderness. There is no guarding or rebound.   Musculoskeletal:         General: No deformity. Normal range of motion.      Cervical back: Normal range of motion and neck supple.   Lymphadenopathy:      Cervical: No cervical adenopathy.   Skin:     General: Skin is warm and dry.      Findings: No rash.   Neurological:      Mental Status: He is alert and oriented to person, place, and time.      Cranial Nerves: No cranial nerve deficit.      Sensory: No sensory deficit.   Psychiatric:         Speech: Speech normal.         Behavior: Behavior normal.         Thought Content: Thought content normal.         Judgment: Judgment normal.         Procedures           ED Course  ED Course as of 06/13/22 1139   Mon Jun 13, 2022   1054 The patient presents with a complaint of new onset of weakness and poor coordination in the left upper extremity began at 1 AM on Sunday, roughly 32 hours prior to presentation.  He is therefore not considered a tenecteplase candidate.  We will proceed with CT imaging. [NS]      ED Course User Index  [NS] Annalise Salas MD                                                 MDM  Number of Diagnoses or Management Options  Cerebrovascular accident (CVA), unspecified mechanism (HCC): new and requires workup  Left arm weakness: new and requires workup  Diagnosis management comments: The patient presents with complaint of left arm weakness.  Also has had 2 recent syncopal  episodes.    He is a known diabetic does have a underlying history of peripheral neuropathy.    The patient was evaluated in the CT suite along with the stroke navigator.  He is not a candidate for tenecteplase or intervention.    CT head without did not show acute abnormalities.    MR imaging of the brain and neck will be pursued on an inpatient basis by the stroke service.    I will discussed the patient with the hospitalist, Dr. Steve.  The hospital service will consult on the patient to determine status of admission.       Amount and/or Complexity of Data Reviewed  Clinical lab tests: ordered and reviewed  Tests in the radiology section of CPT®: ordered and reviewed  Obtain history from someone other than the patient: yes  Review and summarize past medical records: yes  Discuss the patient with other providers: yes  Independent visualization of images, tracings, or specimens: yes        Final diagnoses:   Left arm weakness   Cerebrovascular accident (CVA), unspecified mechanism (HCC)       ED Disposition  ED Disposition     ED Disposition   Decision to Admit    Condition   --    Comment   --             No follow-up provider specified.       Medication List      No changes were made to your prescriptions during this visit.          Annalise Salas MD  06/13/22 9432

## 2022-06-13 NOTE — CONSULTS
Stroke Consult Note    Patient Name: Laron Pandey   MRN: [id number]  Age: 61 y.o.  Sex: male  : 1960    Primary Care Physician: Nacho Omer MD  Referring Physician:  No ref. provider found    TIME STROKE TEAM CALLED: 1045 EST     TIME PATIENT SEEN: 1046 EST    Handedness: Right  Race:     Chief Complaint/Reason for Consultation: Left arm numbness that started at 1 AM on     HPI: Laron Pandey is a 61 year old male with known medical history of coronary artery disease with placement of 2 cardiac stents in  (Dr. Block) and three-vessel CABG in  (with Dr. Myers), hypertension, hyperlipidemia, tobacco abuse (smokes 1/2 ppd), PAD, thyroid disease, arthritis, back pain, claudication, bilateral CEA ( by Dr. Myers), diabetes, and lumbar stenosis who presents to [facility] today for evaluation of left arm numbness and discoordination that started at 1 AM on  morning.  He tells me that around 1 AM on  morning he awoke with the urge to defecate, believes that he must of passed out, then awoke with his wife standing over him, he started to have cramping of his bilateral lower extremities, he was able to stand and use the restroom, when he went back to the room he had the urge to defecate again, he states that he did experience incontinence of stool at that time but was able to go to the restroom a second time, at that point noticed cramping in his bilateral lower extremities, this episode was accompanied by vomiting, diarrhea, sweating.  He checked his glucose at this time it was 308, blood pressure 97/57, his leg cramps worsened and were accompanied by spasms for about 1 hour, he rechecked his blood pressure and it was 136/72.  While sitting on the toilet he noticed that his left arm had a pinprick sensation accompanied by weakness and poor coordination in the left hand and arm, he also felt like his left arm had some spastic movement at onset of  numbness, he reports that the left arm numbness has improved some since onset but has persisted which prompted evaluation at [facility] ER this morning.    Patient is met in CT scanner, NIH stroke scale 1 for mildly decreased sensation in right lower extremity and numbness of LUE. Per Cardiology documentation (1/5/2022 he has intermittent numbness/tingling in both hands and feet). Patient states that has experiencing left arm numbness, and still feels like his arm is not as coordinated as he would like it to be.  He demonstrates no overt dysmetria, finger-to-nose movements are smooth, able to mimic playing piano with fingers smoothly, but does demonstrate decreased  strength of left upper extremity.  He has not been able to hold objects at home without concern of dropping them with his left hand. EOM intact, PERRL, no nystagmus appreciated.  Patient denies blurry or double vision, does report feeling lightheaded when sitting up or down.    He tells me that he takes Xarelto that was started after his cardiac stents were placed about 1.5 years ago, reports compliance with Xarelto.  Not currently taking any antiplatelet medications.    Of note he also reports a previous episode of paresthesias and peripheral neuropathy in the left arm approximately 2 weeks ago which resolved without any residual deficit.  He also reports a previous syncopal episode.  Denies new or different medications.      Last Known Normal Date/Time: 6/12/2022 0100 EST     Review of Systems   Constitutional: Positive for fatigue. Negative for fever.   HENT: Positive for congestion. Negative for trouble swallowing.    Eyes: Negative for photophobia and visual disturbance.   Respiratory: Positive for cough. Negative for shortness of breath.    Cardiovascular: Negative for palpitations and leg swelling.   Gastrointestinal: Positive for diarrhea and vomiting. Negative for constipation and nausea.   Genitourinary: Negative for dysuria  and frequency.   Allergic/Immunologic: Positive for environmental allergies.   Neurological: Positive for dizziness, weakness, light-headedness and numbness. Negative for headaches.      Past Medical History:   Diagnosis Date   • Arthritis    • Back pain    • Claudication (HCC)    • Coronary artery disease    • Diabetes (HCC)    • ED (erectile dysfunction)    • Heart disease    • Hyperlipidemia    • Hypertension    • Lumbar post-laminectomy syndrome    • Lumbar stenosis with neurogenic claudication    • PAD (peripheral artery disease) (HCC)    • Thyroid disease    • Thyroid disease      Past Surgical History:   Procedure Laterality Date   • BACK SURGERY     • CARDIAC CATHETERIZATION N/A 11/11/2020    Procedure: Left Heart Cath;  Surgeon: Mychal Block MD;  Location:  [facility] CATH INVASIVE LOCATION;  Service: Cardiology;  Laterality: N/A;   • CARDIAC CATHETERIZATION N/A 12/3/2020    Procedure: Peripheral angiography: left iliac artery and left SFA intervention;  Surgeon: Mychal Block MD;  Location: [facility] CATH INVASIVE LOCATION;  Service: Cardiovascular;  Laterality: N/A;  about 2 weeks.    • CARDIAC SURGERY     • CAROTID ENDARTERECTOMY     • CORONARY ARTERY BYPASS GRAFT     • INTERVENTIONAL RADIOLOGY PROCEDURE N/A 11/11/2020    Procedure: Abdominal Aortogram;  Surgeon: Mychal Block MD;  Location: [facility] CATH INVASIVE LOCATION;  Service: Cardiology;  Laterality: N/A;     Family History   Problem Relation Age of Onset   • Diabetes Mother    • Heart disease Mother    • Diabetes Father    • Heart disease Father    • Stroke Father    • Heart attack Neg Hx    • Hypertension Neg Hx    • Thyroid disease Neg Hx      Social History     Socioeconomic History   • Marital status:    Tobacco Use   • Smoking status: Current Every Day Smoker     Packs/day: 0.00     Types: Cigarettes   • Smokeless tobacco: Never Used   • Tobacco comment: 2 CIGS/DAY   Vaping Use   • Vaping Use: Never used   Substance and Sexual Activity   •  Alcohol use: Yes     Alcohol/week: 12.0 standard drinks     Types: 12 Cans of beer per week     Comment: 12 BEERS/WEEK (3-4 AT A TIME)   • Drug use: Never   • Sexual activity: Defer     Allergies   Allergen Reactions   • Celebrex [Celecoxib] Swelling     Hands   • Morphine And Related Nausea Only     Prior to Admission medications    Medication Sig Start Date End Date Taking? Authorizing Provider   aspirin 81 MG EC tablet Take 81 mg by mouth Daily.    Provider, MD Chelsea   BD ULTRA-FINE PEN NEEDLES 29G X 12.7MM misc Use with Insulin 3 times daily 12/28/21   Meka Swan MD   Blood Glucose Monitoring Suppl (ONE TOUCH ULTRA 2) w/Device kit 1 Device 3 (Three) Times a Day. 5/23/22   Meka Swan MD   Continuous Blood Gluc Sensor (FreeStyle Jude 14 Day Sensor) misc 1 each Every 14 (Fourteen) Days. 5/4/21   Meka Swan MD   gabapentin (NEURONTIN) 100 MG capsule Take 1 capsule by mouth 3 (Three) Times a Day. 5/9/22   Meka Swan MD   glucose blood (ONE TOUCH ULTRA TEST) test strip Test three times daily 12/28/21   Meka Swan MD   Lancets (OneTouch Delica Plus [id number]) misc 1 each 3 (Three) Times a Day. 12/28/21   Meka Swan MD   levothyroxine (SYNTHROID, LEVOTHROID) 200 MCG tablet Take 1 tablet by mouth Daily. 12/28/21   Meka Swan MD   levothyroxine (SYNTHROID, LEVOTHROID) 25 MCG tablet Take 1 tablet by mouth Daily. LAST REFILL WITHOUT APPT 12/28/21   Meka Swan MD   metoprolol succinate XL (TOPROL-XL) 25 MG 24 hr tablet Take 1 tablet by mouth Daily. 12/28/21   Meka Swan MD   NovoLOG Mix 70/30 FlexPen (70-30) 100 UNIT/ML suspension pen-injector injection 55 UNITS SQ qam 50 UNITS SQ  qpm 2/22/22   Meka Swan MD   Rivaroxaban (XARELTO) 2.5 MG tablet Take 1 tablet by mouth 2 (Two) Times a Day. 6/16/21   Mickie Carpenter APRN   rosuvastatin (CRESTOR) 20 MG tablet Take 1 tablet by mouth Daily. 12/3/20   Mychal Block MD   valsartan-hydrochlorothiazide (DIOVAN-HCT) 320-12.5  MG per tablet Take 1 tablet by mouth Daily. 12/28/21   Meka Swan MD         Temp:  [97.9 °F (36.6 °C)] 97.9 °F (36.6 °C)  Heart Rate:  [94] 94  Resp:  [16] 16  BP: (186)/(100) 186/100     Neurological Exam  Mental Status  Awake, alert and oriented to person, place and time.Alert. Oriented to person, place and time. Oriented to person, place, and time. Speech is normal. Language is fluent with no aphasia.    Cranial Nerves  CN II: Visual fields full to confrontation.  CN III, IV, VI: Extraocular movements intact bilaterally. Normal lids and orbits bilaterally. Pupils equal round and reactive to light bilaterally.  CN V: Facial sensation is normal.  CN VII: Full and symmetric facial movement.  CN VIII: Hearing appears intact.  CN IX, X: Palate elevates symmetrically  CN XI: Shoulder shrug strength is normal.  CN XII: Tongue midline without atrophy or fasciculations.    Motor  Normal muscle bulk throughout. No fasciculations present. Normal muscle tone. Strength is 5/5 in all four extremities except as noted.  LUE  strength less then RUE.    Sensory  Light touch abnormality: Decreased sensation on RLE to light touch.     Reflexes                                            Right                      Left  Plantar                           Downgoing                Downgoing    Coordination  Right: Finger-to-nose normal. Rapid alternating movement normal. Heel-to-shin normal.Left: Finger-to-nose normal. Rapid alternating movement normal. Heel-to-shin normal.    Gait    Not observed.      Physical Exam  Vitals reviewed.   Constitutional:       General: He is not in acute distress.     Appearance: Normal appearance. He is not ill-appearing.   HENT:      Head: Normocephalic and atraumatic.      Mouth/Throat:      Mouth: Mucous membranes are moist.      Pharynx: Oropharynx is clear.   Eyes:      General: Lids are normal.      Extraocular Movements: Extraocular movements intact.      Pupils: Pupils are equal, round,  and reactive to light.   Cardiovascular:      Rate and Rhythm: Normal rate and regular rhythm.   Pulmonary:      Effort: Pulmonary effort is normal. No respiratory distress.      Comments: Room air  Musculoskeletal:      Right lower leg: No edema.      Left lower leg: No edema.   Skin:     General: Skin is warm and dry.   Neurological:      Mental Status: He is alert and oriented to person, place, and time.      Sensory: Sensory deficit present.      Motor: Weakness present.   Psychiatric:         Speech: Speech normal.         Acute Stroke Data    Thrombolytic Inclusion / Exclusion Criteria    Time: 11:10 EDT  Person Administering Scale: CARLI Ortega    Inclusion Criteria  [x]   18 years of age or greater   []   Onset of symptoms < 4.5 hours before beginning treatment (stroke onset = time patient was last seen well or without symptoms).   []   Diagnosis of acute ischemic stroke causing measurable disabling deficit (Complete Hemianopia, Any Aphasia, Visual or Sensory Extinction, Any weakness limiting sustained effort against gravity)   []   Any remaining deficit considered potentially disabling in view of patient and practitioner   Exclusion criteria (Do not proceed with Alteplase if any are checked under exclusion criteria)  [x]   Onset unknown or GREATER than 4.5 hours   []   ICH on CT/MRI   []   CT demonstrates hypodensity representing acute or subacute infarct   []   Significant head trauma or prior stroke in the previous 3 months   []   Symptoms suggestive of subarachnoid hemorrhage   []   History of un-ruptured intracranial aneurysm GREATER than 10 mm   []   Recent intracranial or intraspinal surgery within the last 3 months   []   Arterial puncture at a non-compressible site in the previous 7 days   []   Active internal bleeding   []   Acute bleeding tendency   []   Platelet count LESS than 100,000 for known hematological diseases such as leukemia, thrombocytopenia or chronic cirrhosis   []    Current use of anticoagulant with INR GREATER than 1.7 or PT GREATER than 15 seconds, aPTT GREATER than 40 seconds   []   Heparin received within 48 hours, resulting in abnormally elevated aPTT GREATER than upper limit of normal   [x]   Current use of direct thrombin inhibitors or direct factor Xa inhibitors in the past 48 hours   []   Elevated blood pressure refractory to treatment (systolic GREATER than 185 mm/Hg or diastolic  GREATER than 110 mm/Hg   []   Suspected infective endocarditis and aortic arch dissection   []   Current use of therapeutic treatment dose of low-molecular-weight heparin (LMWH) within the previous 24 hours   []   Structural GI malignancy or bleed   Relative exclusion for all patients  []   Only minor non-disabling symptoms   []   Pregnancy   []   Seizure at onset with postictal residual neurological impairments   []   Major surgery or previous trauma within past 14 days   []   History of previous spontaneous ICH, intracranial neoplasm, or AV malformation   []   Postpartum (within previous 14 days)   []   Recent GI or urinary tract hemorrhage (within previous 21 days)   []   Recent acute MI (within previous 3 months)   []   History of un-ruptured intracranial aneurysm LESS than 10 mm   []   History of ruptured intracranial aneurysm   []   Blood glucose LESS than 50 mg/dL (2.7 mmol/L)   []   Dural puncture within the last 7 days   []   Known GREATER than 10 cerebral microbleeds   Additional exclusions for patients with symptoms onset between 3 and 4.5 hours.  []   Age > 80.   []   On any anticoagulants regardless of INR  >>> Warfarin (Coumadin), Heparin, Enoxaparin (Lovenox), fondaparinux (Arixtra), bivalirudin (Angiomax), Argatroban, dabigatran (Pradaxa), rivaroxaban (Xarelto), or apixaban (Eliquis)   []   Severe stroke (NIHSS > 25).   []   History of BOTH diabetes and previous ischemic stroke.   []   The risks and benefits have been discussed with the patient or family related to the  administration of IV thrombolytic therapy for stroke symptoms.   []   I have discussed and reviewed the patient's case and imaging with the attending prior to IV thrombolytic therapy.   N/A Time IV thrombolytic administered       Hospital Meds:  Scheduled-    Infusions-     PRNs- •  sodium chloride    Functional Status Prior to Current Stroke/Roosevelt Score: 0    NIH Stroke Scale  Time: 11:10 EDT  Person Administering Scale: CARLI Ortega    Interval: baseline  1a. Level of Consciousness: 0-->Alert, keenly responsive  1b. LOC Questions: 0-->Answers both questions correctly  1c. LOC Commands: 0-->Performs both tasks correctly  2. Best Gaze: 0-->Normal  3. Visual: 0-->No visual loss  4. Facial Palsy: 0-->Normal symmetrical movements  5a. Motor Arm, Left: 0-->No drift, limb holds 90 (or 45) degrees for full 10 secs  5b. Motor Arm, Right: 0-->No drift, limb holds 90 (or 45) degrees for full 10 secs  6a. Motor Leg, Left: 0-->No drift, leg holds 30 degree position for full 5 secs  6b. Motor Leg, Right: 0-->No drift, leg holds 30 degree position for full 5 secs  7. Limb Ataxia: 0-->Absent  8. Sensory: 1-->Mild-to-moderate sensory loss, patient feels pinprick is less sharp or is dull on the affected side, or there is a loss of superficial pain with pinprick, but patient is aware of being touched  9. Best Language: 0-->No aphasia, normal  10. Dysarthria: 0-->Normal  11. Extinction and Inattention (formerly Neglect): 0-->No abnormality    Total (NIH Stroke Scale): 1    Results Reviewed:  I have personally reviewed current lab, radiology, and data and agree with results.    Results for orders placed during the hospital encounter of 11/11/20    Adult Transthoracic Echo Complete W/ Cont if Necessary Per Protocol    Interpretation Summary  · Left ventricular systolic function is normal. Left ventricular ejection fraction appears to be 56 - 60%.  · Left ventricular wall thickness is consistent with concentric  hypertrophy.  · Left ventricular diastolic function is consistent with (grade Ia w/high LAP) impaired relaxation.  · There is moderate calcification of the aortic valve mainly affecting the non-coronary cusp(s).  · Mild aortic valve regurgitation is present.  · There is mild calcification of the mitral valve.  · Mild mitral valve regurgitation is present.     CT Head Without Contrast Stroke Protocol    Result Date: 6/13/2022  1.  There is a hypodense area within the left frontal lobe that could be reflective of more chronic small vessel ischemic change. 2.  Tiny hyperdense areas within the scalp in the frontal area which are nonspecific. Correlation with patient's history with respect to this area would be suggested.  Study was reviewed on the CT scan monitor and discussed with the stroke team navigator at 10:49 AM.  This report was finalized on 6/13/2022 11:03 AM by Russell Gonzalez MD.      MRI Angiogram Head Without Contrast    Result Date: 6/13/2022  Focal areas of acute cortical infarct are present involving the right pre and postcentral gyri near the vertex, as well as involving a small area of right frontal lobe cortex. There is no evidence of associated significant mass effect or hemorrhage.  Essentially normal MR angiogram of the head with no evidence of flow-limiting stenosis, large vessel occlusion or aneurysmal dilatation.  MR angiogram of the neck is somewhat limited by noncontrast technique. There is apparent 50-70% atherosclerotic narrowing of the right ICA origin. Similar moderate narrowing is present on the left, with additional focal area of luminal irregularity concerning for either dissection or possible irregular eccentric plaque. CT angiogram would be useful to more specifically evaluate, as indicated.  This report was finalized on 6/13/2022 1:04 PM by Laron Retana.      MRI Angiogram Neck Without Contrast    Result Date: 6/13/2022  Focal areas of acute cortical infarct are present involving  the right pre and postcentral gyri near the vertex, as well as involving a small area of right frontal lobe cortex. There is no evidence of associated significant mass effect or hemorrhage.  Essentially normal MR angiogram of the head with no evidence of flow-limiting stenosis, large vessel occlusion or aneurysmal dilatation.  MR angiogram of the neck is somewhat limited by noncontrast technique. There is apparent 50-70% atherosclerotic narrowing of the right ICA origin. Similar moderate narrowing is present on the left, with additional focal area of luminal irregularity concerning for either dissection or possible irregular eccentric plaque. CT angiogram would be useful to more specifically evaluate, as indicated.  This report was finalized on 6/13/2022 1:04 PM by Laron Retana.      MRI Brain Without Contrast    Result Date: 6/13/2022  Focal areas of acute cortical infarct are present involving the right pre and postcentral gyri near the vertex, as well as involving a small area of right frontal lobe cortex. There is no evidence of associated significant mass effect or hemorrhage.  Essentially normal MR angiogram of the head with no evidence of flow-limiting stenosis, large vessel occlusion or aneurysmal dilatation.  MR angiogram of the neck is somewhat limited by noncontrast technique. There is apparent 50-70% atherosclerotic narrowing of the right ICA origin. Similar moderate narrowing is present on the left, with additional focal area of luminal irregularity concerning for either dissection or possible irregular eccentric plaque. CT angiogram would be useful to more specifically evaluate, as indicated.  This report was finalized on 6/13/2022 1:04 PM by Laron Retana.      Assessment/Plan:     Laron Pandey is a 61 year old male with known medical history of coronary artery disease with placement of 2 cardiac stents, hypertension, hyperlipidemia, tobacco abuse (smokes 1/2 ppd), PAD, thyroid disease,  arthritis, back pain, claudication, bilateral CEA (in 2009 by Dr. Myers), diabetes, and lumbar stenosis who presents to [facility] today for evaluation of left arm numbness and discoordination that started at 1 AM on Sunday morning accompanied by syncopal episode.     Patient is not a candidate for IV TNK d/t LKW >4.5 H and Xarelto compliance  Patient is not a candidate for emergent endovascular intervention d/t LKW >24H and no LVO on MRA, neurosurgical team notified of carotid duplex results with plan for evaluation in the AM.    Antiplatelet: None  Anticoagulant:Xarelto      1. Focal acute cortical infarct in the right pre and postcentral gyri near the vertex, as well as a small area of right frontal lobe  1. Stat MRI brain revealed acute cortical infarct in the right pre and postcentral gyri near the vertex, as well as a small area of the right frontal lobe  2. Stat MRA head/neck concerning for 50-70% atherosclerotic narrowing of the right ICA, similar narrowing is present on the left with luminal irregularity. Imaging reviewed with Dr. Saeed who advised stat carotid duplex, loading with Plavix, and continuation of home Xarelto  3. MRA plaque protocol  4. Initiate TIA/Ischemic Stroke Order set  5. NPO pending bedside dysphagia evaluation, when cleared recommend cardiac consistent carb diet  6. Activity as tolerated, PT/OT to evaluate  7. TTE with bubble study  8. Continue home Xarelto  9. Load with Plavix 300 mg  10. Continue home Crestor 20 mg  2. Tobacco abuse  1. Current 1/2 ppd smoker  2. Tobacco cessation counseling will be needed   3. Hypertension  1. Goal -180  4. Hyperlipidemia  1. Continue home Crestor 20 mg, may need to adjust dose pending AM lipid panel  2. Lipid panel in AM      Plan of care discussed with ED Physician, Dr. Salas, Dr. Saeed and with patient.    Thank you for allowing us to participate in the care of this patient, stroke neurology will continue to  follow.     Addendum: Carotid duplex shows right ICA stenosis >70%, left ICA stenosis >70% with heterogenous plaque present. Neurosurgery consult placed. Aspirin 81 mg added.     Rose Edwards, APRN  June 13, 2022  11:10 EDT

## 2022-06-13 NOTE — TELEPHONE ENCOUNTER
Patient called to report that he feels like he had a CVA yesterday. He states that he still has left arm numbness/weakness. Patient advised he needs to be evaluated at ER. Patient is agreeable to plan.

## 2022-06-13 NOTE — CASE MANAGEMENT/SOCIAL WORK
Discharge Planning Assessment  [facility]     Patient Name: Laron Pandey  MRN: [id number]  Today's Date: 6/13/2022    Admit Date: 6/13/2022     Discharge Needs Assessment     Row Name 06/13/22 1219       Living Environment    People in Home significant other    Name(s) of People in Home Kylah Pandey   [phone]    Current Living Arrangements home    Primary Care Provided by self    Provides Primary Care For no one    Family Caregiver if Needed significant other;child(anup), adult    Family Caregiver Names Kylah Pandey   [phone]    Quality of Family Relationships helpful;involved;supportive    Able to Return to Prior Arrangements yes       Resource/Environmental Concerns    Resource/Environmental Concerns none    Transportation Concerns none       Transition Planning    Patient/Family Anticipates Transition to home    Patient/Family Anticipated Services at Transition none    Transportation Anticipated family or friend will provide       Discharge Needs Assessment    Readmission Within the Last 30 Days no previous admission in last 30 days    Equipment Currently Used at Home none    Concerns to be Addressed denies needs/concerns at this time    Anticipated Changes Related to Illness none    Equipment Needed After Discharge none               Discharge Plan     Row Name 06/13/22 1224       Plan    Plan Initial    Plan Comments CM spoke with patient's daughter at bedside. Patient resides in [facility] with his SO. Patient is independent with ADL’s, denies any DME. Daughter denies any current home health or outpatient services. Patient has medical insurance, prescription coverage and is able to afford/obtain medications without difficulty. Daughter denies any discharge planning needs. Plan is home with SO. CM will continue to follow    Final Discharge Disposition Code 30 - still a patient              Continued Care and Services - Admitted Since 6/13/2022    Coordination has not been started  for this encounter.          Demographic Summary     Row Name 06/13/22 1219       General Information    Arrived From home    Referral Source emergency department    Reason for Consult discharge planning    Preferred Language English       Contact Information    Contact Information Comments Kylah Pandey   [phone]               Functional Status     Row Name 06/13/22 1219       Functional Status    Usual Activity Tolerance good    Current Activity Tolerance moderate       Assessment of Health Literacy    How often do you have someone help you read hospital materials? Never    How often do you have problems learning about your medical condition because of difficulty understanding written information? Never    How often do you have a problem understanding what is told to you about your medical condition? Never    How confident are you filling out medical forms by yourself? Extremely    Health Literacy Good       Functional Status, IADL    Medications independent    Meal Preparation independent    Housekeeping independent    Laundry independent    Shopping independent       Mental Status    General Appearance WDL WDL       Mental Status Summary    Recent Changes in Mental Status/Cognitive Functioning no changes       Employment/    Employment Status employed full-time               Psychosocial    No documentation.                Abuse/Neglect    No documentation.                Legal    No documentation.                Substance Abuse    No documentation.                Patient Forms    No documentation.                   Gabriela Gonzalez RN

## 2022-06-14 ENCOUNTER — APPOINTMENT (OUTPATIENT)
Dept: GENERAL RADIOLOGY | Facility: HOSPITAL | Age: 62
End: 2022-06-14

## 2022-06-14 PROBLEM — I65.21 SYMPTOMATIC STENOSIS OF RIGHT CAROTID ARTERY: Status: ACTIVE | Noted: 2022-06-13

## 2022-06-14 LAB
ANION GAP SERPL CALCULATED.3IONS-SCNC: 8 MMOL/L (ref 5–15)
BASOPHILS # BLD AUTO: 0.05 10*3/MM3 (ref 0–0.2)
BASOPHILS NFR BLD AUTO: 0.7 % (ref 0–1.5)
BUN SERPL-MCNC: 15 MG/DL (ref 8–23)
BUN/CREAT SERPL: 16.1 (ref 7–25)
CALCIUM SPEC-SCNC: 9.1 MG/DL (ref 8.6–10.5)
CHLORIDE SERPL-SCNC: 98 MMOL/L (ref 98–107)
CHOLEST SERPL-MCNC: 199 MG/DL (ref 0–200)
CO2 SERPL-SCNC: 28 MMOL/L (ref 22–29)
CREAT SERPL-MCNC: 0.93 MG/DL (ref 0.76–1.27)
DEPRECATED RDW RBC AUTO: 46 FL (ref 37–54)
EGFRCR SERPLBLD CKD-EPI 2021: 93.4 ML/MIN/1.73
EOSINOPHIL # BLD AUTO: 0.25 10*3/MM3 (ref 0–0.4)
EOSINOPHIL NFR BLD AUTO: 3.4 % (ref 0.3–6.2)
ERYTHROCYTE [DISTWIDTH] IN BLOOD BY AUTOMATED COUNT: 13 % (ref 12.3–15.4)
GLUCOSE BLDC GLUCOMTR-MCNC: 210 MG/DL (ref 70–130)
GLUCOSE BLDC GLUCOMTR-MCNC: 258 MG/DL (ref 70–130)
GLUCOSE BLDC GLUCOMTR-MCNC: 261 MG/DL (ref 70–130)
GLUCOSE BLDC GLUCOMTR-MCNC: 284 MG/DL (ref 70–130)
GLUCOSE BLDC GLUCOMTR-MCNC: 311 MG/DL (ref 70–130)
GLUCOSE SERPL-MCNC: 259 MG/DL (ref 65–99)
HBA1C MFR BLD: 9.8 % (ref 4.8–5.6)
HCT VFR BLD AUTO: 41.6 % (ref 37.5–51)
HDLC SERPL-MCNC: 38 MG/DL (ref 40–60)
HGB BLD-MCNC: 13.9 G/DL (ref 13–17.7)
IMM GRANULOCYTES # BLD AUTO: 0.02 10*3/MM3 (ref 0–0.05)
IMM GRANULOCYTES NFR BLD AUTO: 0.3 % (ref 0–0.5)
LDLC SERPL CALC-MCNC: 123 MG/DL (ref 0–100)
LDLC/HDLC SERPL: 3.1 {RATIO}
LYMPHOCYTES # BLD AUTO: 2.36 10*3/MM3 (ref 0.7–3.1)
LYMPHOCYTES NFR BLD AUTO: 31.8 % (ref 19.6–45.3)
MCH RBC QN AUTO: 32.7 PG (ref 26.6–33)
MCHC RBC AUTO-ENTMCNC: 33.4 G/DL (ref 31.5–35.7)
MCV RBC AUTO: 97.9 FL (ref 79–97)
MONOCYTES # BLD AUTO: 0.69 10*3/MM3 (ref 0.1–0.9)
MONOCYTES NFR BLD AUTO: 9.3 % (ref 5–12)
NEUTROPHILS NFR BLD AUTO: 4.04 10*3/MM3 (ref 1.7–7)
NEUTROPHILS NFR BLD AUTO: 54.5 % (ref 42.7–76)
NRBC BLD AUTO-RTO: 0 /100 WBC (ref 0–0.2)
PLATELET # BLD AUTO: 236 10*3/MM3 (ref 140–450)
PMV BLD AUTO: 10.4 FL (ref 6–12)
POTASSIUM SERPL-SCNC: 4.4 MMOL/L (ref 3.5–5.2)
RBC # BLD AUTO: 4.25 10*6/MM3 (ref 4.14–5.8)
SODIUM SERPL-SCNC: 134 MMOL/L (ref 136–145)
TRIGL SERPL-MCNC: 216 MG/DL (ref 0–150)
VLDLC SERPL-MCNC: 38 MG/DL (ref 5–40)
WBC NRBC COR # BLD: 7.41 10*3/MM3 (ref 3.4–10.8)

## 2022-06-14 PROCEDURE — 83036 HEMOGLOBIN GLYCOSYLATED A1C: CPT

## 2022-06-14 PROCEDURE — 63710000001 INSULIN DETEMIR PER 5 UNITS: Performed by: INTERNAL MEDICINE

## 2022-06-14 PROCEDURE — 92523 SPEECH SOUND LANG COMPREHEN: CPT

## 2022-06-14 PROCEDURE — 97165 OT EVAL LOW COMPLEX 30 MIN: CPT

## 2022-06-14 PROCEDURE — 82962 GLUCOSE BLOOD TEST: CPT

## 2022-06-14 PROCEDURE — 97161 PT EVAL LOW COMPLEX 20 MIN: CPT

## 2022-06-14 PROCEDURE — 99233 SBSQ HOSP IP/OBS HIGH 50: CPT

## 2022-06-14 PROCEDURE — G0108 DIAB MANAGE TRN  PER INDIV: HCPCS | Performed by: REGISTERED NURSE

## 2022-06-14 PROCEDURE — B3151ZZ FLUOROSCOPY OF BILATERAL COMMON CAROTID ARTERIES USING LOW OSMOLAR CONTRAST: ICD-10-PCS | Performed by: NEUROLOGICAL SURGERY

## 2022-06-14 PROCEDURE — 85025 COMPLETE CBC W/AUTO DIFF WBC: CPT | Performed by: INTERNAL MEDICINE

## 2022-06-14 PROCEDURE — 80061 LIPID PANEL: CPT

## 2022-06-14 PROCEDURE — 80048 BASIC METABOLIC PNL TOTAL CA: CPT | Performed by: INTERNAL MEDICINE

## 2022-06-14 PROCEDURE — 97530 THERAPEUTIC ACTIVITIES: CPT

## 2022-06-14 PROCEDURE — 73030 X-RAY EXAM OF SHOULDER: CPT

## 2022-06-14 PROCEDURE — 037K34Z DILATION OF RIGHT INTERNAL CAROTID ARTERY WITH DRUG-ELUTING INTRALUMINAL DEVICE, PERCUTANEOUS APPROACH: ICD-10-PCS | Performed by: NEUROLOGICAL SURGERY

## 2022-06-14 PROCEDURE — 63710000001 INSULIN LISPRO (HUMAN) PER 5 UNITS: Performed by: INTERNAL MEDICINE

## 2022-06-14 PROCEDURE — 99233 SBSQ HOSP IP/OBS HIGH 50: CPT | Performed by: HOSPITALIST

## 2022-06-14 RX ORDER — NICOTINE 21 MG/24HR
1 PATCH, TRANSDERMAL 24 HOURS TRANSDERMAL
Status: DISCONTINUED | OUTPATIENT
Start: 2022-06-14 | End: 2022-06-17

## 2022-06-14 RX ORDER — LEVOTHYROXINE SODIUM 0.2 MG/1
TABLET ORAL
COMMUNITY
End: 2022-06-17 | Stop reason: HOSPADM

## 2022-06-14 RX ADMIN — LEVOTHYROXINE SODIUM 200 MCG: 0.1 TABLET ORAL at 06:13

## 2022-06-14 RX ADMIN — SENNOSIDES AND DOCUSATE SODIUM 2 TABLET: 50; 8.6 TABLET ORAL at 08:12

## 2022-06-14 RX ADMIN — INSULIN DETEMIR 20 UNITS: 100 INJECTION, SOLUTION SUBCUTANEOUS at 21:23

## 2022-06-14 RX ADMIN — ACETAMINOPHEN 325MG 650 MG: 325 TABLET ORAL at 04:06

## 2022-06-14 RX ADMIN — ROSUVASTATIN 40 MG: 20 TABLET, FILM COATED ORAL at 21:19

## 2022-06-14 RX ADMIN — INSULIN LISPRO 6 UNITS: 100 INJECTION, SOLUTION INTRAVENOUS; SUBCUTANEOUS at 11:35

## 2022-06-14 RX ADMIN — GABAPENTIN 100 MG: 100 CAPSULE ORAL at 08:13

## 2022-06-14 RX ADMIN — RIVAROXABAN 2.5 MG: 2.5 TABLET, FILM COATED ORAL at 08:13

## 2022-06-14 RX ADMIN — Medication 1 PATCH: at 10:42

## 2022-06-14 RX ADMIN — ASPIRIN 81 MG CHEWABLE TABLET 81 MG: 81 TABLET CHEWABLE at 10:40

## 2022-06-14 RX ADMIN — Medication 10 ML: at 21:20

## 2022-06-14 RX ADMIN — METOPROLOL SUCCINATE 25 MG: 25 TABLET, EXTENDED RELEASE ORAL at 08:12

## 2022-06-14 RX ADMIN — Medication 10 ML: at 08:13

## 2022-06-14 RX ADMIN — INSULIN LISPRO 7 UNITS: 100 INJECTION, SOLUTION INTRAVENOUS; SUBCUTANEOUS at 17:24

## 2022-06-14 RX ADMIN — INSULIN LISPRO 4 UNITS: 100 INJECTION, SOLUTION INTRAVENOUS; SUBCUTANEOUS at 08:12

## 2022-06-14 RX ADMIN — LEVOTHYROXINE SODIUM 25 MCG: 25 TABLET ORAL at 06:13

## 2022-06-14 RX ADMIN — GABAPENTIN 100 MG: 100 CAPSULE ORAL at 16:04

## 2022-06-14 RX ADMIN — CLOPIDOGREL BISULFATE 75 MG: 75 TABLET ORAL at 08:13

## 2022-06-14 RX ADMIN — GABAPENTIN 100 MG: 100 CAPSULE ORAL at 21:19

## 2022-06-14 NOTE — NURSING NOTE
Woc consulted for: Possible DTI on top of fourth toe great toe abrasion    Wound/ Skin Assessment: Patient's left foot on the great toe there is about a 1.5 cm x 1 cm dry yellow fibrin abrasion.  Patient said he scuffed himself on the dock.  He fell on his knee and chest distal to the knee is a probably 3 cm x 2 cm scab and is very superficial and healing.  Also patient has a small purple line on the fourth toe right by the nailbed.  In the light it was pink in the darkness it was purple doubt this is not a DTI.  Patient has peripheral artery disease and may be the start of that.  Very unlikely that it will turn into anything at all.    Recommendations/ Intervention performed: We have everything open to air and clean from infection.    Head to toe Ax performed.    Additional skin issues: None noted patient talked about wound care.  It Betadine we will keep this area to dry and prolong healing so open areas past.    Skin interventions in place.    Pressure Injury Protocol (initiate for Lee Score of 18 or less):   *Maintain good skin care, keep dry, turn q 2 hr, keep heels elevated and offloaded with heel boots.    *Apply z-guard to sacrococcygeal area/ perineal area BID or daily and PRN per incontinent episodes.  *Follow C.A.R.E protocol if medical devices (Bipap, dockery, Ng tube, etc) are being used.     Specialty bed: No    Woc will and off.    Please contact with questions or concerns.

## 2022-06-14 NOTE — CONSULTS
Mr. Pandey gave permission for diabetes education. He reports he has an 18 year history of Type 2 diabetes. His current A1c is 9.8%. He states he takes Novolog 70/30 and metformin for glycemic control. He reports he checks his BG when he feels it's too low or too high. He states it will be in the 250s and has had one at 50 several months ago.   Discussed and taught about type 2 diabetes self-management, risk factors, and importance of blood glucose control to reduce complications. Target blood glucose readings and A1c goals per ADA were reviewed. Reviewed with patient current A1c and discussed its significance. Signs, symptoms and treatment of hyperglycemia and hypoglycemia were discussed. Lifestyle changes such as physical activity with MD approval and healthy eating were encouraged.   Encouraged him to check blood sugar prior to administering insulin BID, fasting and 2 hours post prandial.To keep record of blood glucose readings to take to follow up appointment with PCP.   Patient has been scheduled for outpatient diabetes education follow up visit on 6/21/22 at 10:30 AM.  Outpatient staff will provide reminder call prior to appointment. Patient was given reminder card with date and time of appointment and our contact information.  Thank you for this referral.

## 2022-06-14 NOTE — PLAN OF CARE
Goal Outcome Evaluation:  Plan of Care Reviewed With: patient, daughter        Progress:  (eval)       SLP evaluation completed. Will sign-off SLC difficulties. Please see note for further details and recommendations.

## 2022-06-14 NOTE — PROGRESS NOTES
Stroke Progress Note       Chief Complaint: Left Arm Weakness     Subjective    Subjective     Subjective:  Patient was sitting in bed, eating breakfast in no acute distress. He states that he continues to have tingling and a numb sensation in left arm. No acute events overnight, blood pressure remains stable. No neurological changes overnight.     Review of Systems   Constitutional: Negative for activity change and fatigue.   HENT: Negative.    Eyes: Negative.    Respiratory: Positive for cough.    Cardiovascular: Negative.    Gastrointestinal: Negative.    Musculoskeletal: Positive for arthralgias and back pain.   Skin: Negative.    Neurological: Positive for weakness and numbness. Negative for tremors, facial asymmetry, speech difficulty and headaches.            Objective    Objective      Temp:  [97.4 °F (36.3 °C)-98.3 °F (36.8 °C)] 97.4 °F (36.3 °C)  Heart Rate:  [50-94] 59  Resp:  [16-20] 20  BP: (129-186)/() 147/75        Neurological Exam  Mental Status  Awake and alert. Oriented to person, place, time and situation. Oriented to person, place, and time. Recent and remote memory are intact. Speech is normal. Language is fluent with no aphasia. Attention and concentration are normal. Fund of knowledge is appropriate for level of education.    Cranial Nerves  CN II: Visual fields full to confrontation.  CN III, IV, VI: Extraocular movements intact bilaterally. Pupils equal round and reactive to light bilaterally.  CN V: Facial sensation is normal.  CN VII: Full and symmetric facial movement.  CN VIII: Hearing intact bilaterally .  CN IX, X: Palate elevates symmetrically  CN XI: Shoulder shrug strength is normal.  CN XII: Tongue midline without atrophy or fasciculations.    Motor  Normal muscle bulk throughout. No fasciculations present. Normal muscle tone. No abnormal involuntary movements. Left pronator drift.  RUE 5/5  LUE 4+/5  RLE 5/5  LLE 5/5 .    Sensory  Light touch is normal in upper and lower  extremities. Sensation: Patient reports mild tingling in 4th and 5th digit on Left hand. .     Coordination  Right: Finger-to-nose normal.Left: Finger-to-nose normal.    Gait    Not observed .      Physical Exam  Vitals and nursing note reviewed.   Constitutional:       General: He is awake. He is not in acute distress.     Appearance: Normal appearance.   HENT:      Head: Normocephalic and atraumatic.      Nose: Nose normal.      Mouth/Throat:      Mouth: Mucous membranes are moist.      Pharynx: Oropharynx is clear.   Eyes:      Extraocular Movements: Extraocular movements intact.      Pupils: Pupils are equal, round, and reactive to light.   Cardiovascular:      Rate and Rhythm: Normal rate and regular rhythm.      Pulses: Normal pulses.   Pulmonary:      Effort: Pulmonary effort is normal. No respiratory distress.   Abdominal:      General: Abdomen is flat.   Musculoskeletal:         General: No swelling or tenderness.      Cervical back: Normal range of motion.   Skin:     General: Skin is warm and dry.   Neurological:      Mental Status: He is alert and oriented to person, place, and time.      Cranial Nerves: No cranial nerve deficit.      Sensory: Sensory deficit present.      Motor: Weakness present.      Coordination: Coordination normal.   Psychiatric:         Mood and Affect: Mood normal.         Speech: Speech normal.         Behavior: Behavior normal.         Results Review:      I reviewed the patient's new clinical results.    Glucose 210  Na 134  HgA1C 9.8    H&H 13.9 41.6  Platelets 236  ALT 21  AST 35    MRI of brain reveals right hemispheric strokes, no evidence of hemorrhage.     Results for orders placed during the hospital encounter of 06/13/22    Duplex Carotid Ultrasound CAR    Interpretation Summary  · Right internal carotid artery stenosis of >70%.  · Left internal carotid artery stenosis of >70%.  · There is antegrade flow in the vertebral arteries bilaterally.  · When compared to  the previous study performed in October 2020, the right ICA stenosis is increased and the left ICA stenosis is stable.    Results for orders placed during the hospital encounter of 06/13/22    Adult Transthoracic Echo Complete W/ Cont if Necessary Per Protocol (With Agitated Saline)    Interpretation Summary  · Left ventricular ejection fraction appears to be 56 - 60%. Left ventricular systolic function is normal.  · Left ventricular wall thickness is consistent with mild to moderate concentric hypertrophy.  · There is moderate calcification of the aortic valve.  · Mild aortic valve regurgitation is present.  · Mild mitral annular calcification is present.  · Mild mitral valve regurgitation is present.  · Mild tricuspid valve regurgitation is present.  - LA Cavity normal size no evidence of PFO           Assessment/Plan     Assessment/Plan:       Laron Pandey is a 61 year old male with known medical history of coronary artery disease with placement of 2 cardiac stents, hypertension, hyperlipidemia, tobacco abuse (smokes 1/2 ppd), PAD, thyroid disease, arthritis, back pain, claudication, bilateral CEA (in 2009 by Dr. Myers), diabetes, and lumbar stenosis who presents to [facility] today for evaluation of left arm numbness and discoordination that started at 1 AM on Sunday morning accompanied by syncopal episode. Patient is not a candidate for IV TNK d/t Xarelto with compliance.  Patient is not a candidate for emergent endovascular intervention d/t LKW >24H and no LVO on MRA.  Patient was loaded Plavix 300mg, ASA 81mg, and will contiune low dose Xarelto. Neuro intervention   has been notified and will review advanced imaging for possible intervention r/t symptomatic right ICA stenosis..     1. Focal acute cortical infarct in the right pre and postcentral gyri near the vertex, as well as a small area of right frontal lobe  -MRI brain revealed acute cortical infarct, mostly likely associated withl  carotid stenosis.   - MRA head/neck concerning for 50-70% atherosclerotic narrowing of the bilateral ICA.   -Carotid duplex confirms greater than 70% bilaterally   - MRA plaque protocol does not reveal ruptured plaque.   - TIA/Ischemic Stroke Order set in place  - Activity as tolerated, PT/OT to evaluate  -TTE with bubble study  - Continue home Xarelto  - Continue with Plavix 75 mg and ASA 81 mg   - Increase  home Crestor 40 mg  - Dr. Flores to see will plan for intervention on 6/15 or 6/16    2. Tobacco abuse  -Current 1/2 ppd smoker  - Educated the patient on the importance of tobacco cessation, he expresses his willingness to quit.   - Nicotine patch ordered.     3. Hypertension  - Goal -180, to avoid hypoperfusion and further strokes  -Cardene gtt for SBP greater than 180    4. Hyperlipidemia  - LDL on admission 123, goal less than 70  -On zetia 10mg at home will need to resume after discharge as we do not carry it on our formulary.   - Discussed dietary changes and increase in physical activity.   - Continue Crestor 40 mg    5. DM Type II   -HgBA1c 9.8 on this admission   -Goal HgBA1c less than 7.0  -Management per hospitalist   -Maintain euglycemia, goal less than 140.     Plan of care discussed with patient and primary team. Patient educated on risk factor management, all questions are answered. Will await Dr. Flores's recommendation regarding stents versus CTA.     CARLI Lazcano  06/14/22  08:48 EDT

## 2022-06-14 NOTE — THERAPY DISCHARGE NOTE
Patient Name: Laron Pandey  : 1960    MRN: [id number]                              Today's Date: 2022       Admit Date: 2022    Visit Dx:     ICD-10-CM ICD-9-CM   1. Left arm weakness  R29.898 729.89   2. Cerebrovascular accident (CVA), unspecified mechanism ([facility])  I63.9 434.91     Patient Active Problem List   Diagnosis   • Uncontrolled type 2 diabetes mellitus with hyperglycemia, with long-term current use of insulin ([facility])   • Hyperlipidemia   • Hypothyroidism, adult   • Coronary artery disease involving coronary bypass graft of native heart without angina pectoris   • Erectile dysfunction   • Hypertension   • Lumbar stenosis with neurogenic claudication   • Lumbar postlaminectomy syndrome   • History of lumbar surgery   • PAD (peripheral artery disease) ([facility])   • Current every day smoker   • Tobacco abuse counseling   • Claudication of both lower extremities ([facility])   • Abnormal stress test   • Left arm weakness   • Tobacco abuse     Past Medical History:   Diagnosis Date   • Arthritis    • Back pain    • Claudication ([facility])    • Coronary artery disease    • Diabetes ([facility])    • ED (erectile dysfunction)    • Heart disease    • Hyperlipidemia    • Hypertension    • Lumbar post-laminectomy syndrome    • Lumbar stenosis with neurogenic claudication    • PAD (peripheral artery disease) ([facility])    • Thyroid disease    • Thyroid disease      Past Surgical History:   Procedure Laterality Date   • BACK SURGERY     • CARDIAC CATHETERIZATION N/A 2020    Procedure: Left Heart Cath;  Surgeon: Mychal Block MD;  Location:  [facility] INVASIVE LOC[facility];  Service: Cardiology;  Laterality: N/A;   • CARDIAC CATHETERIZATION N/A 12/3/2020    Procedure: Peripheral angiography: left iliac artery and left SFA intervention;  Surgeon: Mychal Block MD;  Location:  [facility]ATH INVASIVE LOCATION;  Service: Cardiovascular;  Laterality: N/A;  about 2 weeks.    • CARDIAC SURGERY     • CAROTID ENDARTERECTOMY     •  CORONARY ARTERY BYPASS GRAFT     • INTERVENTIONAL RADIOLOGY PROCEDURE N/A 11/11/2020    Procedure: Abdominal Aortogram;  Surgeon: Mychal Block MD;  Location: [facility] CATH INVASIVE LOCATION;  Service: Cardiology;  Laterality: N/A;      General Information     Row Name 06/14/22 1155          Physical Therapy Time and Intention    Document Type discharge evaluation/summary  -LO     Mode of Treatment individual therapy;physical therapy  -LO     Row Name 06/14/22 1155          General Information    Patient Profile Reviewed yes  -LO     Prior Level of Function independent:;all household mobility;gait;transfer;grooming;dressing;bathing;using stairs;driving;cleaning;cooking;home management;bed mobility;community mobility  -LO     Existing Precautions/Restrictions no known precautions/restrictions  -LO     Barriers to Rehab medically complex  -LO     Row Name 06/14/22 1155          Living Environment    People in Home significant other  -LO     Row Name 06/14/22 1155          Home Main Entrance    Number of Stairs, Main Entrance two  -LO     Stair Railings, Main Entrance none  -LO     Row Name 06/14/22 1155          Stairs Within Home, Primary    Number of Stairs, Within Home, Primary none  -LO     Row Name 06/14/22 1155          Cognition    Orientation Status (Cognition) oriented x 3  -LO     Row Name 06/14/22 1155          Safety Issues, Functional Mobility    Safety Issues Affecting Function (Mobility) other (see comments)  none noted  -LO     Impairments Affecting Function (Mobility) strength  -LO           User Key  (r) = Recorded By, (t) = Taken By, (c) = Cosigned By    Initials Name Provider Type    LO Salud Mustafa PT Physical Therapist               Mobility     Row Name 06/14/22 1306          Bed Mobility    Comment, (Bed Mobility) UIC  -LO     Row Name 06/14/22 1306          Transfers    Comment, (Transfers) recliner>stand>recliner; supervision  -LO     Row Name 06/14/22 1306          Sit-Stand Transfer     Sit-Stand [place] (Transfers) supervision  -LO     Row Name 06/14/22 1306          Gait/Stairs (Locomotion)    [place] Level (Gait) supervision  -LO     Assistive Device (Gait) other (see comments)  no AD  -LO     Distance in Feet (Gait) 400  -LO     Deviations/Abnormal Patterns (Gait) left sided deviations  -LO     Left Sided Gait Deviations --  mild reduction in DF during swing on L  -LO     Comment, (Gait/Stairs) Ambulates without AD x 400' with supervision and without loss of balance.  -LO           User Key  (r) = Recorded By, (t) = Taken By, (c) = Cosigned By    Initials Name Provider Type    Salud Sylvester, PT Physical Therapist               Obj/Interventions     Row Name 06/14/22 1309          Range of Motion Comprehensive    General Range of Motion bilateral lower extremity ROM WNL  -LO     Row Name 06/14/22 1309          Strength Comprehensive (MMT)    General Manual Muscle Testing (MMT) Assessment lower extremity strength deficits identified  -LO     Comment, General Manual Muscle Testing (MMT) Assessment L hip, L DF 3+/5, L great toe flexion 2-/5; RLE 5/5  -LO     Row Name 06/14/22 1309          Balance    Balance Assessment sitting static balance;sitting dynamic balance;standing static balance;standing dynamic balance  -LO     Static Sitting Balance independent  -LO     Dynamic Sitting Balance independent  -LO     Position, Sitting Balance unsupported;sitting in chair  -LO     Static Standing Balance supervision  -LO     Dynamic Standing Balance supervision  -LO     Position/Device Used, Standing Balance unsupported  -LO     Comment, Balance supervision in standing without AD  -LO     Row Name 06/14/22 1309          Sensory Assessment (Somatosensory)    Sensory Assessment (Somatosensory) other (see comments)  reduced sensation on L in L5 nerve distribution  -LO           User Key  (r) = Recorded By, (t) = Taken By, (c) = Cosigned By    Initials Name Provider Type    Salud Sylvester, PT  Physical Therapist               Goals/Plan    No documentation.                Clinical Impression     Row Name 06/14/22 1311          Pain Scale: FACES Pre/Post-Treatment    Pain: FACES Scale, Pretreatment 0-->no hurt  -LO     Posttreatment Pain Rating 0-->no hurt  -LO     Row Name 06/14/22 1311          Plan of Care Review    Plan of Care Reviewed With patient  -LO     Progress no change  -LO     Outcome Evaluation PT eval completed. Patient alert and oriented x4. Presents with mild deficits in sensation and strength LLE ( both in L5 Nerve distribution). Patient reports these sx have been present before episode leading to this current hospitalization. He also reports a chronic hx of low back issues which may be related. Patient demonstrates supervision for all transfers and ambulation x 400' without AD. No loss of balance noted. All Vss throughout. As patient appears back to baseline, no further skilled IP PT warranted at this time. Recommend home at [place].  -LO     Row Name 06/14/22 1311          Therapy Assessment/Plan (PT)    Patient/Family Therapy Goals Statement (PT) return home  -LO     Rehab Potential (PT) good, to achieve stated therapy goals  -LO     Criteria for Skilled Interventions Met (PT) yes;meets criteria  -LO     Therapy Frequency (PT) daily  -LO     Row Name 06/14/22 1311          Vital Signs    Pre Systolic BP Rehab 135  -LO     Pre Treatment Diastolic BP 75  -LO     Post Systolic BP Rehab 145  -LO     Post Treatment Diastolic BP 73  -LO     Pretreatment Heart Rate (beats/min) 55  -LO     Posttreatment Heart Rate (beats/min) 70  -LO     Pre SpO2 (%) 99  -LO     O2 Delivery Pre Treatment room air  -LO     O2 Delivery Intra Treatment room air  -LO     O2 Delivery Post Treatment room air  -LO     Pre Patient Position Sitting  -LO     Intra Patient Position Standing  -LO     Post Patient Position Sitting  -LO     Row Name 06/14/22 1311          Positioning and Restraints    Pre-Treatment Position  sitting in chair/recliner  -LO     Post Treatment Position chair  -LO     In Chair notified nsg;reclined;call light within reach;encouraged to call for assist;exit alarm on;waffle cushion  -           User Key  (r) = Recorded By, (t) = Taken By, (c) = Cosigned By    Initials Name Provider Type    Salud Sylvester, PT Physical Therapist               Outcome Measures     Row Name 06/14/22 1316 06/14/22 0800       How much help from another person do you currently need...    Turning from your back to your side while in flat bed without using bedrails? 4  -LO 4  -LC    Moving from lying on back to sitting on the side of a flat bed without bedrails? 4  -LO 4  -LC    Moving to and from a bed to a chair (including a wheelchair)? 4  -LO 4  -LC    Standing up from a chair using your arms (e.g., wheelchair, bedside chair)? 4  -LO 4  -LC    Climbing 3-5 steps with a railing? 4  -LO 4  -LC    To walk in hospital room? 4  -LO 3  -LC    AM-PAC 6 Clicks Score (PT) 24  -LO 23  -LC    Highest level of mobility 8 --> Walked 250 feet or more  -LO 7 --> Walked 25 feet or more  -    Row Name 06/14/22 1316          Modified [place] Scale    Pre-Stroke Modified [place] Scale 0 - No Symptoms at all.  -LO     Modified [place] Scale 1 - No significant disability despite symptoms.  Able to carry out all usual duties and activities.  -     Row Name 06/14/22 1316          Functional Assessment    Outcome Measure Options AM-PAC 6 Clicks Basic Mobility (PT);Modified [place]  -           User Key  (r) = Recorded By, (t) = Taken By, (c) = Cosigned By    Initials Name Provider Type    Veena Luz RN Registered Nurse    Salud Sylvester, PT Physical Therapist              Physical Therapy Education                 Title: PT OT SLP Therapies (Done)     Topic: Physical Therapy (Done)     Point: Mobility training (Done)     Learning Progress Summary           Patient Acceptance, E, VU,NR by RON at 6/14/2022 1129    Comment: Patient education on  PT POC                   Point: Home exercise program (Done)     Learning Progress Summary           Patient Acceptance, E, VU,NR by  at 6/14/2022 1129    Comment: Patient education on PT POC                   Point: Body mechanics (Done)     Learning Progress Summary           Patient Acceptance, E, VU,NR by  at 6/14/2022 1129    Comment: Patient education on PT POC                   Point: Precautions (Done)     Learning Progress Summary           Patient Acceptance, E, VU,NR by  at 6/14/2022 1129    Comment: Patient education on PT POC                               User Key     Initials Effective Dates Name Provider Type Discipline     06/16/21 -  Salud Mustafa, PT Physical Therapist PT              PT Recommendation and Plan     Plan of Care Reviewed With: patient  Progress: no change  Outcome Evaluation: PT eval completed. Patient alert and oriented x4. Presents with mild deficits in sensation and strength LLE ( both in L5 Nerve distribution). Patient reports these sx have been present before episode leading to this current hospitalization. He also reports a chronic hx of low back issues which may be related. Patient demonstrates supervision for all transfers and ambulation x 400' without AD. No loss of balance noted. All Vss throughout. As patient appears back to baseline, no further skilled IP PT warranted at this time. Recommend home at [place].     Time Calculation:    PT Charges     Row Name 06/14/22 1125             Time Calculation    Start Time 1125  -LO      PT Received On 06/14/22  -LO      PT Goal Re-Cert Due Date 06/24/22  -LO              Timed Charges    77691 - PT Therapeutic Activity Minutes 12  -LO              Untimed Charges    PT Eval/Re-eval Minutes 38  -LO              Total Minutes    Timed Charges Total Minutes 12  -LO      Untimed Charges Total Minutes 38  -LO       Total Minutes 50  -LO            User Key  (r) = Recorded By, (t) = Taken By, (c) = Cosigned By    Initials Name  Provider Type    Salud Sylvester, PT Physical Therapist              Therapy Charges for Today     Code Description Service Date Service Provider Modifiers Qty    25271156717  PT THERAPEUTIC ACT EA 15 MIN 6/14/2022 Salud Mustafa, PT GP 1    02043126159 HC PT EVAL LOW COMPLEXITY 3 6/14/2022 Salud Mustafa, PT GP 1          PT G-Codes  Outcome Measure Options: AM-PAC 6 Clicks Basic Mobility (PT), Modified [place]  AM-PAC 6 Clicks Score (PT): 24  Modified [place] Scale: 1 - No significant disability despite symptoms.  Able to carry out all usual duties and activities.    PT Discharge Summary  Anticipated Discharge Disposition (PT): home    Salud Mustafa PT  6/14/2022

## 2022-06-14 NOTE — THERAPY EVALUATION
Patient Name: Laron Pandey  : 1960    MRN: [id number]                              Today's Date: 2022       Admit Date: 2022    Visit Dx:     ICD-10-CM ICD-9-CM   1. Left arm weakness  R29.898 729.89   2. Cerebrovascular accident (CVA), unspecified mechanism (Formerly [facility])  I63.9 434.91     Patient Active Problem List   Diagnosis   • Uncontrolled type 2 diabetes mellitus with hyperglycemia, with long-term current use of insulin (Formerly [facility])   • Hyperlipidemia   • Hypothyroidism, adult   • Coronary artery disease involving coronary bypass graft of native heart without angina pectoris   • Erectile dysfunction   • Hypertension   • Lumbar stenosis with neurogenic claudication   • Lumbar postlaminectomy syndrome   • History of lumbar surgery   • PAD (peripheral artery disease) (Formerly [facility])   • Current every day smoker   • Tobacco abuse counseling   • Claudication of both lower extremities (Formerly [facility])   • Abnormal stress test   • Left arm weakness   • Tobacco abuse     Past Medical History:   Diagnosis Date   • Arthritis    • Back pain    • Claudication (Formerly [facility])    • Coronary artery disease    • Diabetes (Formerly [facility])    • ED (erectile dysfunction)    • Heart disease    • Hyperlipidemia    • Hypertension    • Lumbar post-laminectomy syndrome    • Lumbar stenosis with neurogenic claudication    • PAD (peripheral artery disease) (Formerly [facility])    • Thyroid disease    • Thyroid disease      Past Surgical History:   Procedure Laterality Date   • BACK SURGERY     • CARDIAC CATHETERIZATION N/A 2020    Procedure: Left Heart Cath;  Surgeon: Mychal Block MD;  Location:  [facility] CATH INVASIVE LOCATION;  Service: Cardiology;  Laterality: N/A;   • CARDIAC CATHETERIZATION N/A 12/3/2020    Procedure: Peripheral angiography: left iliac artery and left SFA intervention;  Surgeon: Mychal Block MD;  Location:  [facility] CATH INVASIVE LOCATION;  Service: Cardiovascular;  Laterality: N/A;  about 2 weeks.    • CARDIAC SURGERY     • CAROTID ENDARTERECTOMY     •  CORONARY ARTERY BYPASS GRAFT     • INTERVENTIONAL RADIOLOGY PROCEDURE N/A 11/11/2020    Procedure: Abdominal Aortogram;  Surgeon: Mychal Block MD;  Location: [facility] CATH INVASIVE LOCATION;  Service: Cardiology;  Laterality: N/A;      General Information     Row Name 06/14/22 1155          Physical Therapy Time and Intention    Document Type discharge evaluation/summary  -LO     Mode of Treatment individual therapy;physical therapy  -LO     Row Name 06/14/22 1155          General Information    Patient Profile Reviewed yes  -LO     Prior Level of Function independent:;all household mobility;gait;transfer;grooming;dressing;bathing;using stairs;driving;cleaning;cooking;home management;bed mobility;community mobility  -LO     Existing Precautions/Restrictions no known precautions/restrictions  -LO     Barriers to Rehab medically complex  -LO     Row Name 06/14/22 1155          Living Environment    People in Home significant other  -LO     Row Name 06/14/22 1155          Home Main Entrance    Number of Stairs, Main Entrance two  -LO     Stair Railings, Main Entrance none  -LO     Row Name 06/14/22 1155          Stairs Within Home, Primary    Number of Stairs, Within Home, Primary none  -LO     Row Name 06/14/22 1155          Cognition    Orientation Status (Cognition) oriented x 3  -LO     Row Name 06/14/22 1155          Safety Issues, Functional Mobility    Safety Issues Affecting Function (Mobility) other (see comments)  none noted  -LO     Impairments Affecting Function (Mobility) strength  -LO           User Key  (r) = Recorded By, (t) = Taken By, (c) = Cosigned By    Initials Name Provider Type    LO Salud Mustafa PT Physical Therapist               Mobility     Row Name 06/14/22 1306          Bed Mobility    Comment, (Bed Mobility) UIC  -LO     Row Name 06/14/22 1306          Transfers    Comment, (Transfers) recliner>stand>recliner; supervision  -LO     Row Name 06/14/22 1306          Sit-Stand Transfer     Sit-Stand [place] (Transfers) supervision  -LO     Row Name 06/14/22 1306          Gait/Stairs (Locomotion)    [place] Level (Gait) supervision  -LO     Assistive Device (Gait) other (see comments)  no AD  -LO     Distance in Feet (Gait) 400  -LO     Deviations/Abnormal Patterns (Gait) left sided deviations  -LO     Left Sided Gait Deviations --  mild reduction in DF during swing on L  -LO     Comment, (Gait/Stairs) Ambulates without AD x 400' with supervision and without loss of balance.  -LO           User Key  (r) = Recorded By, (t) = Taken By, (c) = Cosigned By    Initials Name Provider Type    Salud Sylvester, PT Physical Therapist               Obj/Interventions     Row Name 06/14/22 1309          Range of Motion Comprehensive    General Range of Motion bilateral lower extremity ROM WNL  -LO     Row Name 06/14/22 1309          Strength Comprehensive (MMT)    General Manual Muscle Testing (MMT) Assessment lower extremity strength deficits identified  -LO     Comment, General Manual Muscle Testing (MMT) Assessment L hip, L DF 3+/5, L great toe flexion 2-/5; RLE 5/5  -LO     Row Name 06/14/22 1309          Balance    Balance Assessment sitting static balance;sitting dynamic balance;standing static balance;standing dynamic balance  -LO     Static Sitting Balance independent  -LO     Dynamic Sitting Balance independent  -LO     Position, Sitting Balance unsupported;sitting in chair  -LO     Static Standing Balance supervision  -LO     Dynamic Standing Balance supervision  -LO     Position/Device Used, Standing Balance unsupported  -LO     Comment, Balance supervision in standing without AD  -LO     Row Name 06/14/22 1309          Sensory Assessment (Somatosensory)    Sensory Assessment (Somatosensory) other (see comments)  reduced sensation on L in L5 nerve distribution  -LO           User Key  (r) = Recorded By, (t) = Taken By, (c) = Cosigned By    Initials Name Provider Type    Salud Sylvester, PT  Physical Therapist               Goals/Plan    No documentation.                Clinical Impression     Row Name 06/14/22 1311          Pain Scale: FACES Pre/Post-Treatment    Pain: FACES Scale, Pretreatment 0-->no hurt  -LO     Posttreatment Pain Rating 0-->no hurt  -LO     Row Name 06/14/22 1311          Plan of Care Review    Plan of Care Reviewed With patient  -LO     Progress no change  -LO     Outcome Evaluation PT eval completed. Patient alert and oriented x4. Presents with mild deficits in sensation and strength LLE ( both in L5 Nerve distribution). Patient reports these sx have been present before episode leading to this current hospitalization. He also reports a chronic hx of low back issues which may be related. Patient demonstrates supervision for all transfers and ambulation x 400' without AD. No loss of balance noted. All Vss throughout. As patient appears back to baseline, no further skilled IP PT warranted at this time. Recommend home at [place].  -LO     Row Name 06/14/22 1311          Therapy Assessment/Plan (PT)    Patient/Family Therapy Goals Statement (PT) return home  -LO     Rehab Potential (PT) good, to achieve stated therapy goals  -LO     Criteria for Skilled Interventions Met (PT) yes;meets criteria  -LO     Therapy Frequency (PT) daily  -LO     Row Name 06/14/22 1311          Vital Signs    Pre Systolic BP Rehab 135  -LO     Pre Treatment Diastolic BP 75  -LO     Post Systolic BP Rehab 145  -LO     Post Treatment Diastolic BP 73  -LO     Pretreatment Heart Rate (beats/min) 55  -LO     Posttreatment Heart Rate (beats/min) 70  -LO     Pre SpO2 (%) 99  -LO     O2 Delivery Pre Treatment room air  -LO     O2 Delivery Intra Treatment room air  -LO     O2 Delivery Post Treatment room air  -LO     Pre Patient Position Sitting  -LO     Intra Patient Position Standing  -LO     Post Patient Position Sitting  -LO     Row Name 06/14/22 1311          Positioning and Restraints    Pre-Treatment Position  sitting in chair/recliner  -LO     Post Treatment Position chair  -LO     In Chair notified nsg;reclined;call light within reach;encouraged to call for assist;exit alarm on;waffle cushion  -           User Key  (r) = Recorded By, (t) = Taken By, (c) = Cosigned By    Initials Name Provider Type    Salud Sylvester, PT Physical Therapist               Outcome Measures     Row Name 06/14/22 1316 06/14/22 0800       How much help from another person do you currently need...    Turning from your back to your side while in flat bed without using bedrails? 4  -LO 4  -LC    Moving from lying on back to sitting on the side of a flat bed without bedrails? 4  -LO 4  -LC    Moving to and from a bed to a chair (including a wheelchair)? 4  -LO 4  -LC    Standing up from a chair using your arms (e.g., wheelchair, bedside chair)? 4  -LO 4  -LC    Climbing 3-5 steps with a railing? 4  -LO 4  -LC    To walk in hospital room? 4  -LO 3  -LC    AM-PAC 6 Clicks Score (PT) 24  -LO 23  -LC    Highest level of mobility 8 --> Walked 250 feet or more  -LO 7 --> Walked 25 feet or more  -    Row Name 06/14/22 1316          Modified [place] Scale    Pre-Stroke Modified [place] Scale 0 - No Symptoms at all.  -LO     Modified [place] Scale 1 - No significant disability despite symptoms.  Able to carry out all usual duties and activities.  -     Row Name 06/14/22 1316          Functional Assessment    Outcome Measure Options AM-PAC 6 Clicks Basic Mobility (PT);Modified [place]  -           User Key  (r) = Recorded By, (t) = Taken By, (c) = Cosigned By    Initials Name Provider Type    Veena Luz RN Registered Nurse    Salud Sylvester, PT Physical Therapist                             Physical Therapy Education                 Title: PT OT SLP Therapies (Done)     Topic: Physical Therapy (Done)     Point: Mobility training (Done)     Learning Progress Summary           Patient Acceptance, E, VU,NR by RON at 6/14/2022 1121    Comment:  Patient education on PT POC                   Point: Home exercise program (Done)     Learning Progress Summary           Patient Acceptance, E, VU,NR by  at 6/14/2022 1129    Comment: Patient education on PT POC                   Point: Body mechanics (Done)     Learning Progress Summary           Patient Acceptance, E, VU,NR by  at 6/14/2022 1129    Comment: Patient education on PT POC                   Point: Precautions (Done)     Learning Progress Summary           Patient Acceptance, E, VU,NR by  at 6/14/2022 1129    Comment: Patient education on PT POC                               User Key     Initials Effective Dates Name Provider Type Discipline     06/16/21 -  Salud Mustafa, PT Physical Therapist PT              PT Recommendation and Plan     Plan of Care Reviewed With: patient  Progress: no change  Outcome Evaluation: PT eval completed. Patient alert and oriented x4. Presents with mild deficits in sensation and strength LLE ( both in L5 Nerve distribution). Patient reports these sx have been present before episode leading to this current hospitalization. He also reports a chronic hx of low back issues which may be related. Patient demonstrates supervision for all transfers and ambulation x 400' without AD. No loss of balance noted. All Vss throughout. As patient appears back to baseline, no further skilled IP PT warranted at this time. Recommend home at [place].     Time Calculation:    PT Charges     Row Name 06/14/22 1125             Time Calculation    Start Time 1125  -LO      PT Received On 06/14/22  -LO      PT Goal Re-Cert Due Date 06/24/22  -LO              Timed Charges    70534 - PT Therapeutic Activity Minutes 12  -LO              Untimed Charges    PT Eval/Re-eval Minutes 38  -LO              Total Minutes    Timed Charges Total Minutes 12  -LO      Untimed Charges Total Minutes 38  -LO       Total Minutes 50  -LO            User Key  (r) = Recorded By, (t) = Taken By, (c) = Cosigned By     Initials Name Provider Type    Salud Sylvester, PT Physical Therapist              Therapy Charges for Today     Code Description Service Date Service Provider Modifiers Qty    41091298872 HC PT THERAPEUTIC ACT EA 15 MIN 6/14/2022 Salud Mustafa, PT GP 1    37262331897 HC PT EVAL LOW COMPLEXITY 3 6/14/2022 Salud Mustafa, PT GP 1          PT G-Codes  Outcome Measure Options: AM-PAC 6 Clicks Basic Mobility (PT), Modified [place]  AM-PAC 6 Clicks Score (PT): 24  Modified [place] Scale: 1 - No significant disability despite symptoms.  Able to carry out all usual duties and activities.    Salud Mustafa, PT  6/14/2022

## 2022-06-14 NOTE — PROGRESS NOTES
[facility] Medicine Services  PROGRESS NOTE    Patient Name: Laron Pandey  : 1960  MRN: [id number]    Date of Admission: 2022  Primary Care Physician: Nacho Omer MD    Subjective   Subjective     CC:  Syncope/left arm weakness    HPI:  Patient sitting up in bed with family member at bedside. Patient states he is having ongoing left arm weakness but it has improved since admission. He states he has left shoulder pain as well that is making range of motion hard. Left hand  strength is good.    ROS:  Gen- No fevers, chills  CV- No chest pain, palpitations  Resp- No cough, dyspnea  GI- No N/V/D, abd pain  +left shoulder pain     Objective   Objective     Vital Signs:   Temp:  [97.4 °F (36.3 °C)-98.3 °F (36.8 °C)] 98 °F (36.7 °C)  Heart Rate:  [50-73] 54  Resp:  [18-20] 18  BP: (129-169)/(75-95) 147/75  Flow (L/min):  [2] 2     Physical Exam:  Constitutional: No acute distress, awake, alert  HENT: NCAT, mucous membranes moist  Respiratory: Clear to auscultation bilaterally, respiratory effort normal   Cardiovascular: RRR, no murmurs, rubs, or gallops  Gastrointestinal: Positive bowel sounds, soft, nontender, nondistended  Musculoskeletal: No bilateral ankle edema, left shoulder decreased range of motion and pain with flexion/extension  Psychiatric: Appropriate affect, cooperative  Neurologic: Oriented x 3, strength 3/5 in LUE, Cranial Nerves grossly intact to confrontation, speech clear  Skin: No rashes    Results Reviewed:  LAB RESULTS:      Lab 22  0608 22  1114 22  1102 22  1101   WBC 7.41 10.74  --   --    HEMOGLOBIN 13.9 14.5  --   --    HEMOGLOBIN, POC  --   --  15.6  --    HEMATOCRIT 41.6 42.6  --   --    HEMATOCRIT POC  --   --  46  --    PLATELETS 236 277  --   --    NEUTROS ABS 4.04 8.01*  --   --    IMMATURE GRANS (ABS) 0.02 0.04  --   --    LYMPHS ABS 2.36 1.89  --   --    MONOS ABS 0.69 0.64  --   --    EOS ABS 0.25 0.10  --    --    MCV 97.9* 95.1  --   --    PROTIME  --   --   --  12.8   APTT  --  34.5*  --   --          Lab 06/14/22  0608 06/13/22  1102   SODIUM 134*  --    POTASSIUM 4.4  --    CHLORIDE 98  --    CO2 28.0  --    ANION GAP 8.0  --    BUN 15  --    CREATININE 0.93 0.90   EGFR 93.4 97.2   GLUCOSE 259*  --    CALCIUM 9.1  --    HEMOGLOBIN A1C 9.80*  --          Lab 06/13/22  1114   ALT (SGPT) 21   AST (SGOT) 35         Lab 06/13/22  1114 06/13/22  1101   TROPONIN T <0.010  --    PROTIME  --  12.8   INR  --  1.1         Lab 06/14/22  0608   CHOLESTEROL 199   LDL CHOL 123*   HDL CHOL 38*   TRIGLYCERIDES 216*             Brief Urine Lab Results     None          Microbiology Results Abnormal     Procedure Component Value - Date/Time    COVID PRE-OP / PRE-PROCEDURE SCREENING ORDER (NO ISOLATION) - Swab, Nasopharynx [[id number]]  (Normal) Collected: 06/13/22 1621    Lab Status: Final result Specimen: Swab from Nasopharynx Updated: 06/13/22 1649    Narrative:      The following orders were created for panel order COVID PRE-OP / PRE-PROCEDURE SCREENING ORDER (NO ISOLATION) - Swab, Nasopharynx.  Procedure                               Abnormality         Status                     ---------                               -----------         ------                     COVID-19, ABBOTT IN-HOUS...[[id number]]  Normal              Final result                 Please view results for these tests on the individual orders.    COVID-19, ABBOTT IN-HOUSE,NASAL Swab (NO TRANSPORT MEDIA) 2 HR TAT - Swab, Nasopharynx [[id number]]  (Normal) Collected: 06/13/22 1621    Lab Status: Final result Specimen: Swab from Nasopharynx Updated: 06/13/22 1649     COVID19 Presumptive Negative    Narrative:      Fact sheet for providers: https://www.fda.gov/media/757035/download     Fact sheet for patients: https://www.fda.gov/media/191836/download    Test performed by PCR.  If inconsistent with clinical signs and symptoms patient should be tested with different  authorized molecular test.          Adult Transthoracic Echo Complete W/ Cont if Necessary Per Protocol (With Agitated Saline)    Result Date: 6/13/2022  · Left ventricular ejection fraction appears to be 56 - 60%. Left ventricular systolic function is normal. · Left ventricular wall thickness is consistent with mild to moderate concentric hypertrophy. · There is moderate calcification of the aortic valve. · Mild aortic valve regurgitation is present. · Mild mitral annular calcification is present. · Mild mitral valve regurgitation is present. · Mild tricuspid valve regurgitation is present.      MRI Angiogram Head Without Contrast    Result Date: 6/13/2022  DATE OF EXAM: 6/13/2022 12:17 PM  PROCEDURE: MRI BRAIN WO CONTRAST-, MRI ANGIOGRAM HEAD WO CONTRAST-, MRI ANGIOGRAM NECK WO CONTRAST-  INDICATIONS: Neuro deficit, acute, stroke suspected; R29.898-Other symptoms and signs involving the musculoskeletal system; I63.9-Cerebral infarction, unspecified  COMPARISON: CT earlier the same day  TECHNIQUE: Multiplanar multisequence MRI of the brain performed without IV contrast. Noncontrast time-of-flight 3-dimensional MR angiogram of the head and neck with three-dimensional maximum intensity projections postprocessed  FINDINGS: MRI brain: Small areas of cortical diffusion restriction are present along the right paracentral region near the vertex as well as in the right frontal lobe compatible with areas of acute infarct. There is no evidence of associated mass effect or hemorrhage. Minimal age-related changes are otherwise present with some typical T2 hyperintense periventricular white matter changes favored to relate to chronic small vessel ischemia. There is no evidence of hemorrhage, mass or mass effect. The ventricles are normal in size and configuration. The orbits are unremarkable and the paranasal sinuses are grossly clear. Intracranial arterial flow voids appear maintained.  MRA head: The carotid siphons  demonstrate expected flow related signal without evidence of atherosclerotic narrowing or aneurysmal dilatation. The anterior cerebral arteries are normal in course and caliber bilaterally. The right middle cerebral artery demonstrates no evidence of flow-limiting stenosis, large vessel occlusion or aneurysmal dilatation. The left middle cerebral artery is similarly normal in course and caliber. The vertebrobasilar system is patent. The posterior cerebral arteries are normal in course and caliber bilaterally.  MRA NECK: Limited time-of-flight noncontrast MR angiogram of the neck demonstrates focal atherosclerotic narrowing of the right ICA origin, likely 50-70% by NASCET criteria. Atherosclerotic changes also present involving the left ICA origin with similar moderate narrowing, and addition to a focal area of eccentric, somewhat linear signal concerning for possible dissection. The vertebral arteries are normal in course and caliber bilaterally.      Impression: Focal areas of acute cortical infarct are present involving the right pre and postcentral gyri near the vertex, as well as involving a small area of right frontal lobe cortex. There is no evidence of associated significant mass effect or hemorrhage.  Essentially normal MR angiogram of the head with no evidence of flow-limiting stenosis, large vessel occlusion or aneurysmal dilatation.  MR angiogram of the neck is somewhat limited by noncontrast technique. There is apparent 50-70% atherosclerotic narrowing of the right ICA origin. Similar moderate narrowing is present on the left, with additional focal area of luminal irregularity concerning for either dissection or possible irregular eccentric plaque. CT angiogram would be useful to more specifically evaluate, as indicated.  This report was finalized on 6/13/2022 1:04 PM by Laron Retana.      MRI Angiogram Neck Without Contrast    Result Date: 6/13/2022  DATE OF EXAM: 6/13/2022 12:17 PM  PROCEDURE: MRI  BRAIN WO CONTRAST-, MRI ANGIOGRAM HEAD WO CONTRAST-, MRI ANGIOGRAM NECK WO CONTRAST-  INDICATIONS: Neuro deficit, acute, stroke suspected; R29.898-Other symptoms and signs involving the musculoskeletal system; I63.9-Cerebral infarction, unspecified  COMPARISON: CT earlier the same day  TECHNIQUE: Multiplanar multisequence MRI of the brain performed without IV contrast. Noncontrast time-of-flight 3-dimensional MR angiogram of the head and neck with three-dimensional maximum intensity projections postprocessed  FINDINGS: MRI brain: Small areas of cortical diffusion restriction are present along the right paracentral region near the vertex as well as in the right frontal lobe compatible with areas of acute infarct. There is no evidence of associated mass effect or hemorrhage. Minimal age-related changes are otherwise present with some typical T2 hyperintense periventricular white matter changes favored to relate to chronic small vessel ischemia. There is no evidence of hemorrhage, mass or mass effect. The ventricles are normal in size and configuration. The orbits are unremarkable and the paranasal sinuses are grossly clear. Intracranial arterial flow voids appear maintained.  MRA head: The carotid siphons demonstrate expected flow related signal without evidence of atherosclerotic narrowing or aneurysmal dilatation. The anterior cerebral arteries are normal in course and caliber bilaterally. The right middle cerebral artery demonstrates no evidence of flow-limiting stenosis, large vessel occlusion or aneurysmal dilatation. The left middle cerebral artery is similarly normal in course and caliber. The vertebrobasilar system is patent. The posterior cerebral arteries are normal in course and caliber bilaterally.  MRA NECK: Limited time-of-flight noncontrast MR angiogram of the neck demonstrates focal atherosclerotic narrowing of the right ICA origin, likely 50-70% by NASCET criteria. Atherosclerotic changes also present  involving the left ICA origin with similar moderate narrowing, and addition to a focal area of eccentric, somewhat linear signal concerning for possible dissection. The vertebral arteries are normal in course and caliber bilaterally.      Impression: Focal areas of acute cortical infarct are present involving the right pre and postcentral gyri near the vertex, as well as involving a small area of right frontal lobe cortex. There is no evidence of associated significant mass effect or hemorrhage.  Essentially normal MR angiogram of the head with no evidence of flow-limiting stenosis, large vessel occlusion or aneurysmal dilatation.  MR angiogram of the neck is somewhat limited by noncontrast technique. There is apparent 50-70% atherosclerotic narrowing of the right ICA origin. Similar moderate narrowing is present on the left, with additional focal area of luminal irregularity concerning for either dissection or possible irregular eccentric plaque. CT angiogram would be useful to more specifically evaluate, as indicated.  This report was finalized on 6/13/2022 1:04 PM by Laron Retana.      MRI Brain Without Contrast    Result Date: 6/13/2022  DATE OF EXAM: 6/13/2022 12:17 PM  PROCEDURE: MRI BRAIN WO CONTRAST-, MRI ANGIOGRAM HEAD WO CONTRAST-, MRI ANGIOGRAM NECK WO CONTRAST-  INDICATIONS: Neuro deficit, acute, stroke suspected; R29.898-Other symptoms and signs involving the musculoskeletal system; I63.9-Cerebral infarction, unspecified  COMPARISON: CT earlier the same day  TECHNIQUE: Multiplanar multisequence MRI of the brain performed without IV contrast. Noncontrast time-of-flight 3-dimensional MR angiogram of the head and neck with three-dimensional maximum intensity projections postprocessed  FINDINGS: MRI brain: Small areas of cortical diffusion restriction are present along the right paracentral region near the vertex as well as in the right frontal lobe compatible with areas of acute infarct. There is no  evidence of associated mass effect or hemorrhage. Minimal age-related changes are otherwise present with some typical T2 hyperintense periventricular white matter changes favored to relate to chronic small vessel ischemia. There is no evidence of hemorrhage, mass or mass effect. The ventricles are normal in size and configuration. The orbits are unremarkable and the paranasal sinuses are grossly clear. Intracranial arterial flow voids appear maintained.  MRA head: The carotid siphons demonstrate expected flow related signal without evidence of atherosclerotic narrowing or aneurysmal dilatation. The anterior cerebral arteries are normal in course and caliber bilaterally. The right middle cerebral artery demonstrates no evidence of flow-limiting stenosis, large vessel occlusion or aneurysmal dilatation. The left middle cerebral artery is similarly normal in course and caliber. The vertebrobasilar system is patent. The posterior cerebral arteries are normal in course and caliber bilaterally.  MRA NECK: Limited time-of-flight noncontrast MR angiogram of the neck demonstrates focal atherosclerotic narrowing of the right ICA origin, likely 50-70% by NASCET criteria. Atherosclerotic changes also present involving the left ICA origin with similar moderate narrowing, and addition to a focal area of eccentric, somewhat linear signal concerning for possible dissection. The vertebral arteries are normal in course and caliber bilaterally.      Impression: Focal areas of acute cortical infarct are present involving the right pre and postcentral gyri near the vertex, as well as involving a small area of right frontal lobe cortex. There is no evidence of associated significant mass effect or hemorrhage.  Essentially normal MR angiogram of the head with no evidence of flow-limiting stenosis, large vessel occlusion or aneurysmal dilatation.  MR angiogram of the neck is somewhat limited by noncontrast technique. There is apparent  50-70% atherosclerotic narrowing of the right ICA origin. Similar moderate narrowing is present on the left, with additional focal area of luminal irregularity concerning for either dissection or possible irregular eccentric plaque. CT angiogram would be useful to more specifically evaluate, as indicated.  This report was finalized on 6/13/2022 1:04 PM by Laron Retana.      XR Chest 1 View    Result Date: 6/13/2022  DATE OF EXAM: 6/13/2022 12:40 PM  PROCEDURE: XR CHEST 1 VW-  INDICATIONS: Acute Stroke Protocol (onset < 12 hrs); R29.898-Other symptoms and signs involving the musculoskeletal system; I63.9-Cerebral infarction, unspecified  COMPARISON: No comparisons available.  TECHNIQUE: Single radiographic AP view of the chest was obtained.  FINDINGS: Sternotomy wires are noted. The heart looks enlarged. The lungs seem clear. There are no pleural effusions.      Impression: 1.  Cardiomegaly suggested.  This report was finalized on 6/13/2022 12:49 PM by Russell Gonzalez MD.      Duplex Carotid Ultrasound CAR    Result Date: 6/13/2022  · Right internal carotid artery stenosis of >70%. · Left internal carotid artery stenosis of >70%. · There is antegrade flow in the vertebral arteries bilaterally. · When compared to the previous study performed in October 2020, the right ICA stenosis is increased and the left ICA stenosis is stable.      CT Head Without Contrast Stroke Protocol    Result Date: 6/13/2022  DATE OF EXAM: 6/13/2022 10:50 AM  PROCEDURE: CT HEAD WO CONTRAST STROKE PROTOCOL-  INDICATIONS: Neuro deficit, acute, stroke suspected  COMPARISON: No comparisons available.  TECHNIQUE: Routine transaxial and coronal reconstruction images were obtained through the head without the administration of contrast. Automated exposure control and iterative reconstruction methods were used.    FINDINGS: There is a hypodense area in the left frontal lobe that could relate to more chronic area of ischemia. There is no underlying  hemorrhage. There is no midline shift. There are no abnormal extra-axial fluid collections. The paranasal sinuses seem clear. There are multiple small hyperdense areas within the scalp in the frontal area. Correlation with patient's history with respect to the scalp would be suggested. These may relate to small foreign bodies or calcification.      Impression: 1.  There is a hypodense area within the left frontal lobe that could be reflective of more chronic small vessel ischemic change. 2.  Tiny hyperdense areas within the scalp in the frontal area which are nonspecific. Correlation with patient's history with respect to this area would be suggested.  Study was reviewed on the CT scan monitor and discussed with the stroke team navigator at 10:49 AM.  This report was finalized on 6/13/2022 11:03 AM by Russell Gonzalez MD.        Results for orders placed during the hospital encounter of 06/13/22    Adult Transthoracic Echo Complete W/ Cont if Necessary Per Protocol (With Agitated Saline)    Interpretation Summary  · Left ventricular ejection fraction appears to be 56 - 60%. Left ventricular systolic function is normal.  · Left ventricular wall thickness is consistent with mild to moderate concentric hypertrophy.  · There is moderate calcification of the aortic valve.  · Mild aortic valve regurgitation is present.  · Mild mitral annular calcification is present.  · Mild mitral valve regurgitation is present.  · Mild tricuspid valve regurgitation is present.      I have reviewed the medications:  Scheduled Meds:aspirin, 81 mg, Oral, Daily  clopidogrel, 75 mg, Oral, Daily  gabapentin, 100 mg, Oral, TID  insulin detemir, 20 Units, Subcutaneous, Nightly  insulin lispro, 0-9 Units, Subcutaneous, TID AC  levothyroxine, 200 mcg, Oral, Q AM  levothyroxine, 25 mcg, Oral, Q AM  metoprolol succinate XL, 25 mg, Oral, Q24H  nicotine, 1 patch, Transdermal, Q24H  Rivaroxaban, 2.5 mg, Oral, BID  rosuvastatin, 40 mg, Oral,  Nightly  senna-docusate sodium, 2 tablet, Oral, BID  sodium chloride, 10 mL, Intravenous, Q12H  sodium chloride, 10 mL, Intravenous, Q12H      Continuous Infusions:   PRN Meds:.•  acetaminophen **OR** acetaminophen **OR** acetaminophen  •  senna-docusate sodium **AND** polyethylene glycol **AND** bisacodyl **AND** bisacodyl  •  dextrose  •  dextrose  •  glucagon (human recombinant)  •  potassium chloride **OR** potassium chloride **OR** potassium chloride  •  sodium chloride  •  sodium chloride  •  sodium chloride    Assessment & Plan   Assessment & Plan     Active Hospital Problems    Diagnosis  POA   • Left arm weakness [R29.898]  Yes   • Tobacco abuse [Z72.0]  Yes   • PAD (peripheral artery disease) ([facility]) [I73.9]  Yes   • Hypertension [I10]  Yes   • Coronary artery disease involving coronary bypass graft of native heart without angina pectoris [I25.810]  Yes   • Uncontrolled type 2 diabetes mellitus with hyperglycemia, with long-term current use of insulin ([facility]) [E11.65, Z79.4]  Not Applicable   • Hyperlipidemia [E78.5]  Yes      Resolved Hospital Problems   No resolved problems to display.        Brief Hospital Course to date:  Laron Pandey is a 61 y.o. male with history of coronary artery disease, peripheral arterial disease status post stents, carotid stenosis status post CEA, hypertension, hyperlipidemia, type 2 diabetes who presents with left arm numbness and discoordination.     This patient's problems and plans were partially entered by my partner and updated as appropriate by me 06/14/22.    All problems are new to me today.     Acute CVA  -Unclear etiology  -CT head suggestive of changes of chronic small vessel ischemic disease  -MRI of the brain shows focal areas of acute cortical infarct involving the right pre and precentral gyri near the vertex as well as involving a small area of the right frontal lobe cortex.  No mass-effect or hemorrhage.  -MRA of the head was normal.  MRA of the neck with 50-75  atherosclerosis of the right ICA, moderate narrowing present on the left with luminal irregularity concerning for either dissection or plaque  - Carotid duplex confirms greater than 70% bilaterally   -Permissive hypertension  -Every 4 neurochecks  -Stroke neurology following  -PT/OT/speech-language pathology  - Continue home Xarelto  - Continue with Plavix 75 mg and ASA 81 mg   - Increase  home Crestor 40 mg  - TTE with bubble study pending  - Dr. Flores to see will plan for intervention on 6/15 or 6/16    Left Shoulder Pain  - s/p syncopal episode with fall  - patient has ongoing left arm weakness s/p CVA but complains of pain with the weakness that is localized to the left shoulder  - Left shoulder xray pending  - consider MRI if ongoing pain present.     Hypothyroidism  -Continue Synthroid     Coronary artery disease  -Continue metoprolol, ASA, statin  -EKG reviewed.  Notable for T wave inversions similar to prior EKGs     Hypertension  -Hold Diovan     Type 2 diabetes  -HGA1C: 9.8  -Takes 55 units of 70/30  - Start lower dose of Levemir 20 units nightly  - sliding scale insulin    DVT prophylaxis:  Medical and mechanical DVT prophylaxis orders are present.       AM-PAC 6 Clicks Score (PT): 24 (06/14/22 1316)    Disposition: I expect the patient to be discharged TBD. Patient wants to go home with home health/PT.    CODE STATUS:   Code Status and Medical Interventions:   Ordered at: 06/13/22 1331     Code Status (Patient has no pulse and is not breathing):    CPR (Attempt to Resuscitate)     Medical Interventions (Patient has pulse or is breathing):    Full Support       Davina Weldon DO  06/14/22

## 2022-06-14 NOTE — THERAPY EVALUATION
Acute Care - Speech Language Pathology Initial Evaluation   [place]   Cognitive-Communication Evaluation       Patient Name: Laron Pandey  : 1960  MRN: [id number]  Today's Date: 2022               Admit Date: 2022     Visit Dx:    ICD-10-CM ICD-9-CM   1. Left arm weakness  R29.898 729.89   2. Cerebrovascular accident (CVA), unspecified mechanism ([facility])  I63.9 434.91     Patient Active Problem List   Diagnosis   • Uncontrolled type 2 diabetes mellitus with hyperglycemia, with long-term current use of insulin ([facility])   • Hyperlipidemia   • Hypothyroidism, adult   • Coronary artery disease involving coronary bypass graft of native heart without angina pectoris   • Erectile dysfunction   • Hypertension   • Lumbar stenosis with neurogenic claudication   • Lumbar postlaminectomy syndrome   • History of lumbar surgery   • PAD (peripheral artery disease) ([facility])   • Current every day smoker   • Tobacco abuse counseling   • Claudication of both lower extremities ([facility])   • Abnormal stress test   • Left arm weakness   • Tobacco abuse     Past Medical History:   Diagnosis Date   • Arthritis    • Back pain    • Claudication ([facility])    • Coronary artery disease    • Diabetes ([facility])    • ED (erectile dysfunction)    • Heart disease    • Hyperlipidemia    • Hypertension    • Lumbar post-laminectomy syndrome    • Lumbar stenosis with neurogenic claudication    • PAD (peripheral artery disease) ([facility])    • Thyroid disease    • Thyroid disease      Past Surgical History:   Procedure Laterality Date   • BACK SURGERY     • CARDIAC CATHETERIZATION N/A 2020    Procedure: Left Heart Cath;  Surgeon: Mychal Block MD;  Location:  [facility] CATH INVASIVE LOCATION;  Service: Cardiology;  Laterality: N/A;   • CARDIAC CATHETERIZATION N/A 12/3/2020    Procedure: Peripheral angiography: left iliac artery and left SFA intervention;  Surgeon: Mychal Block MD;  Location:  [facility] CATH INVASIVE LOCATION;  Service: Cardiovascular;   Laterality: N/A;  about 2 weeks.    • CARDIAC SURGERY     • CAROTID ENDARTERECTOMY     • CORONARY ARTERY BYPASS GRAFT     • INTERVENTIONAL RADIOLOGY PROCEDURE N/A 11/11/2020    Procedure: Abdominal Aortogram;  Surgeon: Mychal Block MD;  Location: [facility] CATH INVASIVE LOCATION;  Service: Cardiology;  Laterality: N/A;       SLP Recommendation and Plan  SLP Diagnosis: functional SLC at this time (06/14/22 0800)        SLC Criteria for Skilled Therapy Interventions Met: baseline status (06/14/22 0800)  Anticipated Discharge Disposition (SLP): home (06/14/22 0800)                             Plan of Care Reviewed With: patient, daughter (06/14/22 0811)  Progress:  (eval) (06/14/22 0811)      SLP EVALUATION (last 72 hours)     SLP SLC Evaluation     Row Name 06/14/22 0800                   Communication Assessment/Intervention    Document Type evaluation  -AV        Subjective Information no complaints  -AV        Patient Observations alert;cooperative  -AV        Patient/Family/Caregiver Comments/Observations daughter present  -AV        Patient Effort good  -AV                  General Information    Patient Profile Reviewed yes  -AV        Pertinent History Of Current Problem CVA  -AV        Precautions/Limitations, Vision WFL;for purposes of eval  -AV        Precautions/Limitations, Hearing WFL;for purposes of eval  -AV        Prior Level of Function-Communication WFL  -AV        Plans/Goals Discussed with patient;family  -AV        Barriers to Rehab none identified  -AV        Patient's Goals for Discharge return to home  -AV        Family Goals for Discharge patient able to return to previous activities/roles  -AV                  Pain    Additional Documentation Pain Scale: FACES Pre/Post-Treatment (Group)  -AV                  Pain Scale: FACES Pre/Post-Treatment    Pain: FACES Scale, Pretreatment 0-->no hurt  -AV        Posttreatment Pain Rating 0-->no hurt  -AV                  Comprehension  Assessment/Intervention    Comprehension Assessment/Intervention Auditory Comprehension  -AV                  Auditory Comprehension Assessment/Intervention    Auditory Comprehension (Communication) WFL  -AV                  Expression Assessment/Intervention    Expression Assessment/Intervention verbal expression  -AV                  Verbal Expression Assessment/Intervention    Verbal Expression WFL  -AV                  Oral Motor Structure and Function    Oral Motor Structure and Function WFL  -AV        Dentition Assessment natural, present and adequate  -AV        Mucosal Quality moist, healthy  -AV                  Oral Musculature and Cranial Nerve Assessment    Oral Motor General Assessment WFL  -AV                  Motor Speech Assessment/Intervention    Motor Speech Function WFL  -AV                  Cursory Voice Assessment/Intervention    Quality and Resonance (Voice) WFL  -AV                  Cognitive Assessment Intervention- SLP    Cognitive Function (Cognition) WFL  -AV                  SLP Evaluation Clinical Impressions    SLP Diagnosis functional SLC at this time  -AV        Rehab Potential/Prognosis good  -AV        SLC Criteria for Skilled Therapy Interventions Met baseline status  -AV        Functional Impact no impact on function  -AV                  Recommendations    Therapy Frequency (SLP SLC) evaluation only  -AV        Anticipated Discharge Disposition (SLP) home  -AV              User Key  (r) = Recorded By, (t) = Taken By, (c) = Cosigned By    Initials Name Effective Dates    AV Maria Isabel Palomino MS CCC-SLP 06/16/21 -                    EDUCATION  The patient has been educated in the following areas:     Cognitive Impairment Communication Impairment.          Patient was not wearing a face mask and did not exhibit coughing during this therapy encounter.  Procedure performed was not aerosolizing, involved close contact (within 6 feet for at least 15 minutes or longer), and did  not involve contact with infectious secretions or specimens.  Therapist used appropriate personal protective equipment including gloves, standard procedure mask and eye protection.  Appropriate PPE was worn during the entire therapy session.  Hand hygiene was completed before and after therapy session.                   Time Calculation:      Time Calculation- SLP     Row Name 06/14/22 0812             Time Calculation- SLP    SLP Start Time 0800  -AV      SLP Received On 06/14/22  -AV              Untimed Charges    SLP Eval/Re-eval  ST Eval Speech and Production w/ Language - [id number]  -AV      [id number] Eval Speech and Production w/ Language Minutes 38  -AV              Total Minutes    Untimed Charges Total Minutes 38  -AV       Total Minutes 38  -AV            User Key  (r) = Recorded By, (t) = Taken By, (c) = Cosigned By    Initials Name Provider Type    AV Maria Isabel Palomino MS CCC-SLP Speech and Language Pathologist                Therapy Charges for Today     Code Description Service Date Service Provider Modifiers Qty    16115156276 HC ST EVAL SPEECH AND PROD W LANG  3 6/14/2022 Maria Isabel Palomino MS CCC-SLP GN 1                     MS ELIZA Singh  6/14/2022

## 2022-06-14 NOTE — THERAPY DISCHARGE NOTE
Acute Care - Occupational Therapy Discharge  [facility]    Patient Name: Laron Pandey  : 1960    MRN: [id number]                              Today's Date: 2022       Admit Date: 2022    Visit Dx:     ICD-10-CM ICD-9-CM   1. Left arm weakness  R29.898 729.89   2. Cerebrovascular accident (CVA), unspecified mechanism ([facility])  I63.9 434.91     Patient Active Problem List   Diagnosis   • Uncontrolled type 2 diabetes mellitus with hyperglycemia, with long-term current use of insulin ([facility])   • Hyperlipidemia   • Hypothyroidism, adult   • Coronary artery disease involving coronary bypass graft of native heart without angina pectoris   • Erectile dysfunction   • Hypertension   • Lumbar stenosis with neurogenic claudication   • Lumbar postlaminectomy syndrome   • History of lumbar surgery   • PAD (peripheral artery disease) ([facility])   • Current every day smoker   • Tobacco abuse counseling   • Claudication of both lower extremities ([facility])   • Abnormal stress test   • Left arm weakness   • Tobacco abuse     Past Medical History:   Diagnosis Date   • Arthritis    • Back pain    • Claudication ([facility])    • Coronary artery disease    • Diabetes ([facility])    • ED (erectile dysfunction)    • Heart disease    • Hyperlipidemia    • Hypertension    • Lumbar post-laminectomy syndrome    • Lumbar stenosis with neurogenic claudication    • PAD (peripheral artery disease) ([facility])    • Thyroid disease    • Thyroid disease      Past Surgical History:   Procedure Laterality Date   • BACK SURGERY     • CARDIAC CATHETERIZATION N/A 2020    Procedure: Left Heart Cath;  Surgeon: Mychal Block MD;  Location:  [facility] CATH INVASIVE LOCATION;  Service: Cardiology;  Laterality: N/A;   • CARDIAC CATHETERIZATION N/A 12/3/2020    Procedure: Peripheral angiography: left iliac artery and left SFA intervention;  Surgeon: Mychal Block MD;  Location:  [facility] CATH INVASIVE LOCATION;  Service: Cardiovascular;  Laterality: N/A;  about 2 weeks.     • CARDIAC SURGERY     • CAROTID ENDARTERECTOMY     • CORONARY ARTERY BYPASS GRAFT     • INTERVENTIONAL RADIOLOGY PROCEDURE N/A 11/11/2020    Procedure: Abdominal Aortogram;  Surgeon: Mychal Block MD;  Location: [facility] CATH INVASIVE LOCATION;  Service: Cardiology;  Laterality: N/A;      General Information     Row Name 06/14/22 1307          OT Time and Intention    Document Type discharge treatment  -AIDEN     Mode of Treatment individual therapy;occupational therapy  -AIDEN     Row Name 06/14/22 1307          General Information    Patient Profile Reviewed yes  -AIDEN     Prior Level of Function independent:;all household mobility;community mobility;gait;transfer;bed mobility;ADL's;feeding;grooming;dressing;driving;work  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  -AIDEN     Existing Precautions/Restrictions no known precautions/restrictions  -AIDEN     Barriers to Rehab medically complex  -AIDEN     Row Name 06/14/22 1307          Occupational Profile    Environmental Supports and Barriers (Occupational Profile) tub shower, no AD, comfort height toilet  -AIDEN     Row Name 06/14/22 1307          Living Environment    People in Home significant other;child(anup), adult  -AIDEN     Row Name 06/14/22 1307          Home Main Entrance    Number of Stairs, Main Entrance two  1 +1  -AIDEN     Stair Railings, Main Entrance none  -AIDEN     Row Name 06/14/22 1307          Stairs Within Home, Primary    Number of Stairs, Within Home, Primary none  -AIDEN     Row Name 06/14/22 1307          Cognition    Orientation Status (Cognition) oriented x 3  pt. able to answer simple problem solving questions and remember 3 words post several minutes  -AIDEN           User Key  (r) = Recorded By, (t) = Taken By, (c) = Cosigned By    Initials Name Provider Type    AIDEN Sachi oCnti OT Occupational Therapist               Mobility/ADL's     Row Name 06/14/22 1309          Bed Mobility    Bed Mobility supine-sit  -AIDEN     Supine-Sit [place] (Bed Mobility) modified  independence  -     Assistive Device (Bed Mobility) head of bed elevated  -[facility] Name 06/14/22 1309          Transfers    Transfers sit-stand transfer;stand-sit transfer  -     Sit-Stand [place] (Transfers) supervision  -     Stand-Sit [place] (Transfers) supervision  -[facility] Name 06/14/22 1309          Functional Mobility    Functional Mobility- Ind. Level contact guard assist  -     Functional Mobility-Distance (Feet) 30  -     Functional Mobility- Comment CGA given for evaluation purposes, but no assist needed  -[facility] Name 06/14/22 1309          Activities of Daily Living    BADL Assessment/Intervention lower body dressing;grooming  -[facility] Name 06/14/22 1309          Lower Body Dressing Assessment/Training    [place] Level (Lower Body Dressing) doff;don;socks;independent  -     Position (Lower Body Dressing) edge of bed sitting  -[facility] Name 06/14/22 1309          Grooming Assessment/Training    [place] Level (Grooming) wash face, hands;oral care regimen;supervision  -     Position (Grooming) sink side  -           User Key  (r) = Recorded By, (t) = Taken By, (c) = Cosigned By    Initials Name Provider Type    Sachi Taylor OT Occupational Therapist               Obj/Interventions     Row Name 06/14/22 1310          Sensory Assessment (Somatosensory)    Sensory Subjective Reports tingling;burning  -     Sensory Assessment Pt. able to ID light touch throughout LUE and stated that felt touch to both sides equally, but with pain shoulder to elbow especially and with tingling possible buring feeling  -[facility] Name 06/14/22 1310          Sensory Interventions    Desensitization Techniques/Interventions (Sensation) rubbing with textures  educated on using various textures and left with soft toothbrush, washcloth and lotion to LUE in attempt to decrease sensitivity especially with mvmt., Educated if not positive effect noted to discontinue  -      [facility] Name 06/14/22 1310          Vision Assessment/Intervention    Visual Impairment/Limitations corrective lenses for reading  -     Vision Assessment Comment pt. able to track, ID stimuli L and R and read close and far  -[facility] Name 06/14/22 1310          Range of Motion Comprehensive    General Range of Motion bilateral upper extremity ROM WFL  -AIDEN     Row Name 06/14/22 1310          Strength Comprehensive (MMT)    Comment, General Manual Muscle Testing (MMT) Assessment LUE biceps and triceps NT due to pain with mvmt.  Intact shoulder, Elbow, wrist and handn YARELI PICKERING WFL all  -[facility] Name 06/14/22 1310          Motor Skills    Motor Skills coordination  -     Coordination WFL;bilateral;finger to nose;dysdiadochokinesia  -[facility] Name 06/14/22 1310          Balance    Balance Assessment sitting static balance;sitting dynamic balance;standing static balance;standing dynamic balance  -     Static Sitting Balance independent  -AIDEN     Dynamic Sitting Balance independent  -AIDEN     Position, Sitting Balance unsupported;sitting edge of bed;supported  -AIDEN     Static Standing Balance supervision  -     Dynamic Standing Balance supervision  -AIDEN     Position/Device Used, Standing Balance unsupported  -AIDEN     Balance Interventions sit to stand;occupation based/functional task  -     Comment, Balance S reaching without difficulty, S for evalution purposes  -           User Key  (r) = Recorded By, (t) = Taken By, (c) = Cosigned By    Initials Name Provider Type    Sachi Taylor, OT Occupational Therapist               Goals/Plan    No documentation.                Clinical Impression     [facility] Name 06/14/22 1315          Pain Assessment    Pretreatment Pain Rating 5/10  -AIDEN     Posttreatment Pain Rating 5/10  -AIDEN     Pain Location - Side/Orientation Left  -AIDEN     Pain Location upper  -AIDEN     Pre/Posttreatment Pain Comment increases with mvmt., per pt. Tylenol to be given, sensory stimulation education  given to attempt  -AIDEN     Row Name 06/14/22 1315          Plan of Care Review    Plan of Care Reviewed With patient  -AIDEN     Progress no change  evaluation  -AIDEN     Outcome Evaluation OT evaluation completed with pt. demonstrating good strength, balance, endurance, light touch ID, cognition for BADL task performance.  Pt. with LUE pain that increases with mvmt. with reports of tingling and possible burning sensation.  Educated on how to trial sensory stimulation for some relief with sensory pain. No further skilled IPOT services needed.  Recommend OP therapy for LUE pain to assist with return to work.  -AIDEN     Row Name 06/14/22 1315          Therapy Assessment/Plan (OT)    Criteria for Skilled Therapeutic Interventions Met (OT) no;skilled treatment is necessary  -AIDEN     Therapy Frequency (OT) evaluation only  -AIDEN     [facility] Name 06/14/22 1315          Therapy Plan Review/Discharge Plan (OT)    Anticipated Discharge Disposition (OT) home with outpatient therapy services  -AIDEN     Row Name 06/14/22 1315          Vital Signs    Pre Systolic BP Rehab 123  -AIDEN     Pre Treatment Diastolic BP 78  -AIDEN     Post Systolic BP Rehab 135  -AIDEN     Post Treatment Diastolic BP 75  -AIDEN     Pretreatment Heart Rate (beats/min) 54  -AIDEN     Posttreatment Heart Rate (beats/min) 55  -AIDEN     Pre SpO2 (%) 99  -AIDEN     O2 Delivery Pre Treatment room air  -AIDEN     Post SpO2 (%) 100  -AIDEN     O2 Delivery Post Treatment room air  -AIDEN     Pre Patient Position Supine  -AIDEN     Intra Patient Position Standing  -AIDEN     Post Patient Position Sitting  -AIDEN     Row Name 06/14/22 1315          Positioning and Restraints    Pre-Treatment Position in bed  -AIDEN     Post Treatment Position chair  -AIDEN     In Chair reclined;call light within reach;exit alarm on;encouraged to call for assist;waffle cushion  -           User Key  (r) = Recorded By, (t) = Taken By, (c) = Cosigned By    Initials Name Provider Type    Sachi Taylor, OT Occupational Therapist                Outcome Measures     Row Name 06/14/22 1322          How much help from another is currently needed...    Putting on and taking off regular lower body clothing? 4  -AIDEN     Bathing (including washing, rinsing, and drying) 4  -AIDEN     Toileting (which includes using toilet bed pan or urinal) 4  -AIDEN     Putting on and taking off regular upper body clothing 4  -AIDEN     Taking care of personal grooming (such as brushing teeth) 4  -AIDEN     Eating meals 4  -AIDEN     AM-PAC 6 Clicks Score (OT) 24  -AIDEN     Row Name 06/14/22 1316 06/14/22 0800       How much help from another person do you currently need...    Turning from your back to your side while in flat bed without using bedrails? 4  -LO 4  -LC    Moving from lying on back to sitting on the side of a flat bed without bedrails? 4  -LO 4  -LC    Moving to and from a bed to a chair (including a wheelchair)? 4  -LO 4  -LC    Standing up from a chair using your arms (e.g., wheelchair, bedside chair)? 4  -LO 4  -LC    Climbing 3-5 steps with a railing? 4  -LO 4  -LC    To walk in hospital room? 4  -LO 3  -LC    AM-PAC 6 Clicks Score (PT) 24  -LO 23  -LC    Highest level of mobility 8 --> Walked 250 feet or more  -LO 7 --> Walked 25 feet or more  -LC    Row Name 06/14/22 1322 06/14/22 1316       Modified Dickey Scale    Pre-Stroke Modified Pily Scale 0 - No Symptoms at all.  -AIDEN 0 - No Symptoms at all.  -LO    Modified Dickey Scale 2 - Slight disability.  Unable to carry out all previous activities but able to look after own affairs without assistance.  I BADL, not to work with LUE pain  -AIDEN 1 - No significant disability despite symptoms.  Able to carry out all usual duties and activities.  -LO    Row Name 06/14/22 1322 06/14/22 1316       Functional Assessment    Outcome Measure Options AM-PAC 6 Clicks Daily Activity (OT);Modified Dickey  -AIDEN AM-PAC 6 Clicks Basic Mobility (PT);Modified Pily  -LO          User Key  (r) = Recorded By, (t) = Taken By, (c) = Cosigned By     Initials Name Provider Type    Sachi Taylor, OT Occupational Therapist    Veena Luz RN Registered Nurse    Salud Sylvester, PT Physical Therapist              Occupational Therapy Education                 Title: PT OT SLP Therapies (In Progress)     Topic: Occupational Therapy (In Progress)     Point: ADL training (Not Started)     Description:   Instruct learner(s) on proper safety adaptation and remediation techniques during self care or transfers.   Instruct in proper use of assistive devices.              Learner Progress:  Not documented in this visit.          Point: Home exercise program (Done)     Description:   Instruct learner(s) on appropriate technique for monitoring, assisting and/or progressing therapeutic exercises/activities.              Learning Progress Summary           Patient Acceptance, E,D, VU by AIDEN at 6/14/2022 0963    Comment: reason for consult, evaluation results, sensory stimulation                   Point: Precautions (Not Started)     Description:   Instruct learner(s) on prescribed precautions during self-care and functional transfers.              Learner Progress:  Not documented in this visit.          Point: Body mechanics (Not Started)     Description:   Instruct learner(s) on proper positioning and spine alignment during self-care, functional mobility activities and/or exercises.              Learner Progress:  Not documented in this visit.                      User Key     Initials Effective Dates Name Provider Type Discipline    AIDEN 06/16/21 -  Sachi Conti, OT Occupational Therapist OT              OT Recommendation and Plan  Therapy Frequency (OT): evaluation only  Plan of Care Review  Plan of Care Reviewed With: patient  Progress: no change (evaluation)  Outcome Evaluation: OT evaluation completed with pt. demonstrating good strength, balance, endurance, light touch ID, cognition for BADL task performance.  Pt. with LUE pain that increases with mvmt. with  reports of tingling and possible burning sensation.  Educated on how to trial sensory stimulation for some relief with sensory pain. No further skilled IPOT services needed.  Recommend OP therapy for LUE pain to assist with return to work.  Plan of Care Reviewed With: patient  Outcome Evaluation: OT evaluation completed with pt. demonstrating good strength, balance, endurance, light touch ID, cognition for BADL task performance.  Pt. with LUE pain that increases with mvmt. with reports of tingling and possible burning sensation.  Educated on how to trial sensory stimulation for some relief with sensory pain. No further skilled IPOT services needed.  Recommend OP therapy for LUE pain to assist with return to work.     Time Calculation:    Time Calculation- OT     Row Name 06/14/22 1325             Time Calculation- OT    OT Start Time 1048  -AIDEN      OT Received On 06/14/22  -AIDEN      OT Goal Re-Cert Due Date 06/24/22  -AIDEN              Untimed Charges    OT Eval/Re-eval Minutes 57  -AIDEN              Total Minutes    Untimed Charges Total Minutes 57  -AIDEN       Total Minutes 57  -AIDEN            User Key  (r) = Recorded By, (t) = Taken By, (c) = Cosigned By    Initials Name Provider Type    Sachi Taylor OT Occupational Therapist              Therapy Charges for Today     Code Description Service Date Service Provider Modifiers Qty    [id number] HC OT EVAL LOW COMPLEXITY 4 6/14/2022 Sachi Conti OT GO 1             OT Discharge Summary  Anticipated Discharge Disposition (OT): home with outpatient therapy services  Reason for Discharge: At baseline function (For BADL's)  Outcomes Achieved:  (no IP skilled OT services needed, OP therapy recommended)    Sachi Conti OT  6/14/2022

## 2022-06-14 NOTE — PLAN OF CARE
Problem: Adult Inpatient Plan of Care  Goal: Plan of Care Review  Outcome: Ongoing, Progressing  Flowsheets  Taken 6/14/2022 1647 by Veena Jha RN  Plan of Care Reviewed With: patient  Taken 6/14/2022 1315 by Sachi Conti OT  Progress: (evaluation) no change  Goal: Patient-Specific Goal (Individualized)  Outcome: Ongoing, Progressing  Goal: Absence of Hospital-Acquired Illness or Injury  Outcome: Ongoing, Progressing  Intervention: Identify and Manage Fall Risk  Recent Flowsheet Documentation  Taken 6/14/2022 1600 by Veena Jha RN  Safety Promotion/Fall Prevention:   activity supervised   assistive device/personal items within reach   clutter free environment maintained   nonskid shoes/slippers when out of bed   room organization consistent   safety round/check completed  Taken 6/14/2022 1400 by Veena Jha RN  Safety Promotion/Fall Prevention:   assistive device/personal items within reach   clutter free environment maintained   nonskid shoes/slippers when out of bed   room organization consistent   safety round/check completed   activity supervised  Taken 6/14/2022 1200 by Veena Jha RN  Safety Promotion/Fall Prevention:   activity supervised   assistive device/personal items within reach   clutter free environment maintained   nonskid shoes/slippers when out of bed   room organization consistent   safety round/check completed  Taken 6/14/2022 1000 by Veena Jha RN  Safety Promotion/Fall Prevention:   activity supervised   assistive device/personal items within reach   clutter free environment maintained   nonskid shoes/slippers when out of bed   room organization consistent   safety round/check completed  Taken 6/14/2022 0800 by Veena Jha, JORDI  Safety Promotion/Fall Prevention:   activity supervised   assistive device/personal items within reach   clutter free environment maintained   fall prevention program maintained   nonskid shoes/slippers when out of bed   room organization  consistent   safety round/check completed  Intervention: Prevent Skin Injury  Recent Flowsheet Documentation  Taken 6/14/2022 1600 by Veena Jha RN  Body Position:   neutral body alignment   neutral head position  Skin Protection:   adhesive use limited   protective footwear used   tubing/devices free from skin contact  Taken 6/14/2022 1400 by Veena Jha RN  Body Position:   position changed independently   neutral body alignment   neutral head position  Skin Protection:   adhesive use limited   protective footwear used   tubing/devices free from skin contact  Taken 6/14/2022 1200 by Veena Jha RN  Body Position:   legs elevated   position changed independently  Skin Protection:   adhesive use limited   protective footwear used   tubing/devices free from skin contact  Taken 6/14/2022 1000 by Veena Jha RN  Body Position: position changed independently  Skin Protection:   adhesive use limited   protective footwear used   tubing/devices free from skin contact  Taken 6/14/2022 0800 by Veena Jha RN  Body Position:   position changed independently   neutral body alignment   neutral head position   right   side-lying  Skin Protection:   adhesive use limited   incontinence pads utilized   protective footwear used   tubing/devices free from skin contact  Intervention: Prevent and Manage VTE (Venous Thromboembolism) Risk  Recent Flowsheet Documentation  Taken 6/14/2022 1600 by Veena Jha RN  Activity Management: activity adjusted per tolerance  Taken 6/14/2022 1400 by Veena Jha RN  Activity Management: up in chair  VTE Prevention/Management: (xarelto therapy) --  Taken 6/14/2022 1200 by Veena Jha RN  Activity Management: up in chair  VTE Prevention/Management: (xarelto therapy) other (see comments)  Range of Motion: active ROM (range of motion) encouraged  Taken 6/14/2022 1000 by Veena Jha RN  Activity Management: ambulated in room  VTE Prevention/Management:   bilateral   sequential  compression devices off  Taken 6/14/2022 0800 by Veena Jha RN  Activity Management: activity adjusted per tolerance  VTE Prevention/Management:   bilateral   sequential compression devices off  Intervention: Prevent Infection  Recent Flowsheet Documentation  Taken 6/14/2022 1600 by Veena Jha RN  Infection Prevention:   environmental surveillance performed   equipment surfaces disinfected   hand hygiene promoted   rest/sleep promoted   single patient room provided   visitors restricted/screened  Taken 6/14/2022 1400 by Veena Jha RN  Infection Prevention:   environmental surveillance performed   equipment surfaces disinfected   hand hygiene promoted   rest/sleep promoted   single patient room provided   visitors restricted/screened  Taken 6/14/2022 1200 by Veena Jha RN  Infection Prevention:   environmental surveillance performed   equipment surfaces disinfected   hand hygiene promoted   rest/sleep promoted   single patient room provided   visitors restricted/screened  Taken 6/14/2022 1000 by Veena Jha RN  Infection Prevention:   environmental surveillance performed   equipment surfaces disinfected   hand hygiene promoted   rest/sleep promoted   single patient room provided   visitors restricted/screened  Taken 6/14/2022 0800 by Veena Jha RN  Infection Prevention:   environmental surveillance performed   equipment surfaces disinfected   hand hygiene promoted   rest/sleep promoted   single patient room provided   visitors restricted/screened  Goal: Optimal Comfort and Wellbeing  Outcome: Ongoing, Progressing  Intervention: Monitor Pain and Promote Comfort  Recent Flowsheet Documentation  Taken 6/14/2022 1600 by Veena Jha RN  Pain Management Interventions: quiet environment facilitated  Taken 6/14/2022 1400 by Veena Jha RN  Pain Management Interventions:   quiet environment facilitated   pillow support provided   position adjusted  Taken 6/14/2022 1200 by Veena Jha RN  Pain  Management Interventions:   pillow support provided   quiet environment facilitated  Taken 6/14/2022 1000 by Veena Jha, RN  Pain Management Interventions:   care clustered   pillow support provided  Taken 6/14/2022 0813 by Veena Jha RN  Pain Management Interventions:   position adjusted   pillow support provided  Intervention: Provide Person-Centered Care  Recent Flowsheet Documentation  Taken 6/14/2022 1600 by Veena Jha, JORDI  Trust Relationship/Rapport:   care explained   choices provided   emotional support provided   empathic listening provided   questions answered   questions encouraged   reassurance provided   thoughts/feelings acknowledged  Taken 6/14/2022 1400 by Veena Jha RN  Trust Relationship/Rapport:   care explained   choices provided   emotional support provided   empathic listening provided   questions answered   questions encouraged   reassurance provided   thoughts/feelings acknowledged  Taken 6/14/2022 1200 by Veena Jha, RN  Trust Relationship/Rapport:   care explained   choices provided   emotional support provided   empathic listening provided   questions answered   questions encouraged   reassurance provided   thoughts/feelings acknowledged  Taken 6/14/2022 1000 by Veena Jha, JORDI  Trust Relationship/Rapport:   care explained   choices provided   emotional support provided   empathic listening provided   questions answered   questions encouraged   reassurance provided   thoughts/feelings acknowledged  Taken 6/14/2022 0800 by Veena Jha, RN  Trust Relationship/Rapport:   care explained   choices provided   emotional support provided   empathic listening provided   questions answered   questions encouraged   reassurance provided   thoughts/feelings acknowledged  Goal: Readiness for Transition of Care  Outcome: Ongoing, Progressing   Goal Outcome Evaluation:  Plan of Care Reviewed With: patient

## 2022-06-14 NOTE — PLAN OF CARE
Goal Outcome Evaluation:  Plan of Care Reviewed With: patient        Progress: no change  Outcome Evaluation: PT eval completed. Patient alert and oriented x4. Presents with mild deficits in sensation and strength LLE ( both in L5 Nerve distribution). Patient reports these sx have been present before episode leading to this current hospitalization. He also reports a chronic hx of low back issues which may be related. Patient demonstrates supervision for all transfers and ambulation x 400' without AD. No loss of balance noted. All Vss throughout. As patient appears back to baseline, no further skilled IP PT warranted at this time. Recommend home at [place].

## 2022-06-14 NOTE — PLAN OF CARE
Goal Outcome Evaluation:  Plan of Care Reviewed With: patient        Progress: no change (evaluation)  Outcome Evaluation: OT evaluation completed with pt. demonstrating good strength, balance, endurance, light touch ID, cognition for BADL task performance.  Pt. with LUE pain that increases with mvmt. with reports of tingling and possible burning sensation.  Educated on how to trial sensory stimulation for some relief with sensory pain. No further skilled IPOT services needed.  Recommend OP therapy for LUE pain to assist with return to work.

## 2022-06-15 ENCOUNTER — APPOINTMENT (OUTPATIENT)
Dept: MRI IMAGING | Facility: HOSPITAL | Age: 62
End: 2022-06-15

## 2022-06-15 ENCOUNTER — APPOINTMENT (OUTPATIENT)
Dept: CARDIOLOGY | Facility: HOSPITAL | Age: 62
End: 2022-06-15

## 2022-06-15 PROBLEM — Z78.9 ALCOHOL USE: Status: ACTIVE | Noted: 2022-06-15

## 2022-06-15 PROBLEM — I65.23 BILATERAL CAROTID ARTERY STENOSIS: Status: ACTIVE | Noted: 2022-06-13

## 2022-06-15 PROBLEM — F10.90 ALCOHOL USE: Status: ACTIVE | Noted: 2022-06-15

## 2022-06-15 LAB
ALBUMIN SERPL-MCNC: 3.8 G/DL (ref 3.5–5.2)
ALBUMIN/GLOB SERPL: 1.7 G/DL
ALP SERPL-CCNC: 134 U/L (ref 39–117)
ALT SERPL W P-5'-P-CCNC: 24 U/L (ref 1–41)
ANION GAP SERPL CALCULATED.3IONS-SCNC: 9 MMOL/L (ref 5–15)
AST SERPL-CCNC: 30 U/L (ref 1–40)
BILIRUB SERPL-MCNC: 0.3 MG/DL (ref 0–1.2)
BUN SERPL-MCNC: 14 MG/DL (ref 8–23)
BUN/CREAT SERPL: 18.2 (ref 7–25)
CALCIUM SPEC-SCNC: 8.8 MG/DL (ref 8.6–10.5)
CHLORIDE SERPL-SCNC: 100 MMOL/L (ref 98–107)
CO2 SERPL-SCNC: 27 MMOL/L (ref 22–29)
CREAT SERPL-MCNC: 0.77 MG/DL (ref 0.76–1.27)
DEPRECATED RDW RBC AUTO: 45.2 FL (ref 37–54)
EGFRCR SERPLBLD CKD-EPI 2021: 101.9 ML/MIN/1.73
ERYTHROCYTE [DISTWIDTH] IN BLOOD BY AUTOMATED COUNT: 12.8 % (ref 12.3–15.4)
GLOBULIN UR ELPH-MCNC: 2.2 GM/DL
GLUCOSE BLDC GLUCOMTR-MCNC: 215 MG/DL (ref 70–130)
GLUCOSE BLDC GLUCOMTR-MCNC: 231 MG/DL (ref 70–130)
GLUCOSE BLDC GLUCOMTR-MCNC: 322 MG/DL (ref 70–130)
GLUCOSE BLDC GLUCOMTR-MCNC: 329 MG/DL (ref 70–130)
GLUCOSE BLDC GLUCOMTR-MCNC: 401 MG/DL (ref 70–130)
GLUCOSE BLDC GLUCOMTR-MCNC: 411 MG/DL (ref 70–130)
GLUCOSE SERPL-MCNC: 241 MG/DL (ref 65–99)
HCT VFR BLD AUTO: 40.8 % (ref 37.5–51)
HGB BLD-MCNC: 13.9 G/DL (ref 13–17.7)
MCH RBC QN AUTO: 32.9 PG (ref 26.6–33)
MCHC RBC AUTO-ENTMCNC: 34.1 G/DL (ref 31.5–35.7)
MCV RBC AUTO: 96.5 FL (ref 79–97)
PLATELET # BLD AUTO: 244 10*3/MM3 (ref 140–450)
PMV BLD AUTO: 10.4 FL (ref 6–12)
POTASSIUM SERPL-SCNC: 4.3 MMOL/L (ref 3.5–5.2)
PROT SERPL-MCNC: 6 G/DL (ref 6–8.5)
QT INTERVAL: 408 MS
QT INTERVAL: 532 MS
QTC INTERVAL: 479 MS
QTC INTERVAL: 490 MS
RBC # BLD AUTO: 4.23 10*6/MM3 (ref 4.14–5.8)
SODIUM SERPL-SCNC: 136 MMOL/L (ref 136–145)
WBC NRBC COR # BLD: 7.48 10*3/MM3 (ref 3.4–10.8)

## 2022-06-15 PROCEDURE — 25010000002 HEPARIN (PORCINE) PER 1000 UNITS: Performed by: NEUROLOGICAL SURGERY

## 2022-06-15 PROCEDURE — C1760 CLOSURE DEV, VASC: HCPCS | Performed by: NEUROLOGICAL SURGERY

## 2022-06-15 PROCEDURE — C1894 INTRO/SHEATH, NON-LASER: HCPCS | Performed by: NEUROLOGICAL SURGERY

## 2022-06-15 PROCEDURE — C1769 GUIDE WIRE: HCPCS | Performed by: NEUROLOGICAL SURGERY

## 2022-06-15 PROCEDURE — 93005 ELECTROCARDIOGRAM TRACING: CPT | Performed by: INTERNAL MEDICINE

## 2022-06-15 PROCEDURE — 73221 MRI JOINT UPR EXTREM W/O DYE: CPT

## 2022-06-15 PROCEDURE — 99232 SBSQ HOSP IP/OBS MODERATE 35: CPT | Performed by: NEUROLOGICAL SURGERY

## 2022-06-15 PROCEDURE — 82962 GLUCOSE BLOOD TEST: CPT

## 2022-06-15 PROCEDURE — 93882 EXTRACRANIAL UNI/LTD STUDY: CPT

## 2022-06-15 PROCEDURE — C1884 EMBOLIZATION PROTECT SYST: HCPCS | Performed by: NEUROLOGICAL SURGERY

## 2022-06-15 PROCEDURE — 99152 MOD SED SAME PHYS/QHP 5/>YRS: CPT | Performed by: NEUROLOGICAL SURGERY

## 2022-06-15 PROCEDURE — 80053 COMPREHEN METABOLIC PANEL: CPT | Performed by: HOSPITALIST

## 2022-06-15 PROCEDURE — 36223 PLACE CATH CAROTID/INOM ART: CPT | Performed by: NEUROLOGICAL SURGERY

## 2022-06-15 PROCEDURE — 63710000001 INSULIN DETEMIR PER 5 UNITS: Performed by: NURSE PRACTITIONER

## 2022-06-15 PROCEDURE — 85027 COMPLETE CBC AUTOMATED: CPT | Performed by: HOSPITALIST

## 2022-06-15 PROCEDURE — 0 IODIXANOL PER 1 ML: Performed by: NEUROLOGICAL SURGERY

## 2022-06-15 PROCEDURE — 99153 MOD SED SAME PHYS/QHP EA: CPT | Performed by: NEUROLOGICAL SURGERY

## 2022-06-15 PROCEDURE — 99291 CRITICAL CARE FIRST HOUR: CPT | Performed by: STUDENT IN AN ORGANIZED HEALTH CARE EDUCATION/TRAINING PROGRAM

## 2022-06-15 PROCEDURE — 93010 ELECTROCARDIOGRAM REPORT: CPT | Performed by: INTERNAL MEDICINE

## 2022-06-15 PROCEDURE — 99232 SBSQ HOSP IP/OBS MODERATE 35: CPT | Performed by: NURSE PRACTITIONER

## 2022-06-15 PROCEDURE — C1876 STENT, NON-COA/NON-COV W/DEL: HCPCS | Performed by: NEUROLOGICAL SURGERY

## 2022-06-15 PROCEDURE — 63710000001 INSULIN LISPRO (HUMAN) PER 5 UNITS: Performed by: NURSE PRACTITIONER

## 2022-06-15 PROCEDURE — 25010000002 FENTANYL CITRATE (PF) 50 MCG/ML SOLUTION: Performed by: NEUROLOGICAL SURGERY

## 2022-06-15 PROCEDURE — 37215 TRANSCATH STENT CCA W/EPS: CPT | Performed by: NEUROLOGICAL SURGERY

## 2022-06-15 PROCEDURE — 25010000002 MIDAZOLAM PER 1 MG: Performed by: NEUROLOGICAL SURGERY

## 2022-06-15 PROCEDURE — C1725 CATH, TRANSLUMIN NON-LASER: HCPCS | Performed by: NEUROLOGICAL SURGERY

## 2022-06-15 DEVICE — XACT CAROTID STENT SYSTEM 10.0 MM X 40 MM TAPERED
Type: IMPLANTABLE DEVICE | Status: FUNCTIONAL
Brand: XACT

## 2022-06-15 RX ORDER — FENTANYL CITRATE 50 UG/ML
INJECTION, SOLUTION INTRAMUSCULAR; INTRAVENOUS AS NEEDED
Status: DISCONTINUED | OUTPATIENT
Start: 2022-06-15 | End: 2022-06-15 | Stop reason: HOSPADM

## 2022-06-15 RX ORDER — IODIXANOL 320 MG/ML
INJECTION, SOLUTION INTRAVASCULAR AS NEEDED
Status: DISCONTINUED | OUTPATIENT
Start: 2022-06-15 | End: 2022-06-15 | Stop reason: HOSPADM

## 2022-06-15 RX ORDER — PANTOPRAZOLE SODIUM 40 MG/1
40 TABLET, DELAYED RELEASE ORAL DAILY
Status: DISCONTINUED | OUTPATIENT
Start: 2022-06-15 | End: 2022-06-17 | Stop reason: HOSPADM

## 2022-06-15 RX ORDER — INSULIN LISPRO 100 [IU]/ML
0-9 INJECTION, SOLUTION INTRAVENOUS; SUBCUTANEOUS
Status: DISCONTINUED | OUTPATIENT
Start: 2022-06-15 | End: 2022-06-15

## 2022-06-15 RX ORDER — INSULIN LISPRO 100 [IU]/ML
0-24 INJECTION, SOLUTION INTRAVENOUS; SUBCUTANEOUS
Status: DISCONTINUED | OUTPATIENT
Start: 2022-06-15 | End: 2022-06-17 | Stop reason: HOSPADM

## 2022-06-15 RX ORDER — SODIUM CHLORIDE 0.9 % (FLUSH) 0.9 %
10 SYRINGE (ML) INJECTION AS NEEDED
Status: DISCONTINUED | OUTPATIENT
Start: 2022-06-15 | End: 2022-06-15

## 2022-06-15 RX ORDER — SODIUM CHLORIDE 0.9 % (FLUSH) 0.9 %
10 SYRINGE (ML) INJECTION EVERY 12 HOURS SCHEDULED
Status: DISCONTINUED | OUTPATIENT
Start: 2022-06-15 | End: 2022-06-15

## 2022-06-15 RX ORDER — LIDOCAINE HYDROCHLORIDE 10 MG/ML
INJECTION, SOLUTION EPIDURAL; INFILTRATION; INTRACAUDAL; PERINEURAL AS NEEDED
Status: DISCONTINUED | OUTPATIENT
Start: 2022-06-15 | End: 2022-06-15 | Stop reason: HOSPADM

## 2022-06-15 RX ORDER — HEPARIN SODIUM 1000 [USP'U]/ML
INJECTION, SOLUTION INTRAVENOUS; SUBCUTANEOUS AS NEEDED
Status: DISCONTINUED | OUTPATIENT
Start: 2022-06-15 | End: 2022-06-15 | Stop reason: HOSPADM

## 2022-06-15 RX ORDER — MIDAZOLAM HYDROCHLORIDE 1 MG/ML
INJECTION INTRAMUSCULAR; INTRAVENOUS AS NEEDED
Status: DISCONTINUED | OUTPATIENT
Start: 2022-06-15 | End: 2022-06-15 | Stop reason: HOSPADM

## 2022-06-15 RX ADMIN — LEVOTHYROXINE SODIUM 200 MCG: 0.1 TABLET ORAL at 05:10

## 2022-06-15 RX ADMIN — ASPIRIN 81 MG CHEWABLE TABLET 81 MG: 81 TABLET CHEWABLE at 05:09

## 2022-06-15 RX ADMIN — NICARDIPINE HYDROCHLORIDE 3 MG/HR: 0.1 INJECTION, SOLUTION INTRAVENOUS at 21:24

## 2022-06-15 RX ADMIN — INSULIN LISPRO 24 UNITS: 100 INJECTION, SOLUTION INTRAVENOUS; SUBCUTANEOUS at 18:09

## 2022-06-15 RX ADMIN — INSULIN LISPRO 16 UNITS: 100 INJECTION, SOLUTION INTRAVENOUS; SUBCUTANEOUS at 20:32

## 2022-06-15 RX ADMIN — Medication 10 ML: at 20:37

## 2022-06-15 RX ADMIN — METOPROLOL SUCCINATE 25 MG: 25 TABLET, EXTENDED RELEASE ORAL at 10:03

## 2022-06-15 RX ADMIN — GABAPENTIN 100 MG: 100 CAPSULE ORAL at 16:21

## 2022-06-15 RX ADMIN — CLOPIDOGREL BISULFATE 75 MG: 75 TABLET ORAL at 05:09

## 2022-06-15 RX ADMIN — INSULIN DETEMIR 20 UNITS: 100 INJECTION, SOLUTION SUBCUTANEOUS at 16:26

## 2022-06-15 RX ADMIN — INSULIN DETEMIR 20 UNITS: 100 INJECTION, SOLUTION SUBCUTANEOUS at 23:12

## 2022-06-15 RX ADMIN — Medication 10 ML: at 10:03

## 2022-06-15 RX ADMIN — PANTOPRAZOLE SODIUM 40 MG: 40 TABLET, DELAYED RELEASE ORAL at 16:21

## 2022-06-15 RX ADMIN — ROSUVASTATIN 40 MG: 20 TABLET, FILM COATED ORAL at 20:32

## 2022-06-15 RX ADMIN — GABAPENTIN 100 MG: 100 CAPSULE ORAL at 20:32

## 2022-06-15 RX ADMIN — NICARDIPINE HYDROCHLORIDE 5 MG/HR: 0.1 INJECTION, SOLUTION INTRAVENOUS at 09:30

## 2022-06-15 RX ADMIN — ACETAMINOPHEN 325MG 650 MG: 325 TABLET ORAL at 10:55

## 2022-06-15 RX ADMIN — GABAPENTIN 100 MG: 100 CAPSULE ORAL at 10:03

## 2022-06-15 RX ADMIN — NICARDIPINE HYDROCHLORIDE 2.5 MG/HR: 0.1 INJECTION, SOLUTION INTRAVENOUS at 18:51

## 2022-06-15 RX ADMIN — LEVOTHYROXINE SODIUM 25 MCG: 25 TABLET ORAL at 05:10

## 2022-06-15 NOTE — PROGRESS NOTES
Chief complaint: Symptomatic right internal carotid artery stenosis    Admit Diagnosis:   Left arm weakness [R29.898]     Subjective: No events overnight    Objective:    Vitals:    06/15/22 0806   BP: (!) 204/108   Pulse: 64   Resp: 16   Temp:    SpO2: 97%     Pulse  Av.9  Min: 54  Max: 70  Systolic (24hrs), Av , Min:146 , Max:204     Diastolic (24hrs), Av, Min:77, Max:108    Temp (24hrs), Av.2 °F (36.8 °C), Min:97.9 °F (36.6 °C), Max:98.5 °F (36.9 °C)      He is lying in bed.  He is alert, pleasant, conversant, and appropriate.  Cranial nerves II through XII are grossly intact.  He still has some left upper extremity weakness.    Lab Results   Component Value Date     06/15/2022       A/P:   Admit Diagnosis:   Left arm weakness [R29.898]     Informed consent was once again obtained for a diagnostic cerebral angiogram with possible carotid stent.  He received his aspirin and Plavix this morning.  We will proceed with his angiogram and possible stent immediately.

## 2022-06-15 NOTE — PLAN OF CARE
Goal Outcome Evaluation:  Plan of Care Reviewed With: patient        Progress: improving  Outcome Evaluation: Pt post carotid stent placement. Right femoral site is clean, dry and intact. Hemodynically stable. Dr. Flores at bedside stating pt will most likely be getting discharged tomorrow.

## 2022-06-15 NOTE — PROGRESS NOTES
Stroke Progress Note       Chief Complaint: Left Arm Weakness     Subjective    Subjective     Subjective:  Patient came back from the procedure, was in reverse Trendelenburg position.            Objective    Objective      Temp:  [97.9 °F (36.6 °C)-98.5 °F (36.9 °C)] 97.9 °F (36.6 °C)  Heart Rate:  [50-65] 56  Resp:  [16-20] 18  BP: (101-204)/() 101/61        Neurological Exam  Mental Status  Awake and alert. Oriented to person, place, time and situation. Oriented to person, place, and time. Recent and remote memory are intact. Speech is normal. Language is fluent with no aphasia. Attention and concentration are normal. Fund of knowledge is appropriate for level of education.    Cranial Nerves  CN II: Visual fields full to confrontation.  CN III, IV, VI: Extraocular movements intact bilaterally. Pupils equal round and reactive to light bilaterally.  CN V: Facial sensation is normal.  CN VII: Full and symmetric facial movement.  CN VIII: Hearing intact bilaterally .  CN IX, X: Palate elevates symmetrically  CN XI: Shoulder shrug strength is normal.  CN XII: Tongue midline without atrophy or fasciculations.    Motor  Normal muscle bulk throughout. No fasciculations present. Normal muscle tone. No abnormal involuntary movements. Left pronator drift.  RUE 5/5  LUE 4+/5  RLE 5/5  LLE 5/5 .    Sensory  Light touch is normal in upper and lower extremities. Sensation: Patient reports mild tingling in 4th and 5th digit on Left hand. .     Coordination  Right: Finger-to-nose normal.Left: Finger-to-nose normal.    Gait    Not observed .      Physical Exam  Vitals and nursing note reviewed.   Constitutional:       General: He is awake. He is not in acute distress.     Appearance: Normal appearance.   HENT:      Head: Normocephalic and atraumatic.      Nose: Nose normal.      Mouth/Throat:      Mouth: Mucous membranes are moist.      Pharynx: Oropharynx is clear.   Eyes:      Extraocular Movements: Extraocular movements  intact.      Pupils: Pupils are equal, round, and reactive to light.   Cardiovascular:      Rate and Rhythm: Normal rate and regular rhythm.      Pulses: Normal pulses.   Pulmonary:      Effort: Pulmonary effort is normal. No respiratory distress.   Abdominal:      General: Abdomen is flat.   Musculoskeletal:         General: No swelling or tenderness.      Cervical back: Normal range of motion.   Skin:     General: Skin is warm and dry.   Neurological:      Mental Status: He is alert and oriented to person, place, and time.      Cranial Nerves: No cranial nerve deficit.      Sensory: Sensory deficit present.      Motor: Weakness present.      Coordination: Coordination normal.   Psychiatric:         Mood and Affect: Mood normal.         Speech: Speech normal.         Behavior: Behavior normal.         Results Review:      I reviewed the patient's new clinical results.    Glucose 210  Na 134  HgA1C 9.8    H&H 13.9 41.6  Platelets 236  ALT 21  AST 35    MRI of brain reveals right hemispheric strokes, no evidence of hemorrhage.     Results for orders placed during the hospital encounter of 06/13/22    Duplex Carotid Ultrasound CAR    Interpretation Summary  · Right internal carotid artery stenosis of >70%.  · Left internal carotid artery stenosis of >70%.  · There is antegrade flow in the vertebral arteries bilaterally.  · When compared to the previous study performed in October 2020, the right ICA stenosis is increased and the left ICA stenosis is stable.    Results for orders placed during the hospital encounter of 06/13/22    Adult Transthoracic Echo Complete W/ Cont if Necessary Per Protocol (With Agitated Saline)    Interpretation Summary  · Left ventricular ejection fraction appears to be 56 - 60%. Left ventricular systolic function is normal.  · Left ventricular wall thickness is consistent with mild to moderate concentric hypertrophy.  · There is moderate calcification of the aortic valve.  · Mild  aortic valve regurgitation is present.  · Mild mitral annular calcification is present.  · Mild mitral valve regurgitation is present.  · Mild tricuspid valve regurgitation is present.  - LA Cavity normal size no evidence of PFO           Assessment/Plan     Assessment/Plan:       1. Symptomatic right ICA stenosis s/p ICA stent 6/15/2022  - Continue dual antiplatelet therapy.   -patient with sinus bradycardia-continue to monitor, this could be postprocedural baroreceptor reflex sensitivity, consider to hold metoprolol if patient is symptomatic from bradycardia.       2. Hypertension  - Goal SBP   -avoid blood pressure variability.     3. Hyperlipidemia  - Continue Crestor 40 mg    5. DM Type II   -HgBA1c 9.8 on this admission   -Goal HgBA1c less than 7.0  -Management per hospitalist   -Maintain euglycemia, goal less than 140.     Critically ill, spent more than 35 min, reviewing the scans and , discussing the plan with multidisciplinary team. Neurology will continue to follow.       Leonel Saeed MD  06/15/22  14:42 EDT

## 2022-06-15 NOTE — PROGRESS NOTES
Intensive Care Follow-up     Hospital:  LOS: 2 days   Mr. Laron Pandey, 61 y.o. male is followed for:   Bilateral carotid artery stenosis        History of present illness:   Laron Pandey is a 61 y.o. male with PMH MERLY prior bilateral CEAs, HTN, HLP, CAD prior CABG, PAD prior left KB stenting on Xarelto, T2DM, hypothyroidism, 1/2 PPD smoking and 6 beers 3-4 times weekly who was admitted 6/13/22 to the Hospitalist service for stroke.  He had a one day c/o bilateral leg spasms, syncope, left arm weakness, N/V/D, diaphoresis and an episode of bowel incontinence.  He was brought to the ED.  NIHSS was 1.  CTH revealed chronic small vessel ischemic disease.  MRI Brain revealed focal areas of acute cortical infarct involving the right pre and post central gyrus near the vertex as well as involving a small area of the right frontal lobe cortex.  MRA head was unremarkable.  MRA neck revealed 50-75% stenosis VERA and moderate narrowing present on the left with luminal irregularity concerning for either dissection or plaque.  Bilateral carotid duplex revealed stenosis >70% bilaterally.  TTE revealed EF 56-60%, moderate calcification of AV, mild AR, mild MR and mild TR.  He underwent cerebral carotid angiogram with right carotid stent placement today.  Plan is to medically manage his LICA stenosis and follow outpatient.      Subjective   Interval History:  He was transferred to the ICU post procedure today.  He has no complaints.  He has good saturation on room air.         The patient's past medical, surgical and social history were reviewed and updated in Epic as appropriate.    Review of Systems -   General ROS: negative for - chills, fever or night sweats  Respiratory ROS: no cough, shortness of breath, or wheezing  Cardiovascular ROS: no chest pain or dyspnea on exertion  Gastrointestinal ROS: no abdominal pain, change in bowel habits, or black or bloody stools       Objective     Infusions:  niCARdipine, 5-15 mg/hr,  "Last Rate: Stopped (06/15/22 1143)      Medications:  aspirin, 81 mg, Oral, Daily  clopidogrel, 75 mg, Oral, Daily  gabapentin, 100 mg, Oral, TID  insulin detemir, 20 Units, Subcutaneous, Nightly  insulin lispro, 0-9 Units, Subcutaneous, TID AC  levothyroxine, 200 mcg, Oral, Q AM  levothyroxine, 25 mcg, Oral, Q AM  metoprolol succinate XL, 25 mg, Oral, Q24H  nicotine, 1 patch, Transdermal, Q24H  rosuvastatin, 40 mg, Oral, Nightly  senna-docusate sodium, 2 tablet, Oral, BID  sodium chloride, 10 mL, Intravenous, Q12H  sodium chloride, 10 mL, Intravenous, Q12H  sodium chloride, 10 mL, Intravenous, Q12H        Vital Sign Min/Max for last 24 hours  Temp  Min: 97.9 °F (36.6 °C)  Max: 98.5 °F (36.9 °C)   BP  Min: 101/61  Max: 204/108   Pulse  Min: 50  Max: 65   Resp  Min: 16  Max: 20   SpO2  Min: 95 %  Max: 100 %   No data recorded       Input/Output for last 24 hour shift  06/14 0701 - 06/15 0700  In: 840 [P.O.:840]  Out: -       Objective:  General Appearance:  Comfortable and in no acute distress.    Vital signs: (most recent): Blood pressure 139/73, pulse 63, temperature 97.9 °F (36.6 °C), temperature source Oral, resp. rate 18, height 172.7 cm (67.99\"), weight 88.5 kg (195 lb), SpO2 99 %.  Vital signs are normal.    HEENT: Normal HEENT exam.    Lungs:  Normal effort and normal respiratory rate.  Breath sounds clear to auscultation.  He is not in respiratory distress.  No rales, wheezes or rhonchi.    Heart: Normal rate.  Regular rhythm.  No murmur or friction rub.   Abdomen: Abdomen is soft and non-distended.  Bowel sounds are normal.   There is no abdominal tenderness.     Extremities: There is no dependent edema.    Pulses: Distal pulses are intact.    Neurological: Patient is alert and oriented to person, place and time.  GCS score is 15.    Pupils:  Pupils are equal, round, and reactive to light.    Skin:  Warm and dry.  No rash.           Results from last 7 days   Lab Units 06/15/22  0544 06/14/22  0608 " 06/13/22  1114   WBC 10*3/mm3 7.48 7.41 10.74   HEMOGLOBIN g/dL 13.9 13.9 14.5   PLATELETS 10*3/mm3 244 236 277     Results from last 7 days   Lab Units 06/15/22  0544 06/14/22  0608 06/13/22  1102   SODIUM mmol/L 136 134*  --    POTASSIUM mmol/L 4.3 4.4  --    CO2 mmol/L 27.0 28.0  --    BUN mg/dL 14 15  --    CREATININE mg/dL 0.77 0.93 0.90   GLUCOSE mg/dL 241* 259*  --      Estimated Creatinine Clearance: 108.9 mL/min (by C-G formula based on SCr of 0.77 mg/dL).          Images:   I reviewed the patient's results and images.     Assessment & Plan   Impression        Bilateral carotid artery stenosis    Uncontrolled type 2 diabetes mellitus with hyperglycemia, with long-term current use of insulin (HCC)    Hyperlipidemia    Hypertension    Coronary artery disease involving coronary bypass graft of native heart without angina pectoris    PAD (peripheral artery disease) ([facility])    Tobacco abuse    Hypothyroidism    Alcohol use       Plan        Bilateral Carotid Artery Stenosis  Acute focal cortical infarct in the right pre and postcentral gyri near the vertex, as well as a small area of right frontal lobe  · Bilateral carotid stenosis revealed > 70% stenosis bilaterally  · S/p carotid stent 6/15 by Dr. Flores  · LICA stenosis to be managed medically and followed outpatient  · Neuro checks  · Continue DAPT  · AM labs    Left shoulder pain after syncopal episode  · MRI left shoulder pending    HTN  HLP  CAD prior CABG  PAD prior Lt KB stenting (low dose Xarelto at home)  · Continue Metoprolol  · Continue Crestor    T2DM, uncontrolled  Hgb A1c 9.8  · Continue ACHS blood glucose checks  · Continue cardiac, consistent carbohydrate diet  · Change basal insulin to q12 hours  · Increase correction SSI    Tobacco use  · Nicotine patch daily  · Smoking cessation education    Hypothyroidism  · Continue replacement therapy    Alcohol use   · Watch for withdrawal    DVT prophylaxis:  SCDs  GI prophylaxis:  Add PPI  Disposition:   Keep in ICU    Plan of care and goals reviewed with multidisciplinary/antibiotic stewardship team during rounds.   I discussed the patient's findings and my recommendations with patient, family, nursing staff and primary care team     Time: 35 minutes, face to face, with the patient and family or on the day coordinating care with other health care providers.     I spent > 50% percent of this time, counseling and discussing evaluation, current status and management.      CARLI Rangel, AGACNP-BC, FNP-BC  Pulmonary and Critical Care Service

## 2022-06-16 LAB
ANION GAP SERPL CALCULATED.3IONS-SCNC: 9 MMOL/L (ref 5–15)
BASOPHILS # BLD AUTO: 0.04 10*3/MM3 (ref 0–0.2)
BASOPHILS NFR BLD AUTO: 0.3 % (ref 0–1.5)
BH CV MID RIGHT ICA HIDDEN LRR: 1 CM
BH CV RIGHT CCA HIDDEN LRR: 1 CM/S
BH CV VAS CAROTID RIGHT DISTAL STENT EDV: 34 CM/S
BH CV VAS CAROTID RIGHT DISTAL STENT PSV: 97 CM/S
BH CV VAS CAROTID RIGHT DISTAL TO STENT NATIVE VESSEL E: 27 CM/S
BH CV VAS CAROTID RIGHT DISTAL TO STENT PSV: 84 CM/S
BH CV VAS CAROTID RIGHT MID STENT EDV: 36 CM/S
BH CV VAS CAROTID RIGHT MID STENT PSV: 105 CM/S
BH CV VAS CAROTID RIGHT PROXIMAL STENT EDV: 25 CM/S
BH CV VAS CAROTID RIGHT PROXIMAL STENT PSV: 80 CM/S
BH CV VAS CAROTID RIGHT STENT NATIVE VESSEL PROXIMAL EDV: 31 CM/S
BH CV VAS CAROTID RIGHT STENT NATIVE VESSEL PROXIMAL PS: 80 CM/S
BH CV XLRA MEAS RIGHT DIST CCA EDV: 29.1 CM/SEC
BH CV XLRA MEAS RIGHT DIST CCA PSV: 81.8 CM/SEC
BH CV XLRA MEAS RIGHT DIST ICA EDV: 25.2 CM/SEC
BH CV XLRA MEAS RIGHT DIST ICA PSV: 86.2 CM/SEC
BH CV XLRA MEAS RIGHT ICA/CCA RATIO: 1.42
BH CV XLRA MEAS RIGHT MID CCA EDV: 24.1 CM/SEC
BH CV XLRA MEAS RIGHT MID CCA PSV: 71.9 CM/SEC
BH CV XLRA MEAS RIGHT MID ICA EDV: 33.5 CM/SEC
BH CV XLRA MEAS RIGHT MID ICA PSV: 102.1 CM/SEC
BH CV XLRA MEAS RIGHT PROX CCA EDV: 22.5 CM/SEC
BH CV XLRA MEAS RIGHT PROX CCA PSV: 125.6 CM/SEC
BH CV XLRA MEAS RIGHT PROX ECA EDV: 5.5 CM/SEC
BH CV XLRA MEAS RIGHT PROX ECA PSV: 34.6 CM/SEC
BH CV XLRA MEAS RIGHT PROX ICA EDV: 27.4 CM/SEC
BH CV XLRA MEAS RIGHT PROX ICA PSV: 88.4 CM/SEC
BH CV XLRA MEAS RIGHT PROX SCLA PSV: 135.2 CM/SEC
BH CV XLRA MEAS RIGHT VERTEBRAL A EDV: 21.4 CM/SEC
BH CV XLRA MEAS RIGHT VERTEBRAL A PSV: 82.3 CM/SEC
BH CVPROX RIGHT ICA HIDDEN LRR: 1 CM
BUN SERPL-MCNC: 20 MG/DL (ref 8–23)
BUN/CREAT SERPL: 25 (ref 7–25)
CALCIUM SPEC-SCNC: 9.1 MG/DL (ref 8.6–10.5)
CHLORIDE SERPL-SCNC: 103 MMOL/L (ref 98–107)
CO2 SERPL-SCNC: 24 MMOL/L (ref 22–29)
CREAT SERPL-MCNC: 0.8 MG/DL (ref 0.76–1.27)
DEPRECATED RDW RBC AUTO: 45.3 FL (ref 37–54)
EGFRCR SERPLBLD CKD-EPI 2021: 100.7 ML/MIN/1.73
EOSINOPHIL # BLD AUTO: 0.1 10*3/MM3 (ref 0–0.4)
EOSINOPHIL NFR BLD AUTO: 0.8 % (ref 0.3–6.2)
ERYTHROCYTE [DISTWIDTH] IN BLOOD BY AUTOMATED COUNT: 12.7 % (ref 12.3–15.4)
GLUCOSE BLDC GLUCOMTR-MCNC: 106 MG/DL (ref 70–130)
GLUCOSE BLDC GLUCOMTR-MCNC: 260 MG/DL (ref 70–130)
GLUCOSE BLDC GLUCOMTR-MCNC: 285 MG/DL (ref 70–130)
GLUCOSE BLDC GLUCOMTR-MCNC: 291 MG/DL (ref 70–130)
GLUCOSE SERPL-MCNC: 48 MG/DL (ref 65–99)
HCT VFR BLD AUTO: 39.1 % (ref 37.5–51)
HGB BLD-MCNC: 13.3 G/DL (ref 13–17.7)
IMM GRANULOCYTES # BLD AUTO: 0.07 10*3/MM3 (ref 0–0.05)
IMM GRANULOCYTES NFR BLD AUTO: 0.6 % (ref 0–0.5)
LYMPHOCYTES # BLD AUTO: 1.74 10*3/MM3 (ref 0.7–3.1)
LYMPHOCYTES NFR BLD AUTO: 13.7 % (ref 19.6–45.3)
MAGNESIUM SERPL-MCNC: 2.6 MG/DL (ref 1.6–2.4)
MAXIMAL PREDICTED HEART RATE: 159 BPM
MCH RBC QN AUTO: 32.7 PG (ref 26.6–33)
MCHC RBC AUTO-ENTMCNC: 34 G/DL (ref 31.5–35.7)
MCV RBC AUTO: 96.1 FL (ref 79–97)
MONOCYTES # BLD AUTO: 0.99 10*3/MM3 (ref 0.1–0.9)
MONOCYTES NFR BLD AUTO: 7.8 % (ref 5–12)
NEUTROPHILS NFR BLD AUTO: 76.8 % (ref 42.7–76)
NEUTROPHILS NFR BLD AUTO: 9.77 10*3/MM3 (ref 1.7–7)
NRBC BLD AUTO-RTO: 0 /100 WBC (ref 0–0.2)
PA ADP PRP-ACNC: 156 PRU
PHOSPHATE SERPL-MCNC: 3.9 MG/DL (ref 2.5–4.5)
PLATELET # BLD AUTO: 253 10*3/MM3 (ref 140–450)
PMV BLD AUTO: 10.5 FL (ref 6–12)
POTASSIUM SERPL-SCNC: 3.8 MMOL/L (ref 3.5–5.2)
RBC # BLD AUTO: 4.07 10*6/MM3 (ref 4.14–5.8)
RIGHT ARM BP: NORMAL MMHG
SODIUM SERPL-SCNC: 136 MMOL/L (ref 136–145)
STRESS TARGET HR: 135 BPM
WBC NRBC COR # BLD: 12.71 10*3/MM3 (ref 3.4–10.8)

## 2022-06-16 PROCEDURE — 99233 SBSQ HOSP IP/OBS HIGH 50: CPT | Performed by: NURSE PRACTITIONER

## 2022-06-16 PROCEDURE — 99232 SBSQ HOSP IP/OBS MODERATE 35: CPT | Performed by: NURSE PRACTITIONER

## 2022-06-16 PROCEDURE — 63710000001 INSULIN DETEMIR PER 5 UNITS: Performed by: NURSE PRACTITIONER

## 2022-06-16 PROCEDURE — 83735 ASSAY OF MAGNESIUM: CPT | Performed by: NURSE PRACTITIONER

## 2022-06-16 PROCEDURE — 82962 GLUCOSE BLOOD TEST: CPT

## 2022-06-16 PROCEDURE — 84100 ASSAY OF PHOSPHORUS: CPT | Performed by: NURSE PRACTITIONER

## 2022-06-16 PROCEDURE — 63710000001 INSULIN LISPRO (HUMAN) PER 5 UNITS: Performed by: NURSE PRACTITIONER

## 2022-06-16 PROCEDURE — 85025 COMPLETE CBC W/AUTO DIFF WBC: CPT | Performed by: NEUROLOGICAL SURGERY

## 2022-06-16 PROCEDURE — 80048 BASIC METABOLIC PNL TOTAL CA: CPT | Performed by: NEUROLOGICAL SURGERY

## 2022-06-16 PROCEDURE — 85576 BLOOD PLATELET AGGREGATION: CPT | Performed by: NURSE PRACTITIONER

## 2022-06-16 RX ORDER — ACETAMINOPHEN 500 MG
1000 TABLET ORAL ONCE
Status: COMPLETED | OUTPATIENT
Start: 2022-06-16 | End: 2022-06-16

## 2022-06-16 RX ORDER — HYDROCODONE BITARTRATE AND ACETAMINOPHEN 5; 325 MG/1; MG/1
1 TABLET ORAL ONCE AS NEEDED
Status: COMPLETED | OUTPATIENT
Start: 2022-06-16 | End: 2022-06-16

## 2022-06-16 RX ORDER — HYDROCODONE BITARTRATE AND ACETAMINOPHEN 5; 325 MG/1; MG/1
1 TABLET ORAL EVERY 6 HOURS PRN
Status: DISCONTINUED | OUTPATIENT
Start: 2022-06-16 | End: 2022-06-17 | Stop reason: HOSPADM

## 2022-06-16 RX ADMIN — ACETAMINOPHEN 1000 MG: 500 TABLET ORAL at 13:37

## 2022-06-16 RX ADMIN — PANTOPRAZOLE SODIUM 40 MG: 40 TABLET, DELAYED RELEASE ORAL at 08:42

## 2022-06-16 RX ADMIN — GABAPENTIN 100 MG: 100 CAPSULE ORAL at 08:42

## 2022-06-16 RX ADMIN — INSULIN LISPRO 12 UNITS: 100 INJECTION, SOLUTION INTRAVENOUS; SUBCUTANEOUS at 17:18

## 2022-06-16 RX ADMIN — ACETAMINOPHEN 325MG 650 MG: 325 TABLET ORAL at 07:53

## 2022-06-16 RX ADMIN — INSULIN LISPRO 12 UNITS: 100 INJECTION, SOLUTION INTRAVENOUS; SUBCUTANEOUS at 20:53

## 2022-06-16 RX ADMIN — CLOPIDOGREL BISULFATE 75 MG: 75 TABLET ORAL at 05:08

## 2022-06-16 RX ADMIN — INSULIN DETEMIR 15 UNITS: 100 INJECTION, SOLUTION SUBCUTANEOUS at 20:50

## 2022-06-16 RX ADMIN — Medication 1 PATCH: at 08:42

## 2022-06-16 RX ADMIN — INSULIN LISPRO 12 UNITS: 100 INJECTION, SOLUTION INTRAVENOUS; SUBCUTANEOUS at 11:31

## 2022-06-16 RX ADMIN — ASPIRIN 81 MG CHEWABLE TABLET 81 MG: 81 TABLET CHEWABLE at 05:08

## 2022-06-16 RX ADMIN — INSULIN DETEMIR 20 UNITS: 100 INJECTION, SOLUTION SUBCUTANEOUS at 08:42

## 2022-06-16 RX ADMIN — SENNOSIDES AND DOCUSATE SODIUM 2 TABLET: 50; 8.6 TABLET ORAL at 08:42

## 2022-06-16 RX ADMIN — GABAPENTIN 100 MG: 100 CAPSULE ORAL at 20:51

## 2022-06-16 RX ADMIN — Medication 10 ML: at 22:05

## 2022-06-16 RX ADMIN — METOPROLOL SUCCINATE 25 MG: 25 TABLET, EXTENDED RELEASE ORAL at 08:42

## 2022-06-16 RX ADMIN — RIVAROXABAN 2.5 MG: 2.5 TABLET, FILM COATED ORAL at 17:18

## 2022-06-16 RX ADMIN — RIVAROXABAN 2.5 MG: 2.5 TABLET, FILM COATED ORAL at 08:43

## 2022-06-16 RX ADMIN — Medication 10 ML: at 08:43

## 2022-06-16 RX ADMIN — ROSUVASTATIN 40 MG: 20 TABLET, FILM COATED ORAL at 20:50

## 2022-06-16 RX ADMIN — SENNOSIDES AND DOCUSATE SODIUM 2 TABLET: 50; 8.6 TABLET ORAL at 20:51

## 2022-06-16 RX ADMIN — GABAPENTIN 100 MG: 100 CAPSULE ORAL at 16:10

## 2022-06-16 RX ADMIN — HYDROCODONE BITARTRATE AND ACETAMINOPHEN 1 TABLET: 5; 325 TABLET ORAL at 17:40

## 2022-06-16 RX ADMIN — LEVOTHYROXINE SODIUM 225 MCG: 150 TABLET ORAL at 05:08

## 2022-06-16 NOTE — CASE MANAGEMENT/SOCIAL WORK
Continued Stay Note  [facility]     Patient Name: Laron Pandey  MRN: [id number]  Today's Date: 6/16/2022    Admit Date: 6/13/2022     Discharge Plan     Row Name 06/16/22 1137       Plan    Plan Home with s.o.    Patient/Family in Agreement with Plan yes    Plan Comments Spoke to pt and his daughter at . Faxed [facility] papers to [facility] office and pt will follow up with the office for completed paperwork. He plans to d/c home and has no further d/c needs. CM is following.    1500: Consult noted for pt may need outpatient OT pending ortho's recommendation. Ortho consult pending. Pt would like the referral sent to [facility] in [facility] ([phone]) and if they are unable to accept, send to [facility] ([phone]). CM will send referral pending ortho's evals.       Final Discharge Disposition Code 01 - home or self-care               Discharge Codes    No documentation.               Expected Discharge Date and Time     Expected Discharge Date Expected Discharge Time    Jun 16, 2022             Meka Dietrich RN

## 2022-06-16 NOTE — PROGRESS NOTES
"Intensive Care Follow-up     Hospital:  LOS: 3 days   Mr. Laron Pandey, 61 y.o. male is followed for:   Bilateral carotid artery stenosis     Subjective      History of Present Illness (copied from H&P note on 6/13/22):   Laron Pandey is a 61 y.o. male with PMH MERLY prior bilateral CEAs, HTN, HLP, CAD prior CABG, PAD prior left KB stenting on Xarelto, T2DM, hypothyroidism, 1/2 PPD smoking and 6 beers 3-4 times weekly who was admitted 6/13/22 to the Hospitalist service for stroke.  He had a one day c/o bilateral leg spasms, syncope, left arm weakness, N/V/D, diaphoresis and an episode of bowel incontinence.  He was brought to the ED.  NIHSS was 1.  CTH revealed chronic small vessel ischemic disease.  MRI Brain revealed focal areas of acute cortical infarct involving the right pre and post central gyrus near the vertex as well as involving a small area of the right frontal lobe cortex.  MRA head was unremarkable.  MRA neck revealed 50-75% stenosis VERA and moderate narrowing present on the left with luminal irregularity concerning for either dissection or plaque.  Bilateral carotid duplex revealed stenosis >70% bilaterally.  TTE revealed EF 56-60%, moderate calcification of AV, mild AR, mild MR and mild TR.  He underwent cerebral carotid angiogram with right carotid stent placement today. Plan is to medically manage his LICA stenosis and follow outpatient (end of copied text).      Interval History:  The chart has been reviewed. Episode of hypoglycemia overnight, now improved. MRI of the left shoulder was performed last night with multiple tears noted.     Patient sitting up in the chair this AM in no acute distress, family present at bedside. He remains afebrile, hemodynamically stable, and oxygenating appropriately on RA. He is tolerating a PO diet and ambulating without difficulty. He notes significant left shoulder discomfort with intermittent \"shooting pain\" down his arm and is frustrated that he did not get " his MRI until last night as it was reportedly ordered on admission.     He denies current chest pain, shortness of breath, nausea, vomiting, diarrhea, or abdominal discomfort.      The patient's past medical, surgical and social history were reviewed and updated in Epic as appropriate.     Objective     Infusions:  niCARdipine, 5-15 mg/hr, Last Rate: 3 mg/hr (06/15/22 2124)    Medications:  aspirin, 81 mg, Oral, Daily  clopidogrel, 75 mg, Oral, Daily  gabapentin, 100 mg, Oral, TID  insulin detemir, 20 Units, Subcutaneous, Q12H  insulin lispro, 0-24 Units, Subcutaneous, 4x Daily With Meals & Nightly  levothyroxine, 225 mcg, Oral, Q AM  metoprolol succinate XL, 25 mg, Oral, Q24H  nicotine, 1 patch, Transdermal, Q24H  pantoprazole, 40 mg, Oral, Daily  rivaroxaban, 2.5 mg, Oral, BID With Meals  rosuvastatin, 40 mg, Oral, Nightly  senna-docusate sodium, 2 tablet, Oral, BID  sodium chloride, 10 mL, Intravenous, Q12H    I reviewed the patient's medications.    Vital Sign Min/Max for last 24 hours  Temp  Min: 97.8 °F (36.6 °C)  Max: 98.6 °F (37 °C)   BP  Min: 93/60  Max: 150/87   Pulse  Min: 52  Max: 75   Resp  Min: 20  Max: 20   SpO2  Min: 94 %  Max: 100 %   No data recorded       Input/Output for last 24 hour shift  06/15 0701 - 06/16 0700  In: 720 [P.O.:720]  Out: 575 [Urine:575]      Physical Exam:  GENERAL: Patient sitting up in the chair and conversant. No acute distress.   HEENT: Normocephalic and atraumatic. Trachea midline. PER. EOM WNL.   LUNGS: Chest rise of normal depth and symmetric. Lungs clear to auscultation bilaterally. No wheezes, rhonchi, or rales.   HEART: S1,S2 detected. Regular rate and rhythm. No rub, murmur, or gallop.   ABDOMEN: Soft, round, nondistended, and nontender. Bowel sounds normoactive.   EXTREMITIES: No clubbing, edema, or cyanosis. Peripheral pulses present. Skin warm and dry. Significantly decreased ROM present in LUE.   NEURO/PSYCH: Alert and oriented. Follows commands. Moves all  extremities. No focal deficits.      Interval: baseline  1a. Level of Consciousness: 0-->Alert, keenly responsive  1b. LOC Questions: 0-->Answers both questions correctly  1c. LOC Commands: 0-->Performs both tasks correctly  2. Best Gaze: 0-->Normal  3. Visual: 0-->No visual loss  4. Facial Palsy: 0-->Normal symmetrical movements  5a. Motor Arm, Left: 0-->No drift, limb holds 90 (or 45) degrees for full 10 secs  5b. Motor Arm, Right: 0-->No drift, limb holds 90 (or 45) degrees for full 10 secs  6a. Motor Leg, Left: 0-->No drift, leg holds 30 degree position for full 5 secs  6b. Motor Leg, Right: 0-->No drift, leg holds 30 degree position for full 5 secs  7. Limb Ataxia: 0-->Absent  8. Sensory: 1-->Mild-to-moderate sensory loss, patient feels pinprick is less sharp or is dull on the affected side, or there is a loss of superficial pain with pinprick, but patient is aware of being touched  9. Best Language: 0-->No aphasia, normal  10. Dysarthria: 0-->Normal  11. Extinction and Inattention (formerly Neglect): 0-->No abnormality    Total (NIH Stroke Scale): 1    Results from last 7 days   Lab Units 06/16/22  0522 06/15/22  0544 06/14/22  0608   WBC 10*3/mm3 12.71* 7.48 7.41   HEMOGLOBIN g/dL 13.3 13.9 13.9   PLATELETS 10*3/mm3 253 244 236     Results from last 7 days   Lab Units 06/16/22  0522 06/15/22  0544 06/14/22  0608   SODIUM mmol/L 136 136 134*   POTASSIUM mmol/L 3.8 4.3 4.4   CO2 mmol/L 24.0 27.0 28.0   BUN mg/dL 20 14 15   CREATININE mg/dL 0.80 0.77 0.93   MAGNESIUM mg/dL 2.6*  --   --    PHOSPHORUS mg/dL 3.9  --   --    GLUCOSE mg/dL 48* 241* 259*     Estimated Creatinine Clearance: 104.8 mL/min (by C-G formula based on SCr of 0.8 mg/dL).        I reviewed the patient's new clinical results.  I reviewed the patient's new imaging results/reports including actual images and agree with reports.     Imaging Results (Last 24 Hours)     Procedure Component Value Units Date/Time    MRI Shoulder Left Without Contrast  [[id number]] Collected: 06/16/22 0809     Updated: 06/16/22 1756    Narrative:      DATE OF EXAM: 06/15/2022 10:47 PM     PROCEDURE: MRI SHOULDER LEFT WITHOUT CONTRAST-     INDICATIONS: check rotator cuff for suspected; I65.21-Occlusion and  stenosis of right carotid artery; R29.898-Other symptoms and signs  involving the musculoskeletal system; I63.9-Cerebral infarction,  unspecified     COMPARISON: Shoulder plain films 06/14/2022.     TECHNIQUE: Multiplanar/multisequence MRI of the left shoulder was  performed without the administration of contrast.     FINDINGS:  There is extensive AC joint arthropathy, and a small subdeltoid bursal  effusion. On coronal images, supraspinatus shows diffusely abnormal  signal and increased thickening, but is not disrupted. There is a near  full-thickness insertional tear, diffuse tendinopathy, and mid  supraspinatus undersurface irregularity. Infraspinatus appears partially  torn. Subscapularis appears to have a small focal insertional tear, but  which may be full-thickness as well. No rotator cuff muscle atrophy is  seen. Long head biceps tendon and biceps anchor appear intact. Anterior,  posterior and inferior portions of the labrum appear intact. Abnormal  signal in the anterior portion of the superior labrum extends to the  labral midline and is thought to represent a superior labral tear rather  than sublabral foramen. Except for DJD-associated changes of the AC  joint, no pathologic marrow signal changes are seen.             Impression:         1. Nonretracted, probably full-thickness tear of the distal  supraspinatus insertion, and diffuse supraspinatus tendinopathy.  2. Partial-thickness infraspinatus tear.  3. Focal insertional tear of the subscapularis, which may represent a  small full-thickness tear.  4. Extensive AC joint arthropathy.  5. Probable superior labral tear.     This report was finalized on 6/16/2022 5:53 PM by Dr. Jasbir Leal MD.           Assessment & Plan    Impression      Bilateral carotid artery stenosis    Uncontrolled type 2 diabetes mellitus with hyperglycemia, with long-term current use of insulin ([facility])    Hyperlipidemia    Hypothyroidism    Coronary artery disease involving coronary bypass graft of native heart without angina pectoris    Hypertension    PAD (peripheral artery disease) ([facility])    Tobacco abuse    Alcohol use       Plan        # Bilateral carotid artery stenosis  # Acute focal cortical infarct in the right pre and postcentral gyri near the vertex, as well as a small area of right frontal lobe  · Bilateral carotid stenosis revealed > 70% stenosis bilaterally  · S/P Rt carotid stent 6/15 by Dr. Flores  · LICA stenosis to be managed medically and followed outpatient  · Continue DAPT, Xarelto restarted today per NS     # Rotator cuff tear  · Likely sustained PTA 2* fall   · Ortho consult placed; appreciate recs  · Outpatient OT to be arranged pending ortho recs  · Tylenol & Norco PRN for pain management     HTN  HLP  CAD prior CABG  PAD prior Lt KB stenting (low dose Xarelto at home)  · Continue Metoprolol  · Continue Crestor     T2DM, uncontrolled  Hgb A1c 9.8  · Continue ACHS blood glucose checks  · Continue cardiac, consistent carbohydrate diet  · Patient with hypoglycemic episode this AM after changing basal insulin to q12 hrs; will decrease dose  · Continue SSI      Tobacco use  · Nicotine patch daily  · Smoking cessation education     Hypothyroidism  · Continue replacement therapy     Alcohol use   · Watch for withdrawal     DVT prophylaxis:  SCDs  GI prophylaxis:  Add PPI  Disposition:  Keep in ICU one more night; likely D/C tomorrow AM     Plan of care and goals reviewed with multidisciplinary/antibiotic stewardship team during rounds.   I discussed the patient's findings and my recommendations with patient, family, nursing staff and primary care team     Allyson Washburn, DNP, APRN, AGACNP-BC  Pulmonary and Critical Care Medicine

## 2022-06-16 NOTE — PROGRESS NOTES
Stroke Progress Note       Chief Complaint: Left Arm Weakness     Subjective    Subjective     Subjective:  Patient resting conformably in bed at this time, in no acute distress He states that he rested some overnight. He reports intermittent numbness in his left arm. Patient had a hypoglycemic event over night that was accompanied with some vision blurriness, however, vision changes were resolved when hypoglycemia was corrected.         Objective    Objective      Temp:  [97.5 °F (36.4 °C)-98.2 °F (36.8 °C)] 97.9 °F (36.6 °C)  Heart Rate:  [50-75] 57  Resp:  [16-20] 20  BP: ()/() 148/78        Neurological Exam  Mental Status  Awake and alert. Oriented to person, place, time and situation. Oriented to person, place, and time. Recent and remote memory are intact. Speech is normal. Language is fluent with no aphasia. Attention and concentration are normal. Fund of knowledge is appropriate for level of education.    Cranial Nerves  CN II: Visual fields full to confrontation.  CN III, IV, VI: Extraocular movements intact bilaterally. Pupils equal round and reactive to light bilaterally. Intermittent  twitching of left lower eye lid.  CN V: Facial sensation is normal.  CN VII: Full and symmetric facial movement.  CN VIII: Hearing intact bilaterally .  CN IX, X: Palate elevates symmetrically  CN XI: Shoulder shrug strength is normal.  CN XII: Tongue midline without atrophy or fasciculations.    Motor  Normal muscle bulk throughout. No fasciculations present. Normal muscle tone. No abnormal involuntary movements. No pronator drift.  RUE 5/5  LUE 4+/5  RLE 5/5  LLE 5/5 .    Sensory  Light touch is normal in upper and lower extremities. Sensation: Patient reports mild tingling in 4th and 5th digit on Left hand. .     Coordination  Right: Finger-to-nose normal.Left: Finger-to-nose normal.    Gait    Not observed .      Physical Exam  Vitals and nursing note reviewed.   Constitutional:       General: He is awake.  He is not in acute distress.     Appearance: Normal appearance.   HENT:      Head: Normocephalic and atraumatic.      Nose: Nose normal.      Mouth/Throat:      Mouth: Mucous membranes are moist.      Pharynx: Oropharynx is clear.   Eyes:      Extraocular Movements: Extraocular movements intact.      Pupils: Pupils are equal, round, and reactive to light.   Cardiovascular:      Rate and Rhythm: Normal rate and regular rhythm.      Pulses: Normal pulses.   Pulmonary:      Effort: Pulmonary effort is normal. No respiratory distress.   Abdominal:      General: Abdomen is flat.   Musculoskeletal:         General: No swelling or tenderness.      Cervical back: Normal range of motion.   Skin:     General: Skin is warm and dry.   Neurological:      Mental Status: He is alert and oriented to person, place, and time.      Cranial Nerves: No cranial nerve deficit.      Sensory: Sensory deficit present.      Motor: Weakness present.      Coordination: Coordination normal.   Psychiatric:         Mood and Affect: Mood normal.         Speech: Speech normal.         Behavior: Behavior normal.         Results Review:      I reviewed the patient's new clinical results.    Glucose 106  Na 136  HgA1C 9.8    H&H 13.3 39.1  Platelets 253  ALT 24  AST 30    MRI of brain reveals right hemispheric strokes, no evidence of hemorrhage.         Results for orders placed during the hospital encounter of 06/13/22    Duplex Carotid Ultrasound CAR    Interpretation Summary  · Right internal carotid artery stenosis of >70%.  · Left internal carotid artery stenosis of >70%.  · There is antegrade flow in the vertebral arteries bilaterally.  · When compared to the previous study performed in October 2020, the right ICA stenosis is increased and the left ICA stenosis is stable.    Results for orders placed during the hospital encounter of 06/13/22    Adult Transthoracic Echo Complete W/ Cont if Necessary Per Protocol (With Agitated  Saline)    Interpretation Summary  · Left ventricular ejection fraction appears to be 56 - 60%. Left ventricular systolic function is normal.  · Left ventricular wall thickness is consistent with mild to moderate concentric hypertrophy.  · There is moderate calcification of the aortic valve.  · Mild aortic valve regurgitation is present.  · Mild mitral annular calcification is present.  · Mild mitral valve regurgitation is present.  · Mild tricuspid valve regurgitation is present.  - LA Cavity normal size no evidence of PFO           Assessment/Plan     Assessment/Plan:  61-year-old male with significant vascular risk factors including hypertension, hyperlipidemia, tobacco abuse, naïve to antiplatelet therapy, takes low-dose Xarelto for claudication, and a previous history of bilateral CEA presented on 6/13/2022 with left arm numbness, underwent MRI brain which revealed right frontal, right pre and post central gyrus infarcts. CUS revealed VERA stenosis. Pt underwent VERA stent on 6/15 with Dr. Flores.    Acute cortical infarcts in the right cerebral hemisphere likely D/T  Symptomatic right ICA stenosis s/p ICA stent 6/15/2022  -Continue dual antiplatelet therapy with aspirin and Plavix.  Home Xarelto 2.5 mg restarted today. Patient will continue on triple therapy for 3 weeks. At 3 weeks, plan to d/c ASA therapy and continue with Plavix and Xarelto.   -P2Y12 156 indicating adequate platelet inhibition  -Sinus bradycardia noted post procedure. Likely due to baroreceptors. Currently resolved.   -HTN; goal SBP   -T2DM; HgBA1c 9.8. Goal HgBA1c less than 7.0.  Diabetes education following.  Follow-up appointment scheduled on 6/21.  -HLD; . Above goal of less than 70 for secondary stroke prevention. Continue Crestor 40 mg   -Follow up with Dr. Flores via telephone in 3 weeks  -Follow-up with stroke neurology in 6 weeks.  -Stroke education including lifestyle/risk factor modification and medication compliance  discussed at length.  Patient verbalized understanding.  -Stroke neurology will sign off. Patient discussed with Dr. Flores.  Patient is okay to discharge home from his perspective with a telephone follow-up in 3 weeks.  Patient will follow-up in the stroke clinic in 6 weeks.    PVD  -Followed by Dr. Block as an outpatient  -On ASA, Xarelto at home  -Continue home Gabapentin  -Discussed with Dr. Block this am. Will continue DAPT with ASA and Plavix and restart Xarelto 2.5mg BID.    Plan discussed with the patient, Dr. Saeed, Dr. Flores, nursing staff, and primary team.     CARLI Schmitz, AGACNP-BC  06/16/22  07:48 EDT

## 2022-06-16 NOTE — PLAN OF CARE
Goal Outcome Evaluation:  Plan of Care Reviewed With: patient        Progress: improving  Outcome Evaluation: Neuro stroke has signed off and pt got the clearance to be discharged. Pt waiting on ortho surgery to come and see him/ give recommendations on rotater cuff tear that was seen on last nights MRI. Pts vitals clinically stable for his parameters.

## 2022-06-16 NOTE — DISCHARGE SUMMARY
DISCHARGE SUMMARY       Patient name: Laron Pandey  CSN: [id number]  MRN: [id number]  : 1960    Date of Admission: 2022  Date of Discharge:  2022    Admitting Physician: Brittany Salgado MD  Primary Care Provider: Nacho Omer MD  Consultations:   Leonel Saeed MD Neurology  Tucker Flores MD Neurosurgery   Enrique Sutherland MD  Orthopedic Surgery    Admission Diagnosis: Bilateral carotid artery stenosis     Discharge Diagnoses:     Bilateral carotid artery stenosis    Uncontrolled type 2 diabetes mellitus with hyperglycemia, with long-term current use of insulin ([facility])    Hyperlipidemia    Hypothyroidism    Coronary artery disease involving coronary bypass graft of native heart without angina pectoris    Hypertension    PAD (peripheral artery disease) ([facility])    Tobacco abuse    Alcohol use    Procedures:  6/15/2022: Cerebral carotid angiogram with right carotid stent placement     Imaging:  XR Shoulder 2+ View Left    Result Date: 2022  1.  No evidence for displaced fracture or dislocation. 2.  Mild  osteoarthritic degenerative changes of the glenohumeral joint. 3.  Mild  hypertrophic age-related changes of the acromioclavicular joint. 4.  Evidence of changes of calcific tendinopathy involving the distal subscapularis component of the rotator cuff.  This report was finalized on 2022 1:53 PM by Naveen Conroy MD.      MRI Angiogram Head Without Contrast    Result Date: 2022  Focal areas of acute cortical infarct are present involving the right pre and postcentral gyri near the vertex, as well as involving a small area of right frontal lobe cortex. There is no evidence of associated significant mass effect or hemorrhage.  Essentially normal MR angiogram of the head with no evidence of flow-limiting stenosis, large vessel occlusion or aneurysmal dilatation.  MR angiogram of the neck is somewhat limited by noncontrast technique. There is apparent 50-70%  atherosclerotic narrowing of the right ICA origin. Similar moderate narrowing is present on the left, with additional focal area of luminal irregularity concerning for either dissection or possible irregular eccentric plaque. CT angiogram would be useful to more specifically evaluate, as indicated.  This report was finalized on 6/13/2022 1:04 PM by Laron Retana.      MRI Angiogram Neck Without Contrast    Result Date: 6/13/2022  Focal areas of acute cortical infarct are present involving the right pre and postcentral gyri near the vertex, as well as involving a small area of right frontal lobe cortex. There is no evidence of associated significant mass effect or hemorrhage.  Essentially normal MR angiogram of the head with no evidence of flow-limiting stenosis, large vessel occlusion or aneurysmal dilatation.  MR angiogram of the neck is somewhat limited by noncontrast technique. There is apparent 50-70% atherosclerotic narrowing of the right ICA origin. Similar moderate narrowing is present on the left, with additional focal area of luminal irregularity concerning for either dissection or possible irregular eccentric plaque. CT angiogram would be useful to more specifically evaluate, as indicated.  This report was finalized on 6/13/2022 1:04 PM by Laron Retana.      MRI Angiogram Neck With Contrast    Result Date: 6/14/2022  Redemonstration of focal atherosclerotic narrowing of the bilateral ICA origins measuring approximately 65% on the right and 55-60% on the left per NASCET criteria. Redemonstration of focal short segment dissection flap at the origin of the left ICA immediately proximal to the 5 mm region of moderate narrowing.  This report was finalized on 6/14/2022 4:36 PM by Aram Briseno MD.      MRI Brain Without Contrast    Result Date: 6/13/2022  Focal areas of acute cortical infarct are present involving the right pre and postcentral gyri near the vertex, as well as involving a small area of  right frontal lobe cortex. There is no evidence of associated significant mass effect or hemorrhage.  Essentially normal MR angiogram of the head with no evidence of flow-limiting stenosis, large vessel occlusion or aneurysmal dilatation.  MR angiogram of the neck is somewhat limited by noncontrast technique. There is apparent 50-70% atherosclerotic narrowing of the right ICA origin. Similar moderate narrowing is present on the left, with additional focal area of luminal irregularity concerning for either dissection or possible irregular eccentric plaque. CT angiogram would be useful to more specifically evaluate, as indicated.  This report was finalized on 6/13/2022 1:04 PM by Laron Retana.      XR Chest 1 View    Result Date: 6/13/2022  1.  Cardiomegaly suggested.  This report was finalized on 6/13/2022 12:49 PM by Russell Gonzalez MD.      MRI Shoulder Left Without Contrast    Result Date: 6/16/2022   1. Nonretracted, probably full-thickness tear of the distal supraspinatus insertion, and diffuse supraspinatus tendinopathy. 2. Partial-thickness infraspinatus tear. 3. Focal insertional tear of the subscapularis, which may represent a small full-thickness tear. 4. Extensive AC joint arthropathy. 5. Probable superior labral tear.  This report was finalized on 6/16/2022 5:53 PM by Dr. Jasbir Leal MD.      CT Head Without Contrast Stroke Protocol    Result Date: 6/13/2022  1.  There is a hypodense area within the left frontal lobe that could be reflective of more chronic small vessel ischemic change. 2.  Tiny hyperdense areas within the scalp in the frontal area which are nonspecific. Correlation with patient's history with respect to this area would be suggested.  Study was reviewed on the CT scan monitor and discussed with the stroke team navigator at 10:49 AM.  This report was finalized on 6/13/2022 11:03 AM by Russell Gonzalez MD.      History of Present Illness (copied from H&P note on 6/13/22):  Laron Pandey is a  61 y.o. male with history of coronary artery disease, peripheral arterial disease status post stents, carotid stenosis status post CEA, hypertension, hyperlipidemia, type 2 diabetes who presents with left arm numbness and discoordination.  Patient states that on Sunday morning woke up in the morning with spasms in his legs bilaterally and passed out.  He lost consciousness he was found by his girlfriend.  He then was vomiting with diarrhea and sweatiness.  He started having lower extremity spasms.  He also noticed poor coordination of his left arm.  He then went to the bathroom.  He did have an episode of incontinence of his bowels but he was awake at the time.  He also states that he regurgitated food.  Since this episode he has had left arm numbness and lack of control although it is getting better.  He waited today to come to the hospital because he thought it would get better on its own.  Hospital medicine was called for admission (end of copied text).    He was evaluated by Neurology in the ER with work-up notable for STAT MRI of the brain revealing acute cortical infarct in the right pre and postcentral gyri near the vertex as well as a small area of the right frontal lobe and MRA H/N concerning for 50-70% atherosclerotic narrowing of the ICAs bilaterally.  He was not a candidate for TNKase 2* being outside the timing window and was not a candidate for emergent endovascular intervention 2* no LVO. He was instead loaded with ASA and Plavix.    Hospital Course:  Patient was admitted 2* issues discussed in the above HPI and continued on CVA protocol along with DAPT. Further work-up was completed including TTE which was negative for PFO and showed mild valvular dysfunction with preserved LVEF of 56-60%. Neurosurgery was consulted who, upon reviewing films, felt patient would best benefit from right carotid stent placement and medical management of LICA stenosis. He was agreeable to this and the procedure was  "performed by Dr. Flores on 6/15. He tolerated the procedure well and was re-initiated on his home Xarelto shortly after with no issues.     Of note, patient also complained of significant left shoulder pain on admission and MRI was eventually performed revealing a full-thickness tear of the distal supraspinatus insertion, a partial thickness infraspinatus tear, a focal insertional tear of the subscapularis, and a probable superior labral tear. Ortho was consulted with recommendations to keep shoulder mobile / work on ROM and RTC strengthening with outpatient PT/OT. He will follow up with their service outpatient / continue conservative measures but understands that surgical repair may eventually be necessary.     Patient is felt to have reached maximum benefit from this hospitalization and has been deemed appropriate for discharge home. He is feeling well, is tolerating a PO diet, and is ambulating without difficulty. Per Neurology & NS recs he will continue DAPT + Xarelto for 3 weeks, then switch to only Xarelto and Plavix indefinitely. He will follow up with Dr. Flores via telephone visit in 3 weeks and will follow up in the stroke clinic in 6 weeks.     In regard to his rotator cuff tear, he will keep shoulder mobile per Ortho recs and will participate in outpatient PT/OT via [facility] PT in [facility]. 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 [facility] paperwork has been faxed to our office and I have encouraged him to call with any questions.     Vitals:  /76   Pulse 71   Temp 97.7 °F (36.5 °C) (Oral)   Resp 16   Ht 172.7 cm (67.99\")   Wt 83.8 kg (184 lb 11.9 oz)   SpO2 100%   BMI 28.10 kg/m²     Physical Exam:  Constitutional:  Appears well-developed and well-nourished. No distress.   HEENT:  Normocephalic and atraumatic. PER  Neck: Normal range of motion. Neck supple. No JVD present.   Cardiovascular: Normal " rate, regular rhythm, normal heart sounds and intact distal pulses.  No gallop and no friction rub.  No murmur heard.  Pulmonary/Chest: Effort normal and breath sounds normal. No respiratory distress. No wheezes, rhonchi or rales.   Abdominal: Soft. No distension and no mass. There is no tenderness.   Musculoskeletal: Normal range of motion with respect to LUE which is decreased.   Neurological: Alert and oriented to person, place, and time.  No focal deficits  Skin: Skin is warm and dry. No rash noted.   Extremities:  No clubbing, edema or cyanosis  Psychiatric: Normal mood and affect. Behavior is normal.     Labs:  Results from last 7 days   Lab Units 06/17/22  0524   WBC 10*3/mm3 8.85   HEMOGLOBIN g/dL 12.7*   HEMATOCRIT % 36.7*   PLATELETS 10*3/mm3 235     Results from last 7 days   Lab Units 06/17/22  0524 06/16/22  0522 06/15/22  0544   SODIUM mmol/L 138   < > 136   POTASSIUM mmol/L 4.4   < > 4.3   CHLORIDE mmol/L 104   < > 100   CO2 mmol/L 27.0   < > 27.0   BUN mg/dL 17   < > 14   CREATININE mg/dL 0.73*   < > 0.77   CALCIUM mg/dL 8.9   < > 8.8   BILIRUBIN mg/dL  --   --  0.3   ALK PHOS U/L  --   --  134*   ALT (SGPT) U/L  --   --  24   AST (SGOT) U/L  --   --  30   GLUCOSE mg/dL 140*   < > 241*    < > = values in this interval not displayed.         Magnesium   Date Value Ref Range Status   06/16/2022 2.6 (H) 1.6 - 2.4 mg/dL Final     Phosphorus   Date Value Ref Range Status   06/16/2022 3.9 2.5 - 4.5 mg/dL Final           Discharge Medications:     Discharge Medications      New Medications      Instructions Start Date   aspirin 81 MG chewable tablet  Replaces: aspirin 81 MG EC tablet   81 mg, Oral, Daily   Start Date: June 18, 2022     clopidogrel 75 MG tablet  Commonly known as: PLAVIX   75 mg, Oral, Daily   Start Date: June 18, 2022        Changes to Medications      Instructions Start Date   levothyroxine 75 MCG tablet  Commonly known as: SYNTHROID, LEVOTHROID  What changed:   · medication  strength  · how much to take  · when to take this  · Another medication with the same name was removed. Continue taking this medication, and follow the directions you see here.   225 mcg, Oral, Every Early Morning   Start Date: June 18, 2022        Continue These Medications      Instructions Start Date   BD ULTRA-FINE PEN NEEDLES 29G X 12.7MM misc  Generic drug: Insulin Pen Needle   Use with Insulin 3 times daily      ezetimibe 10 MG tablet  Commonly known as: ZETIA   10 mg, Oral, Daily      FreeStyle Jude 14 Day Sensor misc   1 each, Does not apply, Every 14 Days      gabapentin 100 MG capsule  Commonly known as: NEURONTIN   100 mg, Oral, 3 Times Daily      glucose blood test strip  Commonly known as: ONE TOUCH ULTRA TEST   Test three times daily      metFORMIN 1000 MG tablet  Commonly known as: GLUCOPHAGE   1,000 mg, Oral, Daily With Breakfast      metoprolol succinate XL 25 MG 24 hr tablet  Commonly known as: TOPROL-XL   25 mg, Oral, Every 24 Hours Scheduled      NovoLOG Mix 70/30 FlexPen (70-30) 100 UNIT/ML suspension pen-injector injection  Generic drug: insulin aspart prot & aspart   55 UNITS SQ qam 50 UNITS SQ  qpm      ONE TOUCH ULTRA 2 w/Device kit   1 Device, Does not apply, 3 Times Daily      OneTouch Delica Plus [id number] misc   1 each, Does not apply, 3 Times Daily      Rivaroxaban 2.5 MG tablet  Commonly known as: XARELTO   2.5 mg, Oral, 2 Times Daily      rosuvastatin 20 MG tablet  Commonly known as: CRESTOR   20 mg, Oral, Daily      valsartan-hydrochlorothiazide 320-12.5 MG per tablet  Commonly known as: DIOVAN-HCT   1 tablet, Oral, Daily         Stop These Medications    aspirin 81 MG EC tablet  Replaced by: aspirin 81 MG chewable tablet          Discharge Diet:   Diet Instructions     Diet: Regular, Consistent Carbohydrate, Cardiac; Thin      Discharge Diet:  Regular  Consistent Carbohydrate  Cardiac       Fluid Consistency: Thin        Activity at Discharge:    Activity Instructions     Activity  as Tolerated          Follow-up Appointments  Future Appointments   Date Time Provider Department Center   6/21/2022 10:15 AM [facility]   7/6/2022  3:45 PM Mychal Block MD [facility]   8/26/2022  2:30 PM Meka Swan MD [facility]     Additional Instructions for the Follow-ups that You Need to Schedule     Ambulatory Referral to Occupational Therapy   As directed      Specialty needed: Evaluate and treat         Ambulatory Referral to Physical Therapy Evaluate and treat   As directed      Specialty needed: Evaluate and treat         Discharge Follow-up with PCP   As directed       Currently Documented PCP:    Nahco Omer MD    PCP Phone Number:    [phone]     Follow Up Details: 1 week         Discharge Follow-up with Specialty: Stroke Clinic; 6 Weeks   As directed      Specialty: Stroke Clinic    Follow Up: 6 Weeks         Discharge Follow-up with Specified Provider: Dr. Sutherland (Orthopedic Surgery); 2 Weeks   As directed      To: Dr. Sutherland (Orthopedic Surgery)    Follow Up: 2 Weeks         Discharge Follow-up with Specified Provider: Dr. Flores; 3 Weeks   As directed      To: Dr. Flores    Follow Up: 3 Weeks    Follow Up Details: Telephone Visit             Discharge Instructions:  1. Ok to D/C home  2. F/U as above  3. Scripts sent electronically  4. Outpatient PT/OT via [facility] in [facility]  5. Keep left shoulder mobile but do not return to work until clearance given by Ortho          Questions for Current Code Status     Question Answer    Code Status (Patient has no pulse and is not breathing) CPR (Attempt to Resuscitate)    Medical Interventions (Patient has pulse or is breathing) Full Support        Allyson Washburn, DNP, APRN, AGACNP-BC  Pulmonary and Critical Care Medicine    Time: Discharge 40 min    CC: Nacho Omer MD    *Please note that portions of this note were completed with a voice recognition program. Efforts were made to edit the  dictations, but occasionally words are mistranscribed.

## 2022-06-17 VITALS
HEART RATE: 51 BPM | WEIGHT: 184.75 LBS | BODY MASS INDEX: 28 KG/M2 | OXYGEN SATURATION: 100 % | TEMPERATURE: 97.7 F | RESPIRATION RATE: 16 BRPM | SYSTOLIC BLOOD PRESSURE: 140 MMHG | DIASTOLIC BLOOD PRESSURE: 72 MMHG | HEIGHT: 68 IN

## 2022-06-17 LAB
ANION GAP SERPL CALCULATED.3IONS-SCNC: 7 MMOL/L (ref 5–15)
BASOPHILS # BLD AUTO: 0.04 10*3/MM3 (ref 0–0.2)
BASOPHILS NFR BLD AUTO: 0.5 % (ref 0–1.5)
BUN SERPL-MCNC: 17 MG/DL (ref 8–23)
BUN/CREAT SERPL: 23.3 (ref 7–25)
CALCIUM SPEC-SCNC: 8.9 MG/DL (ref 8.6–10.5)
CHLORIDE SERPL-SCNC: 104 MMOL/L (ref 98–107)
CO2 SERPL-SCNC: 27 MMOL/L (ref 22–29)
CREAT SERPL-MCNC: 0.73 MG/DL (ref 0.76–1.27)
DEPRECATED RDW RBC AUTO: 45.7 FL (ref 37–54)
EGFRCR SERPLBLD CKD-EPI 2021: 103.5 ML/MIN/1.73
EOSINOPHIL # BLD AUTO: 0.2 10*3/MM3 (ref 0–0.4)
EOSINOPHIL NFR BLD AUTO: 2.3 % (ref 0.3–6.2)
ERYTHROCYTE [DISTWIDTH] IN BLOOD BY AUTOMATED COUNT: 12.8 % (ref 12.3–15.4)
GLUCOSE BLDC GLUCOMTR-MCNC: 155 MG/DL (ref 70–130)
GLUCOSE BLDC GLUCOMTR-MCNC: 218 MG/DL (ref 70–130)
GLUCOSE SERPL-MCNC: 140 MG/DL (ref 65–99)
HCT VFR BLD AUTO: 36.7 % (ref 37.5–51)
HGB BLD-MCNC: 12.7 G/DL (ref 13–17.7)
IMM GRANULOCYTES # BLD AUTO: 0.03 10*3/MM3 (ref 0–0.05)
IMM GRANULOCYTES NFR BLD AUTO: 0.3 % (ref 0–0.5)
LYMPHOCYTES # BLD AUTO: 3.02 10*3/MM3 (ref 0.7–3.1)
LYMPHOCYTES NFR BLD AUTO: 34.1 % (ref 19.6–45.3)
MCH RBC QN AUTO: 33.5 PG (ref 26.6–33)
MCHC RBC AUTO-ENTMCNC: 34.6 G/DL (ref 31.5–35.7)
MCV RBC AUTO: 96.8 FL (ref 79–97)
MONOCYTES # BLD AUTO: 0.74 10*3/MM3 (ref 0.1–0.9)
MONOCYTES NFR BLD AUTO: 8.4 % (ref 5–12)
NEUTROPHILS NFR BLD AUTO: 4.82 10*3/MM3 (ref 1.7–7)
NEUTROPHILS NFR BLD AUTO: 54.4 % (ref 42.7–76)
NRBC BLD AUTO-RTO: 0 /100 WBC (ref 0–0.2)
PLATELET # BLD AUTO: 235 10*3/MM3 (ref 140–450)
PMV BLD AUTO: 10.6 FL (ref 6–12)
POTASSIUM SERPL-SCNC: 4.4 MMOL/L (ref 3.5–5.2)
RBC # BLD AUTO: 3.79 10*6/MM3 (ref 4.14–5.8)
SODIUM SERPL-SCNC: 138 MMOL/L (ref 136–145)
WBC NRBC COR # BLD: 8.85 10*3/MM3 (ref 3.4–10.8)

## 2022-06-17 PROCEDURE — 82962 GLUCOSE BLOOD TEST: CPT

## 2022-06-17 PROCEDURE — 85025 COMPLETE CBC W/AUTO DIFF WBC: CPT | Performed by: NURSE PRACTITIONER

## 2022-06-17 PROCEDURE — 99239 HOSP IP/OBS DSCHRG MGMT >30: CPT | Performed by: NURSE PRACTITIONER

## 2022-06-17 PROCEDURE — 63710000001 INSULIN LISPRO (HUMAN) PER 5 UNITS: Performed by: NURSE PRACTITIONER

## 2022-06-17 PROCEDURE — 80048 BASIC METABOLIC PNL TOTAL CA: CPT | Performed by: NURSE PRACTITIONER

## 2022-06-17 PROCEDURE — 63710000001 INSULIN DETEMIR PER 5 UNITS: Performed by: NURSE PRACTITIONER

## 2022-06-17 RX ORDER — ASPIRIN 81 MG/1
81 TABLET, CHEWABLE ORAL DAILY
Qty: 21 TABLET | Refills: 0 | Status: SHIPPED | OUTPATIENT
Start: 2022-06-18

## 2022-06-17 RX ORDER — HYDROCODONE BITARTRATE AND ACETAMINOPHEN 5; 325 MG/1; MG/1
1 TABLET ORAL EVERY 6 HOURS PRN
Qty: 12 TABLET | Refills: 0 | Status: SHIPPED | OUTPATIENT
Start: 2022-06-17 | End: 2022-07-06

## 2022-06-17 RX ORDER — HYDRALAZINE HYDROCHLORIDE 10 MG/1
10 TABLET, FILM COATED ORAL ONCE
Status: COMPLETED | OUTPATIENT
Start: 2022-06-17 | End: 2022-06-17

## 2022-06-17 RX ORDER — NICOTINE 21 MG/24HR
1 PATCH, TRANSDERMAL 24 HOURS TRANSDERMAL
Status: DISCONTINUED | OUTPATIENT
Start: 2022-06-17 | End: 2022-06-17 | Stop reason: HOSPADM

## 2022-06-17 RX ORDER — CLOPIDOGREL BISULFATE 75 MG/1
75 TABLET ORAL DAILY
Qty: 60 TABLET | Refills: 2 | Status: SHIPPED | OUTPATIENT
Start: 2022-06-18 | End: 2022-09-28

## 2022-06-17 RX ORDER — LEVOTHYROXINE SODIUM 0.07 MG/1
225 TABLET ORAL
Qty: 60 TABLET | Refills: 2 | Status: SHIPPED | OUTPATIENT
Start: 2022-06-18 | End: 2022-09-14 | Stop reason: DRUGHIGH

## 2022-06-17 RX ADMIN — INSULIN LISPRO 4 UNITS: 100 INJECTION, SOLUTION INTRAVENOUS; SUBCUTANEOUS at 08:26

## 2022-06-17 RX ADMIN — INSULIN DETEMIR 15 UNITS: 100 INJECTION, SOLUTION SUBCUTANEOUS at 08:22

## 2022-06-17 RX ADMIN — HYDROCODONE BITARTRATE AND ACETAMINOPHEN 1 TABLET: 5; 325 TABLET ORAL at 05:12

## 2022-06-17 RX ADMIN — SENNOSIDES AND DOCUSATE SODIUM 2 TABLET: 50; 8.6 TABLET ORAL at 08:22

## 2022-06-17 RX ADMIN — PANTOPRAZOLE SODIUM 40 MG: 40 TABLET, DELAYED RELEASE ORAL at 08:21

## 2022-06-17 RX ADMIN — METOPROLOL SUCCINATE 25 MG: 25 TABLET, EXTENDED RELEASE ORAL at 08:21

## 2022-06-17 RX ADMIN — LEVOTHYROXINE SODIUM 225 MCG: 150 TABLET ORAL at 05:06

## 2022-06-17 RX ADMIN — CLOPIDOGREL BISULFATE 75 MG: 75 TABLET ORAL at 05:06

## 2022-06-17 RX ADMIN — HYDRALAZINE HYDROCHLORIDE 10 MG: 10 TABLET ORAL at 06:54

## 2022-06-17 RX ADMIN — ASPIRIN 81 MG CHEWABLE TABLET 81 MG: 81 TABLET CHEWABLE at 05:06

## 2022-06-17 RX ADMIN — GABAPENTIN 100 MG: 100 CAPSULE ORAL at 08:21

## 2022-06-17 RX ADMIN — RIVAROXABAN 2.5 MG: 2.5 TABLET, FILM COATED ORAL at 08:26

## 2022-06-17 RX ADMIN — Medication 1 PATCH: at 06:54

## 2022-06-17 NOTE — DISCHARGE INSTR - APPOINTMENTS
Appointment made for OP PT and OP OT at [facility]/[place]. # [phone].  First appt is 6- and patient needs to be there by 15:15 to fill out paperwork and bring insurance card. Actual time of appt is 15:45.

## 2022-06-17 NOTE — PAYOR COMM NOTE
"Ref # [id number]  Cammy Galvan RN, BSN, CMGT-BC  Phone # [phone]  Fax # [phone]  Laron Mims (61 y.o. Male)             Date of Birth   1960    Social Security Number       Address   [address] [id number]    Home Phone   [phone]    MRN   [id number]       [facility]    Marital Status                               Admission Date   22    Admission Type   Emergency    Admitting Provider   Good Galvan MD    Attending Provider       Department, Room/Bed   [facility] 2B ICU, N236/1       Discharge Date   2022    Discharge Disposition   Home or Self Care    Discharge Destination                               Attending Provider: (none)   Allergies: Celebrex [Celecoxib], Morphine And Related    Isolation: None   Infection: None   Code Status: Prior   Advance Care Planning Activity    Ht: 172.7 cm (67.99\")   Wt: 83.8 kg (184 lb 11.9 oz)    Admission Cmt: None   Principal Problem: Bilateral carotid artery stenosis [I65.23] More...                 Active Insurance as of 2022     Primary Coverage     Payor Plan Insurance Group Employer/Plan Group    [employer]HE[employer] BLUE CROSS AN[employer]M BLUE CROSS BLUE [employer]IELD PPO [id number]     Payor Plan Address Payor Plan Phone Number Payor Plan Fax Number Effective Dates    [phone]  2012 - None Entered    [place] [address]       Subscriber Name Subscriber Birth Date Member ID       LARON MIMS 1960 [id number]                    Discharge Summary      Allyson Washburn, RICK, APRN at 22 1229          DISCHARGE SUMMARY       Patient name: Laron Mims  CSN: [id number]  MRN: [id number]  : 1960    Date of Admission: 2022  Date of Discharge:  2022    Admitting Physician: Brittany Salgado MD  Primary Care Provider: Nacho Omer MD  Consultations:   Leonel Saeed MD Neurology  Mark, Tucker KAY MD Neurosurgery   [place], " Enrique SRIVASTAVA MD  Orthopedic Surgery    Admission Diagnosis: Bilateral carotid artery stenosis     Discharge Diagnoses:     Bilateral carotid artery stenosis    Uncontrolled type 2 diabetes mellitus with hyperglycemia, with long-term current use of insulin ([facility])    Hyperlipidemia    Hypothyroidism    Coronary artery disease involving coronary bypass graft of native heart without angina pectoris    Hypertension    PAD (peripheral artery disease) ([facility])    Tobacco abuse    Alcohol use    Procedures:  6/15/2022: Cerebral carotid angiogram with right carotid stent placement     Imaging:  XR Shoulder 2+ View Left    Result Date: 6/14/2022  1.  No evidence for displaced fracture or dislocation. 2.  Mild  osteoarthritic degenerative changes of the glenohumeral joint. 3.  Mild  hypertrophic age-related changes of the acromioclavicular joint. 4.  Evidence of changes of calcific tendinopathy involving the distal subscapularis component of the rotator cuff.  This report was finalized on 6/14/2022 1:53 PM by Naveen Conroy MD.      MRI Angiogram Head Without Contrast    Result Date: 6/13/2022  Focal areas of acute cortical infarct are present involving the right pre and postcentral gyri near the vertex, as well as involving a small area of right frontal lobe cortex. There is no evidence of associated significant mass effect or hemorrhage.  Essentially normal MR angiogram of the head with no evidence of flow-limiting stenosis, large vessel occlusion or aneurysmal dilatation.  MR angiogram of the neck is somewhat limited by noncontrast technique. There is apparent 50-70% atherosclerotic narrowing of the right ICA origin. Similar moderate narrowing is present on the left, with additional focal area of luminal irregularity concerning for either dissection or possible irregular eccentric plaque. CT angiogram would be useful to more specifically evaluate, as indicated.  This report was finalized on 6/13/2022 1:04 PM by Laron Retana.       MRI Angiogram Neck Without Contrast    Result Date: 6/13/2022  Focal areas of acute cortical infarct are present involving the right pre and postcentral gyri near the vertex, as well as involving a small area of right frontal lobe cortex. There is no evidence of associated significant mass effect or hemorrhage.  Essentially normal MR angiogram of the head with no evidence of flow-limiting stenosis, large vessel occlusion or aneurysmal dilatation.  MR angiogram of the neck is somewhat limited by noncontrast technique. There is apparent 50-70% atherosclerotic narrowing of the right ICA origin. Similar moderate narrowing is present on the left, with additional focal area of luminal irregularity concerning for either dissection or possible irregular eccentric plaque. CT angiogram would be useful to more specifically evaluate, as indicated.  This report was finalized on 6/13/2022 1:04 PM by Laron Retana.      MRI Angiogram Neck With Contrast    Result Date: 6/14/2022  Redemonstration of focal atherosclerotic narrowing of the bilateral ICA origins measuring approximately 65% on the right and 55-60% on the left per NASCET criteria. Redemonstration of focal short segment dissection flap at the origin of the left ICA immediately proximal to the 5 mm region of moderate narrowing.  This report was finalized on 6/14/2022 4:36 PM by Aram Briseno MD.      MRI Brain Without Contrast    Result Date: 6/13/2022  Focal areas of acute cortical infarct are present involving the right pre and postcentral gyri near the vertex, as well as involving a small area of right frontal lobe cortex. There is no evidence of associated significant mass effect or hemorrhage.  Essentially normal MR angiogram of the head with no evidence of flow-limiting stenosis, large vessel occlusion or aneurysmal dilatation.  MR angiogram of the neck is somewhat limited by noncontrast technique. There is apparent 50-70% atherosclerotic narrowing of the  right ICA origin. Similar moderate narrowing is present on the left, with additional focal area of luminal irregularity concerning for either dissection or possible irregular eccentric plaque. CT angiogram would be useful to more specifically evaluate, as indicated.  This report was finalized on 6/13/2022 1:04 PM by Laron Retana.      XR Chest 1 View    Result Date: 6/13/2022  1.  Cardiomegaly suggested.  This report was finalized on 6/13/2022 12:49 PM by Russell Gonzalez MD.      MRI Shoulder Left Without Contrast    Result Date: 6/16/2022   1. Nonretracted, probably full-thickness tear of the distal supraspinatus insertion, and diffuse supraspinatus tendinopathy. 2. Partial-thickness infraspinatus tear. 3. Focal insertional tear of the subscapularis, which may represent a small full-thickness tear. 4. Extensive AC joint arthropathy. 5. Probable superior labral tear.  This report was finalized on 6/16/2022 5:53 PM by Dr. Jasbir Leal MD.      CT Head Without Contrast Stroke Protocol    Result Date: 6/13/2022  1.  There is a hypodense area within the left frontal lobe that could be reflective of more chronic small vessel ischemic change. 2.  Tiny hyperdense areas within the scalp in the frontal area which are nonspecific. Correlation with patient's history with respect to this area would be suggested.  Study was reviewed on the CT scan monitor and discussed with the stroke team navigator at 10:49 AM.  This report was finalized on 6/13/2022 11:03 AM by Russell Gonzalez MD.      History of Present Illness (copied from H&P note on 6/13/22):  Laron Pandey is a 61 y.o. male with history of coronary artery disease, peripheral arterial disease status post stents, carotid stenosis status post CEA, hypertension, hyperlipidemia, type 2 diabetes who presents with left arm numbness and discoordination.  Patient states that on Sunday morning woke up in the morning with spasms in his legs bilaterally and passed out.  He lost  consciousness he was found by his girlfriend.  He then was vomiting with diarrhea and sweatiness.  He started having lower extremity spasms.  He also noticed poor coordination of his left arm.  He then went to the bathroom.  He did have an episode of incontinence of his bowels but he was awake at the time.  He also states that he regurgitated food.  Since this episode he has had left arm numbness and lack of control although it is getting better.  He waited today to come to the hospital because he thought it would get better on its own.  Hospital medicine was called for admission (end of copied text).    He was evaluated by Neurology in the ER with work-up notable for STAT MRI of the brain revealing acute cortical infarct in the right pre and postcentral gyri near the vertex as well as a small area of the right frontal lobe and MRA H/N concerning for 50-70% atherosclerotic narrowing of the ICAs bilaterally.  He was not a candidate for TNKase 2* being outside the timing window and was not a candidate for emergent endovascular intervention 2* no LVO. He was instead loaded with ASA and Plavix.    Hospital Course:  Patient was admitted 2* issues discussed in the above HPI and continued on CVA protocol along with DAPT. Further work-up was completed including TTE which was negative for PFO and showed mild valvular dysfunction with preserved LVEF of 56-60%. Neurosurgery was consulted who, upon reviewing films, felt patient would best benefit from right carotid stent placement and medical management of LICA stenosis. He was agreeable to this and the procedure was performed by Dr. Flores on 6/15. He tolerated the procedure well and was re-initiated on his home Xarelto shortly after with no issues.     Of note, patient also complained of significant left shoulder pain on admission and MRI was eventually performed revealing a full-thickness tear of the distal supraspinatus insertion, a partial thickness infraspinatus tear, a  "focal insertional tear of the subscapularis, and a probable superior labral tear. Ortho was consulted with recommendations to keep shoulder mobile / work on ROM and RTC strengthening with outpatient PT/OT. He will follow up with their service outpatient / continue conservative measures but understands that surgical repair may eventually be necessary.     Patient is felt to have reached maximum benefit from this hospitalization and has been deemed appropriate for discharge home. He is feeling well, is tolerating a PO diet, and is ambulating without difficulty. Per Neurology & NS recs he will continue DAPT + Xarelto for 3 weeks, then switch to only Xarelto and Plavix indefinitely. He will follow up with Dr. Flores via telephone visit in 3 weeks and will follow up in the stroke clinic in 6 weeks.     In regard to his rotator cuff tear, he will keep shoulder mobile per Ortho recs and will participate in outpatient PT/OT via [facility] in [facility]. 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 and will not be able to work as he cannot lift or load cans with that arm. [facility] paperwork has been faxed to our office and I have encouraged him to call with any questions.     Vitals:  /76   Pulse 71   Temp 97.7 °F (36.5 °C) (Oral)   Resp 16   Ht 172.7 cm (67.99\")   Wt 83.8 kg (184 lb 11.9 oz)   SpO2 100%   BMI 28.10 kg/m²     Physical Exam:  Constitutional:  Appears well-developed and well-nourished. No distress.   HEENT:  Normocephalic and atraumatic. PER  Neck: Normal range of motion. Neck supple. No JVD present.   Cardiovascular: Normal rate, regular rhythm, normal heart sounds and intact distal pulses.  No gallop and no friction rub.  No murmur heard.  Pulmonary/Chest: Effort normal and breath sounds normal. No respiratory distress. No wheezes, rhonchi or rales.   Abdominal: Soft. No distension and no mass. There is no tenderness.   Musculoskeletal: Normal range of motion with respect to LUE which " is decreased.   Neurological: Alert and oriented to person, place, and time.  No focal deficits  Skin: Skin is warm and dry. No rash noted.   Extremities:  No clubbing, edema or cyanosis  Psychiatric: Normal mood and affect. Behavior is normal.     Labs:  Results from last 7 days   Lab Units 06/17/22  0524   WBC 10*3/mm3 8.85   HEMOGLOBIN g/dL 12.7*   HEMATOCRIT % 36.7*   PLATELETS 10*3/mm3 235     Results from last 7 days   Lab Units 06/17/22  0524 06/16/22  0522 06/15/22  0544   SODIUM mmol/L 138   < > 136   POTASSIUM mmol/L 4.4   < > 4.3   CHLORIDE mmol/L 104   < > 100   CO2 mmol/L 27.0   < > 27.0   BUN mg/dL 17   < > 14   CREATININE mg/dL 0.73*   < > 0.77   CALCIUM mg/dL 8.9   < > 8.8   BILIRUBIN mg/dL  --   --  0.3   ALK PHOS U/L  --   --  134*   ALT (SGPT) U/L  --   --  24   AST (SGOT) U/L  --   --  30   GLUCOSE mg/dL 140*   < > 241*    < > = values in this interval not displayed.         Magnesium   Date Value Ref Range Status   06/16/2022 2.6 (H) 1.6 - 2.4 mg/dL Final     Phosphorus   Date Value Ref Range Status   06/16/2022 3.9 2.5 - 4.5 mg/dL Final           Discharge Medications:     Discharge Medications      New Medications      Instructions Start Date   aspirin 81 MG chewable tablet  Replaces: aspirin 81 MG EC tablet   81 mg, Oral, Daily   Start Date: June 18, 2022     clopidogrel 75 MG tablet  Commonly known as: PLAVIX   75 mg, Oral, Daily   Start Date: June 18, 2022        Changes to Medications      Instructions Start Date   levothyroxine 75 MCG tablet  Commonly known as: SYNTHROID, LEVOTHROID  What changed:   · medication strength  · how much to take  · when to take this  · Another medication with the same name was removed. Continue taking this medication, and follow the directions you see here.   225 mcg, Oral, Every Early Morning   Start Date: June 18, 2022        Continue These Medications      Instructions Start Date   BD ULTRA-FINE PEN NEEDLES 29G X 12.7MM misc  Generic drug: Insulin Pen  Needle   Use with Insulin 3 times daily      ezetimibe 10 MG tablet  Commonly known as: ZETIA   10 mg, Oral, Daily      FreeStyle Jude 14 Day Sensor misc   1 each, Does not apply, Every 14 Days      gabapentin 100 MG capsule  Commonly known as: NEURONTIN   100 mg, Oral, 3 Times Daily      glucose blood test strip  Commonly known as: ONE TOUCH ULTRA TEST   Test three times daily      metFORMIN 1000 MG tablet  Commonly known as: GLUCOPHAGE   1,000 mg, Oral, Daily With Breakfast      metoprolol succinate XL 25 MG 24 hr tablet  Commonly known as: TOPROL-XL   25 mg, Oral, Every 24 Hours Scheduled      NovoLOG Mix 70/30 FlexPen (70-30) 100 UNIT/ML suspension pen-injector injection  Generic drug: insulin aspart prot & aspart   55 UNITS SQ qam 50 UNITS SQ  qpm      ONE TOUCH ULTRA 2 w/Device kit   1 Device, Does not apply, 3 Times Daily      OneTouch Delica Plus [id number] misc   1 each, Does not apply, 3 Times Daily      Rivaroxaban 2.5 MG tablet  Commonly known as: XARELTO   2.5 mg, Oral, 2 Times Daily      rosuvastatin 20 MG tablet  Commonly known as: CRESTOR   20 mg, Oral, Daily      valsartan-hydrochlorothiazide 320-12.5 MG per tablet  Commonly known as: DIOVAN-HCT   1 tablet, Oral, Daily         Stop These Medications    aspirin 81 MG EC tablet  Replaced by: aspirin 81 MG chewable tablet          Discharge Diet:   Diet Instructions     Diet: Regular, Consistent Carbohydrate, Cardiac; Thin      Discharge Diet:  Regular  Consistent Carbohydrate  Cardiac       Fluid Consistency: Thin        Activity at Discharge:    Activity Instructions     Activity as Tolerated          Follow-up Appointments  Future Appointments   Date Time Provider Department Center   6/21/2022 10:15 AM [facility] 1 [facility]   7/6/2022  3:45 PM Mychal Block MD [facility]   8/26/2022  2:30 PM Meka Swan MD [facility]     Additional Instructions for the Follow-ups that You Need to Schedule     Ambulatory Referral to Occupational  Therapy   As directed      Specialty needed: Evaluate and treat         Ambulatory Referral to Physical Therapy Evaluate and treat   As directed      Specialty needed: Evaluate and treat         Discharge Follow-up with PCP   As directed       Currently Documented PCP:    Nacho Omer MD    PCP Phone Number:    [phone]     Follow Up Details: 1 week         Discharge Follow-up with Specialty: Stroke Clinic; 6 Weeks   As directed      Specialty: Stroke Clinic    Follow Up: 6 Weeks         Discharge Follow-up with Specified Provider: Dr. Sutherland (Orthopedic Surgery); 2 Weeks   As directed      To: Dr. Sutherland (Orthopedic Surgery)    Follow Up: 2 Weeks         Discharge Follow-up with Specified Provider: Dr. Flores; 3 Weeks   As directed      To: Dr. Flores    Follow Up: 3 Weeks    Follow Up Details: Telephone Visit             Discharge Instructions:  1. Ok to D/C home  2. F/U as above  3. Scripts sent electronically  4. Outpatient PT/OT via [facility] in [facility]  5. Keep left shoulder mobile but do not return to work until clearance given by Ortho          Questions for Current Code Status     Question Answer    Code Status (Patient has no pulse and is not breathing) CPR (Attempt to Resuscitate)    Medical Interventions (Patient has pulse or is breathing) Full Support        Allyson Washburn DNP, APRN, [facility]  Pulmonary and Critical Care Medicine    Time: Discharge 40 min    CC: Nacho Omer MD    *Please note that portions of this note were completed with a voice recognition program. Efforts were made to edit the dictations, but occasionally words are mistranscribed.    Electronically signed by Allyson Washburn DNP, APRN at 06/17/22 1143       Discharge Order (From admission, onward)     Start     Ordered    06/17/22 1124  Discharge patient  Once        Expected Discharge Date: 06/17/22    Discharge Disposition: Home or Self Care    Physician of Record for Attribution - Please  select from Treatment Team: BISI ADHIKARI [[id number]]    Review needed by CMO to determine Physician of Record: No       Question Answer Comment   Physician of Record for Attribution - Please select from Treatment Team BISI ADHIKARI    Review needed by CMO to determine Physician of Record No        06/17/22 [id number]

## 2022-06-17 NOTE — CONSULTS
Orthopedic Consult      Patient: Laron Pandey    Date of Admission: 6/13/2022 10:45 AM    YOB: 1960    Medical Record Number: [id number]    Attending Physician: Good Galvan MD    Consulting Physician: Daisy Page PA-C      Chief Complaints: Left arm weakness [R29.898]      History of Present Illness: 61 y.o. male admitted to [facility] with Left arm weakness [R29.898]. I was consulted for further evaluation and treatment of rotator cuff tear. Onset of symptoms was abrupt starting 3 days ago.  Symptoms are associated with L shoulder pain and weakness.  Symptoms are aggravated by movement.   Symptoms improve with rest.  60 y/o M who presented to the ED after a syncopal episode, he states he does not recall how he landed but he has had L shoulder pain since the fall. Prior to this event he denies having shoulder pain or any previous injury. He notes increased pain with overhead and reaching motions. He denies any other injuries at the time of his fall. Had an MRI of the L shoulder last night. While admitted he underwent ICA stent on 6/15/22 for symptomatic R ICA stenosis.        Allergies   Allergen Reactions   • Celebrex [Celecoxib] Swelling     Hands   • Morphine And Related Nausea Only        Home Medications:  Medications Prior to Admission   Medication Sig Dispense Refill Last Dose   • BD ULTRA-FINE PEN NEEDLES 29G X 12.7MM misc Use with Insulin 3 times daily 300 each 2    • Blood Glucose Monitoring Suppl (ONE TOUCH ULTRA 2) w/Device kit 1 Device 3 (Three) Times a Day. 1 each 0    • Continuous Blood Gluc Sensor (FreeStyle Jude 14 Day Sensor) misc 1 each Every 14 (Fourteen) Days. 2 each 6    • ezetimibe (ZETIA) 10 MG tablet Take 10 mg by mouth Daily.   6/13/2022 at Unknown time   • gabapentin (NEURONTIN) 100 MG capsule Take 1 capsule by mouth 3 (Three) Times a Day. 90 capsule 3 Past Week at Unknown time   • glucose blood (ONE TOUCH ULTRA TEST) test strip Test three  times daily 300 each 2    • Lancets (OneTouch Delica Plus [id number]) misc 1 each 3 (Three) Times a Day. 300 each 2    • levothyroxine (SYNTHROID, LEVOTHROID) 25 MCG tablet Take 1 tablet by mouth Daily. LAST REFILL WITHOUT APPT (Patient taking differently: Take 1 tablet by mouth daily with a 200 mcg tablet for a total dose of 225 mcg) 90 tablet 2    • metFORMIN (GLUCOPHAGE) 1000 MG tablet Take 1,000 mg by mouth Daily With Breakfast.   6/13/2022 at Unknown time   • metoprolol succinate XL (TOPROL-XL) 25 MG 24 hr tablet Take 1 tablet by mouth Daily. 90 tablet 2 6/13/2022 at Unknown time   • NovoLOG Mix 70/30 FlexPen (70-30) 100 UNIT/ML suspension pen-injector injection 55 UNITS SQ qam 50 UNITS SQ  qpm 90 mL 1 6/13/2022 at Unknown time   • Rivaroxaban (XARELTO) 2.5 MG tablet Take 1 tablet by mouth 2 (Two) Times a Day. 60 tablet 11 6/13/2022 at Unknown time   • valsartan-hydrochlorothiazide (DIOVAN-HCT) 320-12.5 MG per tablet Take 1 tablet by mouth Daily. 90 tablet 3 6/13/2022 at Unknown time   • aspirin 81 MG EC tablet Take 81 mg by mouth Daily. (Patient not taking: Reported on 6/13/2022)   Not Taking at Unknown time   • levothyroxine (SYNTHROID, LEVOTHROID) 200 MCG tablet Take 1 tablet by mouth daily with a 25 mcg tablet for a total dose of 225 mcg      • rosuvastatin (CRESTOR) 20 MG tablet Take 1 tablet by mouth Daily. (Patient not taking: Reported on 6/13/2022) 30 tablet 11 Not Taking at Unknown time         Past Medical History:   Diagnosis Date   • Arthritis    • Back pain    • Claudication (HCC)    • Coronary artery disease    • Diabetes (HCC)    • ED (erectile dysfunction)    • Heart disease    • Hyperlipidemia    • Hypertension    • Lumbar post-laminectomy syndrome    • Lumbar stenosis with neurogenic claudication    • PAD (peripheral artery disease) (HCC)    • Thyroid disease    • Thyroid disease         Past Surgical History:   Procedure Laterality Date   • BACK SURGERY     • CARDIAC CATHETERIZATION N/A  11/11/2020    Procedure: Left Heart Cath;  Surgeon: Mychal Block MD;  Location:  [facility];  Service: Cardiology;  Laterality: N/A;   • CARDIAC CATHETERIZATION N/A 12/3/2020    Procedure: Peripheral angiography: left iliac artery and left SFA intervention;  Surgeon: Mychal Block MD;  Location:  [facility];  Service: Cardiovascular;  Laterality: N/A;  about 2 weeks.    • CARDIAC SURGERY     • CAROTID ENDARTERECTOMY     • CORONARY ARTERY BYPASS GRAFT     • INTERVENTIONAL RADIOLOGY PROCEDURE N/A 11/11/2020    Procedure: Abdominal Aortogram;  Surgeon: Mychal Block MD;  Location:  [facility];  Service: Cardiology;  Laterality: N/A;   • INTERVENTIONAL RADIOLOGY PROCEDURE Bilateral 6/15/2022    Procedure: CAROTID CEREBRAL ANGIOGRAM BILATERAL;  Surgeon: Tucker Flores MD;  Location:  [facility];  Service: Interventional Radiology;  Laterality: Bilateral;        Social History     Occupational History   • Not on file   Tobacco Use   • Smoking status: Current Every Day Smoker     Packs/day: 0.00     Types: Cigarettes   • Smokeless tobacco: Never Used   • Tobacco comment: 2 CIGS/DAY   Vaping Use   • Vaping Use: Never used   Substance and Sexual Activity   • Alcohol use: Yes     Alcohol/week: 12.0 standard drinks     Types: 12 Cans of beer per week     Comment: 12 BEERS/WEEK (3-4 AT A TIME)   • Drug use: Never   • Sexual activity: Defer      Social History     Social History Narrative   • Not on file        Family History   Problem Relation Age of Onset   • Diabetes Mother    • Heart disease Mother    • Diabetes Father    • Heart disease Father    • Stroke Father    • Heart attack Neg Hx    • Hypertension Neg Hx    • Thyroid disease Neg Hx          Review of Systems:   HEENT: Patient denies any headaches, vision changes, change in hearing, or tinnitus, Patient denies any rhinorrhea, epistaxis, sinus pain, mouth or dental problems, sore  throat or hoarseness, or dysphagia  Pulmonary: Patient denies any cough, congestion, SOA, or wheezing  Cardiovascular: Patient denies any chest pain, dyspnea, palpitations, weakness, intolerance of exercise, varicosities, swelling of extremities, known murmur  Gastrointestinal:  Patient denies nausea, vomiting, diarrhea, constipation, loss  of appetite, change in appetite, dysphagia, gas, heartburn, melena, change in bowel habits, use of laxatives or other drugs to alter the function of the gastrointestinal tract.  Genital/Urinary: Patient denies dysuria, change in color of urine, change in frequency of urination, pain with urgency, incontinence, retention, or nocturia.  Musculoskeletal: + L shoulder pain. Patient denies increased warmth; redness; or swelling of joints; limitation of function; deformity; crepitation: pain in a joint or an extremity, the neck, or the back, especially with movement.  Neurological: Patient denies dizziness, tremor, ataxia, difficulty in speaking, change in speech, paresthesia, loss of sensation, seizures, syncope, changes in memory.  Endocrine system: Patient denies tremors, palpitations, intolerance of heat or cold, polyuria, polydipsia, polyphagia, diaphoresis, exophthalmos, or goiter.  Psychological: Patient denies thoughts/plans or harming self or other; depression,  insomnia, night terrors, diogo, memory loss, disorientation.  Skin: Patient denies any bruising, rashes, discoloration, pruritus, wounds, ulcers, decubiti, changes in the hair or nails  Hematopoietic: Patient denies history of spontaneous or excessive bleeding, epistaxis, hematuria, melena, fatigue, enlarged or tender lymph nodes, pallor, history of anemia.    Physical Exam: 61 y.o. male  General Appearance:    Alert, cooperative, in no acute distress                   Vitals:    06/16/22 1900 06/16/22 2000 06/16/22 2100 06/16/22 2200   BP: 120/70 121/84 136/85 112/70   BP Location: Right arm Right arm Right arm Right  arm   Patient Position: Lying Sitting Lying Lying   Pulse: 58 59 68 55   Resp:  18     Temp:  98.8 °F (37.1 °C)     TempSrc:  Oral     SpO2: 100% 100% 100% 100%   Weight:       Height:            Head:    Normocephalic, without obvious abnormality, atraumatic      Right Upper Extremity:  No obvious deformity, painless ROM shoulder, elbow, wrist, no joint instability, normal distal strength and sensation to light touch, skin intact without cyanosis, clubbing, edema; +2 radial pulse  Left Upper Extremity:  Painful ROM of the shoulder, limited AROM in all planes due to pain, weakness on supraspinatus and subscapularis testing. No obvious deformity, painless ROM elbow, wrist, no joint instability, normal distal strength and sensation to light touch, skin intact without cyanosis, clubbing, edema; +2 radial pulse  Right Lower Extremity:  No obvious deformity, painless ROM hip, knee, ankle, compartments soft, normal distal strength and sensation to light touch, skin intact without cyanosis, clubbing, edema; +2 dorsalis pedis pulse  Left Lower Extremity:  No obvious deformity, painless ROM hip, knee, ankle, compartments soft, normal distal strength and sensation to light touch, skin intact without cyanosis, clubbing, edema; +2 dorsalis pedis pulse         Diagnostic Tests:    I have reviewed the labs, radiology results and diagnostic studies: MRI of the L shoulder reviewed, demonstrates tears in the supraspinatus, infraspinatus, and subscapularis tendons. Biceps tendon is subluxed, perched over lesser tuberosity. AC joint arthropathy.     Results from last 7 days   Lab Units 06/16/22  0522   WBC 10*3/mm3 12.71*   HEMOGLOBIN g/dL 13.3   PLATELETS 10*3/mm3 253     Results from last 7 days   Lab Units 06/16/22  0522   SODIUM mmol/L 136   POTASSIUM mmol/L 3.8   CO2 mmol/L 24.0   CREATININE mg/dL 0.80   GLUCOSE mg/dL 48*           Assessment:  Fall  L shoulder pain  L rotator cuff tear   Patient Active Problem List   Diagnosis    • Uncontrolled type 2 diabetes mellitus with hyperglycemia, with long-term current use of insulin ([facility])   • Hyperlipidemia   • Hypothyroidism   • Coronary artery disease involving coronary bypass graft of native heart without angina pectoris   • Erectile dysfunction   • Hypertension   • Lumbar stenosis with neurogenic claudication   • Lumbar postlaminectomy syndrome   • History of lumbar surgery   • PAD (peripheral artery disease) ([facility])   • Current every day smoker   • Tobacco abuse counseling   • Claudication of both lower extremities ([facility])   • Abnormal stress test   • Left arm weakness   • Tobacco abuse   • Bilateral carotid artery stenosis   • Alcohol use           Plan:  The patient voiced understanding of the risks, benefits, and alternative forms of treatment that were discussed and the patient consents to proceed with conservative management at this time.  I discussed the patient's MRI findings with him along with the prognosis and treatment options. He may continue with activities as tolerated at this time. OTC tylenol and ice as needed. I encouraged him to keep the shoulder mobile and he may work on ROM and RTC strengthening with outpatient PT/OT. Given the traumatic nature of his tear we discussed that surgical repair may become necessary if his pain and function do not improve with conservative measures. We will see him in clinic on an outpatient basis for repeat exam and for further discussion of his treatment options.     Follow up with us upon discharge     Thank you for the consult       Daisy Page PA-C  06/16/22  23:16 EDT

## 2022-06-17 NOTE — PLAN OF CARE
Problem: Adult Inpatient Plan of Care  Goal: Plan of Care Review  Outcome: Ongoing, Progressing  Goal: Patient-Specific Goal (Individualized)  Outcome: Ongoing, Progressing  Goal: Absence of Hospital-Acquired Illness or Injury  Outcome: Ongoing, Progressing  Goal: Optimal Comfort and Wellbeing  Outcome: Ongoing, Progressing  Goal: Readiness for Transition of Care  Outcome: Ongoing, Progressing     Problem: Diabetes Comorbidity  Goal: Blood Glucose Level Within Targeted Range  Outcome: Ongoing, Progressing     Problem: Hypertension Comorbidity  Goal: Blood Pressure in Desired Range  Outcome: Ongoing, Progressing     Problem: Pain Chronic (Persistent) (Comorbidity Management)  Goal: Acceptable Pain Control and Functional Ability  Outcome: Ongoing, Progressing     Problem: Adjustment to Illness (Stroke, Ischemic/Transient Ischemic Attack)  Goal: Optimal Coping  Outcome: Ongoing, Progressing     Problem: Bowel Elimination Impaired (Stroke, Ischemic/Transient Ischemic Attack)  Goal: Effective Bowel Elimination  Outcome: Ongoing, Progressing     Problem: Cerebral Tissue Perfusion (Stroke, Ischemic/Transient Ischemic Attack)  Goal: Optimal Cerebral Tissue Perfusion  Outcome: Ongoing, Progressing     Problem: Cognitive Impairment (Stroke, Ischemic/Transient Ischemic Attack)  Goal: Optimal Cognitive Function  Outcome: Ongoing, Progressing     Problem: Communication Impairment (Stroke, Ischemic/Transient Ischemic Attack)  Goal: Improved Communication Skills  Outcome: Ongoing, Progressing     Problem: Functional Ability Impaired (Stroke, Ischemic/Transient Ischemic Attack)  Goal: Optimal Functional Ability  Outcome: Ongoing, Progressing     Problem: Respiratory Compromise (Stroke, Ischemic/Transient Ischemic Attack)  Goal: Effective Oxygenation and Ventilation  Outcome: Ongoing, Progressing     Problem: Sensorimotor Impairment (Stroke, Ischemic/Transient Ischemic Attack)  Goal: Improved Sensorimotor Function  Outcome:  Ongoing, Progressing     Problem: Swallowing Impairment (Stroke, Ischemic/Transient Ischemic Attack)  Goal: Optimal Eating and Swallowing without Aspiration  Outcome: Ongoing, Progressing     Problem: Urinary Elimination Impaired (Stroke, Ischemic/Transient Ischemic Attack)  Goal: Effective Urinary Elimination  Outcome: Ongoing, Progressing     Problem: Fall Injury Risk  Goal: Absence of Fall and Fall-Related Injury  Outcome: Ongoing, Progressing     Problem: Skin Injury Risk Increased  Goal: Skin Health and Integrity  Outcome: Ongoing, Progressing   Goal Outcome Evaluation:

## 2022-06-17 NOTE — CASE MANAGEMENT/SOCIAL WORK
Discharge Planning Assessment  [facility]     Patient Name: Laron Pandey  MRN: [id number]  Today's Date: 6/17/2022    Admit Date: 6/13/2022     Discharge Needs Assessment    No documentation.                Discharge Plan     Row Name 06/17/22 0945       Plan    Plan Update: appointment made for OP PT/OT    Plan Comments I made first time appt with [facility] in [facility] on 6-21 at 15:45. This was patient's first choice for OP PT. I placed this information in Appointments/Patient instructions. I will pass this info to the patient. Lorna @ [phone]    Final Discharge Disposition Code 01 - home or self-care              Continued Care and Services - Admitted Since 6/13/2022     Therapy Coordination complete.    Service Provider Request Status Selected Services Address Phone Fax Patient Preferred    [facility] - [facility]   Selected Outpatient Physical Therapy [address], [facility] [address] [phone] -- --              Expected Discharge Date and Time     Expected Discharge Date Expected Discharge Time    Jun 16, 2022          Demographic Summary    No documentation.                Functional Status    No documentation.                Psychosocial    No documentation.                Abuse/Neglect    No documentation.                Legal    No documentation.                Substance Abuse    No documentation.                Patient Forms    No documentation.                   Nighat Jj, RN

## 2022-06-18 ENCOUNTER — READMISSION MANAGEMENT (OUTPATIENT)
Dept: CALL CENTER | Facility: HOSPITAL | Age: 62
End: 2022-06-18

## 2022-06-18 NOTE — OUTREACH NOTE
Prep Survey    Flowsheet Row Responses   [facility]th[facility] facility patient discharged from? [facility]   Is LACE score < 7 ? No   Emergency Room discharge w/ pulse ox? No   Eligibility Readm Mgmt   Discharge diagnosis Bilateral carotid artery stenosis   Does the patient have one of the following disease processes/diagnoses(primary or secondary)? General Surgery   Does the patient have Home health ordered? No   Is there a DME ordered? No   [facility]-21 at 15:45,  Dr. Block 7/6 @ 3:45, Dr. Swan 8/26 @ 2:30 pm   Prep survey completed? Yes          CHIRAG BELL -

## 2022-06-21 ENCOUNTER — HOSPITAL ENCOUNTER (OUTPATIENT)
Dept: DIABETES SERVICES | Facility: HOSPITAL | Age: 62
Setting detail: RECURRING SERIES
Discharge: HOME | End: 2022-06-21

## 2022-06-21 RX ORDER — INSULIN ASPART 100 [IU]/ML
INJECTION, SUSPENSION SUBCUTANEOUS
Qty: 105 ML | Refills: 1 | Status: SHIPPED | OUTPATIENT
Start: 2022-06-21 | End: 2022-08-22 | Stop reason: SDUPTHER

## 2022-06-21 NOTE — CONSULTS
"Diabetes Education    Patient Name:  Laron Pandey  YOB: 1960  MRN: [id number]  Admit Date:  6/21/2022    Mr. Pandey attended follow up outpatient diabetes management education class via in-person. Class time approx 50 min. Educated on basic diabetes pathophysiology and how related to increased risk for complications, specifically stroke. Discussed s/sx of hyperglycemia and hypoglycemia and self management of both. Education provided on TLC diet, the plate method, as well as exercise recommendations. Stressed importance of ongoing, lifelong follow up with PCP for diabetes and other chronic health condition management. Stressed importance of taking all medications as prescribed.     Pt states his meter is not working properly; he was provided a donated meter: One Touch Verio Reflect. Meter was set up for pt, discussed 1800 number, expiration date, strip storage, needle disposal.     Reviewed insulin administration technique with pt; he was able to verbalize appropriate steps for administration. States he was unaware of priming needle and will start to do that.     Reinforced rule of 15's with pt, as he states he has frequent lows between 3 and 4 AM. He states he was drinking a frappucino that has 60 gm carbs. We discussed 15 gm fast acting carb and rechecking blood sugar in 15 min, repeating if reading <70, having a snack when blood sugar >70. He is able to teach back appropriate treatment before conclusion of education session today.     Patient was given opportunity to ask questions, and was also encouraged to pursue further diabetes education class with both RN and RD. Class educational materials were provided to pt, [facility]'s \"Diabetes Basics\" booklet, [employer] \"Types of Insulin\" as well as our contact information for any further questions or needs.     Electronically signed by:  Cristiana Gross RN  06/21/22 11:20 EDT  "

## 2022-06-22 ENCOUNTER — READMISSION MANAGEMENT (OUTPATIENT)
Dept: CALL CENTER | Facility: HOSPITAL | Age: 62
End: 2022-06-22

## 2022-06-22 NOTE — OUTREACH NOTE
General Surgery Week 1 Survey    Flowsheet Row Responses   [facility] patient discharged from? [place]   Does the patient have one of the following disease processes/diagnoses(primary or secondary)? General Surgery   Week 1 attempt successful? Yes   Call start time 1014   Call end time 1018   Person spoke with today (if not patient) and relationship pt   Meds reviewed with patient/caregiver? Yes   Is the patient having any side effects they believe may be caused by any medication additions or changes? No   Does the patient have all medications related to this admission filled (includes all antibiotics, pain medications, etc.) Yes   Is the patient taking all medications as directed (includes completed medication regime)? Yes   Does the patient have a follow up appointment scheduled with their surgeon? Yes   Has the patient kept scheduled appointments due by today? Yes   Has home health visited the patient within 72 hours of discharge? N/A   Psychosocial issues? No   Did the patient receive a copy of their discharge instructions? Yes   Nursing interventions Reviewed instructions with patient   What is the patient's perception of their health status since discharge? Improving   Nursing interventions Nurse provided patient education   Is the patient /caregiver able to teach back basic post-op care? Drive as instructed by MD in discharge instructions, Lifting as instructed by MD in discharge instructions   Is the patient/caregiver able to teach back steps to recovery at home? Set small, achievable goals for return to baseline health, Rest and rebuild strength, gradually increase activity, Make a list of questions for surgeon's appointment   Is the patient/caregiver able to teach back the hierarchy of who to call/visit for symptoms/problems? PCP, Specialist, Home health nurse, Urgent Care, ED, 911 Yes   Week 1 call completed? Yes   Wrap up additional comments Pt feeling well. Pt is participating in out pt PT. Pt  Reason For Visit    The patient is referred to [facility] - - Relationship to Child: maternal grandmother;guardian   - : Martha Espinal   - Interviewee: Ariana Dietrich.   Disposition:   - Booked with: Dr. Borjas   - Appointment Date/Time: 12/5/17 at 11:00 AM   Child's Information:   - Child's Legal Name: Martha Dietrich   - Referred By: self-referral      Patient / Family Information    Legal Guardian(s): Other maternal grandmother   Custody Status Issue: legally adopted by maternal grandmother   Other: unknown biological father   Patient's Caregivers: Adoptive, Biological, biological maternal grandmother adopted pt   Household Members: mother: Hiwot Fields   Who lives in the home (names and ages): maternal uncle- Lalo Fields; biological mother Hiwot Fields; maternal grandmother Ariana Dietrich   Is there a secondary residence and if so, who lives in the secondary residence: denied   Does either parent have children who are not living with them: denied   Other family members that provide care for patient include: denied   mother works as [employer].   Comments:. maternal grandmother works outside the home and pt is in school and day care while pt's grandmother is working.      Caregiver Information    Full Name: Ariana Dietrich   Relationship to Child: maternal grandmother   Phone Number(s): [phone]   Race/Ethnicity: -  and ; pt is -American       Any chronic illness: no   Any recent hospitalizations: no   Any significant psychiatric history: no   Safety concerns in the home due to substance abuse or violence, other: no   If yes, questions related to current use, treatment, current violence, presence of orders of protection, child exposure: no   Past or present involvement with DCFS or the police: yes, one report due to a history of 2 broken legs while under the care of pt's mother; first time was at 6 months. Second time was at 1  did see pcp on this day.          MASON DE LEON - Registered Nurse   year. DCFS was called the second time and pt was removed from the home for two years and placed in foster care. Unclear what caused the broken legs (mom claims that she accidentally sat on pt and that she fell out of the bed the 2nd time); removed from home- almost 2 years; during that time grandma fought to get her back; placed back home in March of 2014; in June of 2016- adoption finalized   Mental health history (parental/caregiver suicidal ideation/depression or other mental illness):  mother has moderate MR; provides only limited care for pt at home- changes diapers. great uncle also has MR; maternal grandmother has been treated with medication for depression for 7 years   If yes, any involvement in counseling services, past psychiatric hospitalizations or psychotropic medications: yes  maternal grandmother medication   Are there any stresses in the home?      Parental Presenting Concerns    Top 3 concerns regarding the patient and when concerns were first noticed: 1) she's nonverbal  2) mental capacity  3) short attention span     Overall Concerns: no aggression, attention difficulties, developmental delays, no feeding/eating issues, no hyperactivity/impulsivity, learning difficulties, school problems, no sleep disturbances, speech delays, toilet training difficulties and Other (specify) when feeding herself she shoves a lot of food in her mouth   What do you feel are your child's top three strengths: 1) very affectionate  2) very attentive and likes attention     Treatment Goals:   Seeking assessment of developmental progress/needs   Seeking help managing patient's behavior   How parent feels we can help: help grandma understand what's going on or how grandma can help her even more- doesn't want to sit still to be read to; hoping to get a diagnosis     Anxiety: No Anxiety   ADHD: No ADHD   Autism Spectrum Disorder: No ASD   Behavioral Disorder: No Behavioral Disorder   Developmental Delay: Developmental Delay    Intellectual Disability: No Intellectual Disability   Language Disorder: No Language Disorder   Learning Disability: No Learning Disability   Mood Disorder: No Mood Disorder     Early Intervention (E/I): ST, PT, OT, DT      Education Information    School Name: [employer]   School District:    Current Grade:    Does the child have an IEP: yes, ST   What are the school's current concerns regarding your child: been there since January of 2016; also goes to [employer]- no special services      Signatures   Electronically signed by : Martha Espinal LCSW; Jan 24 2017  9:18AM CST (Author)

## 2022-06-27 ENCOUNTER — TELEPHONE (OUTPATIENT)
Dept: ENDOCRINOLOGY | Facility: CLINIC | Age: 62
End: 2022-06-27

## 2022-06-27 NOTE — TELEPHONE ENCOUNTER
Pt called needs refill on One Touch Verio Reflex, Lancets and test strips.  It's a NEW meteor the patient wants me to reiterate.  [facility].  Thank you.

## 2022-06-28 RX ORDER — BLOOD SUGAR DIAGNOSTIC
STRIP MISCELLANEOUS
Qty: 300 EACH | Refills: 2 | Status: SHIPPED | OUTPATIENT
Start: 2022-06-28

## 2022-06-28 RX ORDER — LANCETS 33 GAUGE
1 EACH MISCELLANEOUS 3 TIMES DAILY
Qty: 300 EACH | Refills: 2 | Status: SHIPPED | OUTPATIENT
Start: 2022-06-28

## 2022-07-06 ENCOUNTER — OFFICE VISIT (OUTPATIENT)
Dept: CARDIOLOGY | Facility: CLINIC | Age: 62
End: 2022-07-06

## 2022-07-06 VITALS
HEIGHT: 68 IN | BODY MASS INDEX: 29.55 KG/M2 | SYSTOLIC BLOOD PRESSURE: 110 MMHG | HEART RATE: 70 BPM | OXYGEN SATURATION: 99 % | DIASTOLIC BLOOD PRESSURE: 72 MMHG | WEIGHT: 195 LBS

## 2022-07-06 DIAGNOSIS — Z01.810 PREOPERATIVE CARDIOVASCULAR EXAMINATION: ICD-10-CM

## 2022-07-06 DIAGNOSIS — I10 PRIMARY HYPERTENSION: ICD-10-CM

## 2022-07-06 DIAGNOSIS — I73.9 CLAUDICATION OF BOTH LOWER EXTREMITIES: ICD-10-CM

## 2022-07-06 DIAGNOSIS — I25.810 CORONARY ARTERY DISEASE INVOLVING CORONARY BYPASS GRAFT OF NATIVE HEART WITHOUT ANGINA PECTORIS: Primary | ICD-10-CM

## 2022-07-06 DIAGNOSIS — I65.23 BILATERAL CAROTID ARTERY STENOSIS: ICD-10-CM

## 2022-07-06 DIAGNOSIS — E78.2 MIXED HYPERLIPIDEMIA: ICD-10-CM

## 2022-07-06 DIAGNOSIS — I73.9 PAD (PERIPHERAL ARTERY DISEASE): ICD-10-CM

## 2022-07-06 PROCEDURE — 99214 OFFICE O/P EST MOD 30 MIN: CPT | Performed by: INTERNAL MEDICINE

## 2022-07-06 NOTE — PROGRESS NOTES
[facility] Cardiology   [address], [address]  [place], [address]    [phone]  [email or web address]           OUTPATIENT CLINIC FOLLOW UP NOTE    Patient Care Team:  Patient Care Team:  Nacho Omer MD as PCP - General (Family Medicine)  Meka Swan MD as Consulting Physician (Endocrinology)  Mychal Block MD as Consulting Physician (Cardiology)    Subjective:      Chief Complaint   Patient presents with   • Hyperlipidemia, unspecified hyperlipidemia type       HPI:    Laron Pandey is a 61 y.o. male.  Problem list:  1. CAD  a. Status post three-vessel CABG in 2003 with Dr. Myers.  b. Status post PRINCE x2 to the circumflex with Dr. Block, 11/2020  2. Carotid artery disease  a. Status post bilateral CEA in 2009 with Dr. Myers  b. MRA head/neck 3/13/2022 revealing 50-70% atherosclerotic narrowing of the ICA's bilaterally. Status post right carotid stent placement with Dr. Flores, 6/15/2022  3. CVA  1. 6/13/2022: acute cortical infarct in the right pre and postcentral gyri near the vertex as well as a small area of the right frontal lobe.  Thought to be due to carotid stenosis.  Status post R ICA stent as noted above  4. PAD  1. Peripheral angiogram 12/3/2020: Status post 7 x 27 mm VISI pro to the left common iliac artery.  Left SFA status post angioplasty with a Chocolate balloon   1. Back pain significant improved post procedurally  5. Lower back pain  a. History of lumbar surgery, Dr. Dobbs, 1999  b. Acute worsening spring 2020, severe disc space narrowing and L4/L5, moderate to severe canal and neuroforaminal stenosis  6. Hypertension  7. Hyperlipidemia  8. Diabetes mellitus  9. Hypothyroidism  10. Tobacco dependence    The patient presents today for follow-up.    Since his last visit, patient was admitted to [facility] on 6/13/2021 for left arm numbness and discoordination along with a syncopal episode.  He was found to have an acute cortical infarct in the right  pre and postcentral gyri near the vertex as well as a small area of the right frontal lobe and MRA head/neck concerning for 50 to 70% atherosclerotic narrowing of the ICAs bilaterally.  Was seen by neurosurgery who recommended right carotid stent placement and medical management of L ICA stenosis.  He underwent right carotid stent with Dr. Flores on 6/15/2022.  Was started on DAPT and Xarelto at that time.  Plan for this regimen for 3 weeks and potentially switch to Xarelto and Plavix indefinitely.  Patient has telephone follow-up with Dr. Flores on 7/8/2022.    Also sustained an injury to the left shoulder during his syncopal episode or to admission.  Was evaluated by orthopedics who recommended outpatient PT/OT and arthroscopic surgery which is scheduled for 8/16/2022 pending cardiology and neurosurgery clearance.    Patient is recovered well from his stroke.  No major deficits.  Has been doing well from a cardiac standpoint.  Denies chest pain, shortness of breath, palpitations, lower extremity edema, lightheadedness or syncope.  Reports his leg pain is slightly better.  No bleeding diatheses on DAPT and Xarelto.    Review of Systems:  As noted in above HPI.      PFSH:  Patient Active Problem List   Diagnosis   • Uncontrolled type 2 diabetes mellitus with hyperglycemia, with long-term current use of insulin ([facility])   • Hyperlipidemia   • Hypothyroidism   • Coronary artery disease involving coronary bypass graft of native heart without angina pectoris   • Erectile dysfunction   • Hypertension   • Lumbar stenosis with neurogenic claudication   • Lumbar postlaminectomy syndrome   • History of lumbar surgery   • PAD (peripheral artery disease) ([facility])   • Current every day smoker   • Tobacco abuse counseling   • Claudication of both lower extremities ([facility])   • Abnormal stress test   • Left arm weakness   • Tobacco abuse   • Bilateral carotid artery stenosis   • Alcohol use         Current Outpatient Medications:   •   aspirin 81 MG chewable tablet, Chew 1 tablet Daily., Disp: 21 tablet, Rfl: 0  •  BD ULTRA-FINE PEN NEEDLES 29G X 12.7MM misc, Use with Insulin 3 times daily, Disp: 300 each, Rfl: 2  •  clopidogrel (PLAVIX) 75 MG tablet, Take 1 tablet by mouth Daily., Disp: 60 tablet, Rfl: 2  •  Continuous Blood Gluc Sensor (FreeStyle Jude 14 Day Sensor) mis, 1 each Every 14 (Fourteen) Days., Disp: 2 each, Rfl: 6  •  ezetimibe (ZETIA) 10 MG tablet, Take 10 mg by mouth Daily., Disp: , Rfl:   •  gabapentin (NEURONTIN) 100 MG capsule, Take 1 capsule by mouth 3 (Three) Times a Day., Disp: 90 capsule, Rfl: 3  •  glucose blood (OneTouch Verio) test strip, Use as instructed, Disp: 300 each, Rfl: 2  •  Lancets (OneTouch Delica Plus [id number]) misc, 1 each 3 (Three) Times a Day., Disp: 300 each, Rfl: 2  •  levothyroxine (SYNTHROID, LEVOTHROID) 75 MCG tablet, Take 3 tablets by mouth Every Morning., Disp: 60 tablet, Rfl: 2  •  metFORMIN (GLUCOPHAGE) 1000 MG tablet, Take 1,000 mg by mouth Daily With Breakfast., Disp: , Rfl:   •  metoprolol succinate XL (TOPROL-XL) 25 MG 24 hr tablet, Take 1 tablet by mouth Daily., Disp: 90 tablet, Rfl: 2  •  NovoLOG Mix 70/30 FlexPen (70-30) 100 UNIT/ML suspension pen-injector injection, INJECT 55 UNITS SUBCUTANEOUSLY IN THE MORNING AND 50 UNITS SUBCUTANEOUSLY AT BEDTIME, Disp: 105 mL, Rfl: 1  •  Rivaroxaban (XARELTO) 2.5 MG tablet, Take 1 tablet by mouth 2 (Two) Times a Day., Disp: 60 tablet, Rfl: 11  •  valsartan-hydrochlorothiazide (DIOVAN-HCT) 320-12.5 MG per tablet, Take 1 tablet by mouth Daily., Disp: 90 tablet, Rfl: 3    Allergies   Allergen Reactions   • Celebrex [Celecoxib] Swelling     Hands   • Morphine And Related Nausea Only        reports that he has been smoking cigarettes and cigarettes. He has been smoking about 0.25 packs per day. He has never used smokeless tobacco.      Objective:   Physical exam:  /72 (BP Location: Right arm, Patient Position: Sitting)   Pulse 70   Ht 172.7 cm  "(68\")   Wt 88.5 kg (195 lb)   SpO2 99%   BMI 29.65 kg/m²   CONSTITUTIONAL: No acute distress  RESPIRATORY: Normal effort. Clear to auscultation bilaterally without wheezing or rales  CARDIOVASCULAR: Bilateral bruit, right greater than left.  Regular rate and rhythm with normal S1 and S2. Without murmur, gallop or rub. Normal radial pulse. There is no lower extremity edema bilaterally.    Exam, 12/2020: 2+ PT pulses bilaterally post procedurally  Exam 6/16/2021: Difficult to palpate pedal pulses  Exam 1/5/2022: Right PT pulse 1+, left PT pulse difficult to palpate.    Labs:    BUN   Date Value Ref Range Status   06/17/2022 17 8 - 23 mg/dL Final     Creatinine   Date Value Ref Range Status   06/17/2022 0.73 (L) 0.76 - 1.27 mg/dL Final   06/13/2022 0.90 0.60 - 1.30 mg/dL Final     Potassium   Date Value Ref Range Status   06/17/2022 4.4 3.5 - 5.2 mmol/L Final     ALT (SGPT)   Date Value Ref Range Status   06/15/2022 24 1 - 41 U/L Final     AST (SGOT)   Date Value Ref Range Status   06/15/2022 30 1 - 40 U/L Final       Lab Results   Component Value Date    CHOL 199 06/14/2022     Lab Results   Component Value Date    TRIG 216 (H) 06/14/2022     Lab Results   Component Value Date    HDL 38 (L) 06/14/2022     Lab Results   Component Value Date     (H) 06/14/2022     No components found for: LDLDIRECTC    Diagnostic Data:    Procedures    KARISSA 7/27/2021  · Right Conclusion: The right KARISSA is moderately reduced at 0.58. Moderate digital ischemia with a TBI 0.32.  · Left Conclusion: The left KARISSA is severely reduced at 0.48. Moderate digital ischemia with a TBI 0.24.        Results for orders placed during the hospital encounter of 06/13/22    Adult Transthoracic Echo Complete W/ Cont if Necessary Per Protocol (With Agitated Saline)    Interpretation Summary  · Left ventricular ejection fraction appears to be 56 - 60%. Left ventricular systolic function is normal.  · Left ventricular wall thickness is consistent with " mild to moderate concentric hypertrophy.  · There is moderate calcification of the aortic valve.  · Mild aortic valve regurgitation is present.  · Mild mitral annular calcification is present.  · Mild mitral valve regurgitation is present.  · Mild tricuspid valve regurgitation is present.    Stress test 11/2/2020  · Left ventricular ejection fraction is normal. (Calculated EF = 51%).  · Myocardial perfusion imaging indicates a small-sized infarct located in the inferior wall with mild yeyo-infarct ischemia.  · Impressions are consistent with a low to intermediate risk study.    [facility] 11/11/2020  · There was a 30 to 50% proximal circumflex stenosis followed by a 70% mid segment stenosis.  Lesions were found to be functionally significant with an IFR ratio of 0.60.  The lesions are status post tandem stents with a Synergy 2.25 x 16 mm drug-eluting stent proximally and a Synergy 2.25 x 32 mm drug-eluting stent distally with a brief segment of overlap.  · There is a 100% mid LAD stenosis with an widely patent LIMA.  · There is a 100% ostial RCA stenosis with an occluded SVG to RCA, but with moderate left to left collaterals.  · There is an occluded SVG to OM branch.  · Left ventricular ejection fraction 55% with akinesis of the basal to mid inferior segments.  · There is a 60% left common iliac artery stenosis with a peak to peak pullback gradient of 35 mmHg on one pullback and 57 mmHg on another.    Peripheral angiogram 12/3/2020  · The 60% eccentric, hemodynamically significant distal left common iliac artery stenosis is status post intervention with a 7 x 27 mm Visipro balloon expandable, bare-metal stent.  · The tandem 70% mid left SFA stenoses are status post angioplasty with a 5 mm Chocolate nitinol caged, controlled dilatation balloon with multiple inflations.    Carotid duplex 10/30/2020  · Right internal carotid artery stenosis of 50-69%.  · Left internal carotid artery stenosis of >70%.  · There is antegrade  flow in vertebral arteries bilaterally.    Bilateral carotid duplex 6/13/2022  · Right internal carotid artery stenosis of >70%.  · Left internal carotid artery stenosis of >70%.  · There is antegrade flow in the vertebral arteries bilaterally.  · When compared to the previous study performed in October 2020, the right ICA stenosis is increased and the left ICA stenosis is stable.    Right carotid artery duplex 6/15/2022  · Right carotid stent imaging indicates patent flow.        Bilateral carotid cerebral angiogram 6/15/2022  · Status post successful angioplasty and stenting of high-grade, symptomatic right internal carotid artery stenosis.  High risk criteria for endarterectomy include history of prior bilateral endarterectomies.  · Moderate, asymptomatic stenosis of the left internal carotid artery.  This was managed medically and observed with continued ultrasounds.    Assessment and Plan:     Coronary artery disease involving native coronary artery of native heart without angina pectoris   Mixed hyperlipidemia  -Without angina.  -Continue aspirin, high intensity statin, beta-blocker.  -We will attempt to obtain recent labs from PCP.    Peripheral vascular disease with claudication   Diabetic neuropathy  Tobacco dependence  -Intermittent claudication symptoms improved since last visit.  -Continue ASA, Xarelto 2.5 mg twice daily, statin.    -Encourage patient to continue risk factor modification including smoking cessation, heart healthy diet, exercise and better glucose control.    -Patient to discuss potential increase in Neurontin dose with upcoming endocrine appointment.    -Patient to let us know if symptoms worsen.  If so we will have him undergo CT angiogram with runoff    CVA, 6/2022  Carotid stenosis, bilateral  -Status post acute cortical infarct in the right pre and postcentral gyri near the vertex as well as a small area of the right frontal lobe and MRA head and neck concerning for 50 to 70%  atherosclerotic narrowing of the ICAs bilaterally.  -Status post right carotid stent 6/2022, Dr. Flores  -Status post bilateral CEA in 2009.  -Continue DAPT, Xarelto.  Further recommendations per Dr. Flores. Follow up on 7/8    Essential hypertension  -Continue current related medications.    Preoperative cardiac risk assessment  -Patient is hoping to undergo left shoulder surgery in Aug 2022.  -Okay from cardiology standpoint to proceed with left shoulder surgery and for patient to hold Xarelto for 48 hours prior to surgery.    -Patient is also on clopidogrel after recent carotid stenting.  Will need clearance from neurosurgery, Dr. Flores, to hold clopidogrel or not for surgery.  Patient might be taken off aspirin to avoid triple therapy during his follow-up with neurosurgery this coming Friday.  If holding clopidogrel and Xarelto for surgery, would recommend restarting aspirin 81 mg perioperatively    - Return in about 6 months (around 1/6/2023) for Next scheduled follow up with EKG.    Scribed for Mychal Block MD by CARLI Dempsey. 7/6/2022  16:45 EDT    I, Mychal Block MD, personally performed the services as scribed by the above named individual. I have made any necessary edits and it is both accurate and complete.     Mychal Block MD, MSc, FACC, [facility]  Interventional Cardiology  [facility]

## 2022-07-08 ENCOUNTER — OFFICE VISIT (OUTPATIENT)
Dept: NEUROSURGERY | Facility: CLINIC | Age: 62
End: 2022-07-08

## 2022-07-08 VITALS — HEIGHT: 68 IN | WEIGHT: 195 LBS | BODY MASS INDEX: 29.55 KG/M2 | RESPIRATION RATE: 18 BRPM

## 2022-07-08 DIAGNOSIS — I65.22 LEFT CAROTID STENOSIS: ICD-10-CM

## 2022-07-08 DIAGNOSIS — I73.9 PAD (PERIPHERAL ARTERY DISEASE): Primary | ICD-10-CM

## 2022-07-08 DIAGNOSIS — F17.200 CURRENT EVERY DAY SMOKER: ICD-10-CM

## 2022-07-08 DIAGNOSIS — Z95.828 INTERNAL CAROTID ARTERY STENT PRESENT: ICD-10-CM

## 2022-07-08 PROCEDURE — 99442 PR PHYS/QHP TELEPHONE EVALUATION 11-20 MIN: CPT | Performed by: NEUROLOGICAL SURGERY

## 2022-07-08 NOTE — PROGRESS NOTES
Subjective     Chief Complaint: Follow-up carotid stent    Patient ID: Laron Pandey is a 61 y.o. male is here today for follow-up.    History of Present Illness    This is a 61-year-old man in whom I performed a right internal carotid artery stent about 3 weeks ago.  Telephone consultation was established today for routine follow-up.    The following portions of the patient's history were reviewed and updated as appropriate: allergies, current medications, past family history, past medical history, past social history, past surgical history and problem list.    Family history:   Family History   Problem Relation Age of Onset   • Diabetes Mother    • Heart disease Mother    • Diabetes Father    • Heart disease Father    • Stroke Father    • Heart attack Neg Hx    • Hypertension Neg Hx    • Thyroid disease Neg Hx        Social history:   Social History     Socioeconomic History   • Marital status:    Tobacco Use   • Smoking status: Current Every Day Smoker     Packs/day: 0.25     Types: Cigarettes, Cigarettes   • Smokeless tobacco: Never Used   • Tobacco comment: 2 CIGS/DAY   Vaping Use   • Vaping Use: Never used   Substance and Sexual Activity   • Alcohol use: Yes     Alcohol/week: 12.0 standard drinks     Types: 12 Cans of beer per week     Comment: 12 BEERS/WEEK (3-4 AT A TIME)   • Drug use: Never   • Sexual activity: Defer       Review of Systems   Constitutional: Negative for activity change, appetite change, chills, diaphoresis, fatigue, fever and unexpected weight change.   HENT: Negative for congestion, dental problem, drooling, ear discharge, ear pain, facial swelling, hearing loss, mouth sores, nosebleeds, postnasal drip, rhinorrhea, sinus pressure, sneezing, sore throat, tinnitus, trouble swallowing and voice change.    Eyes: Negative for photophobia, pain, discharge, redness, itching and visual disturbance.   Respiratory: Negative for apnea, cough, choking, chest tightness, shortness of breath,  "wheezing and stridor.    Cardiovascular: Negative for chest pain, palpitations and leg swelling.   Gastrointestinal: Negative for abdominal distention, abdominal pain, anal bleeding, blood in stool, constipation, diarrhea, nausea, rectal pain and vomiting.   Endocrine: Negative for cold intolerance, heat intolerance, polydipsia, polyphagia and polyuria.   Genitourinary: Negative for decreased urine volume, difficulty urinating, dysuria, enuresis, flank pain, frequency, genital sores, hematuria and urgency.   Musculoskeletal: Positive for back pain and gait problem. Negative for arthralgias, joint swelling, myalgias, neck pain and neck stiffness.        Bilateral hips and leg pain   Skin: Negative for color change, pallor, rash and wound.   Allergic/Immunologic: Negative for environmental allergies, food allergies and immunocompromised state.   Neurological: Positive for weakness and numbness. Negative for dizziness, tremors, seizures, syncope, facial asymmetry, speech difficulty, light-headedness and headaches.   Hematological: Negative for adenopathy. Does not bruise/bleed easily.   Psychiatric/Behavioral: Negative for agitation, behavioral problems, confusion, decreased concentration, dysphoric mood, hallucinations, self-injury, sleep disturbance and suicidal ideas. The patient is not nervous/anxious and is not hyperactive.        Objective   Resp. rate 18, height 172.7 cm (68\"), weight 88.5 kg (195 lb).  Body mass index is 29.65 kg/m².    Physical Exam    Assessment & Plan     Independent Review of Radiographic Studies:      Available for my review is a carotid ultrasound which was performed on 6/16/2022, the day after his stent procedure.  He has moderate stenosis on the left side which was seen on his ultrasound from the 13th.  The left carotid was not evaluated on the duplex from the 16th.  Peak systolic velocities within the stent are 105 cm/s with a ratio of 1.42.  Peak systolic velocities on the left side " from the 13th were 350 cm/s with a ratio of 2.6.  And diastolic is 106 cm/s.    Medical Decision Making:      Status post successful stenting of asymptomatic high-grade stenosis of the right internal carotid artery.  The velocities of the left internal carotid artery would predict a higher stenosis then what was actually observed on his angiogram.  We will continue to follow this with ultrasounds and if there is progression of disease then he may be a candidate for stenting on the left side as well.  Again, he has a high bifurcation which makes him a better candidate for stenting versus endarterectomy if his left internal does indeed require treatment at some point.    Signs and symptoms of stroke were reviewed with the patient.    I would like for him to stop his aspirin and continue on Xarelto and Plavix for at least the next 3 weeks.    He has a shoulder surgery scheduled for mid August.  Given the relatively recent placement of his carotid stent, I would feel much more comfortable if the patient were able to have this procedure on a baby aspirin.  He has already been cleared by his cardiologist to temporarily stop his Xarelto.  In any case, my plan would be to switch him from Plavix to Xarelto and aspirin in about 3 weeks.    I will attempt to coordinate with his orthopedic surgeon, but again my preference would be for him to have his surgery on a baby aspirin.    We will schedule him for a surveillance carotid duplex in December, or sooner if clinically indicated.    Approximately 15 minutes was spent on today's phone call.      You have chosen to receive care through a telephone visit. Do you consent to use a telephone visit for your medical care today? Yes      Diagnoses and all orders for this visit:    1. PAD (peripheral artery disease) (HCC) (Primary)  -     Cancel: Duplex Carotid Ultrasound CAR    2. Current every day smoker    3. S/p VERA stent 6/22    4. Left carotid stenosis        No follow-ups on  file.           This document signed by RIRI Flores MD July 8, 2022 10:19 EDT

## 2022-08-02 ENCOUNTER — TELEPHONE (OUTPATIENT)
Dept: NEUROSURGERY | Facility: CLINIC | Age: 62
End: 2022-08-02

## 2022-08-02 NOTE — TELEPHONE ENCOUNTER
"Caller: Laron Pandey    Relationship: Self    Best call back number: [phone]    What form or medical record are you requesting: EXTENSION/CONTINUATION OF STD PAPERWORK OUT TO THE 10TH OF AUGUST; HAS BEEN FAXED TO US, PER PATIENT \"DR REYES NEEDS TO BACK UP HIS LEAVE FROM THE LAST OVN DATE OF 7/8/22 THROUGH THE ORTHO SURGERY (6 WEEKS) W/ DR PHILIP BHAKTA (ORTHO) - PER PATIENT THIS HAS BEEN FAXED TO OUR CLINICAL -[phone].    Who is requesting this form or medical record from you: [facility] SHORT TERM DISABILITY    How would you like to receive the form or medical records (pick-up, mail, fax): FAX  If fax, what is the fax number:   FAX # ON FORM    Timeframe paperwork needed: 24-48     Additional notes: JOSE CRAWFORD PH: [phone] IS SUPPOSED TO BE SENDING A CONTINUATION/EXTENSION OF STD PAPERWORK .    PLEASE CALL PATIENT WITH ANY QUESTIONS OR AN UPDATE ON HOW THIS IS PROGRESSING WITHIN 24-48 HOURS -[phone].     THANK YOU VERY MUCH.   "

## 2022-08-04 ENCOUNTER — HOSPITAL ENCOUNTER (OUTPATIENT)
Dept: GENERAL RADIOLOGY | Facility: HOSPITAL | Age: 62
Discharge: HOME | End: 2022-08-04

## 2022-08-04 ENCOUNTER — PRE-ADMISSION TESTING (OUTPATIENT)
Dept: PREADMISSION TESTING | Facility: HOSPITAL | Age: 62
End: 2022-08-04

## 2022-08-04 VITALS — BODY MASS INDEX: 29.92 KG/M2 | WEIGHT: 197.42 LBS | HEIGHT: 68 IN

## 2022-08-04 LAB
ANION GAP SERPL CALCULATED.3IONS-SCNC: 10 MMOL/L (ref 5–15)
BUN SERPL-MCNC: 15 MG/DL (ref 8–23)
BUN/CREAT SERPL: 18.3 (ref 7–25)
CALCIUM SPEC-SCNC: 9.3 MG/DL (ref 8.6–10.5)
CHLORIDE SERPL-SCNC: 96 MMOL/L (ref 98–107)
CO2 SERPL-SCNC: 25 MMOL/L (ref 22–29)
CREAT SERPL-MCNC: 0.82 MG/DL (ref 0.76–1.27)
DEPRECATED RDW RBC AUTO: 44.4 FL (ref 37–54)
EGFRCR SERPLBLD CKD-EPI 2021: 99.9 ML/MIN/1.73
ERYTHROCYTE [DISTWIDTH] IN BLOOD BY AUTOMATED COUNT: 13.1 % (ref 12.3–15.4)
GLUCOSE SERPL-MCNC: 442 MG/DL (ref 65–99)
HBA1C MFR BLD: 9.4 % (ref 4.8–5.6)
HCT VFR BLD AUTO: 39.3 % (ref 37.5–51)
HGB BLD-MCNC: 14 G/DL (ref 13–17.7)
INR PPP: 0.95 (ref 0.84–1.13)
MCH RBC QN AUTO: 32.9 PG (ref 26.6–33)
MCHC RBC AUTO-ENTMCNC: 35.6 G/DL (ref 31.5–35.7)
MCV RBC AUTO: 92.5 FL (ref 79–97)
PLATELET # BLD AUTO: 253 10*3/MM3 (ref 140–450)
PMV BLD AUTO: 10.1 FL (ref 6–12)
POTASSIUM SERPL-SCNC: 5.5 MMOL/L (ref 3.5–5.2)
PROTHROMBIN TIME: 12.5 SECONDS (ref 11.4–14.4)
RBC # BLD AUTO: 4.25 10*6/MM3 (ref 4.14–5.8)
SODIUM SERPL-SCNC: 131 MMOL/L (ref 136–145)
WBC NRBC COR # BLD: 8.02 10*3/MM3 (ref 3.4–10.8)

## 2022-08-04 PROCEDURE — 85027 COMPLETE CBC AUTOMATED: CPT

## 2022-08-04 PROCEDURE — 85610 PROTHROMBIN TIME: CPT

## 2022-08-04 PROCEDURE — 36415 COLL VENOUS BLD VENIPUNCTURE: CPT

## 2022-08-04 PROCEDURE — 80048 BASIC METABOLIC PNL TOTAL CA: CPT

## 2022-08-04 PROCEDURE — 71046 X-RAY EXAM CHEST 2 VIEWS: CPT

## 2022-08-04 PROCEDURE — 83036 HEMOGLOBIN GLYCOSYLATED A1C: CPT

## 2022-08-04 RX ORDER — ACETAMINOPHEN 500 MG
500 TABLET ORAL EVERY 6 HOURS PRN
COMMUNITY

## 2022-08-04 NOTE — PAT
1344 REPORTED GLUCOSE  TO NICOLASA IN DR. PALACIOS'S OFFICE. NICOLASA TO CONTACT DR. PALACIOS FOR FURTHER INSTRUCTIONS AND CALL BACK.

## 2022-08-04 NOTE — PAT
An arrival time for procedure was not given during PAT visit. If patient had any questions or concerns about their arrival time, they were instructed to contact their surgeon/physician.  Additionally, if the patient referred to an arrival time that was acquired from their my chart account, patient was encouraged to verify that time with their surgeon/physician.  NO arrival times given in Pre Admission Testing Department.    Patient instructed to drink 20 ounces (or until full) of Gatorade and it needs to be completed 1 hour (for Main OR patients) or 2 hours (scheduled  section patients) before given arrival time for procedure (NO RED Gatorade)    Patient verbalized understanding.    Patient to apply Chlorhexadine wipes  to surgical area (as instructed) the night before procedure and the AM of procedure. Wipes provided.    Patient viewed general PAT education video as instructed in their preoperative information received from their surgeon.  Patient stated the general PAT education video was viewed in its entirety and survey completed.  Copies of PAT general education handouts (Incentive Spirometry, Meds to Beds Program, Patient Belongings, Pre-op skin preparation instructions, Blood Glucose testing, Visitor policy, Surgery FAQ, Code H) distributed to patient if not printed. Education related to the PAT pass and skin preparation for surgery (if applicable) completed in PAT as a reinforcement to PAT education video. Patient instructed to return PAT pass provided today as well as completed skin preparation sheet (if applicable) on the day of procedure.     Additionally if patient had not viewed video yet but intended to view it at home or in our waiting area, then referred them to the handout with QR code/link provided during PAT visit.  Instructed patient to complete survey after viewing the video in its entirety.  Encouraged patient/family to read PAT general education handouts thoroughly and notify PAT staff  with any questions or concerns. Patient verbalized understanding of all information and priority content.    Patient denies any current skin issues.     Patient directed to Radiology Department for CXR after Pre Admission Testing Appointment.     CARDIAC RISK ASSESSMENT FROM DR. XIAO ON CHART FROM 7/6/22. PT MAY HOLD XARELTO 48 HOURS PRIOR TO SURGERY.    NEED CLEAR INSTRUCTIONS FROM NEUROSURGERY (DR. REYES) ON PLAVIX AND ASA 81 MG. PT WILL NEED INSTRUCTIONS. DR. REYES NOTE ON CHART FROM 7/8/22.

## 2022-08-12 ENCOUNTER — OFFICE VISIT (OUTPATIENT)
Dept: NEUROSURGERY | Facility: CLINIC | Age: 62
End: 2022-08-12

## 2022-08-12 ENCOUNTER — TELEPHONE (OUTPATIENT)
Dept: NEUROSURGERY | Facility: CLINIC | Age: 62
End: 2022-08-12

## 2022-08-12 VITALS — WEIGHT: 198 LBS | RESPIRATION RATE: 18 BRPM | BODY MASS INDEX: 30.01 KG/M2 | HEIGHT: 68 IN

## 2022-08-12 DIAGNOSIS — E11.65 UNCONTROLLED TYPE 2 DIABETES MELLITUS WITH HYPERGLYCEMIA, WITH LONG-TERM CURRENT USE OF INSULIN: Primary | ICD-10-CM

## 2022-08-12 DIAGNOSIS — Z95.828 INTERNAL CAROTID ARTERY STENT PRESENT: ICD-10-CM

## 2022-08-12 DIAGNOSIS — Z79.4 UNCONTROLLED TYPE 2 DIABETES MELLITUS WITH HYPERGLYCEMIA, WITH LONG-TERM CURRENT USE OF INSULIN: Primary | ICD-10-CM

## 2022-08-12 PROCEDURE — 99441 PR PHYS/QHP TELEPHONE EVALUATION 5-10 MIN: CPT | Performed by: NEUROLOGICAL SURGERY

## 2022-08-12 NOTE — PROGRESS NOTES
"Subjective     Chief Complaint: Carotid stenosis    Patient ID: Laron Pandey is a 61 y.o. male is here today for follow-up.    History of Present Illness    This is a 61-year-old man who is approximately 6 weeks out following a right internal carotid artery stent.  Telephone consultation was established today to discuss management of his prescription medications.    The following portions of the patient's history were reviewed and updated as appropriate: allergies, current medications, past family history, past medical history, past social history, past surgical history and problem list.    Family history:   Family History   Problem Relation Age of Onset   • Diabetes Mother    • Heart disease Mother    • Diabetes Father    • Heart disease Father    • Stroke Father    • Heart attack Neg Hx    • Hypertension Neg Hx    • Thyroid disease Neg Hx        Social history:   Social History     Socioeconomic History   • Marital status:    Tobacco Use   • Smoking status: Current Every Day Smoker     Packs/day: 0.25     Types: Cigarettes, Cigarettes   • Smokeless tobacco: Never Used   • Tobacco comment: 2 CIGS/DAY   Vaping Use   • Vaping Use: Never used   Substance and Sexual Activity   • Alcohol use: Yes     Alcohol/week: 12.0 standard drinks     Types: 12 Cans of beer per week     Comment: 12 BEERS/WEEK (3-4 AT A TIME)   • Drug use: Never   • Sexual activity: Defer       Review of Systems    Objective   Resp. rate 18, height 172.7 cm (68\"), weight 89.8 kg (198 lb).  Body mass index is 30.11 kg/m².    Physical Exam    Assessment & Plan     Independent Review of Radiographic Studies:      He has no new imaging for me to review    Medical Decision Making:      I once again reviewed the signs and symptoms of stroke/TIA.  I directed him to call 911 if he thinks he is having a stroke/TIA.  He can stop his Plavix and continue on Xarelto and aspirin indefinitely.  I will follow-up with him with a surveillance carotid duplex " in December, or sooner if clinically indicated.  Approximately 6 minutes was spent on today's phone call.    You have chosen to receive care through a telephone visit. Do you consent to use a telephone visit for your medical care today? Yes      Diagnoses and all orders for this visit:    1. Uncontrolled type 2 diabetes mellitus with hyperglycemia, with long-term current use of insulin (HCC) (Primary)    2. S/p VERA stent 6/22        No follow-ups on file.           This document signed by RIRI Flores MD August 12, 2022 12:30 EDT

## 2022-08-12 NOTE — TELEPHONE ENCOUNTER
Attempted to reach patient to get him ready for his televisit with Dr. Flores at 11:45,    Verbalized that I would call a couple more times and that if I didn't hear back by NOON, he would need to r/s.     Gave direct line.

## 2022-08-14 ENCOUNTER — APPOINTMENT (OUTPATIENT)
Dept: PREADMISSION TESTING | Facility: HOSPITAL | Age: 62
End: 2022-08-14

## 2022-08-22 ENCOUNTER — OFFICE VISIT (OUTPATIENT)
Dept: ENDOCRINOLOGY | Facility: CLINIC | Age: 62
End: 2022-08-22

## 2022-08-22 VITALS
HEIGHT: 68 IN | HEART RATE: 90 BPM | BODY MASS INDEX: 30.36 KG/M2 | SYSTOLIC BLOOD PRESSURE: 120 MMHG | DIASTOLIC BLOOD PRESSURE: 78 MMHG | WEIGHT: 200.3 LBS | OXYGEN SATURATION: 100 %

## 2022-08-22 DIAGNOSIS — M48.062 LUMBAR STENOSIS WITH NEUROGENIC CLAUDICATION: ICD-10-CM

## 2022-08-22 DIAGNOSIS — E03.9 HYPOTHYROIDISM, ADULT: ICD-10-CM

## 2022-08-22 DIAGNOSIS — E11.65 UNCONTROLLED TYPE 2 DIABETES MELLITUS WITH HYPERGLYCEMIA, WITH LONG-TERM CURRENT USE OF INSULIN: Primary | ICD-10-CM

## 2022-08-22 DIAGNOSIS — Z79.4 UNCONTROLLED TYPE 2 DIABETES MELLITUS WITH HYPERGLYCEMIA, WITH LONG-TERM CURRENT USE OF INSULIN: Primary | ICD-10-CM

## 2022-08-22 LAB
EXPIRATION DATE: NORMAL
EXPIRATION DATE: NORMAL
GLUCOSE BLDC GLUCOMTR-MCNC: 71 MG/DL (ref 70–130)
HBA1C MFR BLD: 8.9 %
Lab: NORMAL
Lab: NORMAL

## 2022-08-22 PROCEDURE — 82947 ASSAY GLUCOSE BLOOD QUANT: CPT | Performed by: INTERNAL MEDICINE

## 2022-08-22 PROCEDURE — 83036 HEMOGLOBIN GLYCOSYLATED A1C: CPT | Performed by: INTERNAL MEDICINE

## 2022-08-22 PROCEDURE — 99214 OFFICE O/P EST MOD 30 MIN: CPT | Performed by: INTERNAL MEDICINE

## 2022-08-22 RX ORDER — GABAPENTIN 100 MG/1
200 CAPSULE ORAL 3 TIMES DAILY
Qty: 180 CAPSULE | Refills: 3 | Status: SHIPPED | OUTPATIENT
Start: 2022-08-22 | End: 2023-03-13 | Stop reason: SDUPTHER

## 2022-08-22 RX ORDER — DULAGLUTIDE 0.75 MG/.5ML
0.75 INJECTION, SOLUTION SUBCUTANEOUS WEEKLY
Qty: 4 PEN | Refills: 0 | Status: SHIPPED | OUTPATIENT
Start: 2022-08-22 | End: 2022-10-27 | Stop reason: HOSPADM

## 2022-08-22 RX ORDER — INSULIN ASPART 100 [IU]/ML
50 INJECTION, SUSPENSION SUBCUTANEOUS
Qty: 35 ML | Refills: 11 | Status: SHIPPED | OUTPATIENT
Start: 2022-08-22 | End: 2022-11-15 | Stop reason: SDUPTHER

## 2022-08-22 RX ORDER — DULAGLUTIDE 1.5 MG/.5ML
1.5 INJECTION, SOLUTION SUBCUTANEOUS WEEKLY
Qty: 4 PEN | Refills: 6 | Status: SHIPPED | OUTPATIENT
Start: 2022-08-22 | End: 2023-01-24

## 2022-08-22 NOTE — PATIENT INSTRUCTIONS
Results for orders placed or performed in visit on 08/22/22   POC Glycosylated Hemoglobin (Hb A1C)    Specimen: Blood   Result Value Ref Range    Hemoglobin A1C 8.9 %    Lot Number [id number]     Expiration Date 04/03/2024    POC Glucose, Blood    Specimen: Blood   Result Value Ref Range    Glucose 71 70 - 130 mg/dL    Lot Number [id number]     Expiration Date 02/26/2023      Novolog 70/30 50-55 unit in the morning and 45 -50 units with dinner.  Add Trulicity at 0.75 mg weekly, increase to 1.5 mg in 1 month

## 2022-08-22 NOTE — PROGRESS NOTES
Subjective:     Chief Complaint   Patient presents with   • Diabetes     Follow up: States had a mini stroke since last OV       Laron Pandey is a 61 y.o. male who is is being seen for follow-up for management of  Type 2 diabetes mellitus.  The initial diagnosis of diabetes was made in 2003. He was initially controlled with metformin and started insulin in 2010.   Diabetic complications: cardiovascular disease s/p CABG. He has neuropathy sx with tingling and pain in the legs bilaterally.       Current diabetic medications include novolog 70/30  Metformin - he stopped this medication due to side effects of diarrhea. He only takes it occasionally.     Monitoring  -Freestyle Ang - he wasn't able to use it because cant use any device at work.   He is going on disability and can start using Freestyle Ang.     Other med problems:CAD/CABG, HL, HTN    HL- intolerant to statins in the past. Restarted zetia and doing well with that.   Hypothyroidism - 225 mcg of levothyroxine  HTN -improved after changing to valsartan.   Back pain improved after epidural injection   His sx of neuropathy are worse. Back pain and neuropathy pain are double in intensity.   He had a mini stroke in June 2022  Surgery for back pain is postponed because of high glucose.     MEDICATIONS    Current Outpatient Medications:   •  acetaminophen (TYLENOL) 500 MG tablet, Take 500 mg by mouth Every 6 (Six) Hours As Needed for Mild Pain ., Disp: , Rfl:   •  aspirin 81 MG chewable tablet, Chew 1 tablet Daily., Disp: 21 tablet, Rfl: 0  •  BD ULTRA-FINE PEN NEEDLES 29G X 12.7MM misc, Use with Insulin 3 times daily, Disp: 300 each, Rfl: 2  •  ezetimibe (ZETIA) 10 MG tablet, Take 10 mg by mouth Daily., Disp: , Rfl:   •  gabapentin (NEURONTIN) 100 MG capsule, Take 2 capsules by mouth 3 (Three) Times a Day., Disp: 180 capsule, Rfl: 3  •  glucose blood (OneTouch Verio) test strip, Use as instructed, Disp: 300 each, Rfl: 2  •  Lancets (OneTouch Delica Plus  "[id number]) misc, 1 each 3 (Three) Times a Day., Disp: 300 each, Rfl: 2  •  levothyroxine (SYNTHROID, LEVOTHROID) 75 MCG tablet, Take 3 tablets by mouth Every Morning., Disp: 60 tablet, Rfl: 2  •  metFORMIN (GLUCOPHAGE) 1000 MG tablet, Take 1,000 mg by mouth Daily With Breakfast., Disp: , Rfl:   •  metoprolol succinate XL (TOPROL-XL) 25 MG 24 hr tablet, Take 1 tablet by mouth Daily., Disp: 90 tablet, Rfl: 2  •  NovoLOG Mix 70/30 FlexPen (70-30) 100 UNIT/ML suspension pen-injector injection, Inject 0.5 mL under the skin into the appropriate area as directed 2 (Two) Times a Day Before Meals. INJECT 55 UNITS SUBCUTANEOUSLY IN THE MORNING AND 50 UNITS SUBCUTANEOUSLY AT BEDTIME, Disp: 35 mL, Rfl: 11  •  Rivaroxaban (XARELTO) 2.5 MG tablet, Take 1 tablet by mouth 2 (Two) Times a Day., Disp: 60 tablet, Rfl: 11  •  valsartan-hydrochlorothiazide (DIOVAN-HCT) 320-12.5 MG per tablet, Take 1 tablet by mouth Daily., Disp: 90 tablet, Rfl: 3  •  clopidogrel (PLAVIX) 75 MG tablet, Take 1 tablet by mouth Daily., Disp: 60 tablet, Rfl: 2  •  Continuous Blood Gluc Sensor (FreeStyle Jude 2 Sensor) misc, 1 each Every 14 (Fourteen) Days., Disp: 2 each, Rfl: 6  •  Dulaglutide (Trulicity) 0.75 MG/0.5ML solution pen-injector, Inject 0.75 mg under the skin into the appropriate area as directed 1 (One) Time Per Week., Disp: 4 pen, Rfl: 0  •  Dulaglutide (Trulicity) 1.5 MG/0.5ML solution pen-injector, Inject 1.5 mg under the skin into the appropriate area as directed 1 (One) Time Per Week., Disp: 4 pen, Rfl: 6    Review of Systems  Review of Systems   Constitutional: Positive for fatigue.   Eyes: Negative.    Endocrine:        As listed in HPI   Neurological: Positive for weakness and numbness (tingling).   All other systems reviewed and are negative.         Objective:      /78   Pulse 90   Ht 172.7 cm (68\")   Wt 90.9 kg (200 lb 4.8 oz)   SpO2 100%   BMI 30.46 kg/m² Body mass index is 30.46 kg/m².  Physical Exam   Constitutional: He " is oriented to person, place, and time. He appears well-developed.   HENT:   Head: Normocephalic and atraumatic.   Eyes: Conjunctivae are normal.   Neck: No thyroid mass present.   The neck is supple and no assimetry visualized   Cardiovascular: Normal rate, regular rhythm, normal heart sounds and normal pulses.   Pulmonary/Chest: Effort normal and breath sounds normal.   Neurological: He is alert and oriented to person, place, and time. He has normal reflexes.   Psychiatric: Thought content normal.   Vitals reviewed.          LABS AND IMAGING      Lab Results   Component Value Date    HGBA1C 8.9 08/22/2022    HGBA1C 9.40 (H) 08/04/2022    HGBA1C 9.80 (H) 06/14/2022     Office Visit on 08/22/2022   Component Date Value Ref Range Status   • Hemoglobin A1C 08/22/2022 8.9  % Final   • Lot Number 08/22/2022 [id number]   Final   • Expiration Date 08/22/2022 04/03/2024   Final   • Glucose 08/22/2022 71  70 - 130 mg/dL Final   • Lot Number 08/22/2022 [id number]   Final   • Expiration Date 08/22/2022 02/26/2023   Final   Pre-Admission Testing on 08/04/2022   Component Date Value Ref Range Status   • Hemoglobin A1C 08/04/2022 9.40 (A) 4.80 - 5.60 % Final   • Protime 08/04/2022 12.5  11.4 - 14.4 Seconds Final   • INR 08/04/2022 0.95  0.84 - 1.13 Final   • WBC 08/04/2022 8.02  3.40 - 10.80 10*3/mm3 Final   • RBC 08/04/2022 4.25  4.14 - 5.80 10*6/mm3 Final   • Hemoglobin 08/04/2022 14.0  13.0 - 17.7 g/dL Final   • Hematocrit 08/04/2022 39.3  37.5 - 51.0 % Final   • MCV 08/04/2022 92.5  79.0 - 97.0 fL Final   • MCH 08/04/2022 32.9  26.6 - 33.0 pg Final   • MCHC 08/04/2022 35.6  31.5 - 35.7 g/dL Final   • RDW 08/04/2022 13.1  12.3 - 15.4 % Final   • RDW-SD 08/04/2022 44.4  37.0 - 54.0 fl Final   • MPV 08/04/2022 10.1  6.0 - 12.0 fL Final   • Platelets 08/04/2022 253  140 - 450 10*3/mm3 Final   • Glucose 08/04/2022 442 (A) 65 - 99 mg/dL Final   • BUN 08/04/2022 15  8 - 23 mg/dL Final   • Creatinine 08/04/2022 0.82  0.76 - 1.27  mg/dL Final   • Sodium 08/04/2022 131 (A) 136 - 145 mmol/L Final   • Potassium 08/04/2022 5.5 (A) 3.5 - 5.2 mmol/L Final   • Chloride 08/04/2022 96 (A) 98 - 107 mmol/L Final   • CO2 08/04/2022 25.0  22.0 - 29.0 mmol/L Final   • Calcium 08/04/2022 9.3  8.6 - 10.5 mg/dL Final   • BUN/Creatinine Ratio 08/04/2022 18.3  7.0 - 25.0 Final   • Anion Gap 08/04/2022 10.0  5.0 - 15.0 mmol/L Final   • eGFR 08/04/2022 99.9  >60.0 mL/min/1.73 Final    National Kidney Foundation and American Society of Nephrology (ASN) Task Force recommended calculation based on the Chronic Kidney Disease Epidemiology Collaboration (CKD-EPI) equation refit without adjustment for race.   No results displayed because visit has over 200 results.                Assessment:         Diagnoses and all orders for this visit:    Uncontrolled type 2 diabetes mellitus with hyperglycemia, with long-term current use of insulin (HCC)  -     POC Glycosylated Hemoglobin (Hb A1C)  -     POC Glucose, Blood    Hypothyroidism    Lumbar stenosis with neurogenic claudication  -     gabapentin (NEURONTIN) 100 MG capsule; Take 2 capsules by mouth 3 (Three) Times a Day.    Other orders  -     NovoLOG Mix 70/30 FlexPen (70-30) 100 UNIT/ML suspension pen-injector injection; Inject 0.5 mL under the skin into the appropriate area as directed 2 (Two) Times a Day Before Meals. INJECT 55 UNITS SUBCUTANEOUSLY IN THE MORNING AND 50 UNITS SUBCUTANEOUSLY AT BEDTIME  -     Continuous Blood Gluc Sensor (FreeStyle Jude 2 Sensor) misc; 1 each Every 14 (Fourteen) Days.  -     Dulaglutide (Trulicity) 0.75 MG/0.5ML solution pen-injector; Inject 0.75 mg under the skin into the appropriate area as directed 1 (One) Time Per Week.  -     Dulaglutide (Trulicity) 1.5 MG/0.5ML solution pen-injector; Inject 1.5 mg under the skin into the appropriate area as directed 1 (One) Time Per Week.        Plan:       Patient has poorly controlled diabetes. Dietary recommendations reviewed. Insulin  titration instructions provided.   He was not using Freestyle - not allowed to use it at work. Now will start using it consistently since he is on disability.     Novolog 70/30 55 unit in the morning and 50 units with dinner, if you have a late dinner, take lower dose of insulin at dinner - 45 units.    He is not taking metformin.     Hypoglycemia precautions reviewed.   He has hypoglycemia when takes insulin several hours after meals. Precautions reviewed.   -cont Zetia  -gabapentin for neuropathy. He had a lot if neuropathy sx.      -cont thyroid medications levothyroxine 225 mcg . Repeat levels.        Follow up:  3 -4 months.

## 2022-08-23 ENCOUNTER — TELEPHONE (OUTPATIENT)
Dept: ENDOCRINOLOGY | Facility: CLINIC | Age: 62
End: 2022-08-23

## 2022-08-23 DIAGNOSIS — E03.9 HYPOTHYROIDISM, ADULT: Primary | ICD-10-CM

## 2022-08-23 NOTE — TELEPHONE ENCOUNTER
----- Message from Meka LOGAN MD sent at 8/22/2022  3:31 PM EDT -----  Please obtain records from PCP with latest thyroid labs.

## 2022-08-26 ENCOUNTER — TELEPHONE (OUTPATIENT)
Dept: ENDOCRINOLOGY | Facility: CLINIC | Age: 62
End: 2022-08-26

## 2022-08-26 NOTE — TELEPHONE ENCOUNTER
PATIENT IS REQUESTING A RETURN CALL. HE STATES THAT HE HAS RECEIVED THE ProxeonE AND DOES NOT KNOW HOW TO CONNECT IT WITH HIS PHONE.     CALL BACK [phone]

## 2022-08-26 NOTE — TELEPHONE ENCOUNTER
Returned pt's call. Walked him through adding the luis to his phone and walking thru the set up process. RADAMES

## 2022-08-31 NOTE — TELEPHONE ENCOUNTER
Fax refill request for Rx Levothyroxine 75 denied. Pt will need to contact PCP for further refills.

## 2022-09-14 ENCOUNTER — TELEPHONE (OUTPATIENT)
Dept: ENDOCRINOLOGY | Facility: CLINIC | Age: 62
End: 2022-09-14

## 2022-09-14 RX ORDER — LEVOTHYROXINE SODIUM 0.12 MG/1
TABLET ORAL
Qty: 180 TABLET | Refills: 2 | Status: SHIPPED | OUTPATIENT
Start: 2022-09-14

## 2022-09-14 NOTE — TELEPHONE ENCOUNTER
----- Message from Meka LOGAN MD sent at 9/13/2022  3:55 PM EDT -----  Please call patient with the lab results: Thyroid function is low. Please ask if he is taking his thyroid med consistently. If he is taking it consistently and didn't miss a dose, we can increase the dose to 250 mcg daily (2 tabs of 125 mcg daily).

## 2022-09-14 NOTE — TELEPHONE ENCOUNTER
Pt notified of results expressed understanding. Stated has not missed any doses, will send new RX to pharmacy

## 2022-09-15 DIAGNOSIS — E03.9 HYPOTHYROIDISM, ADULT: ICD-10-CM

## 2022-09-20 RX ORDER — DULAGLUTIDE 0.75 MG/.5ML
0.75 INJECTION, SOLUTION SUBCUTANEOUS WEEKLY
OUTPATIENT
Start: 2022-09-20

## 2022-09-27 ENCOUNTER — TELEPHONE (OUTPATIENT)
Dept: CARDIOLOGY | Facility: CLINIC | Age: 62
End: 2022-09-27

## 2022-09-27 NOTE — TELEPHONE ENCOUNTER
Patient is to have left ASC cuff repair with possible bicep tenodesis with Dr. Sutherland on 10/18/1922. Patient is needing pre-surgical cardiac risk assessment and medication recommendations. Please advise.       F: kiesha Guerrero @ [phone]

## 2022-09-28 NOTE — TELEPHONE ENCOUNTER
LVM for patient with details and recommendations. Requested return call. Letter sent to Dr. Beltran office.

## 2022-10-17 ENCOUNTER — ANESTHESIA EVENT (OUTPATIENT)
Dept: PERIOP | Facility: HOSPITAL | Age: 62
End: 2022-10-17

## 2022-10-17 RX ORDER — FAMOTIDINE 10 MG/ML
20 INJECTION, SOLUTION INTRAVENOUS ONCE
Status: CANCELLED | OUTPATIENT
Start: 2022-10-17 | End: 2022-10-17

## 2022-10-18 ENCOUNTER — ANESTHESIA (OUTPATIENT)
Dept: PERIOP | Facility: HOSPITAL | Age: 62
End: 2022-10-18

## 2022-10-18 ENCOUNTER — HOSPITAL ENCOUNTER (INPATIENT)
Facility: HOSPITAL | Age: 62
LOS: 8 days | Discharge: HOME HEALTH | End: 2022-10-27
Attending: ORTHOPAEDIC SURGERY | Admitting: ORTHOPAEDIC SURGERY

## 2022-10-18 ENCOUNTER — ANESTHESIA EVENT CONVERTED (OUTPATIENT)
Dept: ANESTHESIOLOGY | Facility: HOSPITAL | Age: 62
End: 2022-10-18

## 2022-10-18 ENCOUNTER — APPOINTMENT (OUTPATIENT)
Dept: GENERAL RADIOLOGY | Facility: HOSPITAL | Age: 62
End: 2022-10-18

## 2022-10-18 DIAGNOSIS — M75.122 NONTRAUMATIC COMPLETE TEAR OF LEFT ROTATOR CUFF: Primary | ICD-10-CM

## 2022-10-18 PROBLEM — M75.102 ROTATOR CUFF TEAR, LEFT: Status: ACTIVE | Noted: 2022-10-18

## 2022-10-18 LAB
ALBUMIN SERPL-MCNC: 3.4 G/DL (ref 3.5–5.2)
ALBUMIN/GLOB SERPL: 1.4 G/DL
ALP SERPL-CCNC: 112 U/L (ref 39–117)
ALT SERPL W P-5'-P-CCNC: 58 U/L (ref 1–41)
ANION GAP SERPL CALCULATED.3IONS-SCNC: 12 MMOL/L (ref 5–15)
ARTERIAL PATENCY WRIST A: ABNORMAL
AST SERPL-CCNC: 75 U/L (ref 1–40)
ATMOSPHERIC PRESS: ABNORMAL MM[HG]
BASE EXCESS BLDA CALC-SCNC: -9.1 MMOL/L (ref 0–2)
BASE EXCESS BLDA CALC-SCNC: 2.4 MMOL/L (ref 0–2)
BASE EXCESS BLDA CALC-SCNC: 3.4 MMOL/L (ref 0–2)
BDY SITE: ABNORMAL
BILIRUB SERPL-MCNC: 0.2 MG/DL (ref 0–1.2)
BODY TEMPERATURE: 37 C
BUN SERPL-MCNC: 11 MG/DL (ref 8–23)
BUN/CREAT SERPL: 12.1 (ref 7–25)
CALCIUM SPEC-SCNC: 8 MG/DL (ref 8.6–10.5)
CHLORIDE SERPL-SCNC: 102 MMOL/L (ref 98–107)
CO2 BLDA-SCNC: 23.2 MMOL/L (ref 22–33)
CO2 BLDA-SCNC: 27.4 MMOL/L (ref 22–33)
CO2 BLDA-SCNC: 27.8 MMOL/L (ref 22–33)
CO2 SERPL-SCNC: 28 MMOL/L (ref 22–29)
COHGB MFR BLD: 1.3 % (ref 0–2)
COHGB MFR BLD: 1.4 % (ref 0–2)
COHGB MFR BLD: 1.6 % (ref 0–2)
CREAT SERPL-MCNC: 0.91 MG/DL (ref 0.76–1.27)
D-LACTATE SERPL-SCNC: 1.8 MMOL/L (ref 0.5–2)
D-LACTATE SERPL-SCNC: 4.3 MMOL/L (ref 0.5–2)
DEPRECATED RDW RBC AUTO: 46 FL (ref 37–54)
EGFRCR SERPLBLD CKD-EPI 2021: 95.3 ML/MIN/1.73
EPAP: 0
ERYTHROCYTE [DISTWIDTH] IN BLOOD BY AUTOMATED COUNT: 13.2 % (ref 12.3–15.4)
GLOBULIN UR ELPH-MCNC: 2.5 GM/DL
GLUCOSE BLDC GLUCOMTR-MCNC: 134 MG/DL (ref 70–130)
GLUCOSE BLDC GLUCOMTR-MCNC: 141 MG/DL (ref 70–130)
GLUCOSE BLDC GLUCOMTR-MCNC: 159 MG/DL (ref 70–130)
GLUCOSE BLDC GLUCOMTR-MCNC: 197 MG/DL (ref 70–130)
GLUCOSE BLDC GLUCOMTR-MCNC: 235 MG/DL (ref 70–130)
GLUCOSE BLDC GLUCOMTR-MCNC: 241 MG/DL (ref 70–130)
GLUCOSE BLDC GLUCOMTR-MCNC: 263 MG/DL (ref 70–130)
GLUCOSE BLDC GLUCOMTR-MCNC: 332 MG/DL (ref 70–130)
GLUCOSE BLDC GLUCOMTR-MCNC: 398 MG/DL (ref 70–130)
GLUCOSE SERPL-MCNC: 410 MG/DL (ref 65–99)
HCO3 BLDA-SCNC: 21.2 MMOL/L (ref 20–26)
HCO3 BLDA-SCNC: 26.3 MMOL/L (ref 20–26)
HCO3 BLDA-SCNC: 26.7 MMOL/L (ref 20–26)
HCT VFR BLD AUTO: 39.6 % (ref 37.5–51)
HCT VFR BLD CALC: 37.7 % (ref 38–51)
HCT VFR BLD CALC: 40.2 % (ref 38–51)
HCT VFR BLD CALC: 44.2 % (ref 38–51)
HGB BLD-MCNC: 13.3 G/DL (ref 13–17.7)
HGB BLDA-MCNC: 12.3 G/DL (ref 13.5–17.5)
HGB BLDA-MCNC: 13.1 G/DL (ref 13.5–17.5)
HGB BLDA-MCNC: 14.4 G/DL (ref 13.5–17.5)
INHALED O2 CONCENTRATION: 100 %
INHALED O2 CONCENTRATION: 40 %
INHALED O2 CONCENTRATION: 50 %
IPAP: 0
Lab: ABNORMAL
MAGNESIUM SERPL-MCNC: 1.8 MG/DL (ref 1.6–2.4)
MCH RBC QN AUTO: 31.9 PG (ref 26.6–33)
MCHC RBC AUTO-ENTMCNC: 33.6 G/DL (ref 31.5–35.7)
MCV RBC AUTO: 95 FL (ref 79–97)
METHGB BLD QL: 0.2 % (ref 0–1.5)
METHGB BLD QL: 0.3 % (ref 0–1.5)
METHGB BLD QL: ABNORMAL
MODALITY: ABNORMAL
NOTE: ABNORMAL
NOTIFIED BY: ABNORMAL
NOTIFIED WHO: ABNORMAL
OXYHGB MFR BLDV: 95.8 % (ref 94–99)
OXYHGB MFR BLDV: 96.4 % (ref 94–99)
OXYHGB MFR BLDV: 98.1 % (ref 94–99)
PAW @ PEAK INSP FLOW SETTING VENT: 0 CMH2O
PCO2 BLDA: 35.1 MM HG (ref 35–45)
PCO2 BLDA: 37.1 MM HG (ref 35–45)
PCO2 BLDA: 64.8 MM HG (ref 35–45)
PCO2 TEMP ADJ BLD: 35.1 MM HG (ref 35–48)
PCO2 TEMP ADJ BLD: 37.1 MM HG (ref 35–48)
PCO2 TEMP ADJ BLD: 64.8 MM HG (ref 35–48)
PEEP RESPIRATORY: 5 CM[H2O]
PH BLDA: 7.12 PH UNITS (ref 7.35–7.45)
PH BLDA: 7.46 PH UNITS (ref 7.35–7.45)
PH BLDA: 7.49 PH UNITS (ref 7.35–7.45)
PH, TEMP CORRECTED: 7.12 PH UNITS
PH, TEMP CORRECTED: 7.46 PH UNITS
PH, TEMP CORRECTED: 7.49 PH UNITS
PLATELET # BLD AUTO: 260 10*3/MM3 (ref 140–450)
PMV BLD AUTO: 10.5 FL (ref 6–12)
PO2 BLDA: 186 MM HG (ref 83–108)
PO2 BLDA: 81 MM HG (ref 83–108)
PO2 BLDA: 90.3 MM HG (ref 83–108)
PO2 TEMP ADJ BLD: 186 MM HG (ref 83–108)
PO2 TEMP ADJ BLD: 81 MM HG (ref 83–108)
PO2 TEMP ADJ BLD: 90.3 MM HG (ref 83–108)
POTASSIUM SERPL-SCNC: 4.1 MMOL/L (ref 3.5–5.2)
POTASSIUM SERPL-SCNC: 4.4 MMOL/L (ref 3.5–5.2)
PROT SERPL-MCNC: 5.9 G/DL (ref 6–8.5)
QT INTERVAL: 428 MS
QTC INTERVAL: 515 MS
RBC # BLD AUTO: 4.17 10*6/MM3 (ref 4.14–5.8)
SET MECH RESP RATE: 20
SODIUM SERPL-SCNC: 142 MMOL/L (ref 136–145)
TOTAL RATE: 0 BREATHS/MINUTE
TROPONIN T SERPL-MCNC: 0.02 NG/ML (ref 0–0.03)
TROPONIN T SERPL-MCNC: 0.09 NG/ML (ref 0–0.03)
VENTILATOR MODE: ABNORMAL
VENTILATOR MODE: ABNORMAL
VT ON VENT VENT: 480 ML
WBC NRBC COR # BLD: 10.22 10*3/MM3 (ref 3.4–10.8)

## 2022-10-18 PROCEDURE — 83605 ASSAY OF LACTIC ACID: CPT | Performed by: ORTHOPAEDIC SURGERY

## 2022-10-18 PROCEDURE — 25010000002 ONDANSETRON PER 1 MG: Performed by: NURSE ANESTHETIST, CERTIFIED REGISTERED

## 2022-10-18 PROCEDURE — 94799 UNLISTED PULMONARY SVC/PX: CPT

## 2022-10-18 PROCEDURE — 25010000002 THIAMINE PER 100 MG: Performed by: INTERNAL MEDICINE

## 2022-10-18 PROCEDURE — 83735 ASSAY OF MAGNESIUM: CPT | Performed by: ORTHOPAEDIC SURGERY

## 2022-10-18 PROCEDURE — 5A1935Z RESPIRATORY VENTILATION, LESS THAN 24 CONSECUTIVE HOURS: ICD-10-PCS | Performed by: NURSE ANESTHETIST, CERTIFIED REGISTERED

## 2022-10-18 PROCEDURE — C1713 ANCHOR/SCREW BN/BN,TIS/BN: HCPCS | Performed by: ORTHOPAEDIC SURGERY

## 2022-10-18 PROCEDURE — 84132 ASSAY OF SERUM POTASSIUM: CPT | Performed by: ANESTHESIOLOGY

## 2022-10-18 PROCEDURE — 0LN20ZZ RELEASE LEFT SHOULDER TENDON, OPEN APPROACH: ICD-10-PCS | Performed by: ORTHOPAEDIC SURGERY

## 2022-10-18 PROCEDURE — 25010000002 PROPOFOL 10 MG/ML EMULSION: Performed by: ORTHOPAEDIC SURGERY

## 2022-10-18 PROCEDURE — 92950 HEART/LUNG RESUSCITATION CPR: CPT

## 2022-10-18 PROCEDURE — 25010000002 EPINEPHRINE PER 0.1 MG: Performed by: ORTHOPAEDIC SURGERY

## 2022-10-18 PROCEDURE — 94002 VENT MGMT INPAT INIT DAY: CPT

## 2022-10-18 PROCEDURE — 82805 BLOOD GASES W/O2 SATURATION: CPT

## 2022-10-18 PROCEDURE — 83050 HGB METHEMOGLOBIN QUAN: CPT

## 2022-10-18 PROCEDURE — 84484 ASSAY OF TROPONIN QUANT: CPT | Performed by: INTERNAL MEDICINE

## 2022-10-18 PROCEDURE — 0RHJ04Z INSERTION OF INTERNAL FIXATION DEVICE INTO RIGHT SHOULDER JOINT, OPEN APPROACH: ICD-10-PCS | Performed by: ORTHOPAEDIC SURGERY

## 2022-10-18 PROCEDURE — 0BH18EZ INSERTION OF ENDOTRACHEAL AIRWAY INTO TRACHEA, VIA NATURAL OR ARTIFICIAL OPENING ENDOSCOPIC: ICD-10-PCS | Performed by: NURSE ANESTHETIST, CERTIFIED REGISTERED

## 2022-10-18 PROCEDURE — 0 MAGNESIUM SULFATE 4 GM/100ML SOLUTION: Performed by: INTERNAL MEDICINE

## 2022-10-18 PROCEDURE — 25010000002 EPINEPHRINE 1 MG/10ML SOLUTION PREFILLED SYRINGE: Performed by: NURSE ANESTHETIST, CERTIFIED REGISTERED

## 2022-10-18 PROCEDURE — 71045 X-RAY EXAM CHEST 1 VIEW: CPT

## 2022-10-18 PROCEDURE — 25010000002 PROPOFOL 10 MG/ML EMULSION: Performed by: NURSE ANESTHETIST, CERTIFIED REGISTERED

## 2022-10-18 PROCEDURE — 82962 GLUCOSE BLOOD TEST: CPT

## 2022-10-18 PROCEDURE — 82375 ASSAY CARBOXYHB QUANT: CPT

## 2022-10-18 PROCEDURE — 99291 CRITICAL CARE FIRST HOUR: CPT | Performed by: INTERNAL MEDICINE

## 2022-10-18 PROCEDURE — 0 LIDOCAINE 1 % SOLUTION: Performed by: NURSE ANESTHETIST, CERTIFIED REGISTERED

## 2022-10-18 PROCEDURE — 80053 COMPREHEN METABOLIC PANEL: CPT | Performed by: ORTHOPAEDIC SURGERY

## 2022-10-18 PROCEDURE — 85027 COMPLETE CBC AUTOMATED: CPT | Performed by: ORTHOPAEDIC SURGERY

## 2022-10-18 PROCEDURE — 25010000002 DEXAMETHASONE PER 1 MG: Performed by: NURSE ANESTHETIST, CERTIFIED REGISTERED

## 2022-10-18 PROCEDURE — 0LS30ZZ REPOSITION RIGHT UPPER ARM TENDON, OPEN APPROACH: ICD-10-PCS | Performed by: ORTHOPAEDIC SURGERY

## 2022-10-18 PROCEDURE — L3670 SO ACRO/CLAV CAN WEB PRE OTS: HCPCS | Performed by: ORTHOPAEDIC SURGERY

## 2022-10-18 PROCEDURE — 25010000002 FENTANYL CITRATE (PF) 50 MCG/ML SOLUTION: Performed by: NURSE ANESTHETIST, CERTIFIED REGISTERED

## 2022-10-18 PROCEDURE — 93005 ELECTROCARDIOGRAM TRACING: CPT | Performed by: INTERNAL MEDICINE

## 2022-10-18 PROCEDURE — 25010000002 NEOSTIGMINE 10 MG/10ML SOLUTION: Performed by: NURSE ANESTHETIST, CERTIFIED REGISTERED

## 2022-10-18 DEVICE — TENOLOK TENODESIS KIT WITH 5.0 MM TENOLOK ANCHOR WITH ONE #2 HI-FI SUTURE, 2.4 MM GUIDE PIN, AND 6.5 MM DRILL BIT
Type: IMPLANTABLE DEVICE | Site: SHOULDER | Status: FUNCTIONAL
Brand: TENOLOK, HI-FI

## 2022-10-18 DEVICE — HEALIX ADVANCE BR 3 SUTURE ANCHOR W/DYNACORD TCP/PLGA ABSORBABLE ANCHOR (1) BLUE (1) WHITE/BLUE/GREEN STRIPED (1) WHITE/BLACK STRIPED, SIZE 2 (5 METRIC) DYNACORD SUTURE, 36" (91CM) 4.5MM
Type: IMPLANTABLE DEVICE | Site: SHOULDER | Status: FUNCTIONAL
Brand: HEALIX ADVANCE BR 3 SUTURE ANCHOR W/DYNACORD

## 2022-10-18 RX ORDER — ONDANSETRON 4 MG/1
4 TABLET, FILM COATED ORAL EVERY 8 HOURS PRN
Qty: 12 TABLET | Refills: 0 | Status: SHIPPED | OUTPATIENT
Start: 2022-10-18 | End: 2023-03-13

## 2022-10-18 RX ORDER — MIDAZOLAM HYDROCHLORIDE 1 MG/ML
1 INJECTION INTRAMUSCULAR; INTRAVENOUS
Status: DISCONTINUED | OUTPATIENT
Start: 2022-10-18 | End: 2022-10-18

## 2022-10-18 RX ORDER — FAMOTIDINE 20 MG/1
20 TABLET, FILM COATED ORAL ONCE
Status: DISCONTINUED | OUTPATIENT
Start: 2022-10-18 | End: 2022-10-18

## 2022-10-18 RX ORDER — LABETALOL HYDROCHLORIDE 5 MG/ML
5 INJECTION, SOLUTION INTRAVENOUS
Status: DISCONTINUED | OUTPATIENT
Start: 2022-10-18 | End: 2022-10-18 | Stop reason: HOSPADM

## 2022-10-18 RX ORDER — SODIUM CHLORIDE, SODIUM LACTATE, POTASSIUM CHLORIDE, CALCIUM CHLORIDE 600; 310; 30; 20 MG/100ML; MG/100ML; MG/100ML; MG/100ML
9 INJECTION, SOLUTION INTRAVENOUS CONTINUOUS
Status: DISCONTINUED | OUTPATIENT
Start: 2022-10-18 | End: 2022-10-18

## 2022-10-18 RX ORDER — ACETAMINOPHEN 650 MG/1
650 SUPPOSITORY RECTAL ONCE AS NEEDED
Status: DISCONTINUED | OUTPATIENT
Start: 2022-10-18 | End: 2022-10-18 | Stop reason: HOSPADM

## 2022-10-18 RX ORDER — EPINEPHRINE 0.1 MG/ML
SYRINGE (ML) INJECTION
Status: COMPLETED | OUTPATIENT
Start: 2022-10-18 | End: 2022-10-18

## 2022-10-18 RX ORDER — NICOTINE POLACRILEX 4 MG
15 LOZENGE BUCCAL
Status: DISCONTINUED | OUTPATIENT
Start: 2022-10-18 | End: 2022-10-19

## 2022-10-18 RX ORDER — BUPIVACAINE HYDROCHLORIDE AND EPINEPHRINE 2.5; 5 MG/ML; UG/ML
INJECTION, SOLUTION EPIDURAL; INFILTRATION; INTRACAUDAL; PERINEURAL AS NEEDED
Status: DISCONTINUED | OUTPATIENT
Start: 2022-10-18 | End: 2022-10-18 | Stop reason: HOSPADM

## 2022-10-18 RX ORDER — PROPOFOL 10 MG/ML
VIAL (ML) INTRAVENOUS AS NEEDED
Status: DISCONTINUED | OUTPATIENT
Start: 2022-10-18 | End: 2022-10-18 | Stop reason: SURG

## 2022-10-18 RX ORDER — SODIUM CHLORIDE 9 MG/ML
75 INJECTION, SOLUTION INTRAVENOUS CONTINUOUS
Status: DISCONTINUED | OUTPATIENT
Start: 2022-10-18 | End: 2022-10-20

## 2022-10-18 RX ORDER — SODIUM CHLORIDE 0.9 % (FLUSH) 0.9 %
10 SYRINGE (ML) INJECTION EVERY 12 HOURS SCHEDULED
Status: DISCONTINUED | OUTPATIENT
Start: 2022-10-18 | End: 2022-10-18

## 2022-10-18 RX ORDER — EPHEDRINE SULFATE 50 MG/ML
INJECTION, SOLUTION INTRAVENOUS AS NEEDED
Status: DISCONTINUED | OUTPATIENT
Start: 2022-10-18 | End: 2022-10-18 | Stop reason: SURG

## 2022-10-18 RX ORDER — FENTANYL CITRATE 50 UG/ML
50 INJECTION, SOLUTION INTRAMUSCULAR; INTRAVENOUS
Status: DISCONTINUED | OUTPATIENT
Start: 2022-10-18 | End: 2022-10-18 | Stop reason: HOSPADM

## 2022-10-18 RX ORDER — BUPIVACAINE HCL/0.9 % NACL/PF 0.125 %
PLASTIC BAG, INJECTION (ML) EPIDURAL AS NEEDED
Status: DISCONTINUED | OUTPATIENT
Start: 2022-10-18 | End: 2022-10-18 | Stop reason: SURG

## 2022-10-18 RX ORDER — ACETAMINOPHEN 325 MG/1
650 TABLET ORAL ONCE AS NEEDED
Status: DISCONTINUED | OUTPATIENT
Start: 2022-10-18 | End: 2022-10-18 | Stop reason: HOSPADM

## 2022-10-18 RX ORDER — HYDROCODONE BITARTRATE AND ACETAMINOPHEN 7.5; 325 MG/1; MG/1
1-2 TABLET ORAL EVERY 4 HOURS PRN
Qty: 24 TABLET | Refills: 0 | Status: SHIPPED | OUTPATIENT
Start: 2022-10-18 | End: 2023-03-13

## 2022-10-18 RX ORDER — DEXTROSE MONOHYDRATE 25 G/50ML
10-50 INJECTION, SOLUTION INTRAVENOUS
Status: DISCONTINUED | OUTPATIENT
Start: 2022-10-18 | End: 2022-10-19

## 2022-10-18 RX ORDER — FENTANYL CITRATE 50 UG/ML
INJECTION, SOLUTION INTRAMUSCULAR; INTRAVENOUS
Status: COMPLETED | OUTPATIENT
Start: 2022-10-18 | End: 2022-10-18

## 2022-10-18 RX ORDER — SODIUM CHLORIDE 0.9 % (FLUSH) 0.9 %
10 SYRINGE (ML) INJECTION AS NEEDED
Status: DISCONTINUED | OUTPATIENT
Start: 2022-10-18 | End: 2022-10-18 | Stop reason: HOSPADM

## 2022-10-18 RX ORDER — LEVOTHYROXINE SODIUM 0.12 MG/1
125 TABLET ORAL
Status: DISCONTINUED | OUTPATIENT
Start: 2022-10-19 | End: 2022-10-27 | Stop reason: HOSPADM

## 2022-10-18 RX ORDER — SODIUM CHLORIDE 0.9 % (FLUSH) 0.9 %
10 SYRINGE (ML) INJECTION AS NEEDED
Status: DISCONTINUED | OUTPATIENT
Start: 2022-10-18 | End: 2022-10-18

## 2022-10-18 RX ORDER — GLYCOPYRROLATE 0.2 MG/ML
INJECTION INTRAMUSCULAR; INTRAVENOUS AS NEEDED
Status: DISCONTINUED | OUTPATIENT
Start: 2022-10-18 | End: 2022-10-18 | Stop reason: SURG

## 2022-10-18 RX ORDER — DOCUSATE SODIUM 100 MG/1
100 CAPSULE, LIQUID FILLED ORAL 2 TIMES DAILY
Qty: 20 CAPSULE | Refills: 0 | Status: SHIPPED | OUTPATIENT
Start: 2022-10-18

## 2022-10-18 RX ORDER — LIDOCAINE HYDROCHLORIDE 10 MG/ML
0.5 INJECTION, SOLUTION EPIDURAL; INFILTRATION; INTRACAUDAL; PERINEURAL ONCE AS NEEDED
Status: COMPLETED | OUTPATIENT
Start: 2022-10-18 | End: 2022-10-18

## 2022-10-18 RX ORDER — FAMOTIDINE 20 MG/1
20 TABLET, FILM COATED ORAL ONCE
Status: COMPLETED | OUTPATIENT
Start: 2022-10-18 | End: 2022-10-18

## 2022-10-18 RX ORDER — ROPIVACAINE HYDROCHLORIDE 2 MG/ML
INJECTION, SOLUTION EPIDURAL; INFILTRATION; PERINEURAL CONTINUOUS
Status: DISCONTINUED | OUTPATIENT
Start: 2022-10-18 | End: 2022-10-27 | Stop reason: HOSPADM

## 2022-10-18 RX ORDER — ONDANSETRON 2 MG/ML
INJECTION INTRAMUSCULAR; INTRAVENOUS AS NEEDED
Status: DISCONTINUED | OUTPATIENT
Start: 2022-10-18 | End: 2022-10-18 | Stop reason: SURG

## 2022-10-18 RX ORDER — NEOSTIGMINE METHYLSULFATE 1 MG/ML
INJECTION, SOLUTION INTRAVENOUS AS NEEDED
Status: DISCONTINUED | OUTPATIENT
Start: 2022-10-18 | End: 2022-10-18 | Stop reason: SURG

## 2022-10-18 RX ORDER — CEFAZOLIN SODIUM IN 0.9 % NACL 2 G/100 ML
2 PLASTIC BAG, INJECTION (ML) INTRAVENOUS ONCE
Status: COMPLETED | OUTPATIENT
Start: 2022-10-18 | End: 2022-10-18

## 2022-10-18 RX ORDER — BUPIVACAINE HYDROCHLORIDE 2.5 MG/ML
INJECTION, SOLUTION EPIDURAL; INFILTRATION; INTRACAUDAL
Status: COMPLETED | OUTPATIENT
Start: 2022-10-18 | End: 2022-10-18

## 2022-10-18 RX ORDER — FAMOTIDINE 10 MG/ML
20 INJECTION, SOLUTION INTRAVENOUS EVERY 12 HOURS SCHEDULED
Status: DISCONTINUED | OUTPATIENT
Start: 2022-10-18 | End: 2022-10-19

## 2022-10-18 RX ORDER — ROCURONIUM BROMIDE 10 MG/ML
INJECTION, SOLUTION INTRAVENOUS AS NEEDED
Status: DISCONTINUED | OUTPATIENT
Start: 2022-10-18 | End: 2022-10-18 | Stop reason: SURG

## 2022-10-18 RX ORDER — OXYCODONE HYDROCHLORIDE 5 MG/1
5 TABLET ORAL ONCE AS NEEDED
Status: DISCONTINUED | OUTPATIENT
Start: 2022-10-18 | End: 2022-10-18 | Stop reason: HOSPADM

## 2022-10-18 RX ORDER — ONDANSETRON 2 MG/ML
4 INJECTION INTRAMUSCULAR; INTRAVENOUS ONCE AS NEEDED
Status: DISCONTINUED | OUTPATIENT
Start: 2022-10-18 | End: 2022-10-18 | Stop reason: HOSPADM

## 2022-10-18 RX ORDER — CEFAZOLIN SODIUM 2 G/100ML
2 INJECTION, SOLUTION INTRAVENOUS ONCE
Status: DISCONTINUED | OUTPATIENT
Start: 2022-10-18 | End: 2022-10-18

## 2022-10-18 RX ORDER — SODIUM CHLORIDE 0.9 % (FLUSH) 0.9 %
10 SYRINGE (ML) INJECTION EVERY 12 HOURS SCHEDULED
Status: DISCONTINUED | OUTPATIENT
Start: 2022-10-18 | End: 2022-10-19

## 2022-10-18 RX ORDER — MAGNESIUM SULFATE HEPTAHYDRATE 40 MG/ML
4 INJECTION, SOLUTION INTRAVENOUS ONCE
Status: COMPLETED | OUTPATIENT
Start: 2022-10-18 | End: 2022-10-18

## 2022-10-18 RX ORDER — LIDOCAINE HYDROCHLORIDE 10 MG/ML
0.5 INJECTION, SOLUTION EPIDURAL; INFILTRATION; INTRACAUDAL; PERINEURAL ONCE AS NEEDED
Status: DISCONTINUED | OUTPATIENT
Start: 2022-10-18 | End: 2022-10-18

## 2022-10-18 RX ORDER — MIDAZOLAM HYDROCHLORIDE 1 MG/ML
1 INJECTION INTRAMUSCULAR; INTRAVENOUS
Status: DISCONTINUED | OUTPATIENT
Start: 2022-10-18 | End: 2022-10-18 | Stop reason: HOSPADM

## 2022-10-18 RX ORDER — DEXAMETHASONE SODIUM PHOSPHATE 4 MG/ML
INJECTION, SOLUTION INTRA-ARTICULAR; INTRALESIONAL; INTRAMUSCULAR; INTRAVENOUS; SOFT TISSUE AS NEEDED
Status: DISCONTINUED | OUTPATIENT
Start: 2022-10-18 | End: 2022-10-18 | Stop reason: SURG

## 2022-10-18 RX ORDER — SODIUM CHLORIDE 0.9 % (FLUSH) 0.9 %
10 SYRINGE (ML) INJECTION AS NEEDED
Status: DISCONTINUED | OUTPATIENT
Start: 2022-10-18 | End: 2022-10-19

## 2022-10-18 RX ORDER — CHLORHEXIDINE GLUCONATE 0.12 MG/ML
15 RINSE ORAL EVERY 12 HOURS SCHEDULED
Status: DISCONTINUED | OUTPATIENT
Start: 2022-10-18 | End: 2022-10-19

## 2022-10-18 RX ORDER — HEPARIN SODIUM 5000 [USP'U]/ML
5000 INJECTION, SOLUTION INTRAVENOUS; SUBCUTANEOUS EVERY 8 HOURS SCHEDULED
Status: DISCONTINUED | OUTPATIENT
Start: 2022-10-18 | End: 2022-10-18

## 2022-10-18 RX ORDER — LIDOCAINE HYDROCHLORIDE 10 MG/ML
INJECTION, SOLUTION INFILTRATION; PERINEURAL AS NEEDED
Status: DISCONTINUED | OUTPATIENT
Start: 2022-10-18 | End: 2022-10-18 | Stop reason: SURG

## 2022-10-18 RX ORDER — FAMOTIDINE 10 MG/ML
20 INJECTION, SOLUTION INTRAVENOUS ONCE
Status: CANCELLED | OUTPATIENT
Start: 2022-10-18 | End: 2022-10-18

## 2022-10-18 RX ORDER — ASPIRIN 81 MG/1
81 TABLET, CHEWABLE ORAL DAILY
Status: DISCONTINUED | OUTPATIENT
Start: 2022-10-19 | End: 2022-10-27 | Stop reason: HOSPADM

## 2022-10-18 RX ORDER — EPHEDRINE SULFATE 50 MG/ML
5 INJECTION, SOLUTION INTRAVENOUS ONCE AS NEEDED
Status: DISCONTINUED | OUTPATIENT
Start: 2022-10-18 | End: 2022-10-18 | Stop reason: HOSPADM

## 2022-10-18 RX ADMIN — FAMOTIDINE 20 MG: 10 INJECTION INTRAVENOUS at 15:11

## 2022-10-18 RX ADMIN — ONDANSETRON 4 MG: 2 INJECTION INTRAMUSCULAR; INTRAVENOUS at 12:02

## 2022-10-18 RX ADMIN — SODIUM CHLORIDE, POTASSIUM CHLORIDE, SODIUM LACTATE AND CALCIUM CHLORIDE: 600; 310; 30; 20 INJECTION, SOLUTION INTRAVENOUS at 12:01

## 2022-10-18 RX ADMIN — CEFAZOLIN 2 G: 10 INJECTION, POWDER, FOR SOLUTION INTRAVENOUS at 11:09

## 2022-10-18 RX ADMIN — DEXAMETHASONE SODIUM PHOSPHATE 4 MG: 4 INJECTION, SOLUTION INTRA-ARTICULAR; INTRALESIONAL; INTRAMUSCULAR; INTRAVENOUS; SOFT TISSUE at 11:08

## 2022-10-18 RX ADMIN — SODIUM BICARBONATE 100 MEQ: 84 INJECTION INTRAVENOUS at 12:38

## 2022-10-18 RX ADMIN — THIAMINE HYDROCHLORIDE 250 MG: 100 INJECTION, SOLUTION INTRAMUSCULAR; INTRAVENOUS at 22:57

## 2022-10-18 RX ADMIN — Medication 12.5 MG: at 20:58

## 2022-10-18 RX ADMIN — FAMOTIDINE 20 MG: 20 TABLET ORAL at 08:23

## 2022-10-18 RX ADMIN — MAGNESIUM SULFATE HEPTAHYDRATE 4 G: 40 INJECTION, SOLUTION INTRAVENOUS at 13:51

## 2022-10-18 RX ADMIN — PROPOFOL 30 MCG/KG/MIN: 10 INJECTION, EMULSION INTRAVENOUS at 16:25

## 2022-10-18 RX ADMIN — LIDOCAINE HYDROCHLORIDE 0.5 ML: 10 INJECTION, SOLUTION EPIDURAL; INFILTRATION; INTRACAUDAL; PERINEURAL at 08:23

## 2022-10-18 RX ADMIN — Medication 10 ML: at 20:59

## 2022-10-18 RX ADMIN — FENTANYL CITRATE 50 MCG: 50 INJECTION, SOLUTION INTRAMUSCULAR; INTRAVENOUS at 12:00

## 2022-10-18 RX ADMIN — EPINEPHRINE 1 MG: 0.1 INJECTION INTRACARDIAC; INTRAVENOUS at 12:20

## 2022-10-18 RX ADMIN — PROPOFOL 50 MCG/KG/MIN: 10 INJECTION, EMULSION INTRAVENOUS at 12:39

## 2022-10-18 RX ADMIN — BUPIVACAINE HYDROCHLORIDE 15 ML: 2.5 INJECTION, SOLUTION EPIDURAL; INFILTRATION; INTRACAUDAL at 08:41

## 2022-10-18 RX ADMIN — FENTANYL CITRATE 100 MCG: 50 INJECTION, SOLUTION INTRAMUSCULAR; INTRAVENOUS at 08:41

## 2022-10-18 RX ADMIN — ROCURONIUM BROMIDE 50 MG: 10 INJECTION, SOLUTION INTRAVENOUS at 11:00

## 2022-10-18 RX ADMIN — PROPOFOL 200 MG: 10 INJECTION, EMULSION INTRAVENOUS at 11:00

## 2022-10-18 RX ADMIN — LIDOCAINE HYDROCHLORIDE 50 MG: 10 INJECTION, SOLUTION INFILTRATION; PERINEURAL at 11:00

## 2022-10-18 RX ADMIN — Medication 100 MCG: at 11:18

## 2022-10-18 RX ADMIN — Medication 100 MCG: at 11:29

## 2022-10-18 RX ADMIN — INSULIN HUMAN 6.8 UNITS/HR: 1 INJECTION, SOLUTION INTRAVENOUS at 15:21

## 2022-10-18 RX ADMIN — GLYCOPYRROLATE 0.4 MCG: 0.2 INJECTION INTRAMUSCULAR; INTRAVENOUS at 11:55

## 2022-10-18 RX ADMIN — NEOSTIGMINE METHYLSULFATE 3 MG: 0.5 INJECTION INTRAVENOUS at 11:55

## 2022-10-18 RX ADMIN — SODIUM CHLORIDE, POTASSIUM CHLORIDE, SODIUM LACTATE AND CALCIUM CHLORIDE 9 ML/HR: 600; 310; 30; 20 INJECTION, SOLUTION INTRAVENOUS at 08:23

## 2022-10-18 RX ADMIN — RIVAROXABAN 2.5 MG: 2.5 TABLET, FILM COATED ORAL at 20:58

## 2022-10-18 RX ADMIN — SODIUM CHLORIDE 75 ML/HR: 9 INJECTION, SOLUTION INTRAVENOUS at 16:25

## 2022-10-18 RX ADMIN — FENTANYL CITRATE 50 MCG: 50 INJECTION, SOLUTION INTRAMUSCULAR; INTRAVENOUS at 11:56

## 2022-10-18 RX ADMIN — FAMOTIDINE 20 MG: 10 INJECTION INTRAVENOUS at 20:58

## 2022-10-18 RX ADMIN — EPHEDRINE SULFATE 15 MG: 50 INJECTION INTRAVENOUS at 11:37

## 2022-10-18 RX ADMIN — Medication 100 MCG: at 11:16

## 2022-10-18 RX ADMIN — CHLORHEXIDINE GLUCONATE 15 ML: 1.2 SOLUTION ORAL at 14:02

## 2022-10-18 NOTE — ANESTHESIA POSTPROCEDURE EVALUATION
Patient: Laron Pandey    Procedure Summary     Date: 10/18/22 Room / Location:  [facility]    Anesthesia Start: 1055 Anesthesia Stop: 1247    Procedure: LEFT ARTHROSCOPIC ROTATOR CUFF REPAIR WITH BICEPS TENODESIS (Left: Shoulder) Diagnosis:     Surgeons: Enrique Sutherland Jr., MD Provider: Tolu Mai MD    Anesthesia Type: general ASA Status: 3          Anesthesia Type: general    Vitals  Vitals Value Taken Time   /104 10/18/22 1245   Temp     Pulse 110 10/18/22 1246   Resp     SpO2 100 % 10/18/22 1246   Vitals shown include unvalidated device data.        Post Anesthesia Care and Evaluation    Patient location during evaluation: PACU  Pain management: adequate    Airway patency: patent  Anesthetic complications: No anesthetic complications  PONV Status: none  Cardiovascular status: hemodynamically stable and acceptable  Respiratory status: acceptable, nasal cannula, ventilator and ETT  Hydration status: acceptable    Comments: Patient transported from OR to PACU with oral airway, Nasal cannula, with patent airway. Upon arrival to PACU NIBP attached and first BP was 154/58, SaO2 without appropriate waveform, EKG connected and appears to be V-fib, replaced EKG and recycled, Dr. Mai called to bedside, NO pulse, NO BP, code called, CPR initiated. Reintubated with glidescope, see code sheet for further details.    Anesthesia Attending - Dr Mai:  Called urgently to bedside for witnessed arrest on arrival to PACU.  No pulse and Coarse V fib on the monitor, patient received CPR, defibrillation and epi with return of spontaneous circulation.  Reintubated and a right radial mathew placed.  Bicarb given for pH 7.16 and base def -9.   Blood pressure and pulse stabilized, Propofol sedation begun and patient transported to ICU for further management.

## 2022-10-18 NOTE — ANESTHESIA PROCEDURE NOTES
Airway  Urgency: elective    Airway not difficult    General Information and Staff    Patient location during procedure: OR  SRNA: Haven Mak SRNA  Indications and Patient Condition  Indications for airway management: airway protection    Preoxygenated: yes  MILS not maintained throughout  Mask difficulty assessment: 2 - vent by mask + OA or adjuvant +/- NMBA    Final Airway Details  Final airway type: endotracheal airway      Successful airway: ETT  Cuffed: yes   Successful intubation technique: video laryngoscopy  Endotracheal tube insertion site: oral  Blade: Velasco  Blade size: 3  ETT size (mm): 7.5  Cormack-Lehane Classification: grade III - view of epiglottis only  Placement verified by: chest auscultation and capnometry   Measured from: lips  ETT/EBT  to lips (cm): 20  Number of attempts at approach: 1  Assessment: lips, teeth, and gum same as pre-op and atraumatic intubation    Additional Comments  Negative epigastric sounds, Breath sound equal bilaterally with symmetric chest rise and fall.  DL with MAC blade, unable to view vocal cords. Velasco utilized; polyp noted on epiglottis.

## 2022-10-18 NOTE — ANESTHESIA PROCEDURE NOTES
Interscalene      Patient reassessed immediately prior to procedure    Patient location during procedure: pre-op  Reason for block: at surgeon's request and post-op pain management  Performed by  Anesthesiologist: Tolu Mai MD  CRNA/CAA: Huan Benjamin, CRNA  Assisted by: Cristina Pond RN  Preanesthetic Checklist  Completed: patient identified, IV checked, site marked, risks and benefits discussed, surgical consent, monitors and equipment checked, pre-op evaluation and timeout performed  Prep:  Sterile barriers:cap, gloves, mask and washed/disinfected hands  Prep: ChloraPrep  Patient monitoring: blood pressure monitoring, continuous pulse oximetry and EKG  Procedure    Sedation: yes  Performed under: local infiltration  Guidance:ultrasound guided    ULTRASOUND INTERPRETATION.  Using ultrasound guidance a 20 G gauge needle was placed in close proximity to the nerve, at which point, under ultrasound guidance anesthetic was injected in the area of the nerve and spread of the anesthesia was seen on ultrasound in close proximity thereto.  There were no abnormalities seen on ultrasound; a digital image was taken; and the patient tolerated the procedure with no complications. Images:still images obtained, printed/placed on chart    Laterality:left  Block Type:interscalene  Injection Technique:catheter  Needle Type:Tuohy and echogenic  Needle Gauge:18 G  Resistance on Injection: none  Catheter Size:20 G (20g)    Medications Used: bupivacaine PF (MARCAINE) 0.25 % injection - Injection   15 mL - 10/18/2022 8:41:00 AM  fentaNYL citrate (PF) (SUBLIMAZE) injection - Intravenous   100 mcg - 10/18/2022 8:41:00 AM      Post Assessment  Injection Assessment: negative aspiration for heme, no paresthesia on injection and incremental injection  Patient Tolerance:comfortable throughout block  Complications:no  Additional Notes    CATHETER  A high-frequency linear transducer, with sterile cover, was placed in the supraclavicular  "fossa to identify the subclavian artery and trunks and divisions of the brachial plexus. The transducer was then moved in a cephalad orientation with a slight rotation to continue visualization of the brachial plexus from the trunks and divisions, on to the C5-C7 roots. The insertion site was prepped and draped in sterile fashion. Skin and cutaneous tissue was infiltrated with 2-5 ml of 1% Lidocaine. Using ultrasound-guidance, an 18-gauge Contiplex Ultra 360 Touhy needle was advanced in plane from lateral to medial. Preservative-free normal saline was utilized for hydro-dissection of tissue, advancement of Touhy, and to confirm final needle placement at the fascial plane between the middle scalene muscle and sheath of the brachial plexus (C5-C7). A 20-gauge Contiplex Echo catheter was placed through the needle and advance out the tip of the Touhy 3-5 cm with the \"Hyde Flip\". The Touhy needle was then removed, and final catheter position verified lateral to the brachial plexus with local anesthetic (LA) and ultrasound visualization. The catheter was secured in the usual fashion with skin glue, benzoin, steri-strips, CHG tegaderm and label noting \"Nerve Block Catheter\". Jerk tape applied at yellow connector and catheter connection. All LA was injected in increments of 3-5 ml after catheter secured. Aspiration every 5 ml to prevent intravascular injection. Injection was completed with negative aspiration of blood and negative intravascular injection. Injection pressures were normal with minimal resistance.            "

## 2022-10-18 NOTE — ANESTHESIA PREPROCEDURE EVALUATION
Anesthesia Evaluation     Patient summary reviewed and Nursing notes reviewed                Airway   Mallampati: II  TM distance: >3 FB  Neck ROM: full  No difficulty expected  Dental - normal exam     Pulmonary - normal exam   (+) a smoker Current,   Cardiovascular - normal exam    (+) hypertension, CAD, CABG, cardiac stents PVD, hyperlipidemia,  carotid artery disease      Neuro/Psych  (+) CVA,    GI/Hepatic/Renal/Endo    (+)  GERD,  diabetes mellitus, thyroid problem hypothyroidism    Musculoskeletal     (+) back pain,   Abdominal  - normal exam    Bowel sounds: normal.   Substance History   (+) alcohol use,      OB/GYN negative ob/gyn ROS         Other   arthritis,                      Anesthesia Plan    ASA 3     general     (INTERSCALENE CATHETER FOR POSTOP PAIN)  intravenous induction     Anesthetic plan, risks, benefits, and alternatives have been provided, discussed and informed consent has been obtained with: patient.    Plan discussed with CRNA.        CODE STATUS:

## 2022-10-19 PROBLEM — I49.01 VF (VENTRICULAR FIBRILLATION) (HCC): Status: ACTIVE | Noted: 2022-10-19

## 2022-10-19 PROBLEM — M75.122 NONTRAUMATIC COMPLETE TEAR OF LEFT ROTATOR CUFF: Status: ACTIVE | Noted: 2022-10-19

## 2022-10-19 PROBLEM — R94.39 ABNORMAL STRESS TEST: Status: RESOLVED | Noted: 2020-11-06 | Resolved: 2022-10-19

## 2022-10-19 LAB
ALBUMIN SERPL-MCNC: 3.3 G/DL (ref 3.5–5.2)
ALBUMIN/GLOB SERPL: 1.3 G/DL
ALP SERPL-CCNC: 75 U/L (ref 39–117)
ALT SERPL W P-5'-P-CCNC: 39 U/L (ref 1–41)
ANION GAP SERPL CALCULATED.3IONS-SCNC: 9 MMOL/L (ref 5–15)
AST SERPL-CCNC: 46 U/L (ref 1–40)
BILIRUB SERPL-MCNC: 0.5 MG/DL (ref 0–1.2)
BUN SERPL-MCNC: 10 MG/DL (ref 8–23)
BUN/CREAT SERPL: 14.5 (ref 7–25)
CALCIUM SPEC-SCNC: 8.1 MG/DL (ref 8.6–10.5)
CHLORIDE SERPL-SCNC: 107 MMOL/L (ref 98–107)
CO2 SERPL-SCNC: 23 MMOL/L (ref 22–29)
CREAT SERPL-MCNC: 0.69 MG/DL (ref 0.76–1.27)
DEPRECATED RDW RBC AUTO: 46.5 FL (ref 37–54)
EGFRCR SERPLBLD CKD-EPI 2021: 104.6 ML/MIN/1.73
ERYTHROCYTE [DISTWIDTH] IN BLOOD BY AUTOMATED COUNT: 13.2 % (ref 12.3–15.4)
GLOBULIN UR ELPH-MCNC: 2.6 GM/DL
GLUCOSE BLDC GLUCOMTR-MCNC: 107 MG/DL (ref 70–130)
GLUCOSE BLDC GLUCOMTR-MCNC: 112 MG/DL (ref 70–130)
GLUCOSE BLDC GLUCOMTR-MCNC: 117 MG/DL (ref 70–130)
GLUCOSE BLDC GLUCOMTR-MCNC: 127 MG/DL (ref 70–130)
GLUCOSE BLDC GLUCOMTR-MCNC: 127 MG/DL (ref 70–130)
GLUCOSE BLDC GLUCOMTR-MCNC: 131 MG/DL (ref 70–130)
GLUCOSE BLDC GLUCOMTR-MCNC: 136 MG/DL (ref 70–130)
GLUCOSE BLDC GLUCOMTR-MCNC: 149 MG/DL (ref 70–130)
GLUCOSE BLDC GLUCOMTR-MCNC: 237 MG/DL (ref 70–130)
GLUCOSE BLDC GLUCOMTR-MCNC: 249 MG/DL (ref 70–130)
GLUCOSE BLDC GLUCOMTR-MCNC: 256 MG/DL (ref 70–130)
GLUCOSE BLDC GLUCOMTR-MCNC: 93 MG/DL (ref 70–130)
GLUCOSE BLDC GLUCOMTR-MCNC: 94 MG/DL (ref 70–130)
GLUCOSE SERPL-MCNC: 104 MG/DL (ref 65–99)
HCT VFR BLD AUTO: 33.3 % (ref 37.5–51)
HGB BLD-MCNC: 11.2 G/DL (ref 13–17.7)
MAGNESIUM SERPL-MCNC: 2 MG/DL (ref 1.6–2.4)
MCH RBC QN AUTO: 32.2 PG (ref 26.6–33)
MCHC RBC AUTO-ENTMCNC: 33.6 G/DL (ref 31.5–35.7)
MCV RBC AUTO: 95.7 FL (ref 79–97)
PHOSPHATE SERPL-MCNC: 2.7 MG/DL (ref 2.5–4.5)
PLATELET # BLD AUTO: 220 10*3/MM3 (ref 140–450)
PMV BLD AUTO: 10.6 FL (ref 6–12)
POTASSIUM SERPL-SCNC: 3.8 MMOL/L (ref 3.5–5.2)
PROT SERPL-MCNC: 5.9 G/DL (ref 6–8.5)
QT INTERVAL: 432 MS
QTC INTERVAL: 504 MS
RBC # BLD AUTO: 3.48 10*6/MM3 (ref 4.14–5.8)
SODIUM SERPL-SCNC: 139 MMOL/L (ref 136–145)
TROPONIN T SERPL-MCNC: 0.05 NG/ML (ref 0–0.03)
TSH SERPL DL<=0.05 MIU/L-ACNC: 3.67 UIU/ML (ref 0.27–4.2)
WBC NRBC COR # BLD: 16.94 10*3/MM3 (ref 3.4–10.8)

## 2022-10-19 PROCEDURE — 63710000001 INSULIN DETEMIR PER 5 UNITS: Performed by: INTERNAL MEDICINE

## 2022-10-19 PROCEDURE — 99222 1ST HOSP IP/OBS MODERATE 55: CPT | Performed by: INTERNAL MEDICINE

## 2022-10-19 PROCEDURE — 63710000001 INSULIN LISPRO (HUMAN) PER 5 UNITS: Performed by: INTERNAL MEDICINE

## 2022-10-19 PROCEDURE — 97166 OT EVAL MOD COMPLEX 45 MIN: CPT

## 2022-10-19 PROCEDURE — 25010000002 THIAMINE PER 100 MG: Performed by: INTERNAL MEDICINE

## 2022-10-19 PROCEDURE — 97535 SELF CARE MNGMENT TRAINING: CPT

## 2022-10-19 PROCEDURE — 80050 GENERAL HEALTH PANEL: CPT | Performed by: NURSE PRACTITIONER

## 2022-10-19 PROCEDURE — 93005 ELECTROCARDIOGRAM TRACING: CPT | Performed by: INTERNAL MEDICINE

## 2022-10-19 PROCEDURE — 84100 ASSAY OF PHOSPHORUS: CPT | Performed by: NURSE PRACTITIONER

## 2022-10-19 PROCEDURE — 84484 ASSAY OF TROPONIN QUANT: CPT | Performed by: INTERNAL MEDICINE

## 2022-10-19 PROCEDURE — 97530 THERAPEUTIC ACTIVITIES: CPT

## 2022-10-19 PROCEDURE — 99232 SBSQ HOSP IP/OBS MODERATE 35: CPT | Performed by: INTERNAL MEDICINE

## 2022-10-19 PROCEDURE — 25010000002 ROPIVACAINE PER 1 MG: Performed by: NURSE ANESTHETIST, CERTIFIED REGISTERED

## 2022-10-19 PROCEDURE — 82962 GLUCOSE BLOOD TEST: CPT

## 2022-10-19 PROCEDURE — 83735 ASSAY OF MAGNESIUM: CPT | Performed by: INTERNAL MEDICINE

## 2022-10-19 PROCEDURE — 97110 THERAPEUTIC EXERCISES: CPT

## 2022-10-19 RX ORDER — INSULIN LISPRO 100 [IU]/ML
12 INJECTION, SOLUTION INTRAVENOUS; SUBCUTANEOUS
Status: DISCONTINUED | OUTPATIENT
Start: 2022-10-19 | End: 2022-10-20

## 2022-10-19 RX ORDER — DEXTROSE MONOHYDRATE 25 G/50ML
25 INJECTION, SOLUTION INTRAVENOUS
Status: DISCONTINUED | OUTPATIENT
Start: 2022-10-19 | End: 2022-10-27 | Stop reason: HOSPADM

## 2022-10-19 RX ORDER — GABAPENTIN 100 MG/1
200 CAPSULE ORAL 3 TIMES DAILY
Status: DISCONTINUED | OUTPATIENT
Start: 2022-10-19 | End: 2022-10-27 | Stop reason: HOSPADM

## 2022-10-19 RX ORDER — INSULIN LISPRO 100 [IU]/ML
0-14 INJECTION, SOLUTION INTRAVENOUS; SUBCUTANEOUS
Status: DISCONTINUED | OUTPATIENT
Start: 2022-10-19 | End: 2022-10-27 | Stop reason: HOSPADM

## 2022-10-19 RX ORDER — NICOTINE POLACRILEX 4 MG
15 LOZENGE BUCCAL
Status: DISCONTINUED | OUTPATIENT
Start: 2022-10-19 | End: 2022-10-27 | Stop reason: HOSPADM

## 2022-10-19 RX ORDER — HYDROCODONE BITARTRATE AND ACETAMINOPHEN 5; 325 MG/1; MG/1
1 TABLET ORAL ONCE AS NEEDED
Status: COMPLETED | OUTPATIENT
Start: 2022-10-19 | End: 2022-10-19

## 2022-10-19 RX ORDER — FAMOTIDINE 20 MG/1
20 TABLET, FILM COATED ORAL
Status: DISCONTINUED | OUTPATIENT
Start: 2022-10-19 | End: 2022-10-27 | Stop reason: HOSPADM

## 2022-10-19 RX ORDER — HYDROCODONE BITARTRATE AND ACETAMINOPHEN 5; 325 MG/1; MG/1
1 TABLET ORAL EVERY 6 HOURS PRN
Status: DISPENSED | OUTPATIENT
Start: 2022-10-19 | End: 2022-10-26

## 2022-10-19 RX ORDER — ACETAMINOPHEN 325 MG/1
650 TABLET ORAL ONCE
Status: COMPLETED | OUTPATIENT
Start: 2022-10-19 | End: 2022-10-19

## 2022-10-19 RX ADMIN — INSULIN DETEMIR 20 UNITS: 100 INJECTION, SOLUTION SUBCUTANEOUS at 10:01

## 2022-10-19 RX ADMIN — INSULIN LISPRO 8 UNITS: 100 INJECTION, SOLUTION INTRAVENOUS; SUBCUTANEOUS at 11:57

## 2022-10-19 RX ADMIN — ASPIRIN 81 MG 81 MG: 81 TABLET ORAL at 08:54

## 2022-10-19 RX ADMIN — FAMOTIDINE 20 MG: 10 INJECTION INTRAVENOUS at 08:54

## 2022-10-19 RX ADMIN — Medication 12.5 MG: at 20:37

## 2022-10-19 RX ADMIN — Medication 10 ML: at 08:54

## 2022-10-19 RX ADMIN — GABAPENTIN 200 MG: 100 CAPSULE ORAL at 20:37

## 2022-10-19 RX ADMIN — SODIUM CHLORIDE 75 ML/HR: 9 INJECTION, SOLUTION INTRAVENOUS at 18:05

## 2022-10-19 RX ADMIN — INSULIN LISPRO 5 UNITS: 100 INJECTION, SOLUTION INTRAVENOUS; SUBCUTANEOUS at 20:37

## 2022-10-19 RX ADMIN — THIAMINE HYDROCHLORIDE 250 MG: 100 INJECTION, SOLUTION INTRAMUSCULAR; INTRAVENOUS at 18:05

## 2022-10-19 RX ADMIN — Medication 12.5 MG: at 08:55

## 2022-10-19 RX ADMIN — THIAMINE HYDROCHLORIDE 250 MG: 100 INJECTION, SOLUTION INTRAMUSCULAR; INTRAVENOUS at 05:00

## 2022-10-19 RX ADMIN — HYDROCODONE BITARTRATE AND ACETAMINOPHEN 1 TABLET: 5; 325 TABLET ORAL at 11:57

## 2022-10-19 RX ADMIN — LEVOTHYROXINE SODIUM 125 MCG: 125 TABLET ORAL at 05:00

## 2022-10-19 RX ADMIN — INSULIN LISPRO 5 UNITS: 100 INJECTION, SOLUTION INTRAVENOUS; SUBCUTANEOUS at 18:06

## 2022-10-19 RX ADMIN — GABAPENTIN 200 MG: 100 CAPSULE ORAL at 10:01

## 2022-10-19 RX ADMIN — SODIUM CHLORIDE 75 ML/HR: 9 INJECTION, SOLUTION INTRAVENOUS at 07:11

## 2022-10-19 RX ADMIN — INSULIN LISPRO 12 UNITS: 100 INJECTION, SOLUTION INTRAVENOUS; SUBCUTANEOUS at 11:57

## 2022-10-19 RX ADMIN — RIVAROXABAN 2.5 MG: 2.5 TABLET, FILM COATED ORAL at 18:05

## 2022-10-19 RX ADMIN — INSULIN LISPRO 12 UNITS: 100 INJECTION, SOLUTION INTRAVENOUS; SUBCUTANEOUS at 18:06

## 2022-10-19 RX ADMIN — Medication 1000 MG: at 02:19

## 2022-10-19 RX ADMIN — HYDROCODONE BITARTRATE AND ACETAMINOPHEN 1 TABLET: 5; 325 TABLET ORAL at 18:05

## 2022-10-19 RX ADMIN — ACETAMINOPHEN 650 MG: 325 TABLET, FILM COATED ORAL at 00:27

## 2022-10-19 RX ADMIN — INSULIN DETEMIR 20 UNITS: 100 INJECTION, SOLUTION SUBCUTANEOUS at 21:02

## 2022-10-19 RX ADMIN — FAMOTIDINE 20 MG: 20 TABLET ORAL at 18:05

## 2022-10-19 RX ADMIN — RIVAROXABAN 2.5 MG: 2.5 TABLET, FILM COATED ORAL at 08:54

## 2022-10-20 ENCOUNTER — APPOINTMENT (OUTPATIENT)
Dept: CARDIOLOGY | Facility: HOSPITAL | Age: 62
End: 2022-10-20

## 2022-10-20 ENCOUNTER — DOCUMENTATION (OUTPATIENT)
Dept: CARDIOLOGY | Facility: CLINIC | Age: 62
End: 2022-10-20

## 2022-10-20 LAB
BH CV ECHO MEAS - AO MAX PG: 11.6 MMHG
BH CV ECHO MEAS - AO MEAN PG: 6 MMHG
BH CV ECHO MEAS - AO ROOT DIAM: 3.2 CM
BH CV ECHO MEAS - AO V2 MAX: 170 CM/SEC
BH CV ECHO MEAS - AO V2 VTI: 29.9 CM
BH CV ECHO MEAS - AVA(I,D): 0.97 CM2
BH CV ECHO MEAS - EDV(CUBED): 64 ML
BH CV ECHO MEAS - EDV(MOD-SP2): 132 ML
BH CV ECHO MEAS - EDV(MOD-SP4): 114 ML
BH CV ECHO MEAS - EF(MOD-BP): 56.4 %
BH CV ECHO MEAS - EF(MOD-SP2): 50.8 %
BH CV ECHO MEAS - EF(MOD-SP4): 62.3 %
BH CV ECHO MEAS - ESV(CUBED): 24.4 ML
BH CV ECHO MEAS - ESV(MOD-SP2): 65 ML
BH CV ECHO MEAS - ESV(MOD-SP4): 43 ML
BH CV ECHO MEAS - FS: 27.5 %
BH CV ECHO MEAS - IVS/LVPW: 0.74 CM
BH CV ECHO MEAS - IVSD: 1.4 CM
BH CV ECHO MEAS - LA DIMENSION: 4.9 CM
BH CV ECHO MEAS - LAT PEAK E' VEL: 4.8 CM/SEC
BH CV ECHO MEAS - LV MASS(C)D: 271 GRAMS
BH CV ECHO MEAS - LV MAX PG: 2.24 MMHG
BH CV ECHO MEAS - LV MEAN PG: 1 MMHG
BH CV ECHO MEAS - LV V1 MAX: 74.9 CM/SEC
BH CV ECHO MEAS - LV V1 VTI: 12.8 CM
BH CV ECHO MEAS - LVIDD: 4 CM
BH CV ECHO MEAS - LVIDS: 2.9 CM
BH CV ECHO MEAS - LVOT AREA: 2.27 CM2
BH CV ECHO MEAS - LVOT DIAM: 1.7 CM
BH CV ECHO MEAS - LVPWD: 1.9 CM
BH CV ECHO MEAS - MED PEAK E' VEL: 4.8 CM/SEC
BH CV ECHO MEAS - MV A MAX VEL: 61.8 CM/SEC
BH CV ECHO MEAS - MV DEC SLOPE: 1146 CM/SEC2
BH CV ECHO MEAS - MV E MAX VEL: 90.7 CM/SEC
BH CV ECHO MEAS - MV E/A: 1.47
BH CV ECHO MEAS - MV MAX PG: 6.8 MMHG
BH CV ECHO MEAS - MV MEAN PG: 2 MMHG
BH CV ECHO MEAS - MV P1/2T: 36 MSEC
BH CV ECHO MEAS - MV V2 VTI: 31.3 CM
BH CV ECHO MEAS - MVA(P1/2T): 6.1 CM2
BH CV ECHO MEAS - MVA(VTI): 0.93 CM2
BH CV ECHO MEAS - PA ACC TIME: 0.12 SEC
BH CV ECHO MEAS - PA PR(ACCEL): 25 MMHG
BH CV ECHO MEAS - RAP SYSTOLE: 8 MMHG
BH CV ECHO MEAS - RVSP: 82 MMHG
BH CV ECHO MEAS - SV(LVOT): 29.1 ML
BH CV ECHO MEAS - SV(MOD-SP2): 67 ML
BH CV ECHO MEAS - SV(MOD-SP4): 71 ML
BH CV ECHO MEAS - TAPSE (>1.6): 0.92 CM
BH CV ECHO MEAS - TR MAX PG: 74 MMHG
BH CV ECHO MEAS - TR MAX VEL: 430 CM/SEC
BH CV ECHO MEASUREMENTS AVERAGE E/E' RATIO: 18.9
BH CV VAS BP LEFT ARM: NORMAL MMHG
BH CV XLRA - RV BASE: 3.9 CM
BH CV XLRA - RV LENGTH: 6.3 CM
BH CV XLRA - RV MID: 2.8 CM
BH CV XLRA - TDI S': 7.7 CM/SEC
GLUCOSE BLDC GLUCOMTR-MCNC: 110 MG/DL (ref 70–130)
GLUCOSE BLDC GLUCOMTR-MCNC: 148 MG/DL (ref 70–130)
GLUCOSE BLDC GLUCOMTR-MCNC: 203 MG/DL (ref 70–130)
GLUCOSE BLDC GLUCOMTR-MCNC: 209 MG/DL (ref 70–130)
GLUCOSE BLDC GLUCOMTR-MCNC: 60 MG/DL (ref 70–130)
GLUCOSE BLDC GLUCOMTR-MCNC: 87 MG/DL (ref 70–130)
LEFT ATRIUM VOLUME INDEX: 31 ML/M2
MAXIMAL PREDICTED HEART RATE: 158 BPM
STRESS TARGET HR: 134 BPM
TROPONIN T SERPL-MCNC: 0.05 NG/ML (ref 0–0.03)

## 2022-10-20 PROCEDURE — 99232 SBSQ HOSP IP/OBS MODERATE 35: CPT | Performed by: INTERNAL MEDICINE

## 2022-10-20 PROCEDURE — 97535 SELF CARE MNGMENT TRAINING: CPT

## 2022-10-20 PROCEDURE — 25010000002 THIAMINE PER 100 MG: Performed by: INTERNAL MEDICINE

## 2022-10-20 PROCEDURE — 82962 GLUCOSE BLOOD TEST: CPT

## 2022-10-20 PROCEDURE — 63710000001 INSULIN DETEMIR PER 5 UNITS: Performed by: INTERNAL MEDICINE

## 2022-10-20 PROCEDURE — 97530 THERAPEUTIC ACTIVITIES: CPT

## 2022-10-20 PROCEDURE — 93306 TTE W/DOPPLER COMPLETE: CPT | Performed by: INTERNAL MEDICINE

## 2022-10-20 PROCEDURE — 25010000002 SULFUR HEXAFLUORIDE MICROSPH 60.7-25 MG RECONSTITUTED SUSPENSION: Performed by: INTERNAL MEDICINE

## 2022-10-20 PROCEDURE — 93306 TTE W/DOPPLER COMPLETE: CPT

## 2022-10-20 PROCEDURE — 97110 THERAPEUTIC EXERCISES: CPT

## 2022-10-20 PROCEDURE — 97116 GAIT TRAINING THERAPY: CPT

## 2022-10-20 PROCEDURE — 97162 PT EVAL MOD COMPLEX 30 MIN: CPT

## 2022-10-20 PROCEDURE — 63710000001 INSULIN LISPRO (HUMAN) PER 5 UNITS: Performed by: INTERNAL MEDICINE

## 2022-10-20 PROCEDURE — 84484 ASSAY OF TROPONIN QUANT: CPT | Performed by: INTERNAL MEDICINE

## 2022-10-20 RX ORDER — SODIUM CHLORIDE 9 MG/ML
75 INJECTION, SOLUTION INTRAVENOUS CONTINUOUS
Status: ACTIVE | OUTPATIENT
Start: 2022-10-20 | End: 2022-10-20

## 2022-10-20 RX ORDER — ROSUVASTATIN CALCIUM 20 MG/1
20 TABLET, COATED ORAL NIGHTLY
Status: DISCONTINUED | OUTPATIENT
Start: 2022-10-20 | End: 2022-10-27 | Stop reason: HOSPADM

## 2022-10-20 RX ORDER — INSULIN LISPRO 100 [IU]/ML
6 INJECTION, SOLUTION INTRAVENOUS; SUBCUTANEOUS
Status: DISCONTINUED | OUTPATIENT
Start: 2022-10-20 | End: 2022-10-22

## 2022-10-20 RX ADMIN — FAMOTIDINE 20 MG: 20 TABLET ORAL at 17:38

## 2022-10-20 RX ADMIN — ROSUVASTATIN CALCIUM 20 MG: 20 TABLET, FILM COATED ORAL at 20:32

## 2022-10-20 RX ADMIN — GABAPENTIN 200 MG: 100 CAPSULE ORAL at 15:49

## 2022-10-20 RX ADMIN — HYDROCODONE BITARTRATE AND ACETAMINOPHEN 1 TABLET: 5; 325 TABLET ORAL at 00:13

## 2022-10-20 RX ADMIN — GABAPENTIN 200 MG: 100 CAPSULE ORAL at 20:31

## 2022-10-20 RX ADMIN — Medication 12.5 MG: at 20:31

## 2022-10-20 RX ADMIN — ASPIRIN 81 MG 81 MG: 81 TABLET ORAL at 08:51

## 2022-10-20 RX ADMIN — INSULIN LISPRO 5 UNITS: 100 INJECTION, SOLUTION INTRAVENOUS; SUBCUTANEOUS at 12:17

## 2022-10-20 RX ADMIN — INSULIN DETEMIR 20 UNITS: 100 INJECTION, SOLUTION SUBCUTANEOUS at 08:58

## 2022-10-20 RX ADMIN — HYDROCODONE BITARTRATE AND ACETAMINOPHEN 1 TABLET: 5; 325 TABLET ORAL at 21:57

## 2022-10-20 RX ADMIN — FAMOTIDINE 20 MG: 20 TABLET ORAL at 08:51

## 2022-10-20 RX ADMIN — THIAMINE HYDROCHLORIDE 250 MG: 100 INJECTION, SOLUTION INTRAMUSCULAR; INTRAVENOUS at 05:50

## 2022-10-20 RX ADMIN — LEVOTHYROXINE SODIUM 125 MCG: 125 TABLET ORAL at 05:50

## 2022-10-20 RX ADMIN — SODIUM CHLORIDE 75 ML/HR: 9 INJECTION, SOLUTION INTRAVENOUS at 11:20

## 2022-10-20 RX ADMIN — SULFUR HEXAFLUORIDE 3 ML: KIT at 11:25

## 2022-10-20 RX ADMIN — GABAPENTIN 200 MG: 100 CAPSULE ORAL at 08:51

## 2022-10-20 RX ADMIN — INSULIN LISPRO 12 UNITS: 100 INJECTION, SOLUTION INTRAVENOUS; SUBCUTANEOUS at 08:51

## 2022-10-20 RX ADMIN — HYDROCODONE BITARTRATE AND ACETAMINOPHEN 1 TABLET: 5; 325 TABLET ORAL at 08:57

## 2022-10-20 RX ADMIN — INSULIN LISPRO 6 UNITS: 100 INJECTION, SOLUTION INTRAVENOUS; SUBCUTANEOUS at 17:38

## 2022-10-20 RX ADMIN — INSULIN DETEMIR 20 UNITS: 100 INJECTION, SOLUTION SUBCUTANEOUS at 20:32

## 2022-10-20 RX ADMIN — INSULIN LISPRO 6 UNITS: 100 INJECTION, SOLUTION INTRAVENOUS; SUBCUTANEOUS at 12:17

## 2022-10-20 RX ADMIN — HYDROCODONE BITARTRATE AND ACETAMINOPHEN 1 TABLET: 5; 325 TABLET ORAL at 15:49

## 2022-10-20 RX ADMIN — INSULIN LISPRO 5 UNITS: 100 INJECTION, SOLUTION INTRAVENOUS; SUBCUTANEOUS at 17:38

## 2022-10-20 RX ADMIN — THIAMINE HYDROCHLORIDE 250 MG: 100 INJECTION, SOLUTION INTRAMUSCULAR; INTRAVENOUS at 17:38

## 2022-10-20 RX ADMIN — Medication 12.5 MG: at 10:40

## 2022-10-20 NOTE — RESEARCH
I was asked to screen patient for the Jewel trial -wearable cardiac defibrillator. Patient is not an appropriate candidate due to his recent shoulder surgery and the inability to  exchange patches as required.

## 2022-10-21 ENCOUNTER — ANESTHESIA EVENT (OUTPATIENT)
Dept: ICU | Facility: HOSPITAL | Age: 62
End: 2022-10-21

## 2022-10-21 ENCOUNTER — ANESTHESIA (OUTPATIENT)
Dept: ICU | Facility: HOSPITAL | Age: 62
End: 2022-10-21

## 2022-10-21 ENCOUNTER — ANESTHESIA EVENT CONVERTED (OUTPATIENT)
Dept: ANESTHESIOLOGY | Facility: HOSPITAL | Age: 62
End: 2022-10-21

## 2022-10-21 LAB
ANION GAP SERPL CALCULATED.3IONS-SCNC: 11 MMOL/L (ref 5–15)
BUN SERPL-MCNC: 12 MG/DL (ref 8–23)
BUN/CREAT SERPL: 14.8 (ref 7–25)
CALCIUM SPEC-SCNC: 8.6 MG/DL (ref 8.6–10.5)
CHLORIDE SERPL-SCNC: 103 MMOL/L (ref 98–107)
CO2 SERPL-SCNC: 22 MMOL/L (ref 22–29)
CREAT SERPL-MCNC: 0.81 MG/DL (ref 0.76–1.27)
EGFRCR SERPLBLD CKD-EPI 2021: 99.7 ML/MIN/1.73
GLUCOSE BLDC GLUCOMTR-MCNC: 163 MG/DL (ref 70–130)
GLUCOSE BLDC GLUCOMTR-MCNC: 214 MG/DL (ref 70–130)
GLUCOSE BLDC GLUCOMTR-MCNC: 239 MG/DL (ref 70–130)
GLUCOSE BLDC GLUCOMTR-MCNC: 277 MG/DL (ref 70–130)
GLUCOSE BLDC GLUCOMTR-MCNC: 98 MG/DL (ref 70–130)
GLUCOSE SERPL-MCNC: 164 MG/DL (ref 65–99)
MAGNESIUM SERPL-MCNC: 1.9 MG/DL (ref 1.6–2.4)
POTASSIUM SERPL-SCNC: 4.2 MMOL/L (ref 3.5–5.2)
SODIUM SERPL-SCNC: 136 MMOL/L (ref 136–145)

## 2022-10-21 PROCEDURE — 82962 GLUCOSE BLOOD TEST: CPT

## 2022-10-21 PROCEDURE — 99232 SBSQ HOSP IP/OBS MODERATE 35: CPT | Performed by: INTERNAL MEDICINE

## 2022-10-21 PROCEDURE — 25010000002 THIAMINE PER 100 MG: Performed by: INTERNAL MEDICINE

## 2022-10-21 PROCEDURE — 25010000002 DEXAMETHASONE SODIUM PHOSPHATE 10 MG/ML SOLUTION: Performed by: NURSE ANESTHETIST, CERTIFIED REGISTERED

## 2022-10-21 PROCEDURE — 80048 BASIC METABOLIC PNL TOTAL CA: CPT | Performed by: INTERNAL MEDICINE

## 2022-10-21 PROCEDURE — 97535 SELF CARE MNGMENT TRAINING: CPT

## 2022-10-21 PROCEDURE — 83735 ASSAY OF MAGNESIUM: CPT | Performed by: INTERNAL MEDICINE

## 2022-10-21 PROCEDURE — 93005 ELECTROCARDIOGRAM TRACING: CPT | Performed by: INTERNAL MEDICINE

## 2022-10-21 PROCEDURE — 97530 THERAPEUTIC ACTIVITIES: CPT

## 2022-10-21 PROCEDURE — 63710000001 INSULIN DETEMIR PER 5 UNITS: Performed by: INTERNAL MEDICINE

## 2022-10-21 PROCEDURE — 63710000001 INSULIN LISPRO (HUMAN) PER 5 UNITS: Performed by: INTERNAL MEDICINE

## 2022-10-21 PROCEDURE — 25010000002 FUROSEMIDE PER 20 MG: Performed by: INTERNAL MEDICINE

## 2022-10-21 PROCEDURE — 25010000002 BUPRENORPHINE PER 0.1 MG: Performed by: NURSE ANESTHETIST, CERTIFIED REGISTERED

## 2022-10-21 PROCEDURE — 25010000002 HEPARIN (PORCINE) PER 1000 UNITS: Performed by: INTERNAL MEDICINE

## 2022-10-21 PROCEDURE — 97110 THERAPEUTIC EXERCISES: CPT

## 2022-10-21 PROCEDURE — 0 MAGNESIUM SULFATE 4 GM/100ML SOLUTION: Performed by: INTERNAL MEDICINE

## 2022-10-21 RX ORDER — BUPRENORPHINE HYDROCHLORIDE 0.32 MG/ML
INJECTION INTRAMUSCULAR; INTRAVENOUS
Status: COMPLETED | OUTPATIENT
Start: 2022-10-21 | End: 2022-10-21

## 2022-10-21 RX ORDER — BUPIVACAINE HYDROCHLORIDE 2.5 MG/ML
INJECTION, SOLUTION EPIDURAL; INFILTRATION; INTRACAUDAL
Status: COMPLETED | OUTPATIENT
Start: 2022-10-21 | End: 2022-10-21

## 2022-10-21 RX ORDER — MAGNESIUM SULFATE HEPTAHYDRATE 40 MG/ML
2 INJECTION, SOLUTION INTRAVENOUS AS NEEDED
Status: DISCONTINUED | OUTPATIENT
Start: 2022-10-21 | End: 2022-10-27 | Stop reason: HOSPADM

## 2022-10-21 RX ORDER — MAGNESIUM SULFATE HEPTAHYDRATE 40 MG/ML
4 INJECTION, SOLUTION INTRAVENOUS AS NEEDED
Status: DISCONTINUED | OUTPATIENT
Start: 2022-10-21 | End: 2022-10-27 | Stop reason: HOSPADM

## 2022-10-21 RX ORDER — HEPARIN SODIUM 5000 [USP'U]/ML
5000 INJECTION, SOLUTION INTRAVENOUS; SUBCUTANEOUS EVERY 8 HOURS SCHEDULED
Status: DISCONTINUED | OUTPATIENT
Start: 2022-10-21 | End: 2022-10-27 | Stop reason: HOSPADM

## 2022-10-21 RX ORDER — FUROSEMIDE 10 MG/ML
40 INJECTION INTRAMUSCULAR; INTRAVENOUS ONCE
Status: COMPLETED | OUTPATIENT
Start: 2022-10-21 | End: 2022-10-21

## 2022-10-21 RX ORDER — DEXAMETHASONE SODIUM PHOSPHATE 10 MG/ML
INJECTION, SOLUTION INTRAMUSCULAR; INTRAVENOUS
Status: COMPLETED | OUTPATIENT
Start: 2022-10-21 | End: 2022-10-21

## 2022-10-21 RX ADMIN — ROSUVASTATIN CALCIUM 20 MG: 20 TABLET, FILM COATED ORAL at 20:47

## 2022-10-21 RX ADMIN — GABAPENTIN 200 MG: 100 CAPSULE ORAL at 20:47

## 2022-10-21 RX ADMIN — INSULIN LISPRO 5 UNITS: 100 INJECTION, SOLUTION INTRAVENOUS; SUBCUTANEOUS at 12:00

## 2022-10-21 RX ADMIN — INSULIN LISPRO 8 UNITS: 100 INJECTION, SOLUTION INTRAVENOUS; SUBCUTANEOUS at 20:41

## 2022-10-21 RX ADMIN — BUPRENORPHINE HYDROCHLORIDE 0.3 MG: 0.32 INJECTION INTRAMUSCULAR; INTRAVENOUS at 11:35

## 2022-10-21 RX ADMIN — HYDROCODONE BITARTRATE AND ACETAMINOPHEN 1 TABLET: 5; 325 TABLET ORAL at 17:38

## 2022-10-21 RX ADMIN — Medication 12.5 MG: at 08:56

## 2022-10-21 RX ADMIN — GABAPENTIN 200 MG: 100 CAPSULE ORAL at 08:56

## 2022-10-21 RX ADMIN — INSULIN LISPRO 6 UNITS: 100 INJECTION, SOLUTION INTRAVENOUS; SUBCUTANEOUS at 12:00

## 2022-10-21 RX ADMIN — FUROSEMIDE 40 MG: 10 INJECTION, SOLUTION INTRAMUSCULAR; INTRAVENOUS at 15:41

## 2022-10-21 RX ADMIN — DEXAMETHASONE SODIUM PHOSPHATE 2 MG: 10 INJECTION, SOLUTION INTRAMUSCULAR; INTRAVENOUS at 11:35

## 2022-10-21 RX ADMIN — ASPIRIN 81 MG 81 MG: 81 TABLET ORAL at 08:56

## 2022-10-21 RX ADMIN — INSULIN LISPRO 6 UNITS: 100 INJECTION, SOLUTION INTRAVENOUS; SUBCUTANEOUS at 17:09

## 2022-10-21 RX ADMIN — HYDROCODONE BITARTRATE AND ACETAMINOPHEN 1 TABLET: 5; 325 TABLET ORAL at 12:00

## 2022-10-21 RX ADMIN — THIAMINE HYDROCHLORIDE 250 MG: 100 INJECTION, SOLUTION INTRAMUSCULAR; INTRAVENOUS at 06:07

## 2022-10-21 RX ADMIN — HEPARIN SODIUM 5000 UNITS: 5000 INJECTION INTRAVENOUS; SUBCUTANEOUS at 15:41

## 2022-10-21 RX ADMIN — INSULIN DETEMIR 20 UNITS: 100 INJECTION, SOLUTION SUBCUTANEOUS at 10:16

## 2022-10-21 RX ADMIN — INSULIN LISPRO 5 UNITS: 100 INJECTION, SOLUTION INTRAVENOUS; SUBCUTANEOUS at 17:09

## 2022-10-21 RX ADMIN — INSULIN LISPRO 6 UNITS: 100 INJECTION, SOLUTION INTRAVENOUS; SUBCUTANEOUS at 08:56

## 2022-10-21 RX ADMIN — GABAPENTIN 200 MG: 100 CAPSULE ORAL at 17:09

## 2022-10-21 RX ADMIN — BUPIVACAINE HYDROCHLORIDE 30 ML: 2.5 INJECTION, SOLUTION EPIDURAL; INFILTRATION; INTRACAUDAL; PERINEURAL at 11:35

## 2022-10-21 RX ADMIN — LEVOTHYROXINE SODIUM 125 MCG: 125 TABLET ORAL at 06:07

## 2022-10-21 RX ADMIN — FAMOTIDINE 20 MG: 20 TABLET ORAL at 06:07

## 2022-10-21 RX ADMIN — MAGNESIUM SULFATE HEPTAHYDRATE 4 G: 40 INJECTION, SOLUTION INTRAVENOUS at 17:38

## 2022-10-21 RX ADMIN — FAMOTIDINE 20 MG: 20 TABLET ORAL at 17:09

## 2022-10-21 RX ADMIN — HYDROCODONE BITARTRATE AND ACETAMINOPHEN 1 TABLET: 5; 325 TABLET ORAL at 06:07

## 2022-10-21 RX ADMIN — HEPARIN SODIUM 5000 UNITS: 5000 INJECTION INTRAVENOUS; SUBCUTANEOUS at 22:22

## 2022-10-21 RX ADMIN — Medication 12.5 MG: at 20:47

## 2022-10-21 RX ADMIN — INSULIN DETEMIR 20 UNITS: 100 INJECTION, SOLUTION SUBCUTANEOUS at 20:39

## 2022-10-21 NOTE — ANESTHESIA PROCEDURE NOTES
Peripheral Block      Patient reassessed immediately prior to procedure    Reason for block: at surgeon's request and post-op pain management  Performed by  CRNA/CAA: Rosalio Soto CRNA  Assisted by: Mayuri Myrick CRNA  Preanesthetic Checklist  Completed: patient identified, IV checked, site marked, risks and benefits discussed, surgical consent, monitors and equipment checked, pre-op evaluation and timeout performed  Prep:  Pt Position: supine  Sterile barriers:washed/disinfected hands, gloves, mask and cap  Prep: ChloraPrep  Patient monitoring: blood pressure monitoring, continuous pulse oximetry and EKG  Procedure    Sedation: no  Performed under: local infiltration  Guidance:ultrasound guided    ULTRASOUND INTERPRETATION.  Using ultrasound guidance a 20 G gauge needle was placed in close proximity to the nerve, at which point, under ultrasound guidance anesthetic was injected in the area of the nerve and spread of the anesthesia was seen on ultrasound in close proximity thereto.  There were no abnormalities seen on ultrasound; a digital image was taken; and the patient tolerated the procedure with no complications. Images:still images obtained, printed/placed on chart    Laterality:right  Anesthesia block type: Subpectoral Interfascial Plane.  Injection Technique:single-shot  Needle Type:echogenic and short-bevel  Needle Gauge:20 G  Resistance on Injection: none    Medications Used: buprenorphine (BUPRENEX) injection - Injection   0.3 mg - 10/21/2022 11:35:00 AM  dexamethasone sodium phosphate injection - Injection   2 mg - 10/21/2022 11:35:00 AM  bupivacaine PF (MARCAINE) 0.25 % injection - Injection   30 mL - 10/21/2022 11:35:00 AM      Post Assessment  Injection Assessment: negative aspiration for heme, no paresthesia on injection and incremental injection  Patient Tolerance:comfortable throughout block  Complications:no  Additional Notes  A high-frequency linear transducer, with sterile cover, was placed in  "a parasagittal plane 2 cm lateral to the sternum at the 4th rib. The insertion site was prepped in sterile fashion and then localized with 2-5 ml of 1% Lidocaine. Using ultrasound-guidance, a 20-gauge B-Garcia 4\" Ultraplex 360 non-stimulating echogenic needle was advanced in plane until the tip of the needle is in the fascial plane between the pectoralis major and external intercostal muscle at the level of the 4th rib. 1-3ml of preservative free normal saline was used to hydro-dissect the fascial planes. After the fascial planes were verified, the local anesthetic was injected caudad to cephalad. Another injection was performed at the same level (4th rib) in plane, from cephalad to caudad with the local anesthetic. Aspiration every 5 ml to prevent intravascular injection. Injection was completed with negative aspiration of blood and negative intravascular injection. Injection pressures were normal with minimal resistance. This block can be performed for single sided coverage or bilateral coverage of the sternum.          "

## 2022-10-22 ENCOUNTER — APPOINTMENT (OUTPATIENT)
Dept: GENERAL RADIOLOGY | Facility: HOSPITAL | Age: 62
End: 2022-10-22

## 2022-10-22 LAB
ANION GAP SERPL CALCULATED.3IONS-SCNC: 9 MMOL/L (ref 5–15)
BUN SERPL-MCNC: 15 MG/DL (ref 8–23)
BUN/CREAT SERPL: 20.3 (ref 7–25)
CALCIUM SPEC-SCNC: 8.7 MG/DL (ref 8.6–10.5)
CHLORIDE SERPL-SCNC: 97 MMOL/L (ref 98–107)
CO2 SERPL-SCNC: 23 MMOL/L (ref 22–29)
CREAT SERPL-MCNC: 0.74 MG/DL (ref 0.76–1.27)
DEPRECATED RDW RBC AUTO: 43.5 FL (ref 37–54)
EGFRCR SERPLBLD CKD-EPI 2021: 102.4 ML/MIN/1.73
ERYTHROCYTE [DISTWIDTH] IN BLOOD BY AUTOMATED COUNT: 12.8 % (ref 12.3–15.4)
GLUCOSE BLDC GLUCOMTR-MCNC: 241 MG/DL (ref 70–130)
GLUCOSE BLDC GLUCOMTR-MCNC: 269 MG/DL (ref 70–130)
GLUCOSE BLDC GLUCOMTR-MCNC: 307 MG/DL (ref 70–130)
GLUCOSE BLDC GLUCOMTR-MCNC: 380 MG/DL (ref 70–130)
GLUCOSE SERPL-MCNC: 242 MG/DL (ref 65–99)
HCT VFR BLD AUTO: 33.2 % (ref 37.5–51)
HGB BLD-MCNC: 11.6 G/DL (ref 13–17.7)
MAGNESIUM SERPL-MCNC: 2.3 MG/DL (ref 1.6–2.4)
MCH RBC QN AUTO: 32.4 PG (ref 26.6–33)
MCHC RBC AUTO-ENTMCNC: 34.9 G/DL (ref 31.5–35.7)
MCV RBC AUTO: 92.7 FL (ref 79–97)
PLATELET # BLD AUTO: 213 10*3/MM3 (ref 140–450)
PMV BLD AUTO: 11 FL (ref 6–12)
POTASSIUM SERPL-SCNC: 4.2 MMOL/L (ref 3.5–5.2)
RBC # BLD AUTO: 3.58 10*6/MM3 (ref 4.14–5.8)
SODIUM SERPL-SCNC: 129 MMOL/L (ref 136–145)
WBC NRBC COR # BLD: 11.09 10*3/MM3 (ref 3.4–10.8)

## 2022-10-22 PROCEDURE — 97530 THERAPEUTIC ACTIVITIES: CPT

## 2022-10-22 PROCEDURE — 99232 SBSQ HOSP IP/OBS MODERATE 35: CPT | Performed by: INTERNAL MEDICINE

## 2022-10-22 PROCEDURE — 85027 COMPLETE CBC AUTOMATED: CPT | Performed by: INTERNAL MEDICINE

## 2022-10-22 PROCEDURE — 80048 BASIC METABOLIC PNL TOTAL CA: CPT | Performed by: INTERNAL MEDICINE

## 2022-10-22 PROCEDURE — 83735 ASSAY OF MAGNESIUM: CPT | Performed by: INTERNAL MEDICINE

## 2022-10-22 PROCEDURE — 63710000001 INSULIN LISPRO (HUMAN) PER 5 UNITS: Performed by: INTERNAL MEDICINE

## 2022-10-22 PROCEDURE — 71045 X-RAY EXAM CHEST 1 VIEW: CPT

## 2022-10-22 PROCEDURE — 82962 GLUCOSE BLOOD TEST: CPT

## 2022-10-22 PROCEDURE — 25010000002 HEPARIN (PORCINE) PER 1000 UNITS: Performed by: INTERNAL MEDICINE

## 2022-10-22 PROCEDURE — 63710000001 INSULIN DETEMIR PER 5 UNITS: Performed by: INTERNAL MEDICINE

## 2022-10-22 RX ORDER — BISACODYL 10 MG
10 SUPPOSITORY, RECTAL RECTAL DAILY PRN
Status: DISCONTINUED | OUTPATIENT
Start: 2022-10-22 | End: 2022-10-27 | Stop reason: HOSPADM

## 2022-10-22 RX ORDER — BISACODYL 5 MG/1
5 TABLET, DELAYED RELEASE ORAL DAILY PRN
Status: DISCONTINUED | OUTPATIENT
Start: 2022-10-22 | End: 2022-10-27 | Stop reason: HOSPADM

## 2022-10-22 RX ORDER — INSULIN LISPRO 100 [IU]/ML
14 INJECTION, SOLUTION INTRAVENOUS; SUBCUTANEOUS
Status: DISCONTINUED | OUTPATIENT
Start: 2022-10-23 | End: 2022-10-23

## 2022-10-22 RX ORDER — POLYETHYLENE GLYCOL 3350 17 G/17G
17 POWDER, FOR SOLUTION ORAL DAILY PRN
Status: DISCONTINUED | OUTPATIENT
Start: 2022-10-22 | End: 2022-10-27 | Stop reason: HOSPADM

## 2022-10-22 RX ORDER — AMOXICILLIN 250 MG
2 CAPSULE ORAL 2 TIMES DAILY
Status: DISCONTINUED | OUTPATIENT
Start: 2022-10-22 | End: 2022-10-27 | Stop reason: HOSPADM

## 2022-10-22 RX ADMIN — FAMOTIDINE 20 MG: 20 TABLET ORAL at 16:41

## 2022-10-22 RX ADMIN — HYDROCODONE BITARTRATE AND ACETAMINOPHEN 1 TABLET: 5; 325 TABLET ORAL at 06:39

## 2022-10-22 RX ADMIN — SENNOSIDES AND DOCUSATE SODIUM 2 TABLET: 50; 8.6 TABLET ORAL at 20:25

## 2022-10-22 RX ADMIN — INSULIN LISPRO 6 UNITS: 100 INJECTION, SOLUTION INTRAVENOUS; SUBCUTANEOUS at 16:41

## 2022-10-22 RX ADMIN — SENNOSIDES AND DOCUSATE SODIUM 2 TABLET: 50; 8.6 TABLET ORAL at 12:04

## 2022-10-22 RX ADMIN — GABAPENTIN 200 MG: 100 CAPSULE ORAL at 16:41

## 2022-10-22 RX ADMIN — HYDROCODONE BITARTRATE AND ACETAMINOPHEN 1 TABLET: 5; 325 TABLET ORAL at 14:10

## 2022-10-22 RX ADMIN — INSULIN LISPRO 6 UNITS: 100 INJECTION, SOLUTION INTRAVENOUS; SUBCUTANEOUS at 20:25

## 2022-10-22 RX ADMIN — HYDROCODONE BITARTRATE AND ACETAMINOPHEN 1 TABLET: 5; 325 TABLET ORAL at 00:37

## 2022-10-22 RX ADMIN — METOPROLOL TARTRATE 25 MG: 25 TABLET, FILM COATED ORAL at 20:25

## 2022-10-22 RX ADMIN — GABAPENTIN 200 MG: 100 CAPSULE ORAL at 20:25

## 2022-10-22 RX ADMIN — HEPARIN SODIUM 5000 UNITS: 5000 INJECTION INTRAVENOUS; SUBCUTANEOUS at 20:24

## 2022-10-22 RX ADMIN — INSULIN LISPRO 6 UNITS: 100 INJECTION, SOLUTION INTRAVENOUS; SUBCUTANEOUS at 08:17

## 2022-10-22 RX ADMIN — INSULIN DETEMIR 20 UNITS: 100 INJECTION, SOLUTION SUBCUTANEOUS at 20:25

## 2022-10-22 RX ADMIN — INSULIN LISPRO 12 UNITS: 100 INJECTION, SOLUTION INTRAVENOUS; SUBCUTANEOUS at 16:42

## 2022-10-22 RX ADMIN — METOPROLOL TARTRATE 25 MG: 25 TABLET, FILM COATED ORAL at 08:17

## 2022-10-22 RX ADMIN — INSULIN LISPRO 6 UNITS: 100 INJECTION, SOLUTION INTRAVENOUS; SUBCUTANEOUS at 12:04

## 2022-10-22 RX ADMIN — FAMOTIDINE 20 MG: 20 TABLET ORAL at 06:39

## 2022-10-22 RX ADMIN — ROSUVASTATIN CALCIUM 20 MG: 20 TABLET, FILM COATED ORAL at 20:25

## 2022-10-22 RX ADMIN — GABAPENTIN 200 MG: 100 CAPSULE ORAL at 08:17

## 2022-10-22 RX ADMIN — HEPARIN SODIUM 5000 UNITS: 5000 INJECTION INTRAVENOUS; SUBCUTANEOUS at 06:39

## 2022-10-22 RX ADMIN — HYDROCODONE BITARTRATE AND ACETAMINOPHEN 1 TABLET: 5; 325 TABLET ORAL at 20:25

## 2022-10-22 RX ADMIN — INSULIN LISPRO 5 UNITS: 100 INJECTION, SOLUTION INTRAVENOUS; SUBCUTANEOUS at 08:18

## 2022-10-22 RX ADMIN — HEPARIN SODIUM 5000 UNITS: 5000 INJECTION INTRAVENOUS; SUBCUTANEOUS at 14:10

## 2022-10-22 RX ADMIN — INSULIN LISPRO 10 UNITS: 100 INJECTION, SOLUTION INTRAVENOUS; SUBCUTANEOUS at 12:04

## 2022-10-22 RX ADMIN — LEVOTHYROXINE SODIUM 125 MCG: 125 TABLET ORAL at 06:39

## 2022-10-22 RX ADMIN — ASPIRIN 81 MG 81 MG: 81 TABLET ORAL at 08:17

## 2022-10-22 RX ADMIN — INSULIN DETEMIR 20 UNITS: 100 INJECTION, SOLUTION SUBCUTANEOUS at 08:18

## 2022-10-23 LAB
ANION GAP SERPL CALCULATED.3IONS-SCNC: 10 MMOL/L (ref 5–15)
BUN SERPL-MCNC: 20 MG/DL (ref 8–23)
BUN/CREAT SERPL: 25.6 (ref 7–25)
CALCIUM SPEC-SCNC: 8.5 MG/DL (ref 8.6–10.5)
CHLORIDE SERPL-SCNC: 102 MMOL/L (ref 98–107)
CO2 SERPL-SCNC: 22 MMOL/L (ref 22–29)
CREAT SERPL-MCNC: 0.78 MG/DL (ref 0.76–1.27)
DEPRECATED RDW RBC AUTO: 46.5 FL (ref 37–54)
EGFRCR SERPLBLD CKD-EPI 2021: 100.8 ML/MIN/1.73
ERYTHROCYTE [DISTWIDTH] IN BLOOD BY AUTOMATED COUNT: 13.1 % (ref 12.3–15.4)
GLUCOSE BLDC GLUCOMTR-MCNC: 104 MG/DL (ref 70–130)
GLUCOSE BLDC GLUCOMTR-MCNC: 197 MG/DL (ref 70–130)
GLUCOSE BLDC GLUCOMTR-MCNC: 250 MG/DL (ref 70–130)
GLUCOSE BLDC GLUCOMTR-MCNC: 305 MG/DL (ref 70–130)
GLUCOSE BLDC GLUCOMTR-MCNC: 345 MG/DL (ref 70–130)
GLUCOSE BLDC GLUCOMTR-MCNC: 62 MG/DL (ref 70–130)
GLUCOSE BLDC GLUCOMTR-MCNC: 63 MG/DL (ref 70–130)
GLUCOSE BLDC GLUCOMTR-MCNC: 69 MG/DL (ref 70–130)
GLUCOSE SERPL-MCNC: 194 MG/DL (ref 65–99)
HCT VFR BLD AUTO: 34 % (ref 37.5–51)
HGB BLD-MCNC: 11 G/DL (ref 13–17.7)
MCH RBC QN AUTO: 31.8 PG (ref 26.6–33)
MCHC RBC AUTO-ENTMCNC: 32.4 G/DL (ref 31.5–35.7)
MCV RBC AUTO: 98.3 FL (ref 79–97)
PLATELET # BLD AUTO: 222 10*3/MM3 (ref 140–450)
PMV BLD AUTO: 10.8 FL (ref 6–12)
POTASSIUM SERPL-SCNC: 4.3 MMOL/L (ref 3.5–5.2)
RBC # BLD AUTO: 3.46 10*6/MM3 (ref 4.14–5.8)
SODIUM SERPL-SCNC: 134 MMOL/L (ref 136–145)
WBC NRBC COR # BLD: 10.1 10*3/MM3 (ref 3.4–10.8)

## 2022-10-23 PROCEDURE — 63710000001 INSULIN LISPRO (HUMAN) PER 5 UNITS: Performed by: INTERNAL MEDICINE

## 2022-10-23 PROCEDURE — 97535 SELF CARE MNGMENT TRAINING: CPT

## 2022-10-23 PROCEDURE — 63710000001 INSULIN DETEMIR PER 5 UNITS: Performed by: INTERNAL MEDICINE

## 2022-10-23 PROCEDURE — 25010000002 HEPARIN (PORCINE) PER 1000 UNITS: Performed by: INTERNAL MEDICINE

## 2022-10-23 PROCEDURE — 85027 COMPLETE CBC AUTOMATED: CPT | Performed by: INTERNAL MEDICINE

## 2022-10-23 PROCEDURE — 97530 THERAPEUTIC ACTIVITIES: CPT

## 2022-10-23 PROCEDURE — 80048 BASIC METABOLIC PNL TOTAL CA: CPT | Performed by: INTERNAL MEDICINE

## 2022-10-23 PROCEDURE — 97116 GAIT TRAINING THERAPY: CPT

## 2022-10-23 PROCEDURE — 99232 SBSQ HOSP IP/OBS MODERATE 35: CPT | Performed by: INTERNAL MEDICINE

## 2022-10-23 PROCEDURE — 82962 GLUCOSE BLOOD TEST: CPT

## 2022-10-23 PROCEDURE — 97110 THERAPEUTIC EXERCISES: CPT

## 2022-10-23 RX ORDER — INSULIN LISPRO 100 [IU]/ML
10 INJECTION, SOLUTION INTRAVENOUS; SUBCUTANEOUS
Status: DISCONTINUED | OUTPATIENT
Start: 2022-10-23 | End: 2022-10-27 | Stop reason: HOSPADM

## 2022-10-23 RX ADMIN — INSULIN LISPRO 10 UNITS: 100 INJECTION, SOLUTION INTRAVENOUS; SUBCUTANEOUS at 11:24

## 2022-10-23 RX ADMIN — GABAPENTIN 200 MG: 100 CAPSULE ORAL at 15:14

## 2022-10-23 RX ADMIN — SENNOSIDES AND DOCUSATE SODIUM 2 TABLET: 50; 8.6 TABLET ORAL at 08:27

## 2022-10-23 RX ADMIN — HEPARIN SODIUM 5000 UNITS: 5000 INJECTION INTRAVENOUS; SUBCUTANEOUS at 05:49

## 2022-10-23 RX ADMIN — ASPIRIN 81 MG 81 MG: 81 TABLET ORAL at 08:27

## 2022-10-23 RX ADMIN — INSULIN LISPRO 3 UNITS: 100 INJECTION, SOLUTION INTRAVENOUS; SUBCUTANEOUS at 20:41

## 2022-10-23 RX ADMIN — FAMOTIDINE 20 MG: 20 TABLET ORAL at 17:04

## 2022-10-23 RX ADMIN — INSULIN LISPRO 10 UNITS: 100 INJECTION, SOLUTION INTRAVENOUS; SUBCUTANEOUS at 17:04

## 2022-10-23 RX ADMIN — Medication 15 G: at 07:31

## 2022-10-23 RX ADMIN — HYDROCODONE BITARTRATE AND ACETAMINOPHEN 1 TABLET: 5; 325 TABLET ORAL at 15:14

## 2022-10-23 RX ADMIN — METOPROLOL TARTRATE 25 MG: 25 TABLET, FILM COATED ORAL at 20:29

## 2022-10-23 RX ADMIN — LEVOTHYROXINE SODIUM 125 MCG: 125 TABLET ORAL at 05:49

## 2022-10-23 RX ADMIN — HEPARIN SODIUM 5000 UNITS: 5000 INJECTION INTRAVENOUS; SUBCUTANEOUS at 20:30

## 2022-10-23 RX ADMIN — HYDROCODONE BITARTRATE AND ACETAMINOPHEN 1 TABLET: 5; 325 TABLET ORAL at 23:52

## 2022-10-23 RX ADMIN — ROSUVASTATIN CALCIUM 20 MG: 20 TABLET, FILM COATED ORAL at 20:29

## 2022-10-23 RX ADMIN — FAMOTIDINE 20 MG: 20 TABLET ORAL at 08:27

## 2022-10-23 RX ADMIN — GABAPENTIN 200 MG: 100 CAPSULE ORAL at 20:28

## 2022-10-23 RX ADMIN — SENNOSIDES AND DOCUSATE SODIUM 2 TABLET: 50; 8.6 TABLET ORAL at 20:28

## 2022-10-23 RX ADMIN — GABAPENTIN 200 MG: 100 CAPSULE ORAL at 08:27

## 2022-10-23 RX ADMIN — HYDROCODONE BITARTRATE AND ACETAMINOPHEN 1 TABLET: 5; 325 TABLET ORAL at 03:32

## 2022-10-23 RX ADMIN — INSULIN DETEMIR 20 UNITS: 100 INJECTION, SOLUTION SUBCUTANEOUS at 20:27

## 2022-10-23 RX ADMIN — METOPROLOL TARTRATE 25 MG: 25 TABLET, FILM COATED ORAL at 08:26

## 2022-10-23 RX ADMIN — INSULIN LISPRO 10 UNITS: 100 INJECTION, SOLUTION INTRAVENOUS; SUBCUTANEOUS at 11:23

## 2022-10-23 RX ADMIN — HEPARIN SODIUM 5000 UNITS: 5000 INJECTION INTRAVENOUS; SUBCUTANEOUS at 15:16

## 2022-10-24 LAB
ANION GAP SERPL CALCULATED.3IONS-SCNC: 6 MMOL/L (ref 5–15)
BUN SERPL-MCNC: 17 MG/DL (ref 8–23)
BUN/CREAT SERPL: 21.8 (ref 7–25)
CALCIUM SPEC-SCNC: 8.7 MG/DL (ref 8.6–10.5)
CHLORIDE SERPL-SCNC: 101 MMOL/L (ref 98–107)
CO2 SERPL-SCNC: 25 MMOL/L (ref 22–29)
CREAT SERPL-MCNC: 0.78 MG/DL (ref 0.76–1.27)
DEPRECATED RDW RBC AUTO: 43.4 FL (ref 37–54)
EGFRCR SERPLBLD CKD-EPI 2021: 100.8 ML/MIN/1.73
ERYTHROCYTE [DISTWIDTH] IN BLOOD BY AUTOMATED COUNT: 13.1 % (ref 12.3–15.4)
GLUCOSE BLDC GLUCOMTR-MCNC: 159 MG/DL (ref 70–130)
GLUCOSE BLDC GLUCOMTR-MCNC: 301 MG/DL (ref 70–130)
GLUCOSE BLDC GLUCOMTR-MCNC: 334 MG/DL (ref 70–130)
GLUCOSE BLDC GLUCOMTR-MCNC: 76 MG/DL (ref 70–130)
GLUCOSE SERPL-MCNC: 331 MG/DL (ref 65–99)
HCT VFR BLD AUTO: 32.2 % (ref 37.5–51)
HGB BLD-MCNC: 11 G/DL (ref 13–17.7)
MCH RBC QN AUTO: 31.5 PG (ref 26.6–33)
MCHC RBC AUTO-ENTMCNC: 34.2 G/DL (ref 31.5–35.7)
MCV RBC AUTO: 92.3 FL (ref 79–97)
PLATELET # BLD AUTO: 237 10*3/MM3 (ref 140–450)
PMV BLD AUTO: 11 FL (ref 6–12)
POTASSIUM SERPL-SCNC: 5.2 MMOL/L (ref 3.5–5.2)
RBC # BLD AUTO: 3.49 10*6/MM3 (ref 4.14–5.8)
SODIUM SERPL-SCNC: 132 MMOL/L (ref 136–145)
WBC NRBC COR # BLD: 10.44 10*3/MM3 (ref 3.4–10.8)

## 2022-10-24 PROCEDURE — 63710000001 INSULIN LISPRO (HUMAN) PER 5 UNITS: Performed by: INTERNAL MEDICINE

## 2022-10-24 PROCEDURE — 25010000002 HEPARIN (PORCINE) PER 1000 UNITS: Performed by: INTERNAL MEDICINE

## 2022-10-24 PROCEDURE — 97116 GAIT TRAINING THERAPY: CPT

## 2022-10-24 PROCEDURE — 82962 GLUCOSE BLOOD TEST: CPT

## 2022-10-24 PROCEDURE — 97530 THERAPEUTIC ACTIVITIES: CPT

## 2022-10-24 PROCEDURE — 97535 SELF CARE MNGMENT TRAINING: CPT

## 2022-10-24 PROCEDURE — 63710000001 INSULIN DETEMIR PER 5 UNITS: Performed by: INTERNAL MEDICINE

## 2022-10-24 PROCEDURE — 97110 THERAPEUTIC EXERCISES: CPT

## 2022-10-24 PROCEDURE — 99232 SBSQ HOSP IP/OBS MODERATE 35: CPT | Performed by: INTERNAL MEDICINE

## 2022-10-24 PROCEDURE — 80048 BASIC METABOLIC PNL TOTAL CA: CPT | Performed by: INTERNAL MEDICINE

## 2022-10-24 PROCEDURE — 85027 COMPLETE CBC AUTOMATED: CPT | Performed by: INTERNAL MEDICINE

## 2022-10-24 RX ADMIN — HEPARIN SODIUM 5000 UNITS: 5000 INJECTION INTRAVENOUS; SUBCUTANEOUS at 21:24

## 2022-10-24 RX ADMIN — HYDROCODONE BITARTRATE AND ACETAMINOPHEN 1 TABLET: 5; 325 TABLET ORAL at 15:38

## 2022-10-24 RX ADMIN — INSULIN LISPRO 10 UNITS: 100 INJECTION, SOLUTION INTRAVENOUS; SUBCUTANEOUS at 12:46

## 2022-10-24 RX ADMIN — METOPROLOL TARTRATE 25 MG: 25 TABLET, FILM COATED ORAL at 08:17

## 2022-10-24 RX ADMIN — HEPARIN SODIUM 5000 UNITS: 5000 INJECTION INTRAVENOUS; SUBCUTANEOUS at 05:45

## 2022-10-24 RX ADMIN — ASPIRIN 81 MG 81 MG: 81 TABLET ORAL at 08:17

## 2022-10-24 RX ADMIN — ROSUVASTATIN CALCIUM 20 MG: 20 TABLET, FILM COATED ORAL at 21:25

## 2022-10-24 RX ADMIN — INSULIN LISPRO 10 UNITS: 100 INJECTION, SOLUTION INTRAVENOUS; SUBCUTANEOUS at 17:32

## 2022-10-24 RX ADMIN — GABAPENTIN 200 MG: 100 CAPSULE ORAL at 21:25

## 2022-10-24 RX ADMIN — FAMOTIDINE 20 MG: 20 TABLET ORAL at 08:17

## 2022-10-24 RX ADMIN — FAMOTIDINE 20 MG: 20 TABLET ORAL at 17:32

## 2022-10-24 RX ADMIN — INSULIN LISPRO 10 UNITS: 100 INJECTION, SOLUTION INTRAVENOUS; SUBCUTANEOUS at 08:17

## 2022-10-24 RX ADMIN — INSULIN LISPRO 10 UNITS: 100 INJECTION, SOLUTION INTRAVENOUS; SUBCUTANEOUS at 08:16

## 2022-10-24 RX ADMIN — INSULIN DETEMIR 20 UNITS: 100 INJECTION, SOLUTION SUBCUTANEOUS at 08:19

## 2022-10-24 RX ADMIN — METOPROLOL TARTRATE 25 MG: 25 TABLET, FILM COATED ORAL at 21:24

## 2022-10-24 RX ADMIN — HYDROCODONE BITARTRATE AND ACETAMINOPHEN 1 TABLET: 5; 325 TABLET ORAL at 21:25

## 2022-10-24 RX ADMIN — HYDROCODONE BITARTRATE AND ACETAMINOPHEN 1 TABLET: 5; 325 TABLET ORAL at 05:45

## 2022-10-24 RX ADMIN — LEVOTHYROXINE SODIUM 125 MCG: 125 TABLET ORAL at 05:45

## 2022-10-24 RX ADMIN — SENNOSIDES AND DOCUSATE SODIUM 2 TABLET: 50; 8.6 TABLET ORAL at 08:17

## 2022-10-24 RX ADMIN — SENNOSIDES AND DOCUSATE SODIUM 2 TABLET: 50; 8.6 TABLET ORAL at 21:25

## 2022-10-24 RX ADMIN — GABAPENTIN 200 MG: 100 CAPSULE ORAL at 15:38

## 2022-10-24 RX ADMIN — GABAPENTIN 200 MG: 100 CAPSULE ORAL at 08:17

## 2022-10-24 RX ADMIN — INSULIN LISPRO 3 UNITS: 100 INJECTION, SOLUTION INTRAVENOUS; SUBCUTANEOUS at 17:31

## 2022-10-24 RX ADMIN — INSULIN DETEMIR 25 UNITS: 100 INJECTION, SOLUTION SUBCUTANEOUS at 21:24

## 2022-10-24 RX ADMIN — HEPARIN SODIUM 5000 UNITS: 5000 INJECTION INTRAVENOUS; SUBCUTANEOUS at 15:39

## 2022-10-25 LAB
ANION GAP SERPL CALCULATED.3IONS-SCNC: 9 MMOL/L (ref 5–15)
BUN SERPL-MCNC: 12 MG/DL (ref 8–23)
BUN/CREAT SERPL: 17.6 (ref 7–25)
CALCIUM SPEC-SCNC: 9 MG/DL (ref 8.6–10.5)
CHLORIDE SERPL-SCNC: 103 MMOL/L (ref 98–107)
CO2 SERPL-SCNC: 26 MMOL/L (ref 22–29)
CREAT SERPL-MCNC: 0.68 MG/DL (ref 0.76–1.27)
EGFRCR SERPLBLD CKD-EPI 2021: 105.1 ML/MIN/1.73
GLUCOSE BLDC GLUCOMTR-MCNC: 164 MG/DL (ref 70–130)
GLUCOSE BLDC GLUCOMTR-MCNC: 179 MG/DL (ref 70–130)
GLUCOSE BLDC GLUCOMTR-MCNC: 198 MG/DL (ref 70–130)
GLUCOSE BLDC GLUCOMTR-MCNC: 84 MG/DL (ref 70–130)
GLUCOSE SERPL-MCNC: 53 MG/DL (ref 65–99)
POTASSIUM SERPL-SCNC: 4.3 MMOL/L (ref 3.5–5.2)
QT INTERVAL: 432 MS
QTC INTERVAL: 416 MS
SODIUM SERPL-SCNC: 138 MMOL/L (ref 136–145)

## 2022-10-25 PROCEDURE — 82962 GLUCOSE BLOOD TEST: CPT

## 2022-10-25 PROCEDURE — 25010000002 FENTANYL CITRATE (PF) 50 MCG/ML SOLUTION: Performed by: INTERNAL MEDICINE

## 2022-10-25 PROCEDURE — C1894 INTRO/SHEATH, NON-LASER: HCPCS | Performed by: INTERNAL MEDICINE

## 2022-10-25 PROCEDURE — 0 IOPAMIDOL PER 1 ML: Performed by: INTERNAL MEDICINE

## 2022-10-25 PROCEDURE — 97116 GAIT TRAINING THERAPY: CPT

## 2022-10-25 PROCEDURE — B2111ZZ FLUOROSCOPY OF MULTIPLE CORONARY ARTERIES USING LOW OSMOLAR CONTRAST: ICD-10-PCS | Performed by: INTERNAL MEDICINE

## 2022-10-25 PROCEDURE — 4A023N7 MEASUREMENT OF CARDIAC SAMPLING AND PRESSURE, LEFT HEART, PERCUTANEOUS APPROACH: ICD-10-PCS | Performed by: INTERNAL MEDICINE

## 2022-10-25 PROCEDURE — 93459 L HRT ART/GRFT ANGIO: CPT | Performed by: INTERNAL MEDICINE

## 2022-10-25 PROCEDURE — 80048 BASIC METABOLIC PNL TOTAL CA: CPT | Performed by: INTERNAL MEDICINE

## 2022-10-25 PROCEDURE — 25010000002 MIDAZOLAM PER 1 MG: Performed by: INTERNAL MEDICINE

## 2022-10-25 PROCEDURE — 63710000001 INSULIN LISPRO (HUMAN) PER 5 UNITS: Performed by: INTERNAL MEDICINE

## 2022-10-25 PROCEDURE — 25010000002 HEPARIN (PORCINE) PER 1000 UNITS: Performed by: INTERNAL MEDICINE

## 2022-10-25 PROCEDURE — 99232 SBSQ HOSP IP/OBS MODERATE 35: CPT | Performed by: HOSPITALIST

## 2022-10-25 PROCEDURE — B2131ZZ FLUOROSCOPY OF MULTIPLE CORONARY ARTERY BYPASS GRAFTS USING LOW OSMOLAR CONTRAST: ICD-10-PCS | Performed by: INTERNAL MEDICINE

## 2022-10-25 PROCEDURE — C1760 CLOSURE DEV, VASC: HCPCS | Performed by: INTERNAL MEDICINE

## 2022-10-25 PROCEDURE — 63710000001 INSULIN DETEMIR PER 5 UNITS: Performed by: INTERNAL MEDICINE

## 2022-10-25 PROCEDURE — 97535 SELF CARE MNGMENT TRAINING: CPT | Performed by: OCCUPATIONAL THERAPIST

## 2022-10-25 PROCEDURE — 97110 THERAPEUTIC EXERCISES: CPT | Performed by: OCCUPATIONAL THERAPIST

## 2022-10-25 PROCEDURE — 25010000002 HYDROMORPHONE PER 4 MG: Performed by: NURSE PRACTITIONER

## 2022-10-25 PROCEDURE — C1769 GUIDE WIRE: HCPCS | Performed by: INTERNAL MEDICINE

## 2022-10-25 RX ORDER — LIDOCAINE HYDROCHLORIDE 10 MG/ML
INJECTION, SOLUTION EPIDURAL; INFILTRATION; INTRACAUDAL; PERINEURAL
Status: DISCONTINUED | OUTPATIENT
Start: 2022-10-25 | End: 2022-10-25 | Stop reason: HOSPADM

## 2022-10-25 RX ORDER — FENTANYL CITRATE 50 UG/ML
INJECTION, SOLUTION INTRAMUSCULAR; INTRAVENOUS
Status: DISCONTINUED | OUTPATIENT
Start: 2022-10-25 | End: 2022-10-25 | Stop reason: HOSPADM

## 2022-10-25 RX ORDER — ACETAMINOPHEN 325 MG/1
650 TABLET ORAL EVERY 4 HOURS PRN
Status: DISCONTINUED | OUTPATIENT
Start: 2022-10-25 | End: 2022-10-27 | Stop reason: HOSPADM

## 2022-10-25 RX ORDER — VALSARTAN 160 MG/1
80 TABLET ORAL
Status: DISCONTINUED | OUTPATIENT
Start: 2022-10-26 | End: 2022-10-27 | Stop reason: HOSPADM

## 2022-10-25 RX ORDER — MIDAZOLAM HYDROCHLORIDE 1 MG/ML
INJECTION INTRAMUSCULAR; INTRAVENOUS
Status: DISCONTINUED | OUTPATIENT
Start: 2022-10-25 | End: 2022-10-25 | Stop reason: HOSPADM

## 2022-10-25 RX ORDER — ACETAMINOPHEN 325 MG/1
650 TABLET ORAL EVERY 4 HOURS PRN
Status: CANCELLED | OUTPATIENT
Start: 2022-10-25

## 2022-10-25 RX ORDER — SODIUM CHLORIDE 9 MG/ML
75 INJECTION, SOLUTION INTRAVENOUS CONTINUOUS
Status: DISCONTINUED | OUTPATIENT
Start: 2022-10-25 | End: 2022-10-27

## 2022-10-25 RX ORDER — METOPROLOL SUCCINATE 25 MG/1
25 TABLET, EXTENDED RELEASE ORAL
Status: DISCONTINUED | OUTPATIENT
Start: 2022-10-26 | End: 2022-10-26

## 2022-10-25 RX ORDER — HYDROMORPHONE HYDROCHLORIDE 1 MG/ML
0.5 INJECTION, SOLUTION INTRAMUSCULAR; INTRAVENOUS; SUBCUTANEOUS
Status: DISCONTINUED | OUTPATIENT
Start: 2022-10-25 | End: 2022-10-27 | Stop reason: HOSPADM

## 2022-10-25 RX ORDER — FUROSEMIDE 10 MG/ML
40 INJECTION INTRAMUSCULAR; INTRAVENOUS
Status: DISCONTINUED | OUTPATIENT
Start: 2022-10-26 | End: 2022-10-26

## 2022-10-25 RX ADMIN — SENNOSIDES AND DOCUSATE SODIUM 2 TABLET: 50; 8.6 TABLET ORAL at 08:14

## 2022-10-25 RX ADMIN — INSULIN DETEMIR 25 UNITS: 100 INJECTION, SOLUTION SUBCUTANEOUS at 08:15

## 2022-10-25 RX ADMIN — ROSUVASTATIN CALCIUM 20 MG: 20 TABLET, FILM COATED ORAL at 20:28

## 2022-10-25 RX ADMIN — METOPROLOL TARTRATE 25 MG: 25 TABLET, FILM COATED ORAL at 09:36

## 2022-10-25 RX ADMIN — FAMOTIDINE 20 MG: 20 TABLET ORAL at 18:15

## 2022-10-25 RX ADMIN — GABAPENTIN 200 MG: 100 CAPSULE ORAL at 08:14

## 2022-10-25 RX ADMIN — HYDROMORPHONE HYDROCHLORIDE 0.5 MG: 1 INJECTION, SOLUTION INTRAMUSCULAR; INTRAVENOUS; SUBCUTANEOUS at 10:03

## 2022-10-25 RX ADMIN — SENNOSIDES AND DOCUSATE SODIUM 2 TABLET: 50; 8.6 TABLET ORAL at 20:28

## 2022-10-25 RX ADMIN — SODIUM CHLORIDE 75 ML/HR: 9 INJECTION, SOLUTION INTRAVENOUS at 12:55

## 2022-10-25 RX ADMIN — FAMOTIDINE 20 MG: 20 TABLET ORAL at 08:14

## 2022-10-25 RX ADMIN — GABAPENTIN 200 MG: 100 CAPSULE ORAL at 20:28

## 2022-10-25 RX ADMIN — HEPARIN SODIUM 5000 UNITS: 5000 INJECTION INTRAVENOUS; SUBCUTANEOUS at 20:28

## 2022-10-25 RX ADMIN — HYDROMORPHONE HYDROCHLORIDE 0.5 MG: 1 INJECTION, SOLUTION INTRAMUSCULAR; INTRAVENOUS; SUBCUTANEOUS at 05:34

## 2022-10-25 RX ADMIN — LEVOTHYROXINE SODIUM 125 MCG: 125 TABLET ORAL at 05:34

## 2022-10-25 RX ADMIN — HYDROMORPHONE HYDROCHLORIDE 0.5 MG: 1 INJECTION, SOLUTION INTRAMUSCULAR; INTRAVENOUS; SUBCUTANEOUS at 13:03

## 2022-10-25 RX ADMIN — HYDROCODONE BITARTRATE AND ACETAMINOPHEN 1 TABLET: 5; 325 TABLET ORAL at 04:49

## 2022-10-25 RX ADMIN — GABAPENTIN 200 MG: 100 CAPSULE ORAL at 18:15

## 2022-10-25 RX ADMIN — HYDROCODONE BITARTRATE AND ACETAMINOPHEN 1 TABLET: 5; 325 TABLET ORAL at 20:28

## 2022-10-25 RX ADMIN — INSULIN LISPRO 3 UNITS: 100 INJECTION, SOLUTION INTRAVENOUS; SUBCUTANEOUS at 20:28

## 2022-10-26 LAB
CHOLEST SERPL-MCNC: 99 MG/DL (ref 0–200)
GLUCOSE BLDC GLUCOMTR-MCNC: 106 MG/DL (ref 70–130)
GLUCOSE BLDC GLUCOMTR-MCNC: 120 MG/DL (ref 70–130)
GLUCOSE BLDC GLUCOMTR-MCNC: 236 MG/DL (ref 70–130)
GLUCOSE BLDC GLUCOMTR-MCNC: 253 MG/DL (ref 70–130)
GLUCOSE BLDC GLUCOMTR-MCNC: 57 MG/DL (ref 70–130)
GLUCOSE BLDC GLUCOMTR-MCNC: 59 MG/DL (ref 70–130)
HBA1C MFR BLD: 7.6 % (ref 4.8–5.6)
HDLC SERPL-MCNC: 29 MG/DL (ref 40–60)
LDLC SERPL CALC-MCNC: 49 MG/DL (ref 0–100)
LDLC/HDLC SERPL: 1.66 {RATIO}
NT-PROBNP SERPL-MCNC: 964.4 PG/ML (ref 0–900)
TRIGL SERPL-MCNC: 110 MG/DL (ref 0–150)
VLDLC SERPL-MCNC: 21 MG/DL (ref 5–40)

## 2022-10-26 PROCEDURE — 97116 GAIT TRAINING THERAPY: CPT

## 2022-10-26 PROCEDURE — 25010000002 FUROSEMIDE PER 20 MG: Performed by: INTERNAL MEDICINE

## 2022-10-26 PROCEDURE — 25010000002 HEPARIN (PORCINE) PER 1000 UNITS: Performed by: INTERNAL MEDICINE

## 2022-10-26 PROCEDURE — 99232 SBSQ HOSP IP/OBS MODERATE 35: CPT | Performed by: PEDIATRICS

## 2022-10-26 PROCEDURE — 25010000002 HYDROMORPHONE PER 4 MG: Performed by: INTERNAL MEDICINE

## 2022-10-26 PROCEDURE — 99232 SBSQ HOSP IP/OBS MODERATE 35: CPT | Performed by: HOSPITALIST

## 2022-10-26 PROCEDURE — 83036 HEMOGLOBIN GLYCOSYLATED A1C: CPT | Performed by: INTERNAL MEDICINE

## 2022-10-26 PROCEDURE — 63710000001 INSULIN DETEMIR PER 5 UNITS: Performed by: NURSE PRACTITIONER

## 2022-10-26 PROCEDURE — 63710000001 INSULIN LISPRO (HUMAN) PER 5 UNITS: Performed by: INTERNAL MEDICINE

## 2022-10-26 PROCEDURE — 80061 LIPID PANEL: CPT | Performed by: INTERNAL MEDICINE

## 2022-10-26 PROCEDURE — 97530 THERAPEUTIC ACTIVITIES: CPT

## 2022-10-26 PROCEDURE — 82962 GLUCOSE BLOOD TEST: CPT

## 2022-10-26 PROCEDURE — 83880 ASSAY OF NATRIURETIC PEPTIDE: CPT | Performed by: INTERNAL MEDICINE

## 2022-10-26 PROCEDURE — 97110 THERAPEUTIC EXERCISES: CPT | Performed by: OCCUPATIONAL THERAPIST

## 2022-10-26 RX ORDER — METOPROLOL SUCCINATE 50 MG/1
50 TABLET, EXTENDED RELEASE ORAL
Status: DISCONTINUED | OUTPATIENT
Start: 2022-10-27 | End: 2022-10-27 | Stop reason: HOSPADM

## 2022-10-26 RX ORDER — FUROSEMIDE 10 MG/ML
40 INJECTION INTRAMUSCULAR; INTRAVENOUS ONCE
Status: COMPLETED | OUTPATIENT
Start: 2022-10-26 | End: 2022-10-26

## 2022-10-26 RX ORDER — METOPROLOL SUCCINATE 25 MG/1
25 TABLET, EXTENDED RELEASE ORAL ONCE
Status: COMPLETED | OUTPATIENT
Start: 2022-10-26 | End: 2022-10-26

## 2022-10-26 RX ADMIN — FUROSEMIDE 40 MG: 10 INJECTION, SOLUTION INTRAMUSCULAR; INTRAVENOUS at 09:47

## 2022-10-26 RX ADMIN — Medication 15 G: at 20:38

## 2022-10-26 RX ADMIN — INSULIN LISPRO 10 UNITS: 100 INJECTION, SOLUTION INTRAVENOUS; SUBCUTANEOUS at 17:04

## 2022-10-26 RX ADMIN — METOPROLOL SUCCINATE 25 MG: 25 TABLET, FILM COATED, EXTENDED RELEASE ORAL at 09:47

## 2022-10-26 RX ADMIN — HEPARIN SODIUM 5000 UNITS: 5000 INJECTION INTRAVENOUS; SUBCUTANEOUS at 22:04

## 2022-10-26 RX ADMIN — GABAPENTIN 200 MG: 100 CAPSULE ORAL at 17:04

## 2022-10-26 RX ADMIN — GABAPENTIN 200 MG: 100 CAPSULE ORAL at 08:10

## 2022-10-26 RX ADMIN — GABAPENTIN 200 MG: 100 CAPSULE ORAL at 22:03

## 2022-10-26 RX ADMIN — HEPARIN SODIUM 5000 UNITS: 5000 INJECTION INTRAVENOUS; SUBCUTANEOUS at 14:58

## 2022-10-26 RX ADMIN — HYDROMORPHONE HYDROCHLORIDE 0.5 MG: 1 INJECTION, SOLUTION INTRAMUSCULAR; INTRAVENOUS; SUBCUTANEOUS at 18:24

## 2022-10-26 RX ADMIN — EMPAGLIFLOZIN 10 MG: 10 TABLET, FILM COATED ORAL at 08:10

## 2022-10-26 RX ADMIN — VALSARTAN 80 MG: 160 TABLET, FILM COATED ORAL at 08:10

## 2022-10-26 RX ADMIN — FAMOTIDINE 20 MG: 20 TABLET ORAL at 08:11

## 2022-10-26 RX ADMIN — FUROSEMIDE 40 MG: 10 INJECTION, SOLUTION INTRAMUSCULAR; INTRAVENOUS at 08:10

## 2022-10-26 RX ADMIN — FAMOTIDINE 20 MG: 20 TABLET ORAL at 17:04

## 2022-10-26 RX ADMIN — LEVOTHYROXINE SODIUM 125 MCG: 125 TABLET ORAL at 05:44

## 2022-10-26 RX ADMIN — ROSUVASTATIN CALCIUM 20 MG: 20 TABLET, FILM COATED ORAL at 22:04

## 2022-10-26 RX ADMIN — HYDROCODONE BITARTRATE AND ACETAMINOPHEN 1 TABLET: 5; 325 TABLET ORAL at 12:36

## 2022-10-26 RX ADMIN — INSULIN LISPRO 8 UNITS: 100 INJECTION, SOLUTION INTRAVENOUS; SUBCUTANEOUS at 08:10

## 2022-10-26 RX ADMIN — HEPARIN SODIUM 5000 UNITS: 5000 INJECTION INTRAVENOUS; SUBCUTANEOUS at 05:44

## 2022-10-26 RX ADMIN — INSULIN LISPRO 5 UNITS: 100 INJECTION, SOLUTION INTRAVENOUS; SUBCUTANEOUS at 12:26

## 2022-10-26 RX ADMIN — HYDROMORPHONE HYDROCHLORIDE 0.5 MG: 1 INJECTION, SOLUTION INTRAMUSCULAR; INTRAVENOUS; SUBCUTANEOUS at 08:21

## 2022-10-26 RX ADMIN — SENNOSIDES AND DOCUSATE SODIUM 2 TABLET: 50; 8.6 TABLET ORAL at 08:11

## 2022-10-26 RX ADMIN — INSULIN LISPRO 10 UNITS: 100 INJECTION, SOLUTION INTRAVENOUS; SUBCUTANEOUS at 08:09

## 2022-10-26 RX ADMIN — INSULIN LISPRO 10 UNITS: 100 INJECTION, SOLUTION INTRAVENOUS; SUBCUTANEOUS at 12:27

## 2022-10-26 RX ADMIN — INSULIN DETEMIR 25 UNITS: 100 INJECTION, SOLUTION SUBCUTANEOUS at 08:09

## 2022-10-26 RX ADMIN — ASPIRIN 81 MG 81 MG: 81 TABLET ORAL at 08:11

## 2022-10-26 RX ADMIN — HYDROCODONE BITARTRATE AND ACETAMINOPHEN 1 TABLET: 5; 325 TABLET ORAL at 03:34

## 2022-10-26 RX ADMIN — METOPROLOL SUCCINATE 25 MG: 25 TABLET, EXTENDED RELEASE ORAL at 08:11

## 2022-10-27 ENCOUNTER — READMISSION MANAGEMENT (OUTPATIENT)
Dept: CALL CENTER | Facility: HOSPITAL | Age: 62
End: 2022-10-27

## 2022-10-27 VITALS
RESPIRATION RATE: 18 BRPM | HEIGHT: 68 IN | BODY MASS INDEX: 29.86 KG/M2 | WEIGHT: 197 LBS | TEMPERATURE: 97.9 F | DIASTOLIC BLOOD PRESSURE: 81 MMHG | OXYGEN SATURATION: 97 % | HEART RATE: 68 BPM | SYSTOLIC BLOOD PRESSURE: 131 MMHG

## 2022-10-27 LAB
ANION GAP SERPL CALCULATED.3IONS-SCNC: 12 MMOL/L (ref 5–15)
BASOPHILS # BLD AUTO: 0.03 10*3/MM3 (ref 0–0.2)
BASOPHILS NFR BLD AUTO: 0.2 % (ref 0–1.5)
BUN SERPL-MCNC: 13 MG/DL (ref 8–23)
BUN/CREAT SERPL: 19.7 (ref 7–25)
CALCIUM SPEC-SCNC: 8.7 MG/DL (ref 8.6–10.5)
CHLORIDE SERPL-SCNC: 100 MMOL/L (ref 98–107)
CO2 SERPL-SCNC: 24 MMOL/L (ref 22–29)
CREAT SERPL-MCNC: 0.66 MG/DL (ref 0.76–1.27)
DEPRECATED RDW RBC AUTO: 45.5 FL (ref 37–54)
EGFRCR SERPLBLD CKD-EPI 2021: 106 ML/MIN/1.73
EOSINOPHIL # BLD AUTO: 0.48 10*3/MM3 (ref 0–0.4)
EOSINOPHIL NFR BLD AUTO: 4 % (ref 0.3–6.2)
ERYTHROCYTE [DISTWIDTH] IN BLOOD BY AUTOMATED COUNT: 13.3 % (ref 12.3–15.4)
GLUCOSE BLDC GLUCOMTR-MCNC: 109 MG/DL (ref 70–130)
GLUCOSE BLDC GLUCOMTR-MCNC: 185 MG/DL (ref 70–130)
GLUCOSE BLDC GLUCOMTR-MCNC: 209 MG/DL (ref 70–130)
GLUCOSE BLDC GLUCOMTR-MCNC: 314 MG/DL (ref 70–130)
GLUCOSE SERPL-MCNC: 187 MG/DL (ref 65–99)
HCT VFR BLD AUTO: 35.7 % (ref 37.5–51)
HGB BLD-MCNC: 12.2 G/DL (ref 13–17.7)
IMM GRANULOCYTES # BLD AUTO: 0.06 10*3/MM3 (ref 0–0.05)
IMM GRANULOCYTES NFR BLD AUTO: 0.5 % (ref 0–0.5)
LYMPHOCYTES # BLD AUTO: 1.71 10*3/MM3 (ref 0.7–3.1)
LYMPHOCYTES NFR BLD AUTO: 14.1 % (ref 19.6–45.3)
MAGNESIUM SERPL-MCNC: 2 MG/DL (ref 1.6–2.4)
MCH RBC QN AUTO: 32 PG (ref 26.6–33)
MCHC RBC AUTO-ENTMCNC: 34.2 G/DL (ref 31.5–35.7)
MCV RBC AUTO: 93.7 FL (ref 79–97)
MONOCYTES # BLD AUTO: 1.07 10*3/MM3 (ref 0.1–0.9)
MONOCYTES NFR BLD AUTO: 8.9 % (ref 5–12)
NEUTROPHILS NFR BLD AUTO: 72.3 % (ref 42.7–76)
NEUTROPHILS NFR BLD AUTO: 8.74 10*3/MM3 (ref 1.7–7)
NRBC BLD AUTO-RTO: 0 /100 WBC (ref 0–0.2)
PHOSPHATE SERPL-MCNC: 3.6 MG/DL (ref 2.5–4.5)
PLATELET # BLD AUTO: 292 10*3/MM3 (ref 140–450)
PMV BLD AUTO: 10.5 FL (ref 6–12)
POTASSIUM SERPL-SCNC: 4.9 MMOL/L (ref 3.5–5.2)
RBC # BLD AUTO: 3.81 10*6/MM3 (ref 4.14–5.8)
SODIUM SERPL-SCNC: 136 MMOL/L (ref 136–145)
WBC NRBC COR # BLD: 12.09 10*3/MM3 (ref 3.4–10.8)

## 2022-10-27 PROCEDURE — 99239 HOSP IP/OBS DSCHRG MGMT >30: CPT | Performed by: NURSE PRACTITIONER

## 2022-10-27 PROCEDURE — 83735 ASSAY OF MAGNESIUM: CPT | Performed by: PEDIATRICS

## 2022-10-27 PROCEDURE — 63710000001 INSULIN LISPRO (HUMAN) PER 5 UNITS: Performed by: INTERNAL MEDICINE

## 2022-10-27 PROCEDURE — 97116 GAIT TRAINING THERAPY: CPT

## 2022-10-27 PROCEDURE — 97110 THERAPEUTIC EXERCISES: CPT

## 2022-10-27 PROCEDURE — 85025 COMPLETE CBC W/AUTO DIFF WBC: CPT | Performed by: PEDIATRICS

## 2022-10-27 PROCEDURE — 80048 BASIC METABOLIC PNL TOTAL CA: CPT | Performed by: PEDIATRICS

## 2022-10-27 PROCEDURE — 63710000001 INSULIN DETEMIR PER 5 UNITS: Performed by: NURSE PRACTITIONER

## 2022-10-27 PROCEDURE — 97535 SELF CARE MNGMENT TRAINING: CPT

## 2022-10-27 PROCEDURE — 82962 GLUCOSE BLOOD TEST: CPT

## 2022-10-27 PROCEDURE — 99232 SBSQ HOSP IP/OBS MODERATE 35: CPT | Performed by: INTERNAL MEDICINE

## 2022-10-27 PROCEDURE — 25010000002 HEPARIN (PORCINE) PER 1000 UNITS: Performed by: INTERNAL MEDICINE

## 2022-10-27 PROCEDURE — 84100 ASSAY OF PHOSPHORUS: CPT | Performed by: PEDIATRICS

## 2022-10-27 RX ORDER — METOPROLOL SUCCINATE 50 MG/1
50 TABLET, EXTENDED RELEASE ORAL
Qty: 60 TABLET | Refills: 1 | Status: SHIPPED | OUTPATIENT
Start: 2022-10-28 | End: 2022-10-27 | Stop reason: SDUPTHER

## 2022-10-27 RX ORDER — HYDROCHLOROTHIAZIDE 12.5 MG/1
12.5 CAPSULE, GELATIN COATED ORAL DAILY
Qty: 30 CAPSULE | Refills: 5 | Status: SHIPPED | OUTPATIENT
Start: 2022-10-27 | End: 2023-03-06

## 2022-10-27 RX ORDER — VALSARTAN 80 MG/1
80 TABLET ORAL
Qty: 30 TABLET | Refills: 5 | Status: SHIPPED | OUTPATIENT
Start: 2022-10-28 | End: 2023-03-03

## 2022-10-27 RX ORDER — VALSARTAN 80 MG/1
80 TABLET ORAL
Qty: 30 TABLET | Refills: 1 | Status: SHIPPED | OUTPATIENT
Start: 2022-10-28 | End: 2022-10-27 | Stop reason: SDUPTHER

## 2022-10-27 RX ORDER — NICOTINE 21 MG/24HR
1 PATCH, TRANSDERMAL 24 HOURS TRANSDERMAL EVERY 24 HOURS
Qty: 30 PATCH | Refills: 1 | Status: SHIPPED | OUTPATIENT
Start: 2022-10-27

## 2022-10-27 RX ORDER — FAMOTIDINE 20 MG/1
20 TABLET, FILM COATED ORAL
Qty: 30 TABLET | Refills: 1 | Status: SHIPPED | OUTPATIENT
Start: 2022-10-27

## 2022-10-27 RX ORDER — METOPROLOL SUCCINATE 50 MG/1
50 TABLET, EXTENDED RELEASE ORAL
Qty: 60 TABLET | Refills: 11 | Status: SHIPPED | OUTPATIENT
Start: 2022-10-28

## 2022-10-27 RX ORDER — NICOTINE 21 MG/24HR
1 PATCH, TRANSDERMAL 24 HOURS TRANSDERMAL EVERY 24 HOURS
Status: DISCONTINUED | OUTPATIENT
Start: 2022-10-27 | End: 2022-10-27 | Stop reason: HOSPADM

## 2022-10-27 RX ORDER — ROSUVASTATIN CALCIUM 20 MG/1
20 TABLET, COATED ORAL NIGHTLY
Qty: 90 TABLET | Refills: 3 | Status: SHIPPED | OUTPATIENT
Start: 2022-10-27

## 2022-10-27 RX ORDER — ROSUVASTATIN CALCIUM 20 MG/1
20 TABLET, COATED ORAL NIGHTLY
Qty: 90 TABLET | Refills: 1 | Status: SHIPPED | OUTPATIENT
Start: 2022-10-27 | End: 2022-10-27 | Stop reason: SDUPTHER

## 2022-10-27 RX ADMIN — INSULIN LISPRO 10 UNITS: 100 INJECTION, SOLUTION INTRAVENOUS; SUBCUTANEOUS at 12:02

## 2022-10-27 RX ADMIN — HEPARIN SODIUM 5000 UNITS: 5000 INJECTION INTRAVENOUS; SUBCUTANEOUS at 06:10

## 2022-10-27 RX ADMIN — INSULIN DETEMIR 25 UNITS: 100 INJECTION, SOLUTION SUBCUTANEOUS at 09:07

## 2022-10-27 RX ADMIN — INSULIN LISPRO 10 UNITS: 100 INJECTION, SOLUTION INTRAVENOUS; SUBCUTANEOUS at 09:11

## 2022-10-27 RX ADMIN — EMPAGLIFLOZIN 10 MG: 10 TABLET, FILM COATED ORAL at 09:10

## 2022-10-27 RX ADMIN — GABAPENTIN 200 MG: 100 CAPSULE ORAL at 16:37

## 2022-10-27 RX ADMIN — LEVOTHYROXINE SODIUM 125 MCG: 125 TABLET ORAL at 06:10

## 2022-10-27 RX ADMIN — ASPIRIN 81 MG 81 MG: 81 TABLET ORAL at 09:10

## 2022-10-27 RX ADMIN — GABAPENTIN 200 MG: 100 CAPSULE ORAL at 09:10

## 2022-10-27 RX ADMIN — FAMOTIDINE 20 MG: 20 TABLET ORAL at 06:10

## 2022-10-27 RX ADMIN — VALSARTAN 80 MG: 160 TABLET, FILM COATED ORAL at 09:09

## 2022-10-27 RX ADMIN — METOPROLOL SUCCINATE 50 MG: 50 TABLET, EXTENDED RELEASE ORAL at 09:10

## 2022-10-27 RX ADMIN — Medication 1 PATCH: at 12:08

## 2022-10-27 RX ADMIN — INSULIN LISPRO 4 UNITS: 100 INJECTION, SOLUTION INTRAVENOUS; SUBCUTANEOUS at 09:11

## 2022-10-28 NOTE — OUTREACH NOTE
Prep Survey    Flowsheet Row Responses   [facility] patient discharged from? [facility]   Is LACE score < 7 ? No   Emergency Room discharge w/ pulse ox? No   Eligibility Readm Mgmt   Discharge diagnosis CARDIAC CATHETERIZATION  elective left rotator cuff repair w/bicep tenodesis    Does the patient have one of the following disease processes/diagnoses(primary or secondary)? General Surgery   Does the patient have Home health ordered? No   Is there a DME ordered? No   Prep survey completed? Yes          RADHA YATES - Registered Nurse

## 2022-10-31 ENCOUNTER — READMISSION MANAGEMENT (OUTPATIENT)
Dept: CALL CENTER | Facility: HOSPITAL | Age: 62
End: 2022-10-31

## 2022-10-31 NOTE — OUTREACH NOTE
General Surgery Week 1 Survey    Flowsheet Row Responses   [facility] patient discharged from? [facility]   Does the patient have one of the following disease processes/diagnoses(primary or secondary)? General Surgery   Week 1 attempt successful? Yes   Call start time 1239   Call end time 1249   Discharge diagnosis CARDIAC CATHETERIZATION  elective left rotator cuff repair w/bicep tenodesis    Meds reviewed with patient/caregiver? Yes   Is the patient having any side effects they believe may be caused by any medication additions or changes? No   Does the patient have all medications related to this admission filled (includes all antibiotics, pain medications, etc.) Yes   Is the patient taking all medications as directed (includes completed medication regime)? Yes   Does the patient have a follow up appointment scheduled with their surgeon? Yes   Has the patient kept scheduled appointments due by today? N/A   Comments Multiple appts on AVS   Has home health visited the patient within 72 hours of discharge? N/A   Comments Patient with Lifevest---compliant with use   Did the patient receive a copy of their discharge instructions? Yes   Nursing interventions Reviewed instructions with patient   What is the patient's perception of their health status since discharge? Improving  [Reports he has some right chest pain from CPR, using IS as ordered.  Cath site w/o issue,life vest in use]   Nursing interventions Nurse provided patient education  [Routine post op care reviewed as well as cardiac s/s--]   Is the patient /caregiver able to teach back basic post-op care? Continue use of incentive spirometry at least 1 week post discharge, Lifting as instructed by MD in discharge instructions, Keep incision areas clean,dry and protected, No tub bath, swimming, or hot tub until instructed by MD, Practice 'cough and deep breath'   Is the patient/caregiver able to teach back signs and symptoms of incisional infection? Increased  redness, swelling or pain at the incisonal site, Increased drainage or bleeding, Incisional warmth, Pus or odor from incision, Fever  [dressing intact--reviewed AVS instructions with him and s/s infection, compliant w/ sling]   Is the patient/caregiver able to teach back steps to recovery at home? Rest and rebuild strength, gradually increase activity, Eat a well-balance diet   If the patient is a current smoker, are they able to teach back resources for cessation? Not a smoker   Is the patient/caregiver able to teach back the hierarchy of who to call/visit for symptoms/problems? PCP, Specialist, Home health nurse, Urgent Care, ED, 911 Yes   Week 1 call completed? Yes   Wrap up additional comments No questions at time of call          ANAHI العراقي - Registered Nurse

## 2022-11-02 NOTE — PROGRESS NOTES
"[facility]  [facility]    Chief Complaint  Coronary Artery Disease, Establish Care, and V-fib    Subjective    History of Present Illness {CC  Problem List  Visit  Diagnosis   Encounters  Notes  Medications  Labs  Result Review Imaging  Media :23}     Laron Pandey is a 62 y.o. male with CAD status post prior CABG and stents, hypertension, hyperlipidemia, PAD status post balloon angioplasty, carotid artery stenosis,diabetes, GERD, hypothyroidism, tobacco abuse who presents today as a hospital referral for V. fib arrest.    Patient was admitted on 10/8 for elective left rotator cuff repair and on arrival to PACU he did not have a pulse.  EKG showed V. fib.  He was defibrillated x1 and given 1 amp of epinephrine with ROSC.Left heart catheterization showed stable findings (patent LIMA graft, patent stents to the circumflex, stable collaterals to an occluded RCA and known occluded vein grafts).  Severely elevated LVEDP of 35 mmHg.  Echo showed normal LVEF with grade 2 diastolic dysfunction with high left atrial pressures, systolic and diastolic flattening of the interventricular septum consistent with right ventricular pressure and volume overload, severely elevated RVSP.  He was started on Jardiance.  Lopressor was changed to Toprol. He was placed in a LifeVest.     He reports that he is doing very well.  He reports his dizziness and lightheadedness has resolved.  Denies any shortness of breath, lower extremity edema, weight gain, chest pain.  He has been compliant with his LifeVest without any firing      Objective     Vital Signs:   Vitals:    11/03/22 1316   BP: 143/71   BP Location: Right arm   Patient Position: Sitting   Cuff Size: Adult   Resp: 16   Temp: 96.5 °F (35.8 °C)   TempSrc: Temporal   SpO2: 96%   Weight: 89.3 kg (196 lb 12.8 oz)   Height: 172.7 cm (68\")     Body mass index is 29.92 kg/m².  Physical Exam  Vitals reviewed.   Constitutional:       Appearance: Normal " appearance.   HENT:      Head: Normocephalic.   Neck:      Vascular: No carotid bruit.   Cardiovascular:      Rate and Rhythm: Normal rate and regular rhythm.      Pulses: Normal pulses.      Heart sounds: Normal heart sounds, S1 normal and S2 normal. No murmur heard.  Pulmonary:      Effort: Pulmonary effort is normal. No respiratory distress.      Breath sounds: Normal breath sounds.   Chest:      Chest wall: No tenderness.   Abdominal:      General: Abdomen is flat.      Palpations: Abdomen is soft.   Musculoskeletal:      Cervical back: Neck supple.      Right lower leg: No edema.      Left lower leg: No edema.   Skin:     General: Skin is warm and dry.   Neurological:      General: No focal deficit present.      Mental Status: He is alert and oriented to person, place, and time. Mental status is at baseline.   Psychiatric:         Mood and Affect: Mood normal.         Behavior: Behavior normal.         Thought Content: Thought content normal.              Result Review  Data Reviewed:{ Labs  Result Review  Imaging  Med Tab  Media :23}   Adult Transthoracic Echo Complete w/ Color, Spectral and Contrast if Necessary Per Protocol (10/20/2022 10:20)  Cardiac Catheterization/Vascular Study (10/25/2022 15:52)  ECG 12 Lead (10/21/2022 15:42)  POC Glucose Once (10/27/2022 16:23)  Magnesium (10/27/2022 06:25)  Phosphorus (10/27/2022 06:25)  Basic Metabolic Panel (10/27/2022 06:25)  Reviewed hospital and cardiology notes           Assessment and Plan {CC Problem List  Visit Diagnosis  ROS  Review (Popup)  Health Maintenance  Quality  BestPractice  Medications  SmartSets  SnapShot Encounters  Media :23}   1. Acute heart failure with preserved ejection fraction (HFpEF) ([facility])  Appears euvolemic.  Check labs today.  Continue Jardiance, valsartan, hydrochlorothiazide and Toprol XL  Discussed signs and symptoms of heart failure and reasons to call  Occurs low-sodium diet  Continue LifeVest  - Basic Metabolic  Panel; Future  - Magnesium; Future  - proBNP; Future    2. Coronary artery disease involving coronary bypass graft of native heart without angina pectoris  Stable without angina.  Left heart catheterization with no significant changes  Continue aspirin and statin    3. Primary hypertension  Stable  Encouraged him to monitor at home and call if SBP consistently >140    Keep scheduled follow up with Dr. Block    Follow Up {Instructions Charge Capture  Follow-up Communications :23}   No follow-ups on file.    Patient was given instructions and counseling regarding his condition or for health maintenance advice. Please see specific information pulled into the AVS if appropriate.  Advised to call the Heart and Valve Center with any questions, concerns, or worsening symptoms.

## 2022-11-03 ENCOUNTER — OFFICE VISIT (OUTPATIENT)
Dept: CARDIOLOGY | Facility: HOSPITAL | Age: 62
End: 2022-11-03

## 2022-11-03 VITALS
RESPIRATION RATE: 16 BRPM | HEIGHT: 68 IN | WEIGHT: 196.8 LBS | TEMPERATURE: 96.5 F | BODY MASS INDEX: 29.83 KG/M2 | OXYGEN SATURATION: 96 % | SYSTOLIC BLOOD PRESSURE: 143 MMHG | DIASTOLIC BLOOD PRESSURE: 71 MMHG

## 2022-11-03 DIAGNOSIS — I25.810 CORONARY ARTERY DISEASE INVOLVING CORONARY BYPASS GRAFT OF NATIVE HEART WITHOUT ANGINA PECTORIS: ICD-10-CM

## 2022-11-03 DIAGNOSIS — I10 PRIMARY HYPERTENSION: ICD-10-CM

## 2022-11-03 DIAGNOSIS — I50.31 ACUTE HEART FAILURE WITH PRESERVED EJECTION FRACTION (HFPEF): Primary | ICD-10-CM

## 2022-11-03 PROCEDURE — 99214 OFFICE O/P EST MOD 30 MIN: CPT | Performed by: NURSE PRACTITIONER

## 2022-11-15 ENCOUNTER — OFFICE VISIT (OUTPATIENT)
Dept: ENDOCRINOLOGY | Facility: CLINIC | Age: 62
End: 2022-11-15

## 2022-11-15 VITALS
DIASTOLIC BLOOD PRESSURE: 78 MMHG | HEART RATE: 68 BPM | SYSTOLIC BLOOD PRESSURE: 126 MMHG | HEIGHT: 68 IN | BODY MASS INDEX: 29.57 KG/M2 | OXYGEN SATURATION: 96 % | WEIGHT: 195.1 LBS

## 2022-11-15 DIAGNOSIS — E03.9 HYPOTHYROIDISM, ADULT: ICD-10-CM

## 2022-11-15 DIAGNOSIS — E11.65 UNCONTROLLED TYPE 2 DIABETES MELLITUS WITH HYPERGLYCEMIA, WITH LONG-TERM CURRENT USE OF INSULIN: Primary | ICD-10-CM

## 2022-11-15 DIAGNOSIS — Z79.4 UNCONTROLLED TYPE 2 DIABETES MELLITUS WITH HYPERGLYCEMIA, WITH LONG-TERM CURRENT USE OF INSULIN: Primary | ICD-10-CM

## 2022-11-15 LAB
EXPIRATION DATE: ABNORMAL
EXPIRATION DATE: NORMAL
GLUCOSE BLDC GLUCOMTR-MCNC: 136 MG/DL (ref 70–130)
HBA1C MFR BLD: 7.2 %
Lab: ABNORMAL
Lab: NORMAL

## 2022-11-15 PROCEDURE — 99214 OFFICE O/P EST MOD 30 MIN: CPT | Performed by: INTERNAL MEDICINE

## 2022-11-15 PROCEDURE — 82947 ASSAY GLUCOSE BLOOD QUANT: CPT | Performed by: INTERNAL MEDICINE

## 2022-11-15 PROCEDURE — 83036 HEMOGLOBIN GLYCOSYLATED A1C: CPT | Performed by: INTERNAL MEDICINE

## 2022-11-15 RX ORDER — OXYCODONE HYDROCHLORIDE 5 MG/1
5 TABLET ORAL EVERY 4 HOURS PRN
COMMUNITY
Start: 2022-10-31 | End: 2023-01-18

## 2022-11-15 RX ORDER — INSULIN ASPART 100 [IU]/ML
50 INJECTION, SUSPENSION SUBCUTANEOUS
Qty: 45 ML | Refills: 7 | Status: SHIPPED | OUTPATIENT
Start: 2022-11-15

## 2022-11-15 NOTE — PROGRESS NOTES
Subjective:     Chief Complaint   Patient presents with   • Diabetes   • Hypothyroidism     Follow Up      Laron Pandey is a 62 y.o. male who is is being seen for follow-up for management of  Type 2 diabetes mellitus.  The initial diagnosis of diabetes was made in 2003. He was initially controlled with metformin and started insulin in 2010.   Diabetic complications: cardiovascular disease s/p CABG. He has neuropathy sx with tingling and pain in the legs bilaterally.       Current diabetic medications include novolog 70/30. TRulicity. Cardiologist started SGLT2 - jardiance  Metformin - he stopped this medication due to side effects of diarrhea. He only takes it occasionally.     Monitoring  -Freestyle Ang     Other med problems:CAD/CABG, HL, HTN    HL- intolerant to statins in the past. Restarted zetia and doing well with that.   Hypothyroidism - 225 mcg of levothyroxine  HTN -improved after changing to valsartan.   Back pain improved after epidural injection   His sx of neuropathy are worse. Back pain and neuropathy pain are double in intensity.   He had a stroke in June 2022.   Patient is s/p left shoulder surgery. He had cardiac arrest after the surgery and now recovering from it. He is wearing a life vest.       MEDICATIONS    Current Outpatient Medications:   •  acetaminophen (TYLENOL) 500 MG tablet, Take 500 mg by mouth Every 6 (Six) Hours As Needed for Mild Pain ., Disp: , Rfl:   •  aspirin 81 MG chewable tablet, Chew 1 tablet Daily., Disp: 21 tablet, Rfl: 0  •  BD ULTRA-FINE PEN NEEDLES 29G X 12.7MM misc, Use with Insulin 3 times daily, Disp: 300 each, Rfl: 2  •  Continuous Blood Gluc Sensor (FreeStyle Ang 2 Sensor) misc, 1 each Every 14 (Fourteen) Days., Disp: 2 each, Rfl: 6  •  docusate sodium (COLACE) 100 MG capsule, Take 1 capsule by mouth 2 (Two) Times a Day., Disp: 20 capsule, Rfl: 0  •  Dulaglutide (Trulicity) 1.5 MG/0.5ML solution pen-injector, Inject 1.5 mg under the skin into the appropriate  area as directed 1 (One) Time Per Week., Disp: 4 pen, Rfl: 6  •  empagliflozin (JARDIANCE) 10 MG tablet tablet, Take 1 tablet by mouth Daily., Disp: 30 tablet, Rfl: 5  •  famotidine (PEPCID) 20 MG tablet, Take 1 tablet by mouth 2 (Two) Times a Day Before Meals., Disp: 30 tablet, Rfl: 1  •  gabapentin (NEURONTIN) 100 MG capsule, Take 2 capsules by mouth 3 (Three) Times a Day., Disp: 180 capsule, Rfl: 3  •  glucose blood (OneTouch Verio) test strip, Use as instructed, Disp: 300 each, Rfl: 2  •  hydroCHLOROthiazide (MICROZIDE) 12.5 MG capsule, Take 1 capsule by mouth Daily., Disp: 30 capsule, Rfl: 5  •  HYDROcodone-acetaminophen (NORCO) 7.5-325 MG per tablet, Take 1-2 tablets by mouth Every 4 (Four) Hours As Needed for Moderate Pain., Disp: 24 tablet, Rfl: 0  •  Lancets (OneTouch Delica Plus [id number]) misc, 1 each 3 (Three) Times a Day., Disp: 300 each, Rfl: 2  •  levothyroxine (Synthroid) 125 MCG tablet, 2 tablets daily to equal 250mcg, Disp: 180 tablet, Rfl: 2  •  metFORMIN (GLUCOPHAGE) 1000 MG tablet, Take 1,000 mg by mouth Daily With Breakfast., Disp: , Rfl:   •  metoprolol succinate XL (TOPROL-XL) 50 MG 24 hr tablet, Take 1 tablet by mouth Daily., Disp: 60 tablet, Rfl: 11  •  nicotine (NICODERM CQ) 21 MG/24HR patch, Place 1 patch on the skin as directed by provider Daily., Disp: 30 patch, Rfl: 1  •  NovoLOG Mix 70/30 FlexPen (70-30) 100 UNIT/ML suspension pen-injector injection, Inject 0.5 mL under the skin into the appropriate area as directed 2 (Two) Times a Day Before Meals. INJECT 55 UNITS SUBCUTANEOUSLY IN THE MORNING AND 50 UNITS SUBCUTANEOUSLY AT BEDTIME (Patient taking differently: Inject 50 Units under the skin into the appropriate area as directed 2 (Two) Times a Day Before Meals. INJECT 50 UNITS SUBCUTANEOUSLY IN THE MORNING AND 45 UNITS SUBCUTANEOUSLY AT BEDTIME), Disp: 35 mL, Rfl: 11  •  ondansetron (Zofran) 4 MG tablet, Take 1 tablet by mouth Every 8 (Eight) Hours As Needed for Nausea or Vomiting.,  "Disp: 12 tablet, Rfl: 0  •  oxyCODONE (ROXICODONE) 5 MG immediate release tablet, Take 5 mg by mouth Every 4 (Four) Hours As Needed., Disp: , Rfl:   •  Rivaroxaban (XARELTO) 2.5 MG tablet, Take 1 tablet by mouth 2 (Two) Times a Day., Disp: 60 tablet, Rfl: 11  •  rosuvastatin (CRESTOR) 20 MG tablet, Take 1 tablet by mouth Every Night., Disp: 90 tablet, Rfl: 3  •  valsartan (DIOVAN) 80 MG tablet, Take 1 tablet by mouth Daily., Disp: 30 tablet, Rfl: 5    Review of Systems  Review of Systems   Constitutional: Positive for fatigue.   Eyes: Negative.    Endocrine:        As listed in HPI   Neurological: Positive for weakness and numbness (tingling).   All other systems reviewed and are negative.         Objective:      /78   Pulse 68   Ht 172.7 cm (68\")   Wt 88.5 kg (195 lb 1.6 oz)   SpO2 96%   BMI 29.66 kg/m² Body mass index is 29.66 kg/m².  Physical Exam   Constitutional: He is oriented to person, place, and time. He appears well-developed.   HENT:   Head: Normocephalic and atraumatic.   Eyes: Conjunctivae are normal.   Neck: No thyroid mass present.   The neck is supple and no assimetry visualized   Cardiovascular: Normal rate, regular rhythm, normal heart sounds and normal pulses.   Pulmonary/Chest: Effort normal and breath sounds normal.   Musculoskeletal:      Comments: Left shoulder has limited ROM   Neurological: He is alert and oriented to person, place, and time. He has normal reflexes.   Psychiatric: Thought content normal.   Vitals reviewed.          LABS AND IMAGING      Lab Results   Component Value Date    HGBA1C 7.2 11/15/2022    HGBA1C 7.60 (H) 10/26/2022    HGBA1C 8.9 08/22/2022     Office Visit on 11/15/2022   Component Date Value Ref Range Status   • Hemoglobin A1C 11/15/2022 7.2  % Final   • Lot Number 11/15/2022 [id number]   Final   • Expiration Date 11/15/2022 09/14/2024   Final   • Glucose 11/15/2022 136 (A)  70 - 130 mg/dL Final   • Lot Number 11/15/2022 [id number]   Final   • Expiration " Date 11/15/2022 06/03/2023   Final   No results displayed because visit has over 200 results.      Office Visit on 08/22/2022   Component Date Value Ref Range Status   • Hemoglobin A1C 08/22/2022 8.9  % Final   • Lot Number 08/22/2022 [id number]   Final   • Expiration Date 08/22/2022 04/03/2024   Final   • Glucose 08/22/2022 71  70 - 130 mg/dL Final   • Lot Number 08/22/2022 [id number]   Final   • Expiration Date 08/22/2022 02/26/2023   Final             Assessment:         Diagnoses and all orders for this visit:    Uncontrolled type 2 diabetes mellitus with hyperglycemia, with long-term current use of insulin (HCC)  -     POC Glycosylated Hemoglobin (Hb A1C)  -     POC Glucose, Blood    Hypothyroidism    Other orders  -     oxyCODONE (ROXICODONE) 5 MG immediate release tablet; Take 5 mg by mouth Every 4 (Four) Hours As Needed.        Plan:       Patient has poorly controlled diabetes. Dietary recommendations reviewed. Insulin titration instructions provided.   Freestyle Jude downloaded and analyzed.     Novolog 70/30 50 unit in the morning and 50 units with dinner.    He is not taking metformin. Trulicity weekly continued.   Cont Jardiance.     Hypoglycemia precautions reviewed.       -gabapentin for neuropathy. He had a lot if neuropathy sx.      -cont thyroid medications levothyroxine 250 mcg . Recent labs reviewed.        Follow up:  3 -4 months.

## 2022-12-05 ENCOUNTER — OFFICE VISIT (OUTPATIENT)
Dept: NEUROSURGERY | Facility: CLINIC | Age: 62
End: 2022-12-05

## 2022-12-05 ENCOUNTER — HOSPITAL ENCOUNTER (OUTPATIENT)
Dept: CARDIOLOGY | Facility: HOSPITAL | Age: 62
Discharge: HOME | End: 2022-12-05
Admitting: NEUROLOGICAL SURGERY

## 2022-12-05 VITALS — BODY MASS INDEX: 30.04 KG/M2 | WEIGHT: 198.2 LBS | HEIGHT: 68 IN | TEMPERATURE: 96.6 F

## 2022-12-05 VITALS — BODY MASS INDEX: 29.57 KG/M2 | WEIGHT: 195.11 LBS | HEIGHT: 68 IN

## 2022-12-05 DIAGNOSIS — I65.22 LEFT CAROTID STENOSIS: ICD-10-CM

## 2022-12-05 DIAGNOSIS — I73.9 PAD (PERIPHERAL ARTERY DISEASE): ICD-10-CM

## 2022-12-05 DIAGNOSIS — Z95.828 INTERNAL CAROTID ARTERY STENT PRESENT: Primary | ICD-10-CM

## 2022-12-05 PROCEDURE — 93880 EXTRACRANIAL BILAT STUDY: CPT | Performed by: INTERNAL MEDICINE

## 2022-12-05 PROCEDURE — 93880 EXTRACRANIAL BILAT STUDY: CPT

## 2022-12-05 PROCEDURE — 99214 OFFICE O/P EST MOD 30 MIN: CPT | Performed by: NEUROLOGICAL SURGERY

## 2022-12-05 NOTE — PROGRESS NOTES
"Subjective     Chief Complaint: Status post right internal carotid artery stent June 2022    Patient ID: Laron Pandey is a 62 y.o. male is here today for follow-up.    History of Present Illness    This is a 62-year-old man in whom I performed a right internal carotid artery stent in June.  He presents today to discuss the results of his 6-month surveillance carotid ultrasound.    The following portions of the patient's history were reviewed and updated as appropriate: allergies, current medications, past family history, past medical history, past social history, past surgical history and problem list.    Family history:   Family History   Problem Relation Age of Onset   • Diabetes Mother    • Heart disease Mother    • Diabetes Father    • Heart disease Father    • Stroke Father    • Heart attack Neg Hx    • Hypertension Neg Hx    • Thyroid disease Neg Hx        Social history:   Social History     Socioeconomic History   • Marital status:    Tobacco Use   • Smoking status: Every Day     Packs/day: 0.25     Types: Cigarettes   • Smokeless tobacco: Never   • Tobacco comments:     2 CIGS/DAY   Vaping Use   • Vaping Use: Never used   Substance and Sexual Activity   • Alcohol use: Yes     Alcohol/week: 12.0 standard drinks     Types: 12 Cans of beer per week     Comment: 12 BEERS/WEEK (3-4 AT A TIME)   • Drug use: Never   • Sexual activity: Not Currently     Partners: Female       Review of Systems    Objective   Temperature 96.6 °F (35.9 °C), height 172.7 cm (68\"), weight 89.9 kg (198 lb 3.2 oz).  Body mass index is 30.14 kg/m².    Physical Exam  Vitals reviewed.   Constitutional:       General: He is not in acute distress.     Appearance: He is well-developed. He is not diaphoretic.   HENT:      Head: Normocephalic and atraumatic.   Pulmonary:      Effort: Pulmonary effort is normal.   Skin:     General: Skin is warm and dry.   Neurological:      Mental Status: He is alert and oriented to person, place, and " time.   Psychiatric:         Behavior: Behavior normal.         Assessment & Plan     Independent Review of Radiographic Studies:      Available for my review is a carotid ultrasound which was performed earlier today.  A comparison study from 6/16/2022 is also available for my review.  Peak systolic velocities on the right side are 133 cm/s with a ratio of 1.3.  Peak systolic velocities in the left side are 336 cm/s with a ratio of 3.3.    Is left ICA is essentially unchanged in comparison to the study from 6/13/2022 which demonstrated peak systolic velocities of 350 cm/s with a ratio of 2.65 at that time.    Medical Decision Making:      He is still smoking.  He will need a modified surveillance schedule as long as he is continuing to abuse tobacco.  I once again reviewed the signs and symptoms of stroke with him.  I directed him to call 911 if he thinks he is having a stroke, otherwise I will follow-up with him in 6 months with a repeat carotid ultrasound, or sooner if clinically indicated.    It sounds like he had some sort of a cardiac event following a recent shoulder surgery.  It looks like he is wearing a Holter monitor.  I would assume at this point that he is high risk for general anesthesia and I will endeavor to treat any worsening stenosis or recurrent stenosis of his left or right carotid artery respectively with another stent.    Diagnoses and all orders for this visit:    1. Internal carotid artery stent present (Primary)  -     Duplex Carotid Ultrasound CAR    2. PAD (peripheral artery disease) (HCC)  -     Duplex Carotid Ultrasound CAR    3. Left carotid stenosis        No follow-ups on file.           This document signed by RIRI Flores MD December 5, 2022 14:30 EST

## 2022-12-06 LAB
BH CV MID RIGHT ICA HIDDEN LRR: 1 CM
BH CV RIGHT CCA HIDDEN LRR: 1 CM/S
BH CV VAS CAROTID RIGHT DISTAL STENT EDV: 31.2 CM/S
BH CV VAS CAROTID RIGHT DISTAL STENT PSV: 106 CM/S
BH CV VAS CAROTID RIGHT DISTAL TO STENT NATIVE VESSEL E: 32.1 CM/S
BH CV VAS CAROTID RIGHT DISTAL TO STENT PSV: 121 CM/S
BH CV VAS CAROTID RIGHT MID STENT EDV: 32.1 CM/S
BH CV VAS CAROTID RIGHT MID STENT PSV: 118 CM/S
BH CV VAS CAROTID RIGHT PROXIMAL STENT EDV: 28.5 CM/S
BH CV VAS CAROTID RIGHT PROXIMAL STENT PSV: 74.6 CM/S
BH CV VAS CAROTID RIGHT STENT NATIVE VESSEL PROXIMAL EDV: 24.7 CM/S
BH CV VAS CAROTID RIGHT STENT NATIVE VESSEL PROXIMAL PS: 77.9 CM/S
BH CV VAS PRELIMINARY FINDINGS SCRIPTING: 1
BH CV XLRA MEAS LEFT CAROTID BULB EDV: 31 CM/SEC
BH CV XLRA MEAS LEFT CAROTID BULB PSV: 181 CM/SEC
BH CV XLRA MEAS LEFT DIST CCA EDV: 25.1 CM/SEC
BH CV XLRA MEAS LEFT DIST CCA PSV: 147 CM/SEC
BH CV XLRA MEAS LEFT DIST ICA EDV: 21.2 CM/SEC
BH CV XLRA MEAS LEFT DIST ICA PSV: 94.1 CM/SEC
BH CV XLRA MEAS LEFT ICA/CCA RATIO: 3.3
BH CV XLRA MEAS LEFT MID CCA EDV: 19.3 CM/SEC
BH CV XLRA MEAS LEFT MID CCA PSV: 106.2 CM/SEC
BH CV XLRA MEAS LEFT MID ICA EDV: 74.1 CM/SEC
BH CV XLRA MEAS LEFT MID ICA PSV: 240 CM/SEC
BH CV XLRA MEAS LEFT PROX CCA EDV: 22 CM/SEC
BH CV XLRA MEAS LEFT PROX CCA PSV: 144.2 CM/SEC
BH CV XLRA MEAS LEFT PROX ECA EDV: 38.1 CM/SEC
BH CV XLRA MEAS LEFT PROX ECA PSV: 338 CM/SEC
BH CV XLRA MEAS LEFT PROX ICA EDV: 101 CM/SEC
BH CV XLRA MEAS LEFT PROX ICA PSV: 336 CM/SEC
BH CV XLRA MEAS LEFT PROX SCLA PSV: 222.3 CM/SEC
BH CV XLRA MEAS LEFT VERTEBRAL A EDV: 14.1 CM/SEC
BH CV XLRA MEAS LEFT VERTEBRAL A PSV: 63.1 CM/SEC
BH CV XLRA MEAS RIGHT DIST CCA EDV: 32.4 CM/SEC
BH CV XLRA MEAS RIGHT DIST CCA PSV: 94.4 CM/SEC
BH CV XLRA MEAS RIGHT DIST ICA EDV: 22.4 CM/SEC
BH CV XLRA MEAS RIGHT DIST ICA PSV: 74.3 CM/SEC
BH CV XLRA MEAS RIGHT ICA/CCA RATIO: 1.39
BH CV XLRA MEAS RIGHT MID CCA EDV: 20.9 CM/SEC
BH CV XLRA MEAS RIGHT MID CCA PSV: 95.5 CM/SEC
BH CV XLRA MEAS RIGHT MID ICA EDV: 41.6 CM/SEC
BH CV XLRA MEAS RIGHT MID ICA PSV: 133 CM/SEC
BH CV XLRA MEAS RIGHT PROX CCA EDV: 19.1 CM/SEC
BH CV XLRA MEAS RIGHT PROX CCA PSV: 140.4 CM/SEC
BH CV XLRA MEAS RIGHT PROX ECA EDV: 13.2 CM/SEC
BH CV XLRA MEAS RIGHT PROX ECA PSV: 63.1 CM/SEC
BH CV XLRA MEAS RIGHT PROX ICA EDV: 22 CM/SEC
BH CV XLRA MEAS RIGHT PROX ICA PSV: 76.4 CM/SEC
BH CV XLRA MEAS RIGHT PROX SCLA PSV: 307.3 CM/SEC
BH CV XLRA MEAS RIGHT VERTEBRAL A EDV: 21.7 CM/SEC
BH CV XLRA MEAS RIGHT VERTEBRAL A PSV: 102 CM/SEC
BH CVPROX RIGHT ICA HIDDEN LRR: 1 CM
LEFT ARM BP: NORMAL MMHG
MAXIMAL PREDICTED HEART RATE: 158 BPM
RIGHT ARM BP: NORMAL MMHG
STRESS TARGET HR: 134 BPM

## 2022-12-13 ENCOUNTER — TELEPHONE (OUTPATIENT)
Dept: ENDOCRINOLOGY | Facility: CLINIC | Age: 62
End: 2022-12-13

## 2022-12-13 RX ORDER — PROCHLORPERAZINE 25 MG/1
SUPPOSITORY RECTAL
Qty: 3 EACH | Refills: 11 | Status: SHIPPED | OUTPATIENT
Start: 2022-12-13 | End: 2023-01-24 | Stop reason: SDUPTHER

## 2022-12-13 RX ORDER — PROCHLORPERAZINE 25 MG/1
1 SUPPOSITORY RECTAL DAILY
Qty: 1 EACH | Refills: 2 | Status: SHIPPED | OUTPATIENT
Start: 2022-12-13

## 2022-12-13 RX ORDER — PROCHLORPERAZINE 25 MG/1
1 SUPPOSITORY RECTAL DAILY
Qty: 1 EACH | Refills: 0 | Status: SHIPPED | OUTPATIENT
Start: 2022-12-13

## 2022-12-13 NOTE — TELEPHONE ENCOUNTER
PA denied states Dexcom on Formulary but also states must have visual impairment for coverage.  OK to send Dexcom?

## 2023-01-12 NOTE — PROGRESS NOTES
[facility] Cardiology   [address], [address]  [place], [place], [address]    [phone]  [email or web address]           OUTPATIENT CLINIC FOLLOW UP NOTE    Patient Care Team:  Patient Care Team:  Nacho Omer MD as PCP - General (Family Medicine)  Meka wSan MD as Consulting Physician (Endocrinology)  Mychal Block MD as Consulting Physician (Cardiology)  Jarret Flores MD as Consulting Physician (Obstetrics and Gynecology)    Subjective:      Chief Complaint   Patient presents with   • Coronary artery disease involving coronary bypass graft of        HPI:    Laron Pandey is a 62 y.o. male.  Problem list:  1. CAD  a. Status post three-vessel CABG in 2003 with Dr. Myers.  b. Status post PRINCE x2 to the circumflex with Dr. Block, 11/2020  2. VT/VF arrest  1. Postop left arthroscopic rotator cuff repair, 10/19/2022, VF arrest during transport from operating room to PACU. Shock x 1 at 150J.   2. LHC 10/25/2022:  Severe multivessel CAD. Patent LIMA to an occluded LAD, patent stents to the circumflex, stable collaterals to an occluded RCA and know occluded vein grafts. No significant changes from previous cath 12/2020.  3. Carotid artery disease  a. Status post bilateral CEA in 2009 with Dr. Myers  b. MRA head/neck 3/13/2022 revealing 50-70% atherosclerotic narrowing of the ICA's bilaterally.   c. Status post right carotid stent placement with Dr. Folres, 6/15/2022  4. CVA  1. 6/13/2022: acute cortical infarct in the right pre and postcentral gyri near the vertex as well as a small area of the right frontal lobe.  Thought to be due to carotid stenosis.  Status post R ICA stent as noted above  5. PAD  1. Peripheral angiogram 12/3/2020: Status post 7 x 27 mm VISI pro to the left common iliac artery.  Left SFA status post angioplasty with a Chocolate balloon   1. Back pain significant improved post procedurally  6. Lower back pain  a. History of lumbar surgery, Dr. Dobbs,  1999  b. Acute worsening spring 2020, severe disc space narrowing and L4/L5, moderate to severe canal and neuroforaminal stenosis  7. Hypertension  8. Hyperlipidemia  9. Diabetes mellitus  10. Hypothyroidism  11. Tobacco dependence    The patient presents today for follow-up.    No recurrent arrhythmia.  No shocks on LifeVest.  No chest pain.  Stable dyspnea.  Persistent back pain, leg pain, worse with ambulation    Review of Systems:  As noted in above HPI.      PFSH:  Patient Active Problem List   Diagnosis   • Uncontrolled type 2 diabetes mellitus with hyperglycemia, with long-term current use of insulin ([facility])   • Hyperlipidemia   • Hypothyroidism   • Coronary artery disease involving coronary bypass graft of native heart without angina pectoris   • Erectile dysfunction   • Hypertension   • Lumbar stenosis with neurogenic claudication   • Lumbar postlaminectomy syndrome   • History of lumbar surgery   • PAD (peripheral artery disease) ([facility])   • Current every day smoker   • Left arm weakness   • Left carotid stenosis   • Alcohol use   • Left Rotator cuff tear with biceps tendinitis   • GERD (gastroesophageal reflux disease)   • Nontraumatic complete tear of left rotator cuff   • VF (ventricular fibrillation) ([facility])         Current Outpatient Medications:   •  acetaminophen (TYLENOL) 500 MG tablet, Take 500 mg by mouth Every 6 (Six) Hours As Needed for Mild Pain ., Disp: , Rfl:   •  aspirin 81 MG chewable tablet, Chew 1 tablet Daily., Disp: 21 tablet, Rfl: 0  •  BD ULTRA-FINE PEN NEEDLES 29G X 12.7MM misc, Use with Insulin 3 times daily, Disp: 300 each, Rfl: 2  •  Continuous Blood Gluc  (Dexcom G6 ) device, 1 Device Daily., Disp: 1 each, Rfl: 0  •  Continuous Blood Gluc Sensor (Dexcom G6 Sensor), Every 10 (Ten) Days., Disp: 3 each, Rfl: 11  •  Continuous Blood Gluc Transmit (Dexcom G6 Transmitter) misc, 1 each Daily., Disp: 1 each, Rfl: 2  •  docusate sodium (COLACE) 100 MG capsule, Take 1 capsule by  mouth 2 (Two) Times a Day., Disp: 20 capsule, Rfl: 0  •  Dulaglutide (Trulicity) 1.5 MG/0.5ML solution pen-injector, Inject 1.5 mg under the skin into the appropriate area as directed 1 (One) Time Per Week., Disp: 4 pen, Rfl: 6  •  empagliflozin (JARDIANCE) 10 MG tablet tablet, Take 1 tablet by mouth Daily., Disp: 30 tablet, Rfl: 5  •  famotidine (PEPCID) 20 MG tablet, Take 1 tablet by mouth 2 (Two) Times a Day Before Meals., Disp: 30 tablet, Rfl: 1  •  gabapentin (NEURONTIN) 100 MG capsule, Take 2 capsules by mouth 3 (Three) Times a Day., Disp: 180 capsule, Rfl: 3  •  glucose blood (OneTouch Verio) test strip, Use as instructed, Disp: 300 each, Rfl: 2  •  hydroCHLOROthiazide (MICROZIDE) 12.5 MG capsule, Take 1 capsule by mouth Daily., Disp: 30 capsule, Rfl: 5  •  Lancets (OneTouch Delica Plus [id number]) misc, 1 each 3 (Three) Times a Day., Disp: 300 each, Rfl: 2  •  levothyroxine (Synthroid) 125 MCG tablet, 2 tablets daily to equal 250mcg, Disp: 180 tablet, Rfl: 2  •  metFORMIN (GLUCOPHAGE) 1000 MG tablet, Take 1,000 mg by mouth Daily With Breakfast., Disp: , Rfl:   •  metoprolol succinate XL (TOPROL-XL) 50 MG 24 hr tablet, Take 1 tablet by mouth Daily., Disp: 60 tablet, Rfl: 11  •  nicotine (NICODERM CQ) 21 MG/24HR patch, Place 1 patch on the skin as directed by provider Daily., Disp: 30 patch, Rfl: 1  •  NovoLOG Mix 70/30 FlexPen (70-30) 100 UNIT/ML suspension pen-injector injection, Inject 0.5 mL under the skin into the appropriate area as directed 2 (Two) Times a Day Before Meals. INJECT 50 UNITS SUBCUTANEOUSLY IN THE MORNING AND 45 UNITS SUBCUTANEOUSLY AT BEDTIME, Disp: 45 mL, Rfl: 7  •  ondansetron (Zofran) 4 MG tablet, Take 1 tablet by mouth Every 8 (Eight) Hours As Needed for Nausea or Vomiting., Disp: 12 tablet, Rfl: 0  •  Rivaroxaban (XARELTO) 2.5 MG tablet, Take 1 tablet by mouth 2 (Two) Times a Day., Disp: 60 tablet, Rfl: 11  •  rosuvastatin (CRESTOR) 20 MG tablet, Take 1 tablet by mouth Every Night.,  "Disp: 90 tablet, Rfl: 3  •  valsartan (DIOVAN) 80 MG tablet, Take 1 tablet by mouth Daily., Disp: 30 tablet, Rfl: 5  •  HYDROcodone-acetaminophen (NORCO) 7.5-325 MG per tablet, Take 1-2 tablets by mouth Every 4 (Four) Hours As Needed for Moderate Pain., Disp: 24 tablet, Rfl: 0    Allergies   Allergen Reactions   • Celebrex [Celecoxib] Swelling     Hands   • Morphine And Related Nausea Only        reports that he has been smoking cigarettes. He has been smoking an average of .25 packs per day. He has never used smokeless tobacco.      Objective:   Physical exam:  BP 98/68 (BP Location: Right arm, Patient Position: Sitting, Cuff Size: Adult)   Pulse 58   Ht 172.7 cm (68\")   Wt 86.2 kg (190 lb)   SpO2 100%   BMI 28.89 kg/m²   CONSTITUTIONAL: No acute distress  RESPIRATORY: Normal effort. Clear to auscultation bilaterally without wheezing or rales  CARDIOVASCULAR: Regular rate and rhythm with normal S1 and S2. Without murmur.  PERIPHERAL VASCULAR: No carotid bruit bilaterally.  Normal radial pulse. There is no lower extremity edema bilaterally.    Exam, 12/2020: 2+ PT pulses bilaterally post procedurally  Exam 6/16/2021: Difficult to palpate pedal pulses  Exam 1/5/2022: Right PT pulse 1+, left PT pulse difficult to palpate.  Exam 1/2023: 2+ left ulnar, nonpalpable left radial, 1+ right radial    Labs:      Lab Results   Component Value Date    CHOL 99 10/26/2022     Lab Results   Component Value Date    TRIG 110 10/26/2022     Lab Results   Component Value Date    HDL 29 (L) 10/26/2022     Lab Results   Component Value Date    LDL 49 10/26/2022     No components found for: LDLDIRECTC    Diagnostic Data:    Procedures    KARISSA 7/27/2021  · Right Conclusion: The right KARISSA is moderately reduced at 0.58. Moderate digital ischemia with a TBI 0.32.  · Left Conclusion: The left KARISSA is severely reduced at 0.48. Moderate digital ischemia with a TBI 0.24.    Results for orders placed during the hospital encounter of " 10/18/22    Adult Transthoracic Echo Complete w/ Color, Spectral and Contrast if Necessary Per Protocol    Interpretation Summary  •  Left ventricular ejection fraction appears to be 56 - 60%. Left ventricular systolic function is normal.  •  Left ventricular wall thickness is consistent with moderate to severe concentric hypertrophy.  •  Left ventricular diastolic function is consistent with (grade II w/high LAP) pseudonormalization.  •  Systolic and diastolic flattening of the interventricular septum consistent with right ventricle pressure and volume overload.  •  There is mild calcification of the aortic valve.  •  Moderate tricuspid valve regurgitation is present.  •  Estimated right ventricular systolic pressure from tricuspid regurgitation is markedly elevated (>55 mmHg).      Stress test 11/2/2020  · Left ventricular ejection fraction is normal. (Calculated EF = 51%).  · Myocardial perfusion imaging indicates a small-sized infarct located in the inferior wall with mild yeyo-infarct ischemia.  · Impressions are consistent with a low to intermediate risk study.    [facility] 10/25/2022  •  There is severe multivessel CAD.  There is a patent LIMA to an occluded LAD, patent stents to the circumflex, stable collaterals to an occluded RCA, and known occluded vein grafts.  When compared to the completion angiogram performed after intervention in December 2020, there has been no significant changes.  •  Severely elevated LVEDP of 35 mmHg    Peripheral angiogram 12/3/2020  · The 60% eccentric, hemodynamically significant distal left common iliac artery stenosis is status post intervention with a 7 x 27 mm Visipro balloon expandable, bare-metal stent.  · The tandem 70% mid left SFA stenoses are status post angioplasty with a 5 mm Chocolate nitinol caged, controlled dilatation balloon with multiple inflations.    Right carotid artery duplex 6/15/2022  · Right carotid stent imaging indicates patent flow.    Bilateral carotid  cerebral angiogram 6/15/2022  · Status post successful angioplasty and stenting of high-grade, symptomatic right internal carotid artery stenosis.  High risk criteria for endarterectomy include history of prior bilateral endarterectomies.  · Moderate, asymptomatic stenosis of the left internal carotid artery.  This was managed medically and observed with continued ultrasounds.    Assessment and Plan:     Coronary artery disease involving native coronary artery of native heart without angina pectoris   Mixed hyperlipidemia  -Without recurrent angina.  Continue current medication    VT cardiac arrest  -Without recurrent arrhythmia, possibly induced by anesthesia, may pose a challenge to future use of anesthesia.  We will discussed with the EP, message sent to Dr. Santillan    Peripheral vascular disease with claudication   Spinal stenosis  Diabetic neuropathy  Tobacco dependence  -Repeat arterial duplex with ABIs.  Concomitant 6-minute walk test on same day.  -If with persistent claudication/PAD issues and with a decreased 6-minute walk test, could consider candidacy for a handicap sticker.    CVA, 6/2022  Carotid stenosis, bilateral  -Followed by Dr. Flores    Essential hypertension  -Continue current medications.  If averaging less than 110 mmHg systolic, discontinue hydrochlorothiazide      - Return in about 6 months (around 7/18/2023) for Next scheduled follow-up with an ECG.    Mychal Block MD, MSc, FACC, [facility]  Interventional Cardiology  [facility]

## 2023-01-18 ENCOUNTER — OFFICE VISIT (OUTPATIENT)
Dept: CARDIOLOGY | Facility: CLINIC | Age: 63
End: 2023-01-18
Payer: COMMERCIAL

## 2023-01-18 VITALS
HEIGHT: 68 IN | BODY MASS INDEX: 28.79 KG/M2 | SYSTOLIC BLOOD PRESSURE: 98 MMHG | HEART RATE: 58 BPM | DIASTOLIC BLOOD PRESSURE: 68 MMHG | WEIGHT: 190 LBS | OXYGEN SATURATION: 100 %

## 2023-01-18 DIAGNOSIS — I25.810 CORONARY ARTERY DISEASE INVOLVING CORONARY BYPASS GRAFT OF NATIVE HEART WITHOUT ANGINA PECTORIS: ICD-10-CM

## 2023-01-18 DIAGNOSIS — I10 PRIMARY HYPERTENSION: ICD-10-CM

## 2023-01-18 DIAGNOSIS — E78.2 MIXED HYPERLIPIDEMIA: ICD-10-CM

## 2023-01-18 DIAGNOSIS — I73.9 PAD (PERIPHERAL ARTERY DISEASE): ICD-10-CM

## 2023-01-18 DIAGNOSIS — I73.9 CLAUDICATION: Primary | ICD-10-CM

## 2023-01-18 PROCEDURE — 99214 OFFICE O/P EST MOD 30 MIN: CPT | Performed by: INTERNAL MEDICINE

## 2023-01-24 ENCOUNTER — TELEPHONE (OUTPATIENT)
Dept: CARDIOLOGY | Facility: CLINIC | Age: 63
End: 2023-01-24
Payer: COMMERCIAL

## 2023-01-24 ENCOUNTER — OFFICE VISIT (OUTPATIENT)
Dept: ENDOCRINOLOGY | Facility: CLINIC | Age: 63
End: 2023-01-24
Payer: COMMERCIAL

## 2023-01-24 VITALS
HEART RATE: 66 BPM | HEIGHT: 68 IN | BODY MASS INDEX: 28.95 KG/M2 | OXYGEN SATURATION: 99 % | SYSTOLIC BLOOD PRESSURE: 115 MMHG | WEIGHT: 191 LBS | DIASTOLIC BLOOD PRESSURE: 70 MMHG

## 2023-01-24 DIAGNOSIS — Z79.4 UNCONTROLLED TYPE 2 DIABETES MELLITUS WITH HYPERGLYCEMIA, WITH LONG-TERM CURRENT USE OF INSULIN: Primary | ICD-10-CM

## 2023-01-24 DIAGNOSIS — E03.9 HYPOTHYROIDISM, ADULT: ICD-10-CM

## 2023-01-24 DIAGNOSIS — E11.65 UNCONTROLLED TYPE 2 DIABETES MELLITUS WITH HYPERGLYCEMIA, WITH LONG-TERM CURRENT USE OF INSULIN: Primary | ICD-10-CM

## 2023-01-24 DIAGNOSIS — I49.01 VF (VENTRICULAR FIBRILLATION): Primary | ICD-10-CM

## 2023-01-24 LAB
EXPIRATION DATE: ABNORMAL
EXPIRATION DATE: NORMAL
GLUCOSE BLDC GLUCOMTR-MCNC: 255 MG/DL (ref 70–130)
HBA1C MFR BLD: 8.9 %
Lab: ABNORMAL
Lab: NORMAL

## 2023-01-24 PROCEDURE — 82947 ASSAY GLUCOSE BLOOD QUANT: CPT | Performed by: INTERNAL MEDICINE

## 2023-01-24 PROCEDURE — 99214 OFFICE O/P EST MOD 30 MIN: CPT | Performed by: INTERNAL MEDICINE

## 2023-01-24 PROCEDURE — 83036 HEMOGLOBIN GLYCOSYLATED A1C: CPT | Performed by: INTERNAL MEDICINE

## 2023-01-24 RX ORDER — HYDROCODONE BITARTRATE AND ACETAMINOPHEN 5; 325 MG/1; MG/1
1 TABLET ORAL EVERY 4 HOURS PRN
COMMUNITY
Start: 2023-01-18 | End: 2023-03-13

## 2023-01-24 RX ORDER — PROCHLORPERAZINE 25 MG/1
SUPPOSITORY RECTAL
Qty: 3 EACH | Refills: 11 | Status: SHIPPED | OUTPATIENT
Start: 2023-01-24

## 2023-01-24 NOTE — PROGRESS NOTES
Subjective:     Chief Complaint   Patient presents with   • Diabetes     Follow Up   • Hypothyroidism      Laron Pandey is a 62 y.o. male who is is being seen for follow-up for management of  Type 2 diabetes mellitus.  The initial diagnosis of diabetes was made in 2003. He was initially controlled with metformin and started insulin in 2010.   Diabetic complications: cardiovascular disease s/p CABG. He has neuropathy sx with tingling and pain in the legs bilaterally.       Current diabetic medications include novolog 70/30. Trulicity - he has heartburn with it. Cardiologist started SGLT2 - jardiance  Metformin - he stopped this medication due to side effects of diarrhea. He only takes it occasionally.     Monitoring  -Dexcom sensor.      Other med problems:CAD/CABG, HL, HTN    HL- intolerant to statins in the past. Restarted zetia.   Hypothyroidism - 250 mcg of levothyroxine  HTN -BP is normal.      Back pain improved after epidural injection   His sx of neuropathy are worse. Back pain and neuropathy pain are double in intensity.   He had a stroke in June 2022.   Patient is s/p left shoulder surgery. He is still undergoing the PT. His schedule is changed now with him waking up late and staying up until 2 am.       MEDICATIONS    Current Outpatient Medications:   •  acetaminophen (TYLENOL) 500 MG tablet, Take 500 mg by mouth Every 6 (Six) Hours As Needed for Mild Pain ., Disp: , Rfl:   •  aspirin 81 MG chewable tablet, Chew 1 tablet Daily., Disp: 21 tablet, Rfl: 0  •  BD ULTRA-FINE PEN NEEDLES 29G X 12.7MM misc, Use with Insulin 3 times daily, Disp: 300 each, Rfl: 2  •  Continuous Blood Gluc  (Dexcom G6 ) device, 1 Device Daily., Disp: 1 each, Rfl: 0  •  Continuous Blood Gluc Sensor (Dexcom G6 Sensor), Every 10 (Ten) Days., Disp: 3 each, Rfl: 11  •  Continuous Blood Gluc Transmit (Dexcom G6 Transmitter) misc, 1 each Daily., Disp: 1 each, Rfl: 2  •  docusate sodium (COLACE) 100 MG capsule, Take 1  capsule by mouth 2 (Two) Times a Day., Disp: 20 capsule, Rfl: 0  •  Dulaglutide (Trulicity) 1.5 MG/0.5ML solution pen-injector, Inject 1.5 mg under the skin into the appropriate area as directed 1 (One) Time Per Week., Disp: 4 pen, Rfl: 6  •  empagliflozin (JARDIANCE) 10 MG tablet tablet, Take 1 tablet by mouth Daily., Disp: 30 tablet, Rfl: 5  •  famotidine (PEPCID) 20 MG tablet, Take 1 tablet by mouth 2 (Two) Times a Day Before Meals., Disp: 30 tablet, Rfl: 1  •  gabapentin (NEURONTIN) 100 MG capsule, Take 2 capsules by mouth 3 (Three) Times a Day., Disp: 180 capsule, Rfl: 3  •  glucose blood (OneTouch Verio) test strip, Use as instructed, Disp: 300 each, Rfl: 2  •  hydroCHLOROthiazide (MICROZIDE) 12.5 MG capsule, Take 1 capsule by mouth Daily., Disp: 30 capsule, Rfl: 5  •  HYDROcodone-acetaminophen (NORCO) 5-325 MG per tablet, Take 1 tablet by mouth Every 4 (Four) Hours As Needed., Disp: , Rfl:   •  HYDROcodone-acetaminophen (NORCO) 7.5-325 MG per tablet, Take 1-2 tablets by mouth Every 4 (Four) Hours As Needed for Moderate Pain., Disp: 24 tablet, Rfl: 0  •  Lancets (OneTouch Delica Plus [id number]) misc, 1 each 3 (Three) Times a Day., Disp: 300 each, Rfl: 2  •  levothyroxine (Synthroid) 125 MCG tablet, 2 tablets daily to equal 250mcg, Disp: 180 tablet, Rfl: 2  •  metFORMIN (GLUCOPHAGE) 1000 MG tablet, Take 1,000 mg by mouth Daily With Breakfast., Disp: , Rfl:   •  metoprolol succinate XL (TOPROL-XL) 50 MG 24 hr tablet, Take 1 tablet by mouth Daily., Disp: 60 tablet, Rfl: 11  •  nicotine (NICODERM CQ) 21 MG/24HR patch, Place 1 patch on the skin as directed by provider Daily., Disp: 30 patch, Rfl: 1  •  NovoLOG Mix 70/30 FlexPen (70-30) 100 UNIT/ML suspension pen-injector injection, Inject 0.5 mL under the skin into the appropriate area as directed 2 (Two) Times a Day Before Meals. INJECT 50 UNITS SUBCUTANEOUSLY IN THE MORNING AND 45 UNITS SUBCUTANEOUSLY AT BEDTIME, Disp: 45 mL, Rfl: 7  •  ondansetron (Zofran) 4 MG  "tablet, Take 1 tablet by mouth Every 8 (Eight) Hours As Needed for Nausea or Vomiting., Disp: 12 tablet, Rfl: 0  •  Rivaroxaban (XARELTO) 2.5 MG tablet, Take 1 tablet by mouth 2 (Two) Times a Day., Disp: 60 tablet, Rfl: 11  •  rosuvastatin (CRESTOR) 20 MG tablet, Take 1 tablet by mouth Every Night., Disp: 90 tablet, Rfl: 3  •  valsartan (DIOVAN) 80 MG tablet, Take 1 tablet by mouth Daily., Disp: 30 tablet, Rfl: 5    Review of Systems  Review of Systems   Constitutional: Positive for fatigue.   Eyes: Negative.    Endocrine:        As listed in HPI   Neurological: Positive for weakness and numbness (tingling).   All other systems reviewed and are negative.         Objective:      /70   Pulse 66   Ht 172.7 cm (68\")   Wt 86.6 kg (191 lb)   SpO2 99%   BMI 29.04 kg/m² Body mass index is 29.04 kg/m².  Physical Exam   Constitutional: He is oriented to person, place, and time. He appears well-developed.   HENT:   Head: Normocephalic and atraumatic.   Eyes: Conjunctivae are normal.   Neck: No thyroid mass present.   The neck is supple and no assimetry visualized   Cardiovascular: Normal rate, regular rhythm, normal heart sounds and normal pulses.   Pulmonary/Chest: Effort normal and breath sounds normal.   Musculoskeletal:      Comments: Left shoulder has limited ROM   Neurological: He is alert and oriented to person, place, and time. He has normal reflexes.   Psychiatric: Thought content normal.   Vitals reviewed.          LABS AND IMAGING      Lab Results   Component Value Date    HGBA1C 8.9 01/24/2023    HGBA1C 7.2 11/15/2022    HGBA1C 7.60 (H) 10/26/2022     Office Visit on 01/24/2023   Component Date Value Ref Range Status   • Hemoglobin A1C 01/24/2023 8.9  % Final   • Lot Number 01/24/2023 [id number]   Final   • Expiration Date 01/24/2023 11/03/2024   Final   • Glucose 01/24/2023 255 (A)  70 - 130 mg/dL Final   • Lot Number 01/24/2023 [id number]   Final   • Expiration Date 01/24/2023 07/22/223   Final   Office " Visit on 11/15/2022   Component Date Value Ref Range Status   • Hemoglobin A1C 11/15/2022 7.2  % Final   • Lot Number 11/15/2022 [id number]   Final   • Expiration Date 11/15/2022 09/14/2024   Final   • Glucose 11/15/2022 136 (A)  70 - 130 mg/dL Final   • Lot Number 11/15/2022 [id number]   Final   • Expiration Date 11/15/2022 06/03/2023   Final   No results displayed because visit has over 200 results.                Assessment:         Diagnoses and all orders for this visit:    Uncontrolled type 2 diabetes mellitus with hyperglycemia, with long-term current use of insulin ([facility])  -     POC Glycosylated Hemoglobin (Hb A1C)  -     POC Glucose, Blood    Hypothyroidism    Other orders  -     HYDROcodone-acetaminophen (NORCO) 5-325 MG per tablet; Take 1 tablet by mouth Every 4 (Four) Hours As Needed.        Plan:       Patient has poorly controlled diabetes. Dietary recommendations reviewed. Insulin titration instructions provided.   Freestyle Jude downloaded and analyzed.     Novolog 70/30 50 unit in the morning and 50 units with dinner --> reduce the dose to 45 Units BID.     Trulicity discontinued due to side effects. - Nausea, belching and diarrhea     Cont Jardiance at 10 mg daily --> increase to 25 mg daily.   He is not taking metformin.  Dexcom sensor downloaded and reviewed. He has a lot of hypoglycemia due to insulin stacking.     Hypoglycemia precautions reviewed.       -gabapentin for neuropathy. He had a lot if neuropathy sx.      -cont thyroid medications levothyroxine 250 mcg . Recent labs reviewed.      Follow up:  3 -4 months.

## 2023-01-24 NOTE — TELEPHONE ENCOUNTER
Spoke with patient. He is agreeable to consult with Dr. Santillan to discuss possible ICD implant. All questions answered at this time.

## 2023-02-07 ENCOUNTER — HOSPITAL ENCOUNTER (OUTPATIENT)
Dept: CARDIOLOGY | Facility: HOSPITAL | Age: 63
Discharge: HOME | End: 2023-02-07
Admitting: INTERNAL MEDICINE
Payer: COMMERCIAL

## 2023-02-07 ENCOUNTER — TREATMENT (OUTPATIENT)
Dept: CARDIAC REHAB | Facility: HOSPITAL | Age: 63
End: 2023-02-07
Payer: COMMERCIAL

## 2023-02-07 VITALS — WEIGHT: 191 LBS | HEIGHT: 68 IN | BODY MASS INDEX: 28.95 KG/M2

## 2023-02-07 DIAGNOSIS — I73.9 CLAUDICATION: ICD-10-CM

## 2023-02-07 PROCEDURE — 94618 PULMONARY STRESS TESTING: CPT

## 2023-02-07 PROCEDURE — 93925 LOWER EXTREMITY STUDY: CPT | Performed by: INTERNAL MEDICINE

## 2023-02-07 PROCEDURE — 93925 LOWER EXTREMITY STUDY: CPT

## 2023-02-07 NOTE — PROGRESS NOTES
Patient completed 6 MWT with a distance of 1066 ft and MET level of 2.5. Pt. Reported pain in right leg during and after 6 MWT. Patient stated that pain was severe to the point patient almost had to stop walking. Patient recovered well. 6MWT sent to Dr. Block for review.

## 2023-02-08 ENCOUNTER — TELEPHONE (OUTPATIENT)
Dept: CARDIOLOGY | Facility: CLINIC | Age: 63
End: 2023-02-08
Payer: COMMERCIAL

## 2023-02-08 LAB
BH CV GRAFT BRACHIAL PRESSURE LEFT: NORMAL MMHG
BH CV GRAFT BRACHIAL PRESSURE RIGHT: NORMAL MMHG
BH CV LEA LEFT ANT TIBIAL A DISTAL PSV: 27.9 CM/S
BH CV LEA LEFT ANT TIBIAL A MID PSV: 17.8 CM/S
BH CV LEA LEFT ANT TIBIAL A PROX PSV: 54.5 CM/S
BH CV LEA LEFT CFA DISTAL PSV: 133 CM/S
BH CV LEA LEFT CFA PROX PSV: 180 CM/S
BH CV LEA LEFT DFA PROX PSV: 310 CM/S
BH CV LEA LEFT DPA PRESSURE: 100 MMHG
BH CV LEA LEFT PERONEAL  MID PSV: 21 CM/S
BH CV LEA LEFT POPITEAL A  DISTAL PSV: 38 CM/S
BH CV LEA LEFT POPITEAL A  MID PSV: 56.3 CM/S
BH CV LEA LEFT POPITEAL A  PROX PSV: 65.5 CM/S
BH CV LEA LEFT PTA DISTAL PSV: 25 CM/S
BH CV LEA LEFT PTA MID PSV: 25.5 CM/S
BH CV LEA LEFT PTA PRESSURE: 106 MMHG
BH CV LEA LEFT PTA PROX PSV: 77.3 CM/S
BH CV LEA LEFT SFA DISTAL PSV: 122 CM/S
BH CV LEA LEFT SFA MID PSV: 511 CM/S
BH CV LEA LEFT SFA PROX PSV: 277.8 CM/S
BH CV LEA RIGHT ANT TIBIAL A DISTAL PSV: 19.6 CM/S
BH CV LEA RIGHT ANT TIBIAL A MID PSV: 14.7 CM/S
BH CV LEA RIGHT ANT TIBIAL A PROX PSV: 42.6 CM/S
BH CV LEA RIGHT CFA DISTAL PSV: 261 CM/S
BH CV LEA RIGHT CFA PROX PSV: 311 CM/S
BH CV LEA RIGHT DFA PROX PSV: 327 CM/S
BH CV LEA RIGHT DPA PRESSURE: 50 MMHG
BH CV LEA RIGHT PERONEAL  MID PSV: 14.1 CM/S
BH CV LEA RIGHT POPITEAL A  DISTAL PSV: 32 CM/S
BH CV LEA RIGHT POPITEAL A  PROX EDV: 13.2 CM/S
BH CV LEA RIGHT POPITEAL A  PROX PSV: 92.6 CM/S
BH CV LEA RIGHT PTA DISTAL PSV: 9.4 CM/S
BH CV LEA RIGHT PTA MID PSV: 28.1 CM/S
BH CV LEA RIGHT PTA PRESSURE: 100 MMHG
BH CV LEA RIGHT PTA PROX PSV: 76.1 CM/S
BH CV LEA RIGHT SFA DISTAL EDV: 32.3 CM/S
BH CV LEA RIGHT SFA DISTAL PSV: 203 CM/S
BH CV LEA RIGHT SFA MID PSV: 41.8 CM/S
BH CV LEA RIGHT SFA PROX PSV: 233 CM/S
BH CV LEA RIGHT TIBEOPERONEAL EDV: 8.7 CM/S
BH CV LEA RIGHT TIBEOPERONEAL PSV: 57 CM/S
BH CV LOWER ARTERIAL LEFT ABI RATIO: 0.6
BH CV LOWER ARTERIAL LEFT HIGHEST VELOCITY RATIO: 11.31
BH CV LOWER ARTERIAL LEFT VELOCITY RATIO LOCATION: NORMAL
BH CV LOWER ARTERIAL RIGHT ABI RATIO: 0.57
BH CV LOWER ARTERIAL RIGHT HIGHEST VELOCITY RATIO: 4.86
BH CV LOWER ARTERIAL RIGHT VELOCITY RATIO LOCATION: NORMAL
LEFT GROIN CFA SYS: 180.7 CM/SEC
MAXIMAL PREDICTED HEART RATE: 158 BPM
RIGHT GROIN CFA SYS: 262.7 CM/SEC
STRESS TARGET HR: 134 BPM

## 2023-02-08 RX ORDER — CILOSTAZOL 50 MG/1
50 TABLET ORAL 2 TIMES DAILY
Qty: 180 TABLET | Refills: 3 | Status: SHIPPED | OUTPATIENT
Start: 2023-02-08

## 2023-02-08 NOTE — TELEPHONE ENCOUNTER
Spoke with patient regarding results. Patient would like to try Pletal before moving forward with peripheral angiogram. Patient states that most of limiting pain is coming from his back. All questions answered at this time.

## 2023-02-08 NOTE — TELEPHONE ENCOUNTER
----- Message from Mychal Block MD sent at 2/8/2023  9:27 AM EST -----  Ultrasound shows that leg blockages have progressed since 2020. Please ask which leg hurts more. Strongly advise smoking cessation if smoking. Try adding Pletal 50mg BID. Recommend repeat peripheral angio with possible intervention if the pain is lifestyle limiting.    ----- Message -----  From: Mychal Block MD  Sent: 2/8/2023   9:03 AM EST  To: Mychal Block MD

## 2023-03-03 RX ORDER — VALSARTAN 80 MG/1
TABLET ORAL
Qty: 90 TABLET | Refills: 1 | Status: SHIPPED | OUTPATIENT
Start: 2023-03-03

## 2023-03-06 RX ORDER — HYDROCHLOROTHIAZIDE 12.5 MG/1
CAPSULE, GELATIN COATED ORAL
Qty: 90 CAPSULE | Refills: 1 | Status: SHIPPED | OUTPATIENT
Start: 2023-03-06

## 2023-03-13 ENCOUNTER — OFFICE VISIT (OUTPATIENT)
Dept: CARDIOLOGY | Facility: CLINIC | Age: 63
End: 2023-03-13

## 2023-03-13 VITALS
WEIGHT: 189 LBS | OXYGEN SATURATION: 97 % | HEART RATE: 103 BPM | SYSTOLIC BLOOD PRESSURE: 122 MMHG | DIASTOLIC BLOOD PRESSURE: 74 MMHG | HEIGHT: 68 IN | BODY MASS INDEX: 28.64 KG/M2

## 2023-03-13 DIAGNOSIS — I49.01 VF (VENTRICULAR FIBRILLATION): Primary | ICD-10-CM

## 2023-03-13 DIAGNOSIS — Z79.4 UNCONTROLLED TYPE 2 DIABETES MELLITUS WITH HYPERGLYCEMIA, WITH LONG-TERM CURRENT USE OF INSULIN: ICD-10-CM

## 2023-03-13 DIAGNOSIS — E11.65 UNCONTROLLED TYPE 2 DIABETES MELLITUS WITH HYPERGLYCEMIA, WITH LONG-TERM CURRENT USE OF INSULIN: ICD-10-CM

## 2023-03-13 DIAGNOSIS — I10 PRIMARY HYPERTENSION: ICD-10-CM

## 2023-03-13 DIAGNOSIS — I25.810 CORONARY ARTERY DISEASE INVOLVING CORONARY BYPASS GRAFT OF NATIVE HEART WITHOUT ANGINA PECTORIS: ICD-10-CM

## 2023-03-13 DIAGNOSIS — F17.200 CURRENT EVERY DAY SMOKER: ICD-10-CM

## 2023-03-13 DIAGNOSIS — M48.062 LUMBAR STENOSIS WITH NEUROGENIC CLAUDICATION: ICD-10-CM

## 2023-03-13 PROCEDURE — 3078F DIAST BP <80 MM HG: CPT | Performed by: INTERNAL MEDICINE

## 2023-03-13 PROCEDURE — 99204 OFFICE O/P NEW MOD 45 MIN: CPT | Performed by: INTERNAL MEDICINE

## 2023-03-13 PROCEDURE — 3074F SYST BP LT 130 MM HG: CPT | Performed by: INTERNAL MEDICINE

## 2023-03-13 RX ORDER — LEVOFLOXACIN 750 MG/1
1 TABLET ORAL DAILY
COMMUNITY
Start: 2023-03-07

## 2023-03-13 RX ORDER — GABAPENTIN 100 MG/1
200 CAPSULE ORAL 3 TIMES DAILY
Qty: 180 CAPSULE | Refills: 0 | Status: SHIPPED | OUTPATIENT
Start: 2023-03-13

## 2023-03-13 RX ORDER — ONDANSETRON 4 MG/1
4 TABLET, FILM COATED ORAL AS NEEDED
COMMUNITY
Start: 2022-10-18

## 2023-03-13 RX ORDER — TRAMADOL HYDROCHLORIDE 50 MG/1
1 TABLET ORAL 2 TIMES DAILY
COMMUNITY
Start: 2023-02-28

## 2023-03-13 RX ORDER — GABAPENTIN 100 MG/1
200 CAPSULE ORAL 3 TIMES DAILY
Qty: 180 CAPSULE | Refills: 0 | Status: SHIPPED | OUTPATIENT
Start: 2023-03-13 | End: 2023-03-13 | Stop reason: SDUPTHER

## 2023-03-13 NOTE — PROGRESS NOTES
Encounter Date:03/13/2023    Location: Nacho Fox [facility]    Patient ID: Laron Pandey is a 62 y.o. male    1960  Subjective:      Chief Complaint/Reason for visit:    Chief Complaint   Patient presents with   • Establish Care     Pacemaker        Problem List:      HPI:     Social History     Socioeconomic History   • Marital status:    Tobacco Use   • Smoking status: Every Day     Packs/day: 0.25     Years: 50.00     Pack years: 12.50     Types: Cigarettes   • Smokeless tobacco: Never   • Tobacco comments:     2 CIGS/DAY   Vaping Use   • Vaping Use: Never used   Substance and Sexual Activity   • Alcohol use: Yes     Alcohol/week: 2.0 standard drinks     Types: 2 Cans of beer per week     Comment: social   • Drug use: Never   • Sexual activity: Not Currently     Partners: Female       family history includes Diabetes in his father and mother; Heart disease in his father and mother; Stroke in his father.     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    Allergies   Allergen Reactions   • Celebrex [Celecoxib] Swelling     Hands   • Morphine And Related Nausea Only         Current Outpatient Medications:   •  acetaminophen (TYLENOL) 500 MG tablet, Take 1 tablet by mouth Every 6 (Six) Hours As Needed for Mild Pain., Disp: , Rfl:   •  aspirin 81 MG chewable tablet, Chew 1 tablet Daily., Disp: 21 tablet, Rfl: 0  •  BD ULTRA-FINE PEN NEEDLES 29G X 12.7MM misc, Use  with Insulin 3 times daily, Disp: 300 each, Rfl: 2  •  cilostazol (PLETAL) 50 MG tablet, Take 1 tablet by mouth 2 (Two) Times a Day., Disp: 180 tablet, Rfl: 3  •  Continuous Blood Gluc  (Dexcom G6 ) device, 1 Device Daily., Disp: 1 each, Rfl: 0  •  Continuous Blood Gluc Sensor (Dexcom G6 Sensor), Every 10 (Ten) Days., Disp: 3 each, Rfl: 11  •  Continuous Blood Gluc Transmit (Dexcom G6 Transmitter) misc, 1 each Daily., Disp: 1 each, Rfl: 2  •  docusate sodium (COLACE) 100 MG capsule, Take 1 capsule by mouth 2 (Two) Times a Day., Disp: 20 capsule, Rfl: 0  •  empagliflozin (JARDIANCE) 25 MG tablet tablet, Take 1 tablet by mouth Daily., Disp: 30 tablet, Rfl: 6  •  famotidine (PEPCID) 20 MG tablet, Take 1 tablet by mouth 2 (Two) Times a Day Before Meals., Disp: 30 tablet, Rfl: 1  •  gabapentin (NEURONTIN) 100 MG capsule, Take 2 capsules by mouth 3 (Three) Times a Day., Disp: 180 capsule, Rfl: 3  •  glucose blood (OneTouch Verio) test strip, Use as instructed, Disp: 300 each, Rfl: 2  •  hydroCHLOROthiazide (MICROZIDE) 12.5 MG capsule, TAKE 1 CAPSULE BY MOUTH EVERY DAY, Disp: 90 capsule, Rfl: 1  •  Lancets (OneTouch Delica Plus [id number]) misc, 1 each 3 (Three) Times a Day., Disp: 300 each, Rfl: 2  •  levoFLOXacin (LEVAQUIN) 750 MG tablet, Take 1 tablet by mouth Daily., Disp: , Rfl:   •  levothyroxine (Synthroid) 125 MCG tablet, 2 tablets daily to equal 250mcg, Disp: 180 tablet, Rfl: 2  •  metFORMIN (GLUCOPHAGE) 1000 MG tablet, Take 1 tablet by mouth Daily With Breakfast., Disp: , Rfl:   •  metoprolol succinate XL (TOPROL-XL) 50 MG 24 hr tablet, Take 1 tablet by mouth Daily., Disp: 60 tablet, Rfl: 11  •  nicotine (NICODERM CQ) 21 MG/24HR patch, Place 1 patch on the skin as directed by provider Daily., Disp: 30 patch, Rfl: 1  •  NovoLOG Mix 70/30 FlexPen (70-30) 100 UNIT/ML suspension pen-injector injection, Inject 0.5 mL under the skin into the appropriate area as directed 2 (Two) Times a Day Before Meals.  "INJECT 50 UNITS SUBCUTANEOUSLY IN THE MORNING AND 45 UNITS SUBCUTANEOUSLY AT BEDTIME, Disp: 45 mL, Rfl: 7  •  ondansetron (ZOFRAN) 4 MG tablet, Take 1 tablet by mouth As Needed., Disp: , Rfl:   •  Rivaroxaban (XARELTO) 2.5 MG tablet, Take 1 tablet by mouth 2 (Two) Times a Day., Disp: 60 tablet, Rfl: 11  •  rosuvastatin (CRESTOR) 20 MG tablet, Take 1 tablet by mouth Every Night., Disp: 90 tablet, Rfl: 3  •  traMADol (ULTRAM) 50 MG tablet, Take 1 tablet by mouth 2 (Two) Times a Day., Disp: , Rfl:   •  valsartan (DIOVAN) 80 MG tablet, TAKE 1 TABLET BY MOUTH EVERY DAY, Disp: 90 tablet, Rfl: 1    ROS    Vitals:    03/13/23 1010   BP: 122/74   BP Location: Left arm   Patient Position: Sitting   Pulse: 103   SpO2: 97%   Weight: 85.7 kg (189 lb)   Height: 172.7 cm (68\")         Objective:       Physical Exam    Data Review:   Procedures       Assessment:      Diagnosis Plan   1. VF (ventricular fibrillation) (HCC)   secondary prevention ICD indication present.    No identifiable reversible cause.    VF cardiac arrest well-documented.  Occurred in the post anesthesia setting.    His admission to the hospital for this occasion was heralded by an episode of syncope at home.  Indeed this could have been a precedent VF event.    Documented obstructive coronary disease.  Relatively preserved LV systolic function.  Myocardial scar on nuclear stress testing in 2020 consistent with prior infarct.  This is the milieu for cardiac arrest.    Lengthy conversation with him about my recommendation that he proceed with an implanted defibrillator.  He is interested.  We discussed him compared and contrasted transvenous versus a totally subcutaneous ICD.  He is diabetic.  He is a vasculopath.  A S ICD would strongly be favorable in the risk-benefit calculus.  He wishes to proceed with this.      Please especially note that the patient has been on maximally tolerated doses of guideline directed medical therapy, including but not limited to: " HF indicated beta-blocker (carvedilol, metoprolol succinate), ACE Inhibitor or Angiotensin receptor blocker.  Please note that for some of these medications the maximally tolerated dose may be zero.  The patient has not had a myocardial infarction within 40 days and has not had coronary revascularization within 90 days.          This patient who is a candidate for an ICD has a life expectancy greater than 12 months.      The patient and I made a mutually shared decision ( shared decision making model ) that the patient would be best served by proceeding with the above invasive heart procedure.  This is a high risk invasive cardiac procedure which comes with significant risk of morbidity and mortality.  This patient has significant underlying  heart disease.  Laron Pandey understands these risks and   has made an informed decision to proceed.    Hold Xarelto for 1 day preoperatively      2. Coronary artery disease involving coronary bypass graft of native heart without angina pectoris   aspirin.  Statin.  Dr. Block  Stable cardiac catheterization in October.        3. Primary hypertension   blood pressure well controlled.      4. Uncontrolled type 2 diabetes mellitus with hyperglycemia, with long-term current use of insulin (HCC)   hemoglobin A1c 7.4.      5. Current every day smoker   no longer smokes.        I spent 49 minutes in consultation with this patient which included more than 65% of this time in direct face-to-face counseling, physical examination and discussion of my assessment and findings and this shared decision making with the patient.  The remainder of the time not spent face-to-face was performing one, some or all of the following actions: preparing to see the patient (e.g. reviewing tests, prior clinicians' notes, etc), ordering medications, tests or procedures, coordination of care, discussion of the plan with other healthcare providers, documenting clinical information in epic as well as  independently interpreting results and communication of these results to the patient family and/or caregiver(s).  Please note that this explicitly excludes time spent on other separate billable services such as performing procedures or test interpretation, when applicable.      Please note that portions of this note may have been completed with a voice recognition program. Efforts were made to edit the dictations, but occasionally words are mistranscribed.

## 2023-03-13 NOTE — TELEPHONE ENCOUNTER
Dr. Swan is out. He needs a CSA in in the chart I believe. I didn't see one. I'll send 60 day supply and he can discuss with Dr. Swan at next appt.

## 2023-03-13 NOTE — TELEPHONE ENCOUNTER
Pt called requesting a prescription for Gabapentin 100 mg. Pt requested 90 day supply. Pt last f/u 11/15/22 pt next f/u 04/26/23

## 2023-03-14 ENCOUNTER — PREP FOR SURGERY (OUTPATIENT)
Dept: OTHER | Facility: HOSPITAL | Age: 63
End: 2023-03-14
Payer: COMMERCIAL

## 2023-03-14 DIAGNOSIS — I49.01 VF (VENTRICULAR FIBRILLATION): Primary | ICD-10-CM

## 2023-03-14 RX ORDER — SODIUM CHLORIDE 9 MG/ML
40 INJECTION, SOLUTION INTRAVENOUS AS NEEDED
OUTPATIENT
Start: 2023-03-14

## 2023-03-14 RX ORDER — ACETAMINOPHEN 325 MG/1
650 TABLET ORAL EVERY 4 HOURS PRN
OUTPATIENT
Start: 2023-03-14

## 2023-03-14 RX ORDER — ONDANSETRON 2 MG/ML
4 INJECTION INTRAMUSCULAR; INTRAVENOUS EVERY 6 HOURS PRN
OUTPATIENT
Start: 2023-03-14

## 2023-03-14 RX ORDER — NITROGLYCERIN 0.4 MG/1
0.4 TABLET SUBLINGUAL
OUTPATIENT
Start: 2023-03-14

## 2023-04-17 ENCOUNTER — TELEPHONE (OUTPATIENT)
Dept: CARDIOLOGY | Facility: CLINIC | Age: 63
End: 2023-04-17
Payer: COMMERCIAL

## 2023-04-26 ENCOUNTER — OFFICE VISIT (OUTPATIENT)
Dept: ENDOCRINOLOGY | Facility: CLINIC | Age: 63
End: 2023-04-26
Payer: COMMERCIAL

## 2023-04-26 VITALS
HEIGHT: 68 IN | HEART RATE: 92 BPM | SYSTOLIC BLOOD PRESSURE: 122 MMHG | OXYGEN SATURATION: 98 % | DIASTOLIC BLOOD PRESSURE: 84 MMHG | BODY MASS INDEX: 28.34 KG/M2 | WEIGHT: 187 LBS

## 2023-04-26 DIAGNOSIS — E11.65 UNCONTROLLED TYPE 2 DIABETES MELLITUS WITH HYPERGLYCEMIA, WITH LONG-TERM CURRENT USE OF INSULIN: Primary | ICD-10-CM

## 2023-04-26 DIAGNOSIS — I49.01 VF (VENTRICULAR FIBRILLATION): ICD-10-CM

## 2023-04-26 DIAGNOSIS — M48.062 LUMBAR STENOSIS WITH NEUROGENIC CLAUDICATION: ICD-10-CM

## 2023-04-26 DIAGNOSIS — Z79.4 UNCONTROLLED TYPE 2 DIABETES MELLITUS WITH HYPERGLYCEMIA, WITH LONG-TERM CURRENT USE OF INSULIN: Primary | ICD-10-CM

## 2023-04-26 DIAGNOSIS — E03.9 HYPOTHYROIDISM, ADULT: ICD-10-CM

## 2023-04-26 LAB
EXPIRATION DATE: NORMAL
GLUCOSE BLDC GLUCOMTR-MCNC: 234 MG/DL (ref 70–130)
HBA1C MFR BLD: 9.3 %
Lab: NORMAL

## 2023-04-26 RX ORDER — GABAPENTIN 100 MG/1
200 CAPSULE ORAL 3 TIMES DAILY
Qty: 180 CAPSULE | Refills: 3 | Status: SHIPPED | OUTPATIENT
Start: 2023-04-26

## 2023-04-26 RX ORDER — PROCHLORPERAZINE 25 MG/1
1 SUPPOSITORY RECTAL DAILY
Qty: 1 EACH | Refills: 2 | Status: SHIPPED | OUTPATIENT
Start: 2023-04-26

## 2023-04-26 RX ORDER — PROCHLORPERAZINE 25 MG/1
SUPPOSITORY RECTAL
Qty: 3 EACH | Refills: 11 | Status: SHIPPED | OUTPATIENT
Start: 2023-04-26

## 2023-04-26 RX ORDER — PEN NEEDLE, DIABETIC 29 G X1/2"
NEEDLE, DISPOSABLE MISCELLANEOUS
Qty: 300 EACH | Refills: 2 | Status: SHIPPED | OUTPATIENT
Start: 2023-04-26

## 2023-04-26 NOTE — PROGRESS NOTES
Subjective:     Chief Complaint   Patient presents with   • Diabetes     Follow up      Laron Pandey is a 62 y.o. male who is is being seen for follow-up for management of  Type 2 diabetes mellitus.  The initial diagnosis of diabetes was made in 2003. He was initially controlled with metformin and started insulin in 2010.   Diabetic complications: cardiovascular disease s/p CABG. He has neuropathy sx with tingling and pain in the legs bilaterally.       Current diabetic medications include novolog 70/30. Trulicity - he has heartburn with it. Cardiologist started SGLT2 - jardiance  Metformin - he stopped this medication due to side effects of diarrhea.     Monitoring  -Dexcom sensor.      Other med problems:CAD/CABG, HL, HTN    HL- intolerant to statins in the past. Restarted zetia.   Hypothyroidism - 250 mcg of levothyroxine  HTN -BP is normal.      Shoulder ROM improving. He still has a lot of pain in the legs and weakness.     MEDICATIONS    Current Outpatient Medications:   •  acetaminophen (TYLENOL) 500 MG tablet, Take 1 tablet by mouth Every 6 (Six) Hours As Needed for Mild Pain., Disp: , Rfl:   •  aspirin 81 MG chewable tablet, Chew 1 tablet Daily., Disp: 21 tablet, Rfl: 0  •  BD ULTRA-FINE PEN NEEDLES 29G X 12.7MM misc, Use with Insulin 3 times daily, Disp: 300 each, Rfl: 2  •  cilostazol (PLETAL) 50 MG tablet, Take 1 tablet by mouth 2 (Two) Times a Day., Disp: 180 tablet, Rfl: 3  •  Continuous Blood Gluc  (Dexcom G6 ) device, 1 Device Daily., Disp: 1 each, Rfl: 0  •  Continuous Blood Gluc Sensor (Dexcom G6 Sensor), Every 10 (Ten) Days., Disp: 3 each, Rfl: 11  •  Continuous Blood Gluc Transmit (Dexcom G6 Transmitter) misc, 1 each Daily., Disp: 1 each, Rfl: 2  •  docusate sodium (COLACE) 100 MG capsule, Take 1 capsule by mouth 2 (Two) Times a Day., Disp: 20 capsule, Rfl: 0  •  empagliflozin (JARDIANCE) 25 MG tablet tablet, Take 1 tablet by mouth Daily., Disp: 30 tablet, Rfl: 6  •  famotidine  (PEPCID) 20 MG tablet, Take 1 tablet by mouth 2 (Two) Times a Day Before Meals., Disp: 30 tablet, Rfl: 1  •  gabapentin (NEURONTIN) 100 MG capsule, Take 2 capsules by mouth 3 (Three) Times a Day., Disp: 180 capsule, Rfl: 0  •  glucose blood (OneTouch Verio) test strip, Use as instructed, Disp: 300 each, Rfl: 2  •  hydroCHLOROthiazide (MICROZIDE) 12.5 MG capsule, TAKE 1 CAPSULE BY MOUTH EVERY DAY, Disp: 90 capsule, Rfl: 1  •  Lancets (OneTouch Delica Plus [id number]) misc, 1 each 3 (Three) Times a Day., Disp: 300 each, Rfl: 2  •  levothyroxine (Synthroid) 125 MCG tablet, 2 tablets daily to equal 250mcg, Disp: 180 tablet, Rfl: 2  •  metoprolol succinate XL (TOPROL-XL) 50 MG 24 hr tablet, Take 1 tablet by mouth Daily., Disp: 60 tablet, Rfl: 11  •  nicotine (NICODERM CQ) 21 MG/24HR patch, Place 1 patch on the skin as directed by provider Daily., Disp: 30 patch, Rfl: 1  •  NovoLOG Mix 70/30 FlexPen (70-30) 100 UNIT/ML suspension pen-injector injection, Inject 0.5 mL under the skin into the appropriate area as directed 2 (Two) Times a Day Before Meals. INJECT 50 UNITS SUBCUTANEOUSLY IN THE MORNING AND 45 UNITS SUBCUTANEOUSLY AT BEDTIME (Patient taking differently: Inject 50 Units under the skin into the appropriate area as directed 2 (Two) Times a Day Before Meals. INJECT 55 UNITS SUBCUTANEOUSLY IN THE MORNING AND 50 UNITS SUBCUTANEOUSLY AT BEDTIME), Disp: 45 mL, Rfl: 7  •  ondansetron (ZOFRAN) 4 MG tablet, Take 1 tablet by mouth As Needed., Disp: , Rfl:   •  Rivaroxaban (XARELTO) 2.5 MG tablet, Take 1 tablet by mouth 2 (Two) Times a Day., Disp: 60 tablet, Rfl: 11  •  rosuvastatin (CRESTOR) 20 MG tablet, Take 1 tablet by mouth Every Night., Disp: 90 tablet, Rfl: 3  •  traMADol (ULTRAM) 50 MG tablet, Take 1 tablet by mouth 2 (Two) Times a Day., Disp: , Rfl:   •  valsartan (DIOVAN) 80 MG tablet, TAKE 1 TABLET BY MOUTH EVERY DAY, Disp: 90 tablet, Rfl: 1  •  levoFLOXacin (LEVAQUIN) 750 MG tablet, Take 1 tablet by mouth Daily.,  "Disp: , Rfl:   •  metFORMIN (GLUCOPHAGE) 1000 MG tablet, Take 1 tablet by mouth Daily With Breakfast., Disp: , Rfl:     Review of Systems  Review of Systems   Constitutional: Positive for fatigue.   Neurological: Positive for weakness and numbness (tingling).          Objective:      /84   Pulse 92   Ht 172.7 cm (68\")   Wt 84.8 kg (187 lb)   SpO2 98%   BMI 28.43 kg/m² Body mass index is 28.43 kg/m².  Physical Exam   Constitutional: He is oriented to person, place, and time. He appears well-developed.   HENT:   Head: Normocephalic and atraumatic.   Eyes: Conjunctivae are normal.   Neck: No thyroid mass present.   Cardiovascular: Normal rate, regular rhythm, normal heart sounds and normal pulses.   Pulmonary/Chest: Effort normal and breath sounds normal.   Neurological: He is alert and oriented to person, place, and time. He has normal reflexes.   Psychiatric: Thought content normal.   Vitals reviewed.          LABS AND IMAGING      Lab Results   Component Value Date    HGBA1C 8.9 01/24/2023    HGBA1C 7.2 11/15/2022    HGBA1C 7.60 (H) 10/26/2022     Hospital Outpatient Visit on 02/07/2023   Component Date Value Ref Range Status   • Target HR (85%) 02/07/2023 134  bpm Final   • Max. Pred. HR (100%) 02/07/2023 158  bpm Final   • SFA Prox PSV-Right 02/07/2023 233.00  cm/s Final   • SFA Mid PSV-Right 02/07/2023 41.80  cm/s Final   • SFA Distal PSV-Right 02/07/2023 203.00  cm/s Final   • Popiteal A Prox PSV-Right 02/07/2023 92.60  cm/s Final   • Popiteal A Distal PSV-Right 02/07/2023 32.00  cm/s Final   • Ant Tibial A Prox PSV-Right 02/07/2023 42.6  cm/s Final   • Ant Tibial A Mid PSV-Right 02/07/2023 14.7  cm/s Final   • Ant Tibial A Distal PSV-Right 02/07/2023 19.6  cm/s Final   • PTA Prox PSV-Right 02/07/2023 76.1  cm/s Final   • PTA Mid PSV-Right 02/07/2023 28.1  cm/s Final   • PTA Distal PSV-Right 02/07/2023 9.4  cm/s Final   • Peroneal Mid PSV-Right 02/07/2023 14.1  cm/s Final   • SFA Prox PSV-Left " 02/07/2023 277.8  cm/s Final   • SFA Mid PSV-Left 02/07/2023 511.00  cm/s Final   • SFA Distal PSV-Left 02/07/2023 122.00  cm/s Final   • Popiteal A Prox PSV-Left 02/07/2023 65.5  cm/s Final   • Popiteal A Distal PSV-Left 02/07/2023 38.00  cm/s Final   • Ant Tibial A Prox PSV-Left 02/07/2023 54.5  cm/s Final   • Ant Tibial A Mid PSV-Left 02/07/2023 17.8  cm/s Final   • Ant Tibial A Distal PSV-Left 02/07/2023 27.90  cm/s Final   • PTA Prox PSV-Left 02/07/2023 77.3  cm/s Final   • PTA Mid PSV-Left 02/07/2023 25.50  cm/s Final   • PTA Distal PSV-Left 02/07/2023 25.0  cm/s Final   • Peroneal Mid PSV-Left 02/07/2023 21.00  cm/s Final   • Right groin CFA sys 02/07/2023 262.7  cm/sec Final   • Left groin CFA sys 02/07/2023 180.7  cm/sec Final   • CFA Prox PSV-Right 02/07/2023 311.00  cm/s Final   • CFA Distal PSV-Right 02/07/2023 261.00  cm/s Final   • DFA Prox PSV-Right 02/07/2023 327.00  cm/s Final   • Rt. Tibeoperoneal PSV 02/07/2023 57.00  cm/s Final   • Rt Tibeoperoneal EDV 02/07/2023 8.70  cm/s Final   • Right KAE Highest Velocity Ratio 02/07/2023 4.86   Final   • Right Velocity Ratio Location 02/07/2023 Right Mid/Dist SFA   Final   • SFA Distal EDV-Right 02/07/2023 32.30  cm/s Final   • Popiteal A Prox EDV-Right 02/07/2023 13.20  cm/s Final   • Right Brachial Pressure 02/07/2023 168/93  mmHg Final   • Left Brachial Pressure 02/07/2023 176/76  mmHg Final   • RIGHT PTA PRESSURE 02/07/2023 100  mmHg Final   • LEFT PTA PRESSURE 02/07/2023 106  mmHg Final   • RIGHT DPA PRESSURE 02/07/2023 50  mmHg Final   • LEFT DPA PRESSURE 02/07/2023 100  mmHg Final   • RIGHT KARISSA RATIO 02/07/2023 0.57   Final   • LEFT KARISSA RATIO 02/07/2023 0.60   Final   • CFA Prox PSV-Left 02/07/2023 180.00  cm/s Final   • CFA Distal PSV-Left 02/07/2023 133.00  cm/s Final   • DFA Prox PSV-Left 02/07/2023 310.00  cm/s Final   • Popiteal A Mid PSV-Left 02/07/2023 56.30  cm/s Final   • Left Highest Velocity Ratio 02/07/2023 11.31   Final   • Left Velocity  Ratio Location 02/07/2023 Left Prox/Mid SFA   Final             Assessment:         Diagnoses and all orders for this visit:    Uncontrolled type 2 diabetes mellitus with hyperglycemia, with long-term current use of insulin (HCC)    Hypothyroidism    Other orders  -     BD ULTRA-FINE PEN NEEDLES 29G X 12.7MM misc; Use with Insulin 3 times daily  -     Continuous Blood Gluc Sensor (Dexcom G6 Sensor); Every 10 (Ten) Days.  -     Continuous Blood Gluc Transmit (Dexcom G6 Transmitter) misc; 1 each Daily.        Plan:       Patient has poorly controlled diabetes with hyperglycemia. Dietary recommendations reviewed. Insulin titration instructions provided.     Novolog 70/30  45 Units BID--> 50 Units BID  Trulicity discontinued due to side effects. - Nausea, belching and diarrhea     Cont Jardiance 25 mg daily.   He is not taking metformin.  Dexcom sensor downloaded and reviewed. He has a lot of hypoglycemia due to insulin stacking.     Hypoglycemia precautions reviewed.       -gabapentin for neuropathy. He had a lot if neuropathy sx.      -cont thyroid medications levothyroxine 250 mcg . Recent labs reviewed.      Follow up:  3 -4 months.

## 2023-04-26 NOTE — H&P (VIEW-ONLY)
Subjective:     Chief Complaint   Patient presents with   • Diabetes     Follow up      Laron Pandey is a 62 y.o. male who is is being seen for follow-up for management of  Type 2 diabetes mellitus.  The initial diagnosis of diabetes was made in 2003. He was initially controlled with metformin and started insulin in 2010.   Diabetic complications: cardiovascular disease s/p CABG. He has neuropathy sx with tingling and pain in the legs bilaterally.       Current diabetic medications include novolog 70/30. Trulicity - he has heartburn with it. Cardiologist started SGLT2 - jardiance  Metformin - he stopped this medication due to side effects of diarrhea.     Monitoring  -Dexcom sensor.      Other med problems:CAD/CABG, HL, HTN    HL- intolerant to statins in the past. Restarted zetia.   Hypothyroidism - 250 mcg of levothyroxine  HTN -BP is normal.      Shoulder ROM improving. He still has a lot of pain in the legs and weakness.     MEDICATIONS    Current Outpatient Medications:   •  acetaminophen (TYLENOL) 500 MG tablet, Take 1 tablet by mouth Every 6 (Six) Hours As Needed for Mild Pain., Disp: , Rfl:   •  aspirin 81 MG chewable tablet, Chew 1 tablet Daily., Disp: 21 tablet, Rfl: 0  •  BD ULTRA-FINE PEN NEEDLES 29G X 12.7MM misc, Use with Insulin 3 times daily, Disp: 300 each, Rfl: 2  •  cilostazol (PLETAL) 50 MG tablet, Take 1 tablet by mouth 2 (Two) Times a Day., Disp: 180 tablet, Rfl: 3  •  Continuous Blood Gluc  (Dexcom G6 ) device, 1 Device Daily., Disp: 1 each, Rfl: 0  •  Continuous Blood Gluc Sensor (Dexcom G6 Sensor), Every 10 (Ten) Days., Disp: 3 each, Rfl: 11  •  Continuous Blood Gluc Transmit (Dexcom G6 Transmitter) misc, 1 each Daily., Disp: 1 each, Rfl: 2  •  docusate sodium (COLACE) 100 MG capsule, Take 1 capsule by mouth 2 (Two) Times a Day., Disp: 20 capsule, Rfl: 0  •  empagliflozin (JARDIANCE) 25 MG tablet tablet, Take 1 tablet by mouth Daily., Disp: 30 tablet, Rfl: 6  •  famotidine  (PEPCID) 20 MG tablet, Take 1 tablet by mouth 2 (Two) Times a Day Before Meals., Disp: 30 tablet, Rfl: 1  •  gabapentin (NEURONTIN) 100 MG capsule, Take 2 capsules by mouth 3 (Three) Times a Day., Disp: 180 capsule, Rfl: 0  •  glucose blood (OneTouch Verio) test strip, Use as instructed, Disp: 300 each, Rfl: 2  •  hydroCHLOROthiazide (MICROZIDE) 12.5 MG capsule, TAKE 1 CAPSULE BY MOUTH EVERY DAY, Disp: 90 capsule, Rfl: 1  •  Lancets (OneTouch Delica Plus [id number]) misc, 1 each 3 (Three) Times a Day., Disp: 300 each, Rfl: 2  •  levothyroxine (Synthroid) 125 MCG tablet, 2 tablets daily to equal 250mcg, Disp: 180 tablet, Rfl: 2  •  metoprolol succinate XL (TOPROL-XL) 50 MG 24 hr tablet, Take 1 tablet by mouth Daily., Disp: 60 tablet, Rfl: 11  •  nicotine (NICODERM CQ) 21 MG/24HR patch, Place 1 patch on the skin as directed by provider Daily., Disp: 30 patch, Rfl: 1  •  NovoLOG Mix 70/30 FlexPen (70-30) 100 UNIT/ML suspension pen-injector injection, Inject 0.5 mL under the skin into the appropriate area as directed 2 (Two) Times a Day Before Meals. INJECT 50 UNITS SUBCUTANEOUSLY IN THE MORNING AND 45 UNITS SUBCUTANEOUSLY AT BEDTIME (Patient taking differently: Inject 50 Units under the skin into the appropriate area as directed 2 (Two) Times a Day Before Meals. INJECT 55 UNITS SUBCUTANEOUSLY IN THE MORNING AND 50 UNITS SUBCUTANEOUSLY AT BEDTIME), Disp: 45 mL, Rfl: 7  •  ondansetron (ZOFRAN) 4 MG tablet, Take 1 tablet by mouth As Needed., Disp: , Rfl:   •  Rivaroxaban (XARELTO) 2.5 MG tablet, Take 1 tablet by mouth 2 (Two) Times a Day., Disp: 60 tablet, Rfl: 11  •  rosuvastatin (CRESTOR) 20 MG tablet, Take 1 tablet by mouth Every Night., Disp: 90 tablet, Rfl: 3  •  traMADol (ULTRAM) 50 MG tablet, Take 1 tablet by mouth 2 (Two) Times a Day., Disp: , Rfl:   •  valsartan (DIOVAN) 80 MG tablet, TAKE 1 TABLET BY MOUTH EVERY DAY, Disp: 90 tablet, Rfl: 1  •  levoFLOXacin (LEVAQUIN) 750 MG tablet, Take 1 tablet by mouth Daily.,  "Disp: , Rfl:   •  metFORMIN (GLUCOPHAGE) 1000 MG tablet, Take 1 tablet by mouth Daily With Breakfast., Disp: , Rfl:     Review of Systems  Review of Systems   Constitutional: Positive for fatigue.   Neurological: Positive for weakness and numbness (tingling).          Objective:      /84   Pulse 92   Ht 172.7 cm (68\")   Wt 84.8 kg (187 lb)   SpO2 98%   BMI 28.43 kg/m² Body mass index is 28.43 kg/m².  Physical Exam   Constitutional: He is oriented to person, place, and time. He appears well-developed.   HENT:   Head: Normocephalic and atraumatic.   Eyes: Conjunctivae are normal.   Neck: No thyroid mass present.   Cardiovascular: Normal rate, regular rhythm, normal heart sounds and normal pulses.   Pulmonary/Chest: Effort normal and breath sounds normal.   Neurological: He is alert and oriented to person, place, and time. He has normal reflexes.   Psychiatric: Thought content normal.   Vitals reviewed.          LABS AND IMAGING      Lab Results   Component Value Date    HGBA1C 8.9 01/24/2023    HGBA1C 7.2 11/15/2022    HGBA1C 7.60 (H) 10/26/2022     Hospital Outpatient Visit on 02/07/2023   Component Date Value Ref Range Status   • Target HR (85%) 02/07/2023 134  bpm Final   • Max. Pred. HR (100%) 02/07/2023 158  bpm Final   • SFA Prox PSV-Right 02/07/2023 233.00  cm/s Final   • SFA Mid PSV-Right 02/07/2023 41.80  cm/s Final   • SFA Distal PSV-Right 02/07/2023 203.00  cm/s Final   • Popiteal A Prox PSV-Right 02/07/2023 92.60  cm/s Final   • Popiteal A Distal PSV-Right 02/07/2023 32.00  cm/s Final   • Ant Tibial A Prox PSV-Right 02/07/2023 42.6  cm/s Final   • Ant Tibial A Mid PSV-Right 02/07/2023 14.7  cm/s Final   • Ant Tibial A Distal PSV-Right 02/07/2023 19.6  cm/s Final   • PTA Prox PSV-Right 02/07/2023 76.1  cm/s Final   • PTA Mid PSV-Right 02/07/2023 28.1  cm/s Final   • PTA Distal PSV-Right 02/07/2023 9.4  cm/s Final   • Peroneal Mid PSV-Right 02/07/2023 14.1  cm/s Final   • SFA Prox PSV-Left " 02/07/2023 277.8  cm/s Final   • SFA Mid PSV-Left 02/07/2023 511.00  cm/s Final   • SFA Distal PSV-Left 02/07/2023 122.00  cm/s Final   • Popiteal A Prox PSV-Left 02/07/2023 65.5  cm/s Final   • Popiteal A Distal PSV-Left 02/07/2023 38.00  cm/s Final   • Ant Tibial A Prox PSV-Left 02/07/2023 54.5  cm/s Final   • Ant Tibial A Mid PSV-Left 02/07/2023 17.8  cm/s Final   • Ant Tibial A Distal PSV-Left 02/07/2023 27.90  cm/s Final   • PTA Prox PSV-Left 02/07/2023 77.3  cm/s Final   • PTA Mid PSV-Left 02/07/2023 25.50  cm/s Final   • PTA Distal PSV-Left 02/07/2023 25.0  cm/s Final   • Peroneal Mid PSV-Left 02/07/2023 21.00  cm/s Final   • Right groin CFA sys 02/07/2023 262.7  cm/sec Final   • Left groin CFA sys 02/07/2023 180.7  cm/sec Final   • CFA Prox PSV-Right 02/07/2023 311.00  cm/s Final   • CFA Distal PSV-Right 02/07/2023 261.00  cm/s Final   • DFA Prox PSV-Right 02/07/2023 327.00  cm/s Final   • Rt. Tibeoperoneal PSV 02/07/2023 57.00  cm/s Final   • Rt Tibeoperoneal EDV 02/07/2023 8.70  cm/s Final   • Right KAE Highest Velocity Ratio 02/07/2023 4.86   Final   • Right Velocity Ratio Location 02/07/2023 Right Mid/Dist SFA   Final   • SFA Distal EDV-Right 02/07/2023 32.30  cm/s Final   • Popiteal A Prox EDV-Right 02/07/2023 13.20  cm/s Final   • Right Brachial Pressure 02/07/2023 168/93  mmHg Final   • Left Brachial Pressure 02/07/2023 176/76  mmHg Final   • RIGHT PTA PRESSURE 02/07/2023 100  mmHg Final   • LEFT PTA PRESSURE 02/07/2023 106  mmHg Final   • RIGHT DPA PRESSURE 02/07/2023 50  mmHg Final   • LEFT DPA PRESSURE 02/07/2023 100  mmHg Final   • RIGHT KARISSA RATIO 02/07/2023 0.57   Final   • LEFT KARISSA RATIO 02/07/2023 0.60   Final   • CFA Prox PSV-Left 02/07/2023 180.00  cm/s Final   • CFA Distal PSV-Left 02/07/2023 133.00  cm/s Final   • DFA Prox PSV-Left 02/07/2023 310.00  cm/s Final   • Popiteal A Mid PSV-Left 02/07/2023 56.30  cm/s Final   • Left Highest Velocity Ratio 02/07/2023 11.31   Final   • Left Velocity  Ratio Location 02/07/2023 Left Prox/Mid SFA   Final             Assessment:         Diagnoses and all orders for this visit:    Uncontrolled type 2 diabetes mellitus with hyperglycemia, with long-term current use of insulin (HCC)    Hypothyroidism    Other orders  -     BD ULTRA-FINE PEN NEEDLES 29G X 12.7MM misc; Use with Insulin 3 times daily  -     Continuous Blood Gluc Sensor (Dexcom G6 Sensor); Every 10 (Ten) Days.  -     Continuous Blood Gluc Transmit (Dexcom G6 Transmitter) misc; 1 each Daily.        Plan:       Patient has poorly controlled diabetes with hyperglycemia. Dietary recommendations reviewed. Insulin titration instructions provided.     Novolog 70/30  45 Units BID--> 50 Units BID  Trulicity discontinued due to side effects. - Nausea, belching and diarrhea     Cont Jardiance 25 mg daily.   He is not taking metformin.  Dexcom sensor downloaded and reviewed. He has a lot of hypoglycemia due to insulin stacking.     Hypoglycemia precautions reviewed.       -gabapentin for neuropathy. He had a lot if neuropathy sx.      -cont thyroid medications levothyroxine 250 mcg . Recent labs reviewed.      Follow up:  3 -4 months.

## 2023-05-03 ENCOUNTER — PRE-ADMISSION TESTING (OUTPATIENT)
Dept: PREADMISSION TESTING | Facility: HOSPITAL | Age: 63
End: 2023-05-03
Payer: COMMERCIAL

## 2023-05-03 DIAGNOSIS — Z79.4 UNCONTROLLED TYPE 2 DIABETES MELLITUS WITH HYPERGLYCEMIA, WITH LONG-TERM CURRENT USE OF INSULIN: ICD-10-CM

## 2023-05-03 DIAGNOSIS — E11.65 UNCONTROLLED TYPE 2 DIABETES MELLITUS WITH HYPERGLYCEMIA, WITH LONG-TERM CURRENT USE OF INSULIN: ICD-10-CM

## 2023-05-03 DIAGNOSIS — E03.9 HYPOTHYROIDISM, ADULT: ICD-10-CM

## 2023-05-03 LAB
BASOPHILS # BLD AUTO: 0.04 10*3/MM3 (ref 0–0.2)
BASOPHILS NFR BLD AUTO: 0.5 % (ref 0–1.5)
DEPRECATED RDW RBC AUTO: 49.5 FL (ref 37–54)
EOSINOPHIL # BLD AUTO: 0.18 10*3/MM3 (ref 0–0.4)
EOSINOPHIL NFR BLD AUTO: 2.2 % (ref 0.3–6.2)
ERYTHROCYTE [DISTWIDTH] IN BLOOD BY AUTOMATED COUNT: 13.5 % (ref 12.3–15.4)
HCT VFR BLD AUTO: 43.9 % (ref 37.5–51)
HGB BLD-MCNC: 14.2 G/DL (ref 13–17.7)
IMM GRANULOCYTES # BLD AUTO: 0.02 10*3/MM3 (ref 0–0.05)
IMM GRANULOCYTES NFR BLD AUTO: 0.2 % (ref 0–0.5)
LYMPHOCYTES # BLD AUTO: 2.33 10*3/MM3 (ref 0.7–3.1)
LYMPHOCYTES NFR BLD AUTO: 28 % (ref 19.6–45.3)
MAGNESIUM SERPL-MCNC: 2 MG/DL (ref 1.6–2.4)
MCH RBC QN AUTO: 32 PG (ref 26.6–33)
MCHC RBC AUTO-ENTMCNC: 32.3 G/DL (ref 31.5–35.7)
MCV RBC AUTO: 98.9 FL (ref 79–97)
MONOCYTES # BLD AUTO: 0.78 10*3/MM3 (ref 0.1–0.9)
MONOCYTES NFR BLD AUTO: 9.4 % (ref 5–12)
NEUTROPHILS NFR BLD AUTO: 4.97 10*3/MM3 (ref 1.7–7)
NEUTROPHILS NFR BLD AUTO: 59.7 % (ref 42.7–76)
NRBC BLD AUTO-RTO: 0 /100 WBC (ref 0–0.2)
PLATELET # BLD AUTO: 254 10*3/MM3 (ref 140–450)
PMV BLD AUTO: 10 FL (ref 6–12)
RBC # BLD AUTO: 4.44 10*6/MM3 (ref 4.14–5.8)
T4 FREE SERPL-MCNC: 0.72 NG/DL (ref 0.93–1.7)
TSH SERPL DL<=0.05 MIU/L-ACNC: 43.31 UIU/ML (ref 0.27–4.2)
WBC NRBC COR # BLD: 8.32 10*3/MM3 (ref 3.4–10.8)

## 2023-05-03 PROCEDURE — 84443 ASSAY THYROID STIM HORMONE: CPT

## 2023-05-03 PROCEDURE — 83735 ASSAY OF MAGNESIUM: CPT | Performed by: PHYSICIAN ASSISTANT

## 2023-05-03 PROCEDURE — 85025 COMPLETE CBC W/AUTO DIFF WBC: CPT

## 2023-05-03 PROCEDURE — 84439 ASSAY OF FREE THYROXINE: CPT

## 2023-05-03 NOTE — PAT
Pt has cgm in place, reported glucose down to 68. Snack of peanut butter crackers and orange juice provided ( he reported he was fasting anticipating labs) . He did report fee;ling better after his snack .     An arrival time for procedure was not provided during PAT visit. If patient had any questions or concerns about their arrival time, they were instructed to contact their surgeon/physician.  Additionally, if the patient referred to an arrival time that was acquired from their my chart account, patient was encouraged to verify that time with their surgeon/physician. Arrival times are NOT provided in Pre Admission Testing Department.    Patient denies any current skin issues.       No order for procedure consent.  Please obtain procedure consent on the day of procedure.    Pt has his instructino packet from office at home. Encouraged him to review and follow any instructions reguarding hydration or medication instructions.

## 2023-05-04 DIAGNOSIS — E03.9 HYPOTHYROIDISM, UNSPECIFIED TYPE: Primary | ICD-10-CM

## 2023-05-12 ENCOUNTER — APPOINTMENT (OUTPATIENT)
Dept: GENERAL RADIOLOGY | Facility: HOSPITAL | Age: 63
End: 2023-05-12
Payer: COMMERCIAL

## 2023-05-12 ENCOUNTER — HOSPITAL ENCOUNTER (OUTPATIENT)
Facility: HOSPITAL | Age: 63
Discharge: HOME | End: 2023-05-12
Attending: INTERNAL MEDICINE | Admitting: INTERNAL MEDICINE
Payer: COMMERCIAL

## 2023-05-12 VITALS
TEMPERATURE: 97 F | OXYGEN SATURATION: 97 % | DIASTOLIC BLOOD PRESSURE: 69 MMHG | HEART RATE: 53 BPM | WEIGHT: 184.53 LBS | HEIGHT: 68 IN | SYSTOLIC BLOOD PRESSURE: 122 MMHG | BODY MASS INDEX: 27.97 KG/M2 | RESPIRATION RATE: 9 BRPM

## 2023-05-12 DIAGNOSIS — I49.01 VF (VENTRICULAR FIBRILLATION): ICD-10-CM

## 2023-05-12 LAB
ANION GAP SERPL CALCULATED.3IONS-SCNC: 9 MMOL/L (ref 5–15)
BUN SERPL-MCNC: 13 MG/DL (ref 8–23)
BUN/CREAT SERPL: 17.3 (ref 7–25)
CALCIUM SPEC-SCNC: 9.5 MG/DL (ref 8.6–10.5)
CHLORIDE SERPL-SCNC: 104 MMOL/L (ref 98–107)
CO2 SERPL-SCNC: 27 MMOL/L (ref 22–29)
CREAT SERPL-MCNC: 0.75 MG/DL (ref 0.76–1.27)
EGFRCR SERPLBLD CKD-EPI 2021: 102 ML/MIN/1.73
GLUCOSE BLDC GLUCOMTR-MCNC: 123 MG/DL (ref 70–130)
GLUCOSE SERPL-MCNC: 123 MG/DL (ref 65–99)
POTASSIUM SERPL-SCNC: 3.9 MMOL/L (ref 3.5–5.2)
SODIUM SERPL-SCNC: 140 MMOL/L (ref 136–145)

## 2023-05-12 PROCEDURE — 33270 INS/REP SUBQ DEFIBRILLATOR: CPT | Performed by: INTERNAL MEDICINE

## 2023-05-12 PROCEDURE — 0 LIDOCAINE 1 % SOLUTION: Performed by: INTERNAL MEDICINE

## 2023-05-12 PROCEDURE — 80048 BASIC METABOLIC PNL TOTAL CA: CPT | Performed by: INTERNAL MEDICINE

## 2023-05-12 PROCEDURE — C1896 LEAD, AICD, NON SING/DUAL: HCPCS | Performed by: INTERNAL MEDICINE

## 2023-05-12 PROCEDURE — C1722 AICD, SINGLE CHAMBER: HCPCS | Performed by: INTERNAL MEDICINE

## 2023-05-12 PROCEDURE — 25010000002 CEFAZOLIN IN DEXTROSE 2-4 GM/100ML-% SOLUTION: Performed by: INTERNAL MEDICINE

## 2023-05-12 PROCEDURE — 25010000002 FENTANYL CITRATE (PF) 50 MCG/ML SOLUTION: Performed by: INTERNAL MEDICINE

## 2023-05-12 PROCEDURE — 82948 REAGENT STRIP/BLOOD GLUCOSE: CPT

## 2023-05-12 PROCEDURE — 71046 X-RAY EXAM CHEST 2 VIEWS: CPT

## 2023-05-12 PROCEDURE — 99153 MOD SED SAME PHYS/QHP EA: CPT | Performed by: INTERNAL MEDICINE

## 2023-05-12 PROCEDURE — 99152 MOD SED SAME PHYS/QHP 5/>YRS: CPT | Performed by: INTERNAL MEDICINE

## 2023-05-12 PROCEDURE — 25010000002 MIDAZOLAM PER 1 MG: Performed by: INTERNAL MEDICINE

## 2023-05-12 PROCEDURE — 25010000002 ONDANSETRON PER 1 MG: Performed by: INTERNAL MEDICINE

## 2023-05-12 DEVICE — SUBCUTANEOUS ELECTRODE
Type: IMPLANTABLE DEVICE | Site: CHEST | Status: FUNCTIONAL
Brand: EMBLEM™ S-ICD

## 2023-05-12 DEVICE — SUBCUTANEOUS IMPLANTABLE CARDIOVERTER DEFIBRILLATOR
Type: IMPLANTABLE DEVICE | Site: CHEST | Status: FUNCTIONAL
Brand: EMBLEM™ MRI S-ICD

## 2023-05-12 RX ORDER — BUPIVACAINE HYDROCHLORIDE 5 MG/ML
INJECTION, SOLUTION PERINEURAL
Status: DISCONTINUED | OUTPATIENT
Start: 2023-05-12 | End: 2023-05-12 | Stop reason: HOSPADM

## 2023-05-12 RX ORDER — SODIUM CHLORIDE 9 MG/ML
40 INJECTION, SOLUTION INTRAVENOUS AS NEEDED
Status: DISCONTINUED | OUTPATIENT
Start: 2023-05-12 | End: 2023-05-12 | Stop reason: HOSPADM

## 2023-05-12 RX ORDER — ACETAMINOPHEN 325 MG/1
650 TABLET ORAL EVERY 4 HOURS PRN
Status: DISCONTINUED | OUTPATIENT
Start: 2023-05-12 | End: 2023-05-12 | Stop reason: HOSPADM

## 2023-05-12 RX ORDER — NITROGLYCERIN 0.4 MG/1
0.4 TABLET SUBLINGUAL
Status: DISCONTINUED | OUTPATIENT
Start: 2023-05-12 | End: 2023-05-12 | Stop reason: HOSPADM

## 2023-05-12 RX ORDER — SODIUM CHLORIDE 9 MG/ML
INJECTION, SOLUTION INTRAVENOUS
Status: COMPLETED | OUTPATIENT
Start: 2023-05-12 | End: 2023-05-12

## 2023-05-12 RX ORDER — MIDAZOLAM HYDROCHLORIDE 1 MG/ML
INJECTION INTRAMUSCULAR; INTRAVENOUS
Status: DISCONTINUED | OUTPATIENT
Start: 2023-05-12 | End: 2023-05-12 | Stop reason: HOSPADM

## 2023-05-12 RX ORDER — ONDANSETRON 2 MG/ML
INJECTION INTRAMUSCULAR; INTRAVENOUS
Status: DISCONTINUED | OUTPATIENT
Start: 2023-05-12 | End: 2023-05-12 | Stop reason: HOSPADM

## 2023-05-12 RX ORDER — FENTANYL CITRATE 50 UG/ML
INJECTION, SOLUTION INTRAMUSCULAR; INTRAVENOUS
Status: DISCONTINUED | OUTPATIENT
Start: 2023-05-12 | End: 2023-05-12 | Stop reason: HOSPADM

## 2023-05-12 RX ORDER — METOPROLOL SUCCINATE 25 MG/1
50 TABLET, EXTENDED RELEASE ORAL
Status: DISCONTINUED | OUTPATIENT
Start: 2023-05-12 | End: 2023-05-12 | Stop reason: HOSPADM

## 2023-05-12 RX ORDER — CEFAZOLIN SODIUM 2 G/100ML
2 INJECTION, SOLUTION INTRAVENOUS ONCE
Status: COMPLETED | OUTPATIENT
Start: 2023-05-12 | End: 2023-05-12

## 2023-05-12 RX ORDER — IBUPROFEN 600 MG/1
600 TABLET ORAL EVERY 6 HOURS SCHEDULED
Qty: 20 TABLET | Refills: 0 | Status: SHIPPED | OUTPATIENT
Start: 2023-05-12 | End: 2023-05-17

## 2023-05-12 RX ORDER — ASPIRIN 81 MG/1
81 TABLET, CHEWABLE ORAL DAILY
Status: DISCONTINUED | OUTPATIENT
Start: 2023-05-12 | End: 2023-05-12 | Stop reason: HOSPADM

## 2023-05-12 RX ORDER — IBUPROFEN 600 MG/1
600 TABLET ORAL EVERY 6 HOURS SCHEDULED
Status: DISCONTINUED | OUTPATIENT
Start: 2023-05-12 | End: 2023-05-12 | Stop reason: HOSPADM

## 2023-05-12 RX ORDER — LIDOCAINE HYDROCHLORIDE 10 MG/ML
INJECTION, SOLUTION INFILTRATION; PERINEURAL
Status: DISCONTINUED | OUTPATIENT
Start: 2023-05-12 | End: 2023-05-12 | Stop reason: HOSPADM

## 2023-05-12 RX ORDER — ACETAMINOPHEN 325 MG/1
650 TABLET ORAL EVERY 6 HOURS SCHEDULED
Qty: 40 TABLET | Refills: 0 | Status: SHIPPED | OUTPATIENT
Start: 2023-05-12 | End: 2023-05-17

## 2023-05-12 RX ORDER — ONDANSETRON 2 MG/ML
4 INJECTION INTRAMUSCULAR; INTRAVENOUS EVERY 6 HOURS PRN
Status: DISCONTINUED | OUTPATIENT
Start: 2023-05-12 | End: 2023-05-12 | Stop reason: HOSPADM

## 2023-05-12 RX ORDER — ACETAMINOPHEN 325 MG/1
650 TABLET ORAL EVERY 6 HOURS
Status: DISCONTINUED | OUTPATIENT
Start: 2023-05-12 | End: 2023-05-12 | Stop reason: HOSPADM

## 2023-05-12 RX ADMIN — ASPIRIN 81 MG 81 MG: 81 TABLET ORAL at 10:37

## 2023-05-12 RX ADMIN — CEFAZOLIN SODIUM 2 G: 2 INJECTION, SOLUTION INTRAVENOUS at 08:26

## 2023-05-12 RX ADMIN — ACETAMINOPHEN 650 MG: 325 TABLET ORAL at 10:37

## 2023-05-12 NOTE — DISCHARGE INSTR - APPOINTMENTS
"1) We ask you the patient to to leave the wound open to the air.  The glue is the bandage.     2) We ask you to not shower or get the wounds wet for 2 days only.  After this you may shower but we ask that you not scrub or attempt to \"clean\" your surgery wounds.     3) Patients may experience some discomfort especially when performing side bending or twisting of the torso.  This is normal.  Mild to moderate pain is normal and is to be expected.  While there is no explicit recommendation to not lift any heavy objects or raise the left arm above a certain level, doing so may result in some discomfort.  The discomfort should be limiting factor in performing these maneuvers.  In other words, discomfort should limit your activity.  It is not our goal to completely eliminate your pain as this may lead to overactivity, poor wound healing and worse outcomes.  We prefer not to prescribe narcotics for this reason.     4) We ask that you call our office at  with any questions, 24 hours a day and specifically ask for the EP doctor on call with any questions about the device or the wounds.     5) We ask that you follow up with our EP Service Nurse Practitioners in approximately one week and then with me in 3 months.  "

## 2023-05-12 NOTE — INTERVAL H&P NOTE
H&P updated. The patient was examined and the following changes are noted:  as below     The patient and I made a mutually shared decision ( shared decision making model ) that the patient would be best served by proceeding with the above invasive heart procedure.  This is a high risk invasive cardiac procedure which comes with significant risk of morbidity and mortality.  This patient has significant underlying  heart disease.  Laron Pandey understands these risks and   has made an informed decision to proceed.      Please especially note that the patient has been on maximally tolerated doses of guideline directed medical therapy, including but not limited to: HF indicated beta-blocker (carvedilol, metoprolol succinate), ACE Inhibitor or Angiotensin receptor blocker.  Please note that for some of these medications the maximally tolerated dose may be zero.  The patient has not had a myocardial infarction within 40 days and has not had coronary revascularization within 90 days.        This patient who is a candidate for an ICD has a life expectancy greater than 12 months.

## 2023-05-12 NOTE — Clinical Note
3 x-ray detectable sponge(s) removed from the pocket and methodical wound exploration (MWE) performed.

## 2023-05-12 NOTE — OP NOTE
-      Cardiac Electrophysiology Procedure Note       [facility]    PROCEDURE PERFORMED:  IMPLANTATION OF A TOTALLY SUBCUTANEOUS                                                           ICD (SICD).    OPERATIONS PERFORMED: Implantation of a [employer] SICD A219 [id number] pulse generator with [employer] SICD lead 0242, [id number] serial number and  defibrillation threshold testing.    ATTENDING SURGEON:  Mahin Santillan DO    MODERATE SEDATION FOR PROCEDURE:    Moderate sedation was given during this procedure.    I supervised and directed RN to administer this sedation.  This staff member also monitored the patient's hemodynamic and respiratory status and response to these medications.  Please see the full detailed procedure report generated by the electrophysiology laboratory staff.  The patient tolerated moderate sedation well.  There were no complications regarding sedation.  The total dose of fentanyl was 200 mcg and the total dose of midazolam was 5 mg.  The total dose of Brevital was 40 mg.  First sedation was administered at 0831 and continued through 0939.    ESTIMATED BLOOD LOSS: 0    RADIATION EXPOSURE: 0 minutes and 0 milliGray.    COMPLICATIONS: none    TIME OUT: Time out was completed with verification of the correct patient identity, procedure to be performed, procedure site and implanted equipment.    INDICATION FOR PROCEDURE:  Briefly,  Laron Pandey is a 62 y.o. year old male with a history of ischemic cardiomyopathy with a QRS duration of less than 90 milliseconds, normal LV systolic function, documented myocardial scar by nuclear stress testing, ventricular tachycardia cardiac arrest without reversible identifiable cause.  This is a secondary prevention ICD system.  The patient has been wearing a LifeVest.  He has New York Heart Association functional class II heart failure.     Please especially note that the patient has been on maximally  tolerated doses of guideline directed medical therapy, including but not limited to: HF indicated beta-blocker (carvedilol, metoprolol succinate), ACE Inhibitor or Angiotensin receptor blocker.  Please note that for some of these medications the maximally tolerated dose may be zero.  The patient has not had a myocardial infarction within 40 days and has not had coronary revascularization within 90 days.    This patient who is a candidate for an ICD has a life expectancy greater than 12 months.    PROCEDURE AND FINDINGS:     The patient was able to give written informed consent after revisiting the key portions of the risk versus benefit profile of the procedure.  This discussion was framed by our lengthy conversations  (please see our detailed notes).  Patient verbalized strong understanding of this discussion and a strong desire to proceed with the procedure.  Please note that this detailed informed consent process utilized mutual and shared decision making process between all parties involved, principally the physician and patient, but also potentially with input from the patient's selected family and friends.    The patient was brought to the electrophysiology suite in a post-absorptive state.  Informed consent was obtained prior to the procedure and confirmed.  Intravenous prophylactic antibiotics were administered and confirmed to be completely infused prior to start of the procedure.  Additionally intravenous acetominophen was given prophylactically for pain as well.    Patient was appropriately screened for SICD implantation.  The following vectors were deemed acceptable in both the supine and standing position: All 3 vectors.    We assessed the patient for the appropriate site of implantation for the S ICD using a combination of surface anatomic landmarks including the angle of Les suprasternal notch xiphoid process 4th and 5th intercostal spaces mid axillary line and both right and left force of the  sternum.  We confirmed the correct device implantation site with fluoroscopy.  The correct site of implantation was marked with an indelible ink marker.    The patient was then prepared and draped in routine sterile fashion.  After the site of implantation was prepped and draped in the usual sterile fashion and after adequate anesthesia was given, the skin was infiltrated with 1% lidocaine and 0.5% bupivicaine 50/50 mixture.  The skin was incised with a #10 scalpel.  There were two incisions.  The primary incision is at the left lateral chest wall at the level of the 4th intercostal space approximately.  The secondary incision is at the xiphoid process.  With all incisions blunt, Metzenbaum scissor and electrosurgical dissection was carried out to the level of the fascia with careful attention paid to hemostasis.  A pocket to house the pulse generator was formed between the muscular fascia and subcutaneous fat with blunt and electrosurgical dissection.  The pocket dissection was carried out in a posterior direction.  Critical muscular anatomic landmarks of the serratus anterior pectoralis major and latissimus dorsi muscles were noted. These muscles formed the medial anterior and posterior aspects of the device pocket.  The pocket was copiously irrigated with antibiotic containing normal saline and subsequently observed.  Once adequate hemostasis was confirmed we packed the primary incision with several laparotomy sponges soaked in lidocaine bupivacaine epinephrine and gentamicin.  These were later removed with the correct surgical sponge count after the totally portion of the procedure.  We then tunneled the lead from the primary incision to the secondary incision and then along the sternum.  The lead was sutured appropriately to fascia with nonabsorbable suture.    We then removed the laparotomy sponges from the primary device pocket.  Once again we confirmed the correct surgical sponge count.    The S ICD pulse  "generator was brought onto the field the pin of the lead was then connected to the appropriate port.  Testing was performed showing this carried connection.  We then placed the S ICD pulse generator within the pocket.  The device was then secured to the header stitch.    Both wounds were then closed with 2 layers of absorbable suture and finally a layer of surgical adhesive.   We then placed a surgical dressing on all 3 wounds.    Noninvasive shock testing revealed an impedance of 57 ohms across the high voltage circuit.    The patient is this ICD was optimized in the room prior to the patient leaving.    The S ICD chose the secondary vector, however the primary and alternate vectors were also acceptable.    UNDERLYING RHYTHM: Sinus    PROGRAMMED DEVICE DATA:    Post shock pacing was programmed on.  Shock polarity is programmed as standard.  Two zones of tachycardia detection program.  There is a conditional shock zone programmed at 200 bpm and shock zone is additionally programmed at 250 bpm.  PLEASE NOTE THAT THE SHOCK OUTPUT ON THE S ICD IS NON PROGRAMMABLE AND IS FIXED AT 80 JOULES.    CONCLUSION:  Successful implantation of an SICD system.    RECOMMENDATIONS:  No Lovenox or heparin at any dose is to be given.        FOR THE PATIENT        1) We ask you the patient to to leave the wound open to the air.  The glue is the bandage.    2) We ask you to not shower or get the wounds wet for 2 days only.  After this you may shower but we ask that you not scrub or attempt to \"clean\" your surgery wounds.    3) Patients may experience some discomfort especially when performing side bending or twisting of the torso.  This is normal.  Mild to moderate pain is normal and is to be expected.  While there is no explicit recommendation to not lift any heavy objects or raise the left arm above a certain level, doing so may result in some discomfort.  The discomfort should be limiting factor in performing these maneuvers.  In " other words, discomfort should limit your activity.  It is not our goal to completely eliminate your pain as this may lead to overactivity, poor wound healing and worse outcomes.  We prefer not to prescribe narcotics for this reason.    4) We ask that you call our office at  with any questions, 24 hours a day and specifically ask for the EP doctor on call with any questions about the device or the wounds.    5) We ask that you follow up with our EP Service Nurse Practitioners in approximately one week and then with me in 3 months.          Mahin Santillan, DO, FACC, RS  Cardiac Electrophysiologist  [facility] / [facility]

## 2023-05-12 NOTE — Clinical Note
Prepped: left chest. Prepped with: ChloraPrep. The site was clipped. The patient was draped in a sterile fashion. Mid axillary and subxiphoid

## 2023-05-15 ENCOUNTER — CALL CENTER PROGRAMS (OUTPATIENT)
Dept: CALL CENTER | Facility: HOSPITAL | Age: 63
End: 2023-05-15
Payer: COMMERCIAL

## 2023-05-15 NOTE — OUTREACH NOTE
PCI/Device Survey    Flowsheet Row Responses   Facility patient discharged from? [facility]   Procedure date 05/12/23   Procedure (if device, specify in description) Device   Device Description Implantation of a [employer] SICD A219 [id number] number pulse generator with [employer] SICD lead 5699, [id number] serial number and  defibrillation threshold testing.   Performing MD Dr. Mahin Santillan   Attempt successful? Yes   Call start time 0003   Call end time 1207   Symptom comments Swelling is only issue he says.   Is the patient taking prescribed medications: ASA, Plavix  [and another he says]   Nursing intervention Nurse provided patient education   Does the patient have any of the following symptoms related to the cath/surgical site? Bruising   Nursing intervention Patient education provided   Does the patient have an appointment scheduled with the cardiologist? Yes   Appointment comments Checkup on Friday he says.   If the patient is a current smoker, are they able to teach back resources for cessation? [id number]   Did the patient feel prepared to go home on the same day as the procedure? Yes   Is the patient satisfied with the same day discharge process? Yes   Discharge process comments No issues at this time.   PCI/Device call completed Yes   Wrap up additional comments Encouraged smoking cessation.          Divya KAY - Registered Nurse

## 2023-05-19 ENCOUNTER — OFFICE VISIT (OUTPATIENT)
Dept: CARDIOLOGY | Facility: CLINIC | Age: 63
End: 2023-05-19
Payer: COMMERCIAL

## 2023-05-19 DIAGNOSIS — I49.01 VF (VENTRICULAR FIBRILLATION): Primary | ICD-10-CM

## 2023-05-19 NOTE — PROGRESS NOTES
05/19/2023    Name: Laron Pandey  YOB: 1960    WOUND CHECK    Patient has fever: [] YES   [x] NO     Temperature if indicated:     Wound Location:  Xiphoid-Process     Surgical Glue: intact     Wound Appearance: clear/intact     Plan: normal wound check      Did patient receive home monitoring box? [x] YES   [] NO  (If no, contact device clinic.)    Does patient have questions about remote monitoring? [] YES   [x] NO (If yes notify device clinic)      Notes: explained to patient that purple glue would fall off as incisions heal     Appointment for follow-up scheduled for 3 months post procedure [x]    Future Appointments   Date Time Provider Department Center   6/5/2023 11:00 AM [facility] RM 2 [facility]   6/5/2023  1:45 PM Tucker Flores MD [facility]   8/2/2023  3:45 PM Mychal Block MD [facility]   8/11/2023  2:15 PM Meka Swan MD [facility]   8/14/2023  9:00 AM Mahin Santillan DO [facility]          Henny Crook MA, 05/19/23

## 2023-06-05 ENCOUNTER — OFFICE VISIT (OUTPATIENT)
Dept: NEUROSURGERY | Facility: CLINIC | Age: 63
End: 2023-06-05
Payer: COMMERCIAL

## 2023-06-05 ENCOUNTER — HOSPITAL ENCOUNTER (OUTPATIENT)
Dept: CARDIOLOGY | Facility: HOSPITAL | Age: 63
Discharge: HOME | End: 2023-06-05
Admitting: NEUROLOGICAL SURGERY
Payer: COMMERCIAL

## 2023-06-05 VITALS — WEIGHT: 186.4 LBS | BODY MASS INDEX: 28.25 KG/M2 | HEIGHT: 68 IN

## 2023-06-05 VITALS — HEIGHT: 67 IN | WEIGHT: 184 LBS | BODY MASS INDEX: 28.88 KG/M2

## 2023-06-05 DIAGNOSIS — I73.9 PAD (PERIPHERAL ARTERY DISEASE): Primary | ICD-10-CM

## 2023-06-05 DIAGNOSIS — I65.22 LEFT CAROTID STENOSIS: ICD-10-CM

## 2023-06-05 LAB
BH CV DISTAL RIGHT ICA HIDDEN LRR: 1 CM
BH CV MID RIGHT ICA HIDDEN LRR: 1 CM
BH CV VAS CAROTID RIGHT DISTAL STENT EDV: 37.7 CM/S
BH CV VAS CAROTID RIGHT DISTAL STENT PSV: 124 CM/S
BH CV VAS CAROTID RIGHT DISTAL TO STENT NATIVE VESSEL E: 28.3 CM/S
BH CV VAS CAROTID RIGHT DISTAL TO STENT PSV: 107 CM/S
BH CV VAS CAROTID RIGHT MID STENT EDV: 38.5 CM/S
BH CV VAS CAROTID RIGHT MID STENT PSV: 125 CM/S
BH CV VAS CAROTID RIGHT PROXIMAL STENT EDV: 23.2 CM/S
BH CV VAS CAROTID RIGHT PROXIMAL STENT PSV: 77.7 CM/S
BH CV VAS CAROTID RIGHT STENT NATIVE VESSEL PROXIMAL EDV: 25.1 CM/S
BH CV VAS CAROTID RIGHT STENT NATIVE VESSEL PROXIMAL PS: 77.5 CM/S
BH CV XLRA MEAS LEFT DIST CCA EDV: 33.4 CM/SEC
BH CV XLRA MEAS LEFT DIST CCA PSV: 165 CM/SEC
BH CV XLRA MEAS LEFT DIST ICA EDV: 20.4 CM/SEC
BH CV XLRA MEAS LEFT DIST ICA PSV: 73.9 CM/SEC
BH CV XLRA MEAS LEFT ICA/CCA RATIO: 3
BH CV XLRA MEAS LEFT MID CCA EDV: 25.5 CM/SEC
BH CV XLRA MEAS LEFT MID CCA PSV: 115.9 CM/SEC
BH CV XLRA MEAS LEFT MID ICA EDV: 78.6 CM/SEC
BH CV XLRA MEAS LEFT MID ICA PSV: 267 CM/SEC
BH CV XLRA MEAS LEFT PROX CCA EDV: 26.4 CM/SEC
BH CV XLRA MEAS LEFT PROX CCA PSV: 172.2 CM/SEC
BH CV XLRA MEAS LEFT PROX ECA EDV: 38.7 CM/SEC
BH CV XLRA MEAS LEFT PROX ECA PSV: 351 CM/SEC
BH CV XLRA MEAS LEFT PROX ICA EDV: 101 CM/SEC
BH CV XLRA MEAS LEFT PROX ICA PSV: 351 CM/SEC
BH CV XLRA MEAS LEFT PROX SCLA PSV: 355.5 CM/SEC
BH CV XLRA MEAS LEFT VERTEBRAL A EDV: 15.7 CM/SEC
BH CV XLRA MEAS LEFT VERTEBRAL A PSV: 57.9 CM/SEC
BH CV XLRA MEAS RIGHT DIST CCA EDV: 29.3 CM/SEC
BH CV XLRA MEAS RIGHT DIST CCA PSV: 85.2 CM/SEC
BH CV XLRA MEAS RIGHT DIST ICA EDV: 37.7 CM/SEC
BH CV XLRA MEAS RIGHT DIST ICA PSV: 124 CM/SEC
BH CV XLRA MEAS RIGHT MID CCA EDV: 22.3 CM/SEC
BH CV XLRA MEAS RIGHT MID CCA PSV: 78.9 CM/SEC
BH CV XLRA MEAS RIGHT MID ICA EDV: 38.5 CM/SEC
BH CV XLRA MEAS RIGHT MID ICA PSV: 125 CM/SEC
BH CV XLRA MEAS RIGHT PROX CCA EDV: 23.9 CM/SEC
BH CV XLRA MEAS RIGHT PROX CCA PSV: 125.7 CM/SEC
BH CV XLRA MEAS RIGHT PROX ECA EDV: 12.2 CM/SEC
BH CV XLRA MEAS RIGHT PROX ECA PSV: 48.5 CM/SEC
BH CV XLRA MEAS RIGHT PROX ICA EDV: 18.2 CM/SEC
BH CV XLRA MEAS RIGHT PROX ICA PSV: 66.2 CM/SEC
BH CV XLRA MEAS RIGHT PROX SCLA PSV: 223.1 CM/SEC
BH CV XLRA MEAS RIGHT VERTEBRAL A EDV: 29.3 CM/SEC
BH CV XLRA MEAS RIGHT VERTEBRAL A PSV: 95.7 CM/SEC
BH CVPROX RIGHT ICA HIDDEN LRR: 1 CM
LEFT ARM BP: NORMAL MMHG
RIGHT ARM BP: NORMAL MMHG

## 2023-06-05 PROCEDURE — 93880 EXTRACRANIAL BILAT STUDY: CPT | Performed by: INTERNAL MEDICINE

## 2023-06-05 PROCEDURE — 99214 OFFICE O/P EST MOD 30 MIN: CPT | Performed by: NEUROLOGICAL SURGERY

## 2023-06-05 PROCEDURE — 93880 EXTRACRANIAL BILAT STUDY: CPT

## 2023-06-05 RX ORDER — LEVOTHYROXINE SODIUM 0.12 MG/1
TABLET ORAL
Qty: 180 TABLET | Refills: 0 | Status: SHIPPED | OUTPATIENT
Start: 2023-06-05

## 2023-06-05 NOTE — TELEPHONE ENCOUNTER
Rx Refill Note  Requested Prescriptions     Pending Prescriptions Disp Refills    levothyroxine (SYNTHROID, LEVOTHROID) 125 MCG tablet [Pharmacy Med Name: LEVOTHYROXINE 125 MCG TABLET] 180 tablet 2     Sig: TAKE 2 TABLETS BY MOUTH DAILY      Last office visit with prescribing clinician: 4/26/2023   Last telemedicine visit with prescribing clinician: Visit date not found   Next office visit with prescribing clinician: 8/11/2023                           Kala Greer CMA  06/05/23, 08:53 EDT

## 2023-06-05 NOTE — PROGRESS NOTES
"Subjective     Chief Complaint: Status post R ICA stent 6/22    Patient ID: Laron Pandey is a 62 y.o. male is here today for follow-up.    History of Present Illness    This is a 62-year-old man in whom I placed a right ICA stent in June 2022.  He presents today to discuss the results of his most recent carotid ultrasound.    The following portions of the patient's history were reviewed and updated as appropriate: allergies, current medications, past family history, past medical history, past social history, past surgical history and problem list.    Family history:   Family History   Problem Relation Age of Onset    Diabetes Mother     Heart disease Mother     Diabetes Father     Heart disease Father     Stroke Father     Heart attack Neg Hx     Hypertension Neg Hx     Thyroid disease Neg Hx        Social history:   Social History     Socioeconomic History    Marital status:    Tobacco Use    Smoking status: Every Day     Packs/day: 0.25     Years: 50.00     Pack years: 12.50     Types: Cigarettes    Smokeless tobacco: Never   Vaping Use    Vaping Use: Never used   Substance and Sexual Activity    Alcohol use: Yes     Alcohol/week: 2.0 standard drinks     Types: 2 Cans of beer per week     Comment: social    Drug use: Never    Sexual activity: Not Currently     Partners: Female       Review of Systems    Objective   Height 172.7 cm (68\"), weight 84.6 kg (186 lb 6.4 oz).  Body mass index is 28.34 kg/m².    Physical Exam  Vitals reviewed.   Constitutional:       General: He is not in acute distress.     Appearance: He is well-developed. He is not diaphoretic.   HENT:      Head: Normocephalic and atraumatic.   Pulmonary:      Effort: Pulmonary effort is normal.   Skin:     General: Skin is warm and dry.   Neurological:      Mental Status: He is alert and oriented to person, place, and time.   Psychiatric:         Behavior: Behavior normal.       Assessment & Plan     Independent Review of Radiographic " Studies:      Billable for my review is a carotid ultrasound which was performed earlier today this demonstrates peak systolic velocities on the right side of 125 cm/s.  Peak systolic velocities in the left side are 351 cm/s with a ratio of 3.  End-diastolic velocities are 101 cm/s.    Medical Decision Making:      Signs and symptoms of stroke were once again reviewed.  Although the patient does exhibit concerning criteria by ultrasound, he does have an ultrasound which was performed immediately after a diagnostic cerebral angiogram of his left internal carotid artery.  This demonstrated moderate stenosis, so I am comfortable following him with carotid ultrasounds assuming that it remains asymptomatic and there is no significant change in his velocities.  With regards to the right-sided disease, he will be on Plavix and Eliquis indefinitely although he needs to stop either of those medications for any medical procedures that is probably safe at this point.  I asked him to coordinate any starting and stopping of anticoagulants and/or antiplatelets with my office.  I also reviewed the signs and symptoms of stroke and directed him to call 911 if he thinks he is having a stroke and/or TIA.  I will follow-up with him in 6 months with a repeat carotid ultrasound.    Diagnoses and all orders for this visit:    1. PAD (peripheral artery disease) (Primary)  -     Duplex Carotid Ultrasound CAR    2. Left carotid stenosis  -     Duplex Carotid Ultrasound CAR        No follow-ups on file.           This document signed by RIRI Flores MD June 5, 2023 15:28 EDT

## 2023-06-19 RX ORDER — HYDROCHLOROTHIAZIDE 12.5 MG/1
CAPSULE, GELATIN COATED ORAL
Qty: 90 CAPSULE | Refills: 1 | Status: SHIPPED | OUTPATIENT
Start: 2023-06-19

## 2023-06-19 RX ORDER — VALSARTAN 80 MG/1
TABLET ORAL
Qty: 90 TABLET | Refills: 1 | Status: SHIPPED | OUTPATIENT
Start: 2023-06-19

## 2023-08-02 ENCOUNTER — OFFICE VISIT (OUTPATIENT)
Dept: CARDIOLOGY | Facility: CLINIC | Age: 63
End: 2023-08-02
Payer: COMMERCIAL

## 2023-08-02 VITALS
HEIGHT: 68 IN | WEIGHT: 186 LBS | BODY MASS INDEX: 28.19 KG/M2 | OXYGEN SATURATION: 97 % | SYSTOLIC BLOOD PRESSURE: 130 MMHG | DIASTOLIC BLOOD PRESSURE: 74 MMHG | HEART RATE: 55 BPM

## 2023-08-02 DIAGNOSIS — F17.200 CURRENT EVERY DAY SMOKER: ICD-10-CM

## 2023-08-02 DIAGNOSIS — E78.2 MIXED HYPERLIPIDEMIA: ICD-10-CM

## 2023-08-02 DIAGNOSIS — I25.810 CORONARY ARTERY DISEASE INVOLVING CORONARY BYPASS GRAFT OF NATIVE HEART WITHOUT ANGINA PECTORIS: Primary | ICD-10-CM

## 2023-08-02 DIAGNOSIS — I49.01 VF (VENTRICULAR FIBRILLATION): ICD-10-CM

## 2023-08-02 DIAGNOSIS — I73.9 PAD (PERIPHERAL ARTERY DISEASE): ICD-10-CM

## 2023-08-11 ENCOUNTER — OFFICE VISIT (OUTPATIENT)
Dept: ENDOCRINOLOGY | Facility: CLINIC | Age: 63
End: 2023-08-11
Payer: COMMERCIAL

## 2023-08-11 VITALS
WEIGHT: 186 LBS | HEART RATE: 54 BPM | HEIGHT: 68 IN | BODY MASS INDEX: 28.19 KG/M2 | OXYGEN SATURATION: 98 % | SYSTOLIC BLOOD PRESSURE: 120 MMHG | DIASTOLIC BLOOD PRESSURE: 76 MMHG

## 2023-08-11 DIAGNOSIS — Z79.4 UNCONTROLLED TYPE 2 DIABETES MELLITUS WITH HYPERGLYCEMIA, WITH LONG-TERM CURRENT USE OF INSULIN: Primary | ICD-10-CM

## 2023-08-11 DIAGNOSIS — E11.65 UNCONTROLLED TYPE 2 DIABETES MELLITUS WITH HYPERGLYCEMIA, WITH LONG-TERM CURRENT USE OF INSULIN: Primary | ICD-10-CM

## 2023-08-11 DIAGNOSIS — E03.9 HYPOTHYROIDISM, UNSPECIFIED TYPE: ICD-10-CM

## 2023-08-11 LAB
EXPIRATION DATE: ABNORMAL
EXPIRATION DATE: NORMAL
GLUCOSE BLDC GLUCOMTR-MCNC: 190 MG/DL (ref 70–130)
HBA1C MFR BLD: 8.9 %
Lab: ABNORMAL
Lab: NORMAL

## 2023-08-11 PROCEDURE — 84443 ASSAY THYROID STIM HORMONE: CPT | Performed by: INTERNAL MEDICINE

## 2023-08-11 PROCEDURE — 80053 COMPREHEN METABOLIC PANEL: CPT | Performed by: INTERNAL MEDICINE

## 2023-08-11 PROCEDURE — 84439 ASSAY OF FREE THYROXINE: CPT | Performed by: INTERNAL MEDICINE

## 2023-08-11 RX ORDER — LEVOTHYROXINE SODIUM 0.12 MG/1
TABLET ORAL
Qty: 180 TABLET | Refills: 3 | Status: SHIPPED | OUTPATIENT
Start: 2023-08-11

## 2023-08-11 NOTE — PROGRESS NOTES
Subjective:     Chief Complaint   Patient presents with    Diabetes      Laron Pandey is a 62 y.o. male who is is being seen for follow-up for management of  Type 2 diabetes mellitus.  The initial diagnosis of diabetes was made in 2003. He was initially controlled with metformin and started insulin in 2010.   Diabetic complications: cardiovascular disease s/p CABG. He has neuropathy sx with tingling and pain in the legs bilaterally.       Current diabetic medications include novolog 70/30. Trulicity - he has heartburn with it. Cardiologist started SGLT2 - Jardiance  Metformin - he stopped this medication due to side effects of diarrhea.     Monitoring  -Dexcom sensor.      Other med problems:CAD/CABG, HL, HTN    HL- intolerant to statins in the past. Restarted zetia.   Hypothyroidism - 250 mcg of levothyroxine  HTN -BP is normal.      Shoulder ROM improving. He still has a lot of pain in the legs and weakness.     MEDICATIONS    Current Outpatient Medications:     acetaminophen (TYLENOL) 500 MG tablet, Take 1 tablet by mouth Every 6 (Six) Hours As Needed for Mild Pain., Disp: , Rfl:     aspirin 81 MG chewable tablet, Chew 1 tablet Daily., Disp: 21 tablet, Rfl: 0    BD ULTRA-FINE PEN NEEDLES 29G X 12.7MM misc, Use with Insulin 3 times daily, Disp: 300 each, Rfl: 2    cilostazol (PLETAL) 50 MG tablet, Take 1 tablet by mouth 2 (Two) Times a Day., Disp: 180 tablet, Rfl: 3    Continuous Blood Gluc  (Dexcom G6 ) device, 1 Device Daily., Disp: 1 each, Rfl: 0    Continuous Blood Gluc Sensor (Dexcom G6 Sensor), Every 10 (Ten) Days., Disp: 3 each, Rfl: 11    Continuous Blood Gluc Transmit (Dexcom G6 Transmitter) misc, 1 each Daily., Disp: 1 each, Rfl: 2    docusate sodium (COLACE) 100 MG capsule, Take 1 capsule by mouth 2 (Two) Times a Day. (Patient taking differently: Take 1 capsule by mouth Daily.), Disp: 20 capsule, Rfl: 0    empagliflozin (JARDIANCE) 25 MG tablet tablet, Take 1 tablet by mouth Daily.,  "Disp: 30 tablet, Rfl: 6    famotidine (PEPCID) 20 MG tablet, Take 1 tablet by mouth 2 (Two) Times a Day Before Meals., Disp: 30 tablet, Rfl: 1    gabapentin (NEURONTIN) 100 MG capsule, Take 2 capsules by mouth 3 (Three) Times a Day., Disp: 180 capsule, Rfl: 3    glucose blood (OneTouch Verio) test strip, Use as instructed, Disp: 300 each, Rfl: 2    hydroCHLOROthiazide (MICROZIDE) 12.5 MG capsule, TAKE 1 CAPSULE BY MOUTH EVERY DAY, Disp: 90 capsule, Rfl: 1    Lancets (OneTouch Delica Plus [id number]) misc, 1 each 3 (Three) Times a Day., Disp: 300 each, Rfl: 2    levothyroxine (SYNTHROID, LEVOTHROID) 125 MCG tablet, TAKE 2 TABLETS BY MOUTH DAILY, Disp: 180 tablet, Rfl: 3    metoprolol succinate XL (TOPROL-XL) 50 MG 24 hr tablet, Take 1 tablet by mouth Daily., Disp: 60 tablet, Rfl: 11    nicotine (NICODERM CQ) 21 MG/24HR patch, Place 1 patch on the skin as directed by provider Daily., Disp: 30 patch, Rfl: 1    NovoLOG Mix 70/30 FlexPen (70-30) 100 UNIT/ML suspension pen-injector injection, Inject 0.5 mL under the skin into the appropriate area as directed 2 (Two) Times a Day Before Meals. INJECT 50 UNITS SUBCUTANEOUSLY IN THE MORNING AND 45 UNITS SUBCUTANEOUSLY AT BEDTIME (Patient taking differently: INJECT 55 UNITS SUBCUTANEOUSLY IN THE MORNING AND 50 UNITS SUBCUTANEOUSLY AT BEDTIME), Disp: 45 mL, Rfl: 7    Rivaroxaban (XARELTO) 2.5 MG tablet, Take 1 tablet by mouth 2 (Two) Times a Day., Disp: 60 tablet, Rfl: 11    rosuvastatin (CRESTOR) 20 MG tablet, Take 1 tablet by mouth Every Night., Disp: 90 tablet, Rfl: 3    valsartan (DIOVAN) 80 MG tablet, TAKE 1 TABLET BY MOUTH EVERY DAY, Disp: 90 tablet, Rfl: 1    Review of Systems  Review of Systems   Constitutional:  Positive for fatigue.   Neurological:  Positive for weakness and numbness (tingling).        Objective:      /76   Pulse 54   Ht 172.7 cm (68\")   Wt 84.4 kg (186 lb)   SpO2 98%   BMI 28.28 kg/mý Body mass index is 28.28 kg/mý.  Physical Exam "   Constitutional: He is oriented to person, place, and time. He appears well-developed.   HENT:   Head: Normocephalic and atraumatic.   Eyes: Conjunctivae are normal.   Neck: No thyroid mass present.   Cardiovascular: Normal rate, regular rhythm, normal heart sounds and normal pulses.   Pulmonary/Chest: Effort normal and breath sounds normal.   Neurological: He is alert and oriented to person, place, and time. He has normal reflexes.   Psychiatric: Thought content normal.   Vitals reviewed.        LABS AND IMAGING      Lab Results   Component Value Date    HGBA1C 8.9 08/11/2023    HGBA1C 9.3 04/26/2023    HGBA1C 8.9 01/24/2023     Office Visit on 08/11/2023   Component Date Value Ref Range Status    Hemoglobin A1C 08/11/2023 8.9  % Final    Lot Number 08/11/2023 [id number]   Final    Expiration Date 08/11/2023 03-   Final    Glucose 08/11/2023 190 (A)  70 - 130 mg/dL Final    Lot Number 08/11/2023 [id number]   Final    Expiration Date 08/11/2023 01-   Final   Admission on 05/12/2023, Discharged on 05/12/2023   Component Date Value Ref Range Status    Glucose 05/12/2023 123 (H)  65 - 99 mg/dL Final    BUN 05/12/2023 13  8 - 23 mg/dL Final    Creatinine 05/12/2023 0.75 (L)  0.76 - 1.27 mg/dL Final    Sodium 05/12/2023 140  136 - 145 mmol/L Final    Potassium 05/12/2023 3.9  3.5 - 5.2 mmol/L Final    Chloride 05/12/2023 104  98 - 107 mmol/L Final    CO2 05/12/2023 27.0  22.0 - 29.0 mmol/L Final    Calcium 05/12/2023 9.5  8.6 - 10.5 mg/dL Final    BUN/Creatinine Ratio 05/12/2023 17.3  7.0 - 25.0 Final    Anion Gap 05/12/2023 9.0  5.0 - 15.0 mmol/L Final    eGFR 05/12/2023 102.0  >60.0 mL/min/1.73 Final    Glucose 05/12/2023 123  70 - 130 mg/dL Final    Meter: [id number] : [id number] Lawrence YATES             Assessment:         Diagnoses and all orders for this visit:    Uncontrolled type 2 diabetes mellitus with hyperglycemia, with long-term current use of insulin  -     POC Glycosylated Hemoglobin (Hb  A1C)  -     POC Glucose, Blood  -     Comprehensive Metabolic Panel    Hypothyroidism, unspecified type  -     T4, Free  -     TSH    Other orders  -     levothyroxine (SYNTHROID, LEVOTHROID) 125 MCG tablet; TAKE 2 TABLETS BY MOUTH DAILY        Plan:       Patient has poorly controlled diabetes with hyperglycemia. Dietary recommendations reviewed. Insulin titration instructions provided.   CGM downloaded and reviewed. No hypoglycemia, occasional hyperglycemia after dinner.     Novolog 70/30  50 Units BID  Cont Jardiance 25 mg daily.     -gabapentin for neuropathy. He had a lot if neuropathy sx.      -cont thyroid medications levothyroxine 250 mcg . Request labs from PCP and if thyroid still low change to Tirosint.  Repeat thyroid labs today      Follow up:  3 -4 months.

## 2023-08-12 LAB
ALBUMIN SERPL-MCNC: 4.1 G/DL (ref 3.5–5.2)
ALBUMIN/GLOB SERPL: 1.6 G/DL
ALP SERPL-CCNC: 101 U/L (ref 39–117)
ALT SERPL W P-5'-P-CCNC: 19 U/L (ref 1–41)
ANION GAP SERPL CALCULATED.3IONS-SCNC: 13.3 MMOL/L (ref 5–15)
AST SERPL-CCNC: 24 U/L (ref 1–40)
BILIRUB SERPL-MCNC: 0.2 MG/DL (ref 0–1.2)
BUN SERPL-MCNC: 11 MG/DL (ref 8–23)
BUN/CREAT SERPL: 12.6 (ref 7–25)
CALCIUM SPEC-SCNC: 9.5 MG/DL (ref 8.6–10.5)
CHLORIDE SERPL-SCNC: 103 MMOL/L (ref 98–107)
CO2 SERPL-SCNC: 23.7 MMOL/L (ref 22–29)
CREAT SERPL-MCNC: 0.87 MG/DL (ref 0.76–1.27)
EGFRCR SERPLBLD CKD-EPI 2021: 97.6 ML/MIN/1.73
GLOBULIN UR ELPH-MCNC: 2.6 GM/DL
GLUCOSE SERPL-MCNC: 140 MG/DL (ref 65–99)
POTASSIUM SERPL-SCNC: 4 MMOL/L (ref 3.5–5.2)
PROT SERPL-MCNC: 6.7 G/DL (ref 6–8.5)
SODIUM SERPL-SCNC: 140 MMOL/L (ref 136–145)
T4 FREE SERPL-MCNC: 1.16 NG/DL (ref 0.93–1.7)
TSH SERPL DL<=0.05 MIU/L-ACNC: 22.4 UIU/ML (ref 0.27–4.2)

## 2023-08-14 ENCOUNTER — OFFICE VISIT (OUTPATIENT)
Dept: CARDIOLOGY | Facility: CLINIC | Age: 63
End: 2023-08-14
Payer: COMMERCIAL

## 2023-08-14 VITALS
WEIGHT: 185 LBS | HEIGHT: 68 IN | HEART RATE: 97 BPM | OXYGEN SATURATION: 100 % | DIASTOLIC BLOOD PRESSURE: 78 MMHG | SYSTOLIC BLOOD PRESSURE: 156 MMHG | BODY MASS INDEX: 28.04 KG/M2

## 2023-08-14 DIAGNOSIS — I10 PRIMARY HYPERTENSION: ICD-10-CM

## 2023-08-14 DIAGNOSIS — Z95.810 PRESENCE OF IMPLANTABLE CARDIOVERTER-DEFIBRILLATOR (ICD): ICD-10-CM

## 2023-08-14 DIAGNOSIS — I49.01 VF (VENTRICULAR FIBRILLATION): Primary | ICD-10-CM

## 2023-08-14 PROBLEM — M75.102 ROTATOR CUFF TEAR, LEFT: Status: RESOLVED | Noted: 2022-10-18 | Resolved: 2023-08-14

## 2023-08-14 PROCEDURE — 1160F RVW MEDS BY RX/DR IN RCRD: CPT | Performed by: PHYSICIAN ASSISTANT

## 2023-08-14 PROCEDURE — 93260 PRGRMG DEV EVAL IMPLTBL SYS: CPT | Performed by: PHYSICIAN ASSISTANT

## 2023-08-14 PROCEDURE — 3078F DIAST BP <80 MM HG: CPT | Performed by: PHYSICIAN ASSISTANT

## 2023-08-14 PROCEDURE — 1159F MED LIST DOCD IN RCRD: CPT | Performed by: PHYSICIAN ASSISTANT

## 2023-08-14 PROCEDURE — 99212 OFFICE O/P EST SF 10 MIN: CPT | Performed by: PHYSICIAN ASSISTANT

## 2023-08-14 PROCEDURE — 3077F SYST BP >= 140 MM HG: CPT | Performed by: PHYSICIAN ASSISTANT

## 2023-08-14 NOTE — PROGRESS NOTES
Encounter Date:08/14/2023      Patient ID: Laron Pandey is a 62 y.o. male.    Nacho Omer MD    Chief Complaint: ventricular tachycardia      PROBLEM LIST:  Patient Active Problem List    Diagnosis Date Noted    VF (ventricular fibrillation) 10/19/2022     Priority: High     Note Last Updated: 8/14/2023     Echocardiogram, 6/13/2022: Left ventricular ejection fraction appears to be 56 - 60%. Left ventricular systolic function is normal. Left ventricular wall thickness is consistent with mild to moderate concentric hypertrophy.  Implantation of a [employer] SICD A219 [id number] number pulse generator with [employer] SICD lead 3501. (5/12/2023)      Presence of implantable cardioverter-defibrillator (ICD) 08/14/2023     Priority: Medium    Coronary artery disease involving coronary bypass graft of native heart without angina pectoris 02/09/2018     Priority: Medium     Note Last Updated: 10/19/2022     Status post three-vessel CABG in 2003 with Dr. Myers.  Status post PRINCE x2 to the circumflex with Dr. Block, 11/2020      PAD (peripheral artery disease) 09/15/2020     Priority: Low     Note Last Updated: 10/19/2022     H/O Balloon angioplasty of the left SFA and stenting and balloon angioplasty of the left common iliac artery extending into the external iliac artery 12/2020      Hypertension 10/23/2019     Priority: Low    Nontraumatic complete tear of left rotator cuff 10/19/2022    GERD (gastroesophageal reflux disease)     Alcohol use 06/15/2022    Left arm weakness 06/13/2022    Left carotid stenosis 06/13/2022     Note Last Updated: 6/15/2022     6/13/22 Bilateral carotid duplex - Right internal carotid artery stenosis of >70%.  Left internal carotid artery stenosis of >70%.  s/p Rt Carotid Stent 6/15/22 6/15/22 by Dr. Flores      Current every day smoker 09/15/2020    Lumbar postlaminectomy syndrome 09/11/2020    History of lumbar surgery 09/11/2020    Lumbar stenosis with  neurogenic claudication 07/31/2020    Erectile dysfunction 03/30/2018    Uncontrolled type 2 diabetes mellitus with hyperglycemia, with long-term current use of insulin 02/09/2018    Hyperlipidemia 02/09/2018    Hypothyroidism 02/09/2018               History of Present Illness  Patient presents today for follow-up with a history of ventricular tachycardia status post S ICD.  Returns today for initial follow-up after S ICD implantation.  He has had no incidences.  He reports overall doing well.  He has no awareness of palpitations, no dizziness no syncope.  His blood pressure at home has been running 120 to 130 mmHg systolic.  He adds that he forgot to take his blood pressure medicine this morning.    Allergies   Allergen Reactions    Celebrex [Celecoxib] Swelling     Hands    Morphine And Related Nausea Only       Current Outpatient Medications   Medication Instructions    acetaminophen (TYLENOL) 500 mg, Oral, Every 6 Hours PRN    aspirin 81 mg, Oral, Daily    BD ULTRA-FINE PEN NEEDLES 29G X 12.7MM misc Use with Insulin 3 times daily    cilostazol (PLETAL) 50 mg, Oral, 2 Times Daily    Continuous Blood Gluc  (Dexcom G6 ) device 1 Device, Does not apply, Daily    Continuous Blood Gluc Sensor (Dexcom G6 Sensor) Does not apply, Every 10 Days    Continuous Blood Gluc Transmit (Dexcom G6 Transmitter) misc 1 each, Does not apply, Daily    docusate sodium (COLACE) 100 mg, Oral, 2 Times Daily    empagliflozin (JARDIANCE) 25 mg, Oral, Daily    famotidine (PEPCID) 20 mg, Oral, 2 Times Daily Before Meals    gabapentin (NEURONTIN) 200 mg, Oral, 3 Times Daily    glucose blood (OneTouch Verio) test strip Use as instructed    hydroCHLOROthiazide (MICROZIDE) 12.5 MG capsule TAKE 1 CAPSULE BY MOUTH EVERY DAY    Lancets (OneTouch Delica Plus [id number]) misc 1 each, Does not apply, 3 Times Daily    levothyroxine (SYNTHROID, LEVOTHROID) 125 MCG tablet TAKE 2 TABLETS BY MOUTH DAILY    metoprolol succinate XL (TOPROL-XL)  "50 mg, Oral, Every 24 Hours Scheduled    nicotine (NICODERM CQ) 21 MG/24HR patch 1 patch, Transdermal, Every 24 Hours    NovoLOG Mix 70/30 FlexPen (70-30) 100 UNIT/ML suspension pen-injector injection 50 Units, Subcutaneous, 2 Times Daily Before Meals, INJECT 50 UNITS SUBCUTANEOUSLY IN THE MORNING AND 45 UNITS SUBCUTANEOUSLY AT BEDTIME    Rivaroxaban (XARELTO) 2.5 mg, Oral, 2 Times Daily    rosuvastatin (CRESTOR) 20 mg, Oral, Nightly    valsartan (DIOVAN) 80 MG tablet TAKE 1 TABLET BY MOUTH EVERY DAY       .    Objective:     /78 (BP Location: Left arm, Patient Position: Sitting)   Pulse 97   Ht 172.7 cm (68\")   Wt 83.9 kg (185 lb)   SpO2 100%   BMI 28.13 kg/mý    Body mass index is 28.13 kg/mý.     Constitutional:       Appearance: Well-developed.   Pulmonary:      Effort: Pulmonary effort is normal. No respiratory distress.      Breath sounds: Normal breath sounds. No wheezing. No rales.      Comments: Bases clear  Chest:      Chest wall: Not tender to palpatation.   Cardiovascular:      Normal rate. Regular rhythm.      Murmurs: There is no murmur.      No gallop.  No click. No rub.   Pulses:     Intact distal pulses.   Edema:     Peripheral edema absent.   Musculoskeletal: Normal range of motion.     Lab Review:      Results from last 7 days   Lab Units 08/11/23  1507   SODIUM mmol/L 140   POTASSIUM mmol/L 4.0   CHLORIDE mmol/L 103   CO2 mmol/L 23.7   BUN mg/dL 11   CREATININE mg/dL 0.87   GLUCOSE mg/dL 140*   CALCIUM mg/dL 9.5              TSH          10/19/2022    04:47 5/3/2023    14:29 8/11/2023    15:07   TSH   TSH 3.670  43.310  22.400              Procedures               Assessment:      Diagnosis Plan   1. VF (ventricular fibrillation)    This patient's Cardiac Implanted Electronic Device was manually interrogated and reprogrammed during the patient encounter today.  Iterative programming changes were manually made to determine the sensing threshold, pacing threshold, lead impedance as well " as underlying cardiac rhythm.  These programming changes were not limited to but included some or all of the following when appropriate: pacing mode, programmed AV delays, blanking periods, and refractory periods.  Data obtained as a result of these manual programing changes informed the patient's CIED permanent programming.         2.      Presence ICD                                                         S ICD software updated.  No events.  99% battery remaining   3. Primary hypertension  Overall well managed on current medical regimen.  I have urged him to take his blood pressure medications as soon as he gets home.  Blood pressure on 8/11/2023 was 120/76 on 8/2/2023 was 130/74        Plan:     Stable cardiac status.  Continue current medications.   in 6 months, sooner as needed.  Thank you for allowing us to participate in the care of your patient.     Electronically signed by JENAE Haines, 08/14/23, 9:34 AM EDT.

## 2023-08-21 DIAGNOSIS — E03.9 HYPOTHYROIDISM, ADULT: Primary | ICD-10-CM

## 2023-08-21 RX ORDER — LEVOTHYROXINE SODIUM 125 UG/1
250 CAPSULE ORAL DAILY
Qty: 60 CAPSULE | Refills: 10 | Status: SHIPPED | OUTPATIENT
Start: 2023-08-21

## 2023-09-11 RX ORDER — EMPAGLIFLOZIN 25 MG/1
TABLET, FILM COATED ORAL
Qty: 30 TABLET | Refills: 2 | Status: SHIPPED | OUTPATIENT
Start: 2023-09-11

## 2023-09-11 NOTE — TELEPHONE ENCOUNTER
Rx Refill Note  Requested Prescriptions     Pending Prescriptions Disp Refills    Jardiance 25 MG tablet tablet [Pharmacy Med Name: JARDIANCE 25 MG TABLET] 30 tablet 6     Sig: TAKE 1 TABLET BY MOUTH EVERY DAY      Last office visit with prescribing clinician: 8/11/2023   Last telemedicine visit with prescribing clinician: due Nov 2023   Next office visit with prescribing clinician: 11/15/2023                         Would you like a call back once the refill request has been completed: [] Yes [] No    If the office needs to give you a call back, can they leave a voicemail: [] Yes [] No    Kala Greer CMA  09/11/23, 07:45 EDT

## 2023-09-18 RX ORDER — INSULIN ASPART 100 [IU]/ML
INJECTION, SUSPENSION SUBCUTANEOUS
Qty: 90 ML | Refills: 0 | Status: SHIPPED | OUTPATIENT
Start: 2023-09-18

## 2023-09-18 NOTE — TELEPHONE ENCOUNTER
Rx Refill Note  Requested Prescriptions     Pending Prescriptions Disp Refills    NovoLOG Mix 70/30 FlexPen (70-30) 100 UNIT/ML suspension pen-injector injection [Pharmacy Med Name: NOVOLOG MIX 70-30 FLEXPEN]  7     Sig: INJECT 50 UNITS SUBCUTANEOUSLY IN THE MORNING AND 45 UNITS SUBCUTANEOUSLY AT BEDTIME AS DIRECTED      Last office visit with prescribing clinician: 8/11/2023   Last telemedicine visit with prescribing clinician: Visit date not found   Next office visit with prescribing clinician: 11/15/2023                         Would you like a call back once the refill request has been completed: [] Yes [] No    If the office needs to give you a call back, can they leave a voicemail: [] Yes [] No    Kala Greer CMA  09/18/23, 07:57 EDT

## 2023-10-02 RX ORDER — ROSUVASTATIN CALCIUM 20 MG/1
20 TABLET, COATED ORAL NIGHTLY
Qty: 90 TABLET | Refills: 3 | Status: SHIPPED | OUTPATIENT
Start: 2023-10-02

## 2023-10-30 DIAGNOSIS — M48.062 LUMBAR STENOSIS WITH NEUROGENIC CLAUDICATION: ICD-10-CM

## 2023-10-30 NOTE — TELEPHONE ENCOUNTER
Rx Refill Note  Requested Prescriptions     Pending Prescriptions Disp Refills    gabapentin (NEURONTIN) 100 MG capsule [Pharmacy Med Name: GABAPENTIN 100 MG CAPSULE] 180 capsule      Sig: TAKE 2 CAPSULES BY MOUTH 3 TIMES A DAY      Last office visit with prescribing clinician: 8/11/2023   Last telemedicine visit with prescribing clinician: Visit date not found   Next office visit with prescribing clinician: 11/15/2023                         Would you like a call back once the refill request has been completed: [] Yes [] No    If the office needs to give you a call back, can they leave a voicemail: [] Yes [] No    Vickie Covarrubias MA  10/30/23, 13:45 EDT

## 2023-10-31 NOTE — TELEPHONE ENCOUNTER
Rx Refill Note  Requested Prescriptions     Pending Prescriptions Disp Refills    gabapentin (NEURONTIN) 100 MG capsule [Pharmacy Med Name: GABAPENTIN 100 MG CAPSULE] 180 capsule      Sig: TAKE 2 CAPSULES BY MOUTH 3 TIMES A DAY      Last office visit with prescribing clinician: 8/11/2023   Last telemedicine visit with prescribing clinician: Visit date not found   Next office visit with prescribing clinician: 11/15/2023                         Would you like a call back once the refill request has been completed: [] Yes [] No    If the office needs to give you a call back, can they leave a voicemail: [] Yes [] No    Vickie Covarrubias MA  10/31/23, 11:20 EDT

## 2023-11-01 RX ORDER — INSULIN ASPART 100 [IU]/ML
INJECTION, SUSPENSION SUBCUTANEOUS
Qty: 90 ML | Refills: 3 | Status: SHIPPED | OUTPATIENT
Start: 2023-11-01

## 2023-11-01 RX ORDER — GABAPENTIN 100 MG/1
200 CAPSULE ORAL 3 TIMES DAILY
Qty: 180 CAPSULE | Refills: 3 | Status: SHIPPED | OUTPATIENT
Start: 2023-11-01

## 2023-11-15 ENCOUNTER — OFFICE VISIT (OUTPATIENT)
Dept: ENDOCRINOLOGY | Facility: CLINIC | Age: 63
End: 2023-11-15
Payer: COMMERCIAL

## 2023-11-15 VITALS
HEIGHT: 68 IN | WEIGHT: 184 LBS | SYSTOLIC BLOOD PRESSURE: 130 MMHG | DIASTOLIC BLOOD PRESSURE: 64 MMHG | HEART RATE: 62 BPM | BODY MASS INDEX: 27.89 KG/M2

## 2023-11-15 DIAGNOSIS — E03.9 HYPOTHYROIDISM, ADULT: ICD-10-CM

## 2023-11-15 DIAGNOSIS — E78.2 MIXED HYPERLIPIDEMIA: ICD-10-CM

## 2023-11-15 DIAGNOSIS — Z79.4 UNCONTROLLED TYPE 2 DIABETES MELLITUS WITH HYPERGLYCEMIA, WITH LONG-TERM CURRENT USE OF INSULIN: Primary | ICD-10-CM

## 2023-11-15 DIAGNOSIS — E11.65 UNCONTROLLED TYPE 2 DIABETES MELLITUS WITH HYPERGLYCEMIA, WITH LONG-TERM CURRENT USE OF INSULIN: Primary | ICD-10-CM

## 2023-11-15 LAB
ALBUMIN SERPL-MCNC: 4.4 G/DL (ref 3.5–5.2)
ALBUMIN/GLOB SERPL: 1.5 G/DL
ALP SERPL-CCNC: 121 U/L (ref 39–117)
ALT SERPL W P-5'-P-CCNC: 23 U/L (ref 1–41)
ANION GAP SERPL CALCULATED.3IONS-SCNC: 10.4 MMOL/L (ref 5–15)
AST SERPL-CCNC: 25 U/L (ref 1–40)
BILIRUB SERPL-MCNC: 0.5 MG/DL (ref 0–1.2)
BUN SERPL-MCNC: 16 MG/DL (ref 8–23)
BUN/CREAT SERPL: 18.2 (ref 7–25)
CALCIUM SPEC-SCNC: 9.7 MG/DL (ref 8.6–10.5)
CHLORIDE SERPL-SCNC: 103 MMOL/L (ref 98–107)
CO2 SERPL-SCNC: 28.6 MMOL/L (ref 22–29)
CREAT SERPL-MCNC: 0.88 MG/DL (ref 0.76–1.27)
EGFRCR SERPLBLD CKD-EPI 2021: 96.6 ML/MIN/1.73
EXPIRATION DATE: ABNORMAL
EXPIRATION DATE: NORMAL
GLOBULIN UR ELPH-MCNC: 3 GM/DL
GLUCOSE BLDC GLUCOMTR-MCNC: 114 MG/DL (ref 70–130)
GLUCOSE SERPL-MCNC: 51 MG/DL (ref 65–99)
HBA1C MFR BLD: 9.1 % (ref 4.5–5.7)
Lab: ABNORMAL
Lab: NORMAL
POTASSIUM SERPL-SCNC: 3.8 MMOL/L (ref 3.5–5.2)
PROT SERPL-MCNC: 7.4 G/DL (ref 6–8.5)
SODIUM SERPL-SCNC: 142 MMOL/L (ref 136–145)
T4 FREE SERPL-MCNC: 1.23 NG/DL (ref 0.93–1.7)
TSH SERPL DL<=0.05 MIU/L-ACNC: 23.3 UIU/ML (ref 0.27–4.2)

## 2023-11-15 PROCEDURE — 84443 ASSAY THYROID STIM HORMONE: CPT | Performed by: INTERNAL MEDICINE

## 2023-11-15 PROCEDURE — 80053 COMPREHEN METABOLIC PANEL: CPT | Performed by: INTERNAL MEDICINE

## 2023-11-15 PROCEDURE — 95251 CONT GLUC MNTR ANALYSIS I&R: CPT | Performed by: INTERNAL MEDICINE

## 2023-11-15 PROCEDURE — 99214 OFFICE O/P EST MOD 30 MIN: CPT | Performed by: INTERNAL MEDICINE

## 2023-11-15 PROCEDURE — 84439 ASSAY OF FREE THYROXINE: CPT | Performed by: INTERNAL MEDICINE

## 2023-11-15 PROCEDURE — 83036 HEMOGLOBIN GLYCOSYLATED A1C: CPT | Performed by: INTERNAL MEDICINE

## 2023-11-15 PROCEDURE — 36415 COLL VENOUS BLD VENIPUNCTURE: CPT | Performed by: INTERNAL MEDICINE

## 2023-11-15 RX ORDER — ACYCLOVIR 400 MG/1
1 TABLET ORAL
Qty: 3 EACH | Refills: 11 | Status: SHIPPED | OUTPATIENT
Start: 2023-11-15

## 2023-11-15 RX ORDER — LEVOTHYROXINE SODIUM 200 UG/1
200 CAPSULE ORAL DAILY
Qty: 30 CAPSULE | Refills: 11 | Status: SHIPPED | OUTPATIENT
Start: 2023-11-15

## 2023-11-15 RX ORDER — ACYCLOVIR 400 MG/1
1 TABLET ORAL ONCE
Qty: 1 EACH | Refills: 0 | Status: SHIPPED | OUTPATIENT
Start: 2023-11-15 | End: 2023-11-15

## 2023-11-15 NOTE — PROGRESS NOTES
Subjective:     Chief Complaint   Patient presents with    Diabetes      Laron Pandey is a 63 y.o. male who is is being seen for follow-up for management of  Type 2 diabetes mellitus.  The initial diagnosis of diabetes was made in 2003. He was initially controlled with metformin and started insulin in 2010.   Diabetic complications: cardiovascular disease s/p CABG. He has neuropathy sx with tingling and pain in the legs bilaterally.       Current diabetic medications include novolog 70/30. Trulicity - he has heartburn with it. Cardiologist started SGLT2 - Jardiance  Metformin - he stopped this medication due to side effects of diarrhea.     Monitoring  -Dexcom sensor.      Other med problems:CAD/CABG, HL, HTN    HL- intolerant to statins in the past. Restarted zetia.   Hypothyroidism - 250 mcg of levothyroxine. TSh remained 22 despite dose increase. He is compliant with the med.   HTN -BP is normal.     Glucose was low at 55 during the exam. He took a full dose of 70/30 and didn't eat a good lunch (just had brownie and coffee). Large variability in glucose is seen.     MEDICATIONS    Current Outpatient Medications:     acetaminophen (TYLENOL) 500 MG tablet, Take 1 tablet by mouth Every 6 (Six) Hours As Needed for Mild Pain., Disp: , Rfl:     aspirin 81 MG chewable tablet, Chew 1 tablet Daily., Disp: 21 tablet, Rfl: 0    BD ULTRA-FINE PEN NEEDLES 29G X 12.7MM misc, Use with Insulin 3 times daily, Disp: 300 each, Rfl: 2    cilostazol (PLETAL) 50 MG tablet, Take 1 tablet by mouth 2 (Two) Times a Day., Disp: 180 tablet, Rfl: 3    docusate sodium (COLACE) 100 MG capsule, Take 1 capsule by mouth 2 (Two) Times a Day. (Patient taking differently: Take 1 capsule by mouth Daily.), Disp: 20 capsule, Rfl: 0    empagliflozin (Jardiance) 25 MG tablet tablet, TAKE 1 TABLET BY MOUTH EVERY DAY, Disp: 30 tablet, Rfl: 2    famotidine (PEPCID) 20 MG tablet, Take 1 tablet by mouth 2 (Two) Times a Day Before Meals., Disp: 30 tablet,  "Rfl: 1    gabapentin (NEURONTIN) 100 MG capsule, TAKE 2 CAPSULES BY MOUTH 3 TIMES A DAY, Disp: 180 capsule, Rfl: 3    glucose blood (OneTouch Verio) test strip, Use as instructed, Disp: 300 each, Rfl: 2    hydroCHLOROthiazide (MICROZIDE) 12.5 MG capsule, TAKE 1 CAPSULE BY MOUTH EVERY DAY, Disp: 90 capsule, Rfl: 1    insulin aspart prot & aspart (NovoLOG Mix 70/30 FlexPen) (70-30) 100 UNIT/ML suspension pen-injector injection, INJECT 50 UNITS TWICE A DAY BEFORE MEALS, Disp: 90 mL, Rfl: 3    Lancets (OneTouch Delica Plus [id number]) misc, 1 each 3 (Three) Times a Day., Disp: 300 each, Rfl: 2    metoprolol succinate XL (TOPROL-XL) 50 MG 24 hr tablet, Take 1 tablet by mouth Daily., Disp: 60 tablet, Rfl: 11    nicotine (NICODERM CQ) 21 MG/24HR patch, Place 1 patch on the skin as directed by provider Daily., Disp: 30 patch, Rfl: 1    Rivaroxaban (XARELTO) 2.5 MG tablet, Take 1 tablet by mouth 2 (Two) Times a Day., Disp: 60 tablet, Rfl: 11    rosuvastatin (CRESTOR) 20 MG tablet, TAKE 1 TABLET BY MOUTH EVERY NIGHT, Disp: 90 tablet, Rfl: 3    valsartan (DIOVAN) 80 MG tablet, TAKE 1 TABLET BY MOUTH EVERY DAY, Disp: 90 tablet, Rfl: 1    Continuous Blood Gluc  (Dexcom G7 ) device, Use 1 each 1 (One) Time for 1 dose., Disp: 1 each, Rfl: 0    Continuous Blood Gluc Sensor (Dexcom G7 Sensor) misc, Use 1 each Every 10 (Ten) Days., Disp: 3 each, Rfl: 11    Levothyroxine Sodium (Tirosint) 200 MCG capsule, Take 200 mcg by mouth Daily., Disp: 30 capsule, Rfl: 11    Review of Systems  Review of Systems   Constitutional:  Positive for fatigue.   Neurological:  Positive for weakness and numbness (tingling).          Objective:      /64   Pulse 62   Ht 172.7 cm (67.99\")   Wt 83.5 kg (184 lb)   BMI 27.98 kg/m² Body mass index is 27.98 kg/m².  Physical Exam   Constitutional: He is oriented to person, place, and time. He appears well-developed.   HENT:   Head: Normocephalic and atraumatic.   Eyes: Conjunctivae are " normal.   Neck: No thyroid mass present.   Cardiovascular: Normal rate, regular rhythm, normal heart sounds and normal pulses.   Pulmonary/Chest: Effort normal and breath sounds normal.   Neurological: He is alert and oriented to person, place, and time. He has normal reflexes.   Psychiatric: Thought content normal.   Vitals reviewed.          LABS AND IMAGING      Lab Results   Component Value Date    HGBA1C 9.1 (A) 11/15/2023    HGBA1C 8.9 08/11/2023    HGBA1C 9.3 04/26/2023     Office Visit on 11/15/2023   Component Date Value Ref Range Status    Glucose 11/15/2023 114  70 - 130 mg/dL Final    Lot Number 11/15/2023 [id number]   Final    Expiration Date 11/15/2023 03/17/2024   Final    Hemoglobin A1C 11/15/2023 9.1 (A)  4.5 - 5.7 % Final    Lot Number 11/15/2023 [id number]   Final    Expiration Date 11/15/2023 05/02/2025   Final             Assessment:         Diagnoses and all orders for this visit:    Uncontrolled type 2 diabetes mellitus with hyperglycemia, with long-term current use of insulin  -     POC Glucose, Blood  -     POC Glycosylated Hemoglobin (Hb A1C)    Hypothyroidism  -     T4, Free  -     TSH  -     Comprehensive Metabolic Panel    Mixed hyperlipidemia    Other orders  -     Continuous Blood Gluc Sensor (Dexcom G7 Sensor) misc; Use 1 each Every 10 (Ten) Days.  -     Continuous Blood Gluc  (Dexcom G7 ) device; Use 1 each 1 (One) Time for 1 dose.  -     Levothyroxine Sodium (Tirosint) 200 MCG capsule; Take 200 mcg by mouth Daily.        Plan:       Patient has poorly controlled diabetes with hyperglycemia and midday hypoglycemia. Dietary recommendations reviewed. Insulin titration instructions provided.   CGM downloaded and reviewed. No hypoglycemia, occasional hyperglycemia after dinner.   50 % in range and 48% hyperglycemia. Hypoglycemia during the exam today.   Most likely his variability in glucose is related to the 70/30 use and not consistent meal regimen.   Preferred regimen  would  be basal and bolus insulin. He has 2 meals per day - lunch and dinner. He has 3 more weeks of 70/30, will discuss change the regimen and follow-up at an earlier time.   Hypoglycemia was treated with juice and snacl.   Novolog 70/30  55 Units in the morning and 50 units at dinner.   Cont Jardiance 25 mg daily.     -gabapentin for neuropathy. He had a lot if neuropathy sx.      -cont thyroid medications levothyroxine 250 mcg . Patient has a low thyroid function despite increase in the thyroid medication. TSH was 22. I have recommended to change to tirosint - medication with a better absorption. It is medically necessary.   Repeat thyroid labs today, will rewrite the script for Tirosint.  With change to Tirosint we should be able to reduce the dose of Tirosint to 200 mcg as it is absorbed better.      Follow up:  3 months.

## 2023-11-17 ENCOUNTER — PRIOR AUTHORIZATION (OUTPATIENT)
Dept: ENDOCRINOLOGY | Facility: CLINIC | Age: 63
End: 2023-11-17
Payer: COMMERCIAL

## 2023-12-04 RX ORDER — METOPROLOL SUCCINATE 50 MG/1
50 TABLET, EXTENDED RELEASE ORAL DAILY
Qty: 90 TABLET | Refills: 7 | Status: SHIPPED | OUTPATIENT
Start: 2023-12-04

## 2023-12-11 ENCOUNTER — TELEPHONE (OUTPATIENT)
Dept: ENDOCRINOLOGY | Facility: CLINIC | Age: 63
End: 2023-12-11
Payer: COMMERCIAL

## 2023-12-11 NOTE — TELEPHONE ENCOUNTER
Called pt TERENCE to call back regarding his insurance so we can get his supplies. Verify insurance

## 2023-12-12 NOTE — TELEPHONE ENCOUNTER
Pt called back verifying he has [employer]. If you have any questions he said to give him a call.

## 2023-12-15 NOTE — TELEPHONE ENCOUNTER
Rx Refill Note  Requested Prescriptions     Pending Prescriptions Disp Refills    Jardiance 25 MG tablet tablet [Pharmacy Med Name: JARDIANCE 25 MG TABLET] 30 tablet 2     Sig: TAKE 1 TABLET BY MOUTH EVERY DAY      Last office visit with prescribing clinician: 11/15/2023   Last telemedicine visit with prescribing clinician: Visit date not found   Next office visit with prescribing clinician: 2/2/2024                         Would you like a call back once the refill request has been completed: [] Yes [] No    If the office needs to give you a call back, can they leave a voicemail: [] Yes [] No    Vickie Covarrubias MA  12/15/23, 08:13 EST   unknown

## 2023-12-18 RX ORDER — EMPAGLIFLOZIN 25 MG/1
TABLET, FILM COATED ORAL
Qty: 30 TABLET | Refills: 2 | Status: SHIPPED | OUTPATIENT
Start: 2023-12-18

## 2023-12-19 ENCOUNTER — PRIOR AUTHORIZATION (OUTPATIENT)
Dept: ENDOCRINOLOGY | Facility: CLINIC | Age: 63
End: 2023-12-19
Payer: COMMERCIAL

## 2023-12-19 NOTE — TELEPHONE ENCOUNTER
The denial was based on our criteria for Mounjaro Inj 15mg/0.5.  Per your health plan's criteria, this drug is covered if you meet the following:  (1) You have a disease of high blood sugar (type 2 diabetes) as confirmed by one of the following:  (A) For ongoing drug treatment for your disease of high blood sugar levels (type 2 diabetes), your  doctor must provide medical records (for example: chart notes) showing that you have the disease.  (B) Your doctor provides medical records (for example: chart notes) showing that you have a  disease of high blood sugar levels (type 2 diabetes) as seen by one of the following laboratory values:  (I) Your blood sugar level (hemoglobin A1c) is greater than or equal to 6.5%.  (II) Your blood sugar level before eating/drinking (fasting plasma glucose) is greater than or equal to  126mg/dL.  (III) Your blood sugar level after drinking (2-hour plasma glucose during oral glucose tolerance test)  is greater than or equal to 200mg/dL.  (2) You have tried (a minimum 90-day supply at the maximally tolerated dose) or cannot use one  metformin-containing drug.

## 2023-12-20 ENCOUNTER — PRIOR AUTHORIZATION (OUTPATIENT)
Dept: ENDOCRINOLOGY | Facility: CLINIC | Age: 63
End: 2023-12-20
Payer: COMMERCIAL

## 2023-12-20 RX ORDER — PROCHLORPERAZINE 25 MG/1
SUPPOSITORY RECTAL
Qty: 3 EACH | Refills: 5 | Status: SHIPPED | OUTPATIENT
Start: 2023-12-20 | End: 2023-12-22 | Stop reason: SDUPTHER

## 2023-12-20 RX ORDER — ACYCLOVIR 400 MG/1
TABLET ORAL SEE ADMIN INSTRUCTIONS
Qty: 1 EACH | Refills: 0 | Status: SHIPPED | OUTPATIENT
Start: 2023-12-20 | End: 2023-12-20 | Stop reason: CLARIF

## 2023-12-20 NOTE — TELEPHONE ENCOUNTER
Rx Refill Note  Requested Prescriptions     Pending Prescriptions Disp Refills    Continuous Blood Gluc  (Dexcom G7 ) device [Pharmacy Med Name: DEXCOM G7 ] 1 each 0     Sig: USE AS DIRECTED      Last office visit with prescribing clinician: 11/15/2023   Last telemedicine visit with prescribing clinician: Visit date not found   Next office visit with prescribing clinician: 2/2/2024                         Would you like a call back once the refill request has been completed: [] Yes [] No    If the office needs to give you a call back, can they leave a voicemail: [] Yes [] No    Vickie Covarrubias MA  12/20/23, 08:39 EST

## 2023-12-21 ENCOUNTER — TELEPHONE (OUTPATIENT)
Dept: ENDOCRINOLOGY | Facility: CLINIC | Age: 63
End: 2023-12-21
Payer: COMMERCIAL

## 2023-12-21 NOTE — TELEPHONE ENCOUNTER
"Caller: Laron Pandey \"Arben\"    Relationship: Self    Best call back number: [phone]    Requested Prescriptions:   Continuous Blood Gluc Sensor (Dexcom G6 Sensor) [[id number]] (Order [id number])     Pharmacy where request should be sent:    [facility]/pharmacy #[id number] [address] [phone] [facility] [phone] FX     Last office visit with prescribing clinician: 11/15/2023     Next office visit with prescribing clinician: 2/2/2024     Additional details provided by patient: PATIENT WAS TOLD BY PHARMACY THAT HIS INSURANCE HAS DENIED COVERING HIS G7 SENSORS. HE IS NOW REQUESTING A PRESCRIPTION BE SENT FOR THE G6 SENSORS TO SEE IF INSURANCE WILL COVER THOSE. HE IS CURRENTLY OUT OF ANY KIND OF SENSORS.     Does the patient have less than a 3 day supply:  [x] Yes  [] No    Would you like a call back once the refill request has been completed: [] Yes [x] No    If the office needs to give you a call back, can they leave a voicemail: [] Yes [x] No      "

## 2023-12-22 RX ORDER — PROCHLORPERAZINE 25 MG/1
SUPPOSITORY RECTAL
Qty: 3 EACH | Refills: 5 | Status: SHIPPED | OUTPATIENT
Start: 2023-12-22

## 2024-02-02 ENCOUNTER — OFFICE VISIT (OUTPATIENT)
Dept: ENDOCRINOLOGY | Facility: CLINIC | Age: 64
End: 2024-02-02
Payer: COMMERCIAL

## 2024-02-02 VITALS
HEIGHT: 68 IN | SYSTOLIC BLOOD PRESSURE: 122 MMHG | WEIGHT: 189 LBS | HEART RATE: 66 BPM | BODY MASS INDEX: 28.64 KG/M2 | DIASTOLIC BLOOD PRESSURE: 68 MMHG

## 2024-02-02 DIAGNOSIS — E11.65 UNCONTROLLED TYPE 2 DIABETES MELLITUS WITH HYPERGLYCEMIA, WITH LONG-TERM CURRENT USE OF INSULIN: Primary | ICD-10-CM

## 2024-02-02 DIAGNOSIS — E03.9 HYPOTHYROIDISM, ADULT: ICD-10-CM

## 2024-02-02 DIAGNOSIS — Z79.4 UNCONTROLLED TYPE 2 DIABETES MELLITUS WITH HYPERGLYCEMIA, WITH LONG-TERM CURRENT USE OF INSULIN: Primary | ICD-10-CM

## 2024-02-02 PROCEDURE — 80053 COMPREHEN METABOLIC PANEL: CPT | Performed by: INTERNAL MEDICINE

## 2024-02-02 PROCEDURE — 99214 OFFICE O/P EST MOD 30 MIN: CPT | Performed by: INTERNAL MEDICINE

## 2024-02-02 PROCEDURE — 83036 HEMOGLOBIN GLYCOSYLATED A1C: CPT | Performed by: INTERNAL MEDICINE

## 2024-02-02 PROCEDURE — 84439 ASSAY OF FREE THYROXINE: CPT | Performed by: INTERNAL MEDICINE

## 2024-02-02 PROCEDURE — 82947 ASSAY GLUCOSE BLOOD QUANT: CPT | Performed by: INTERNAL MEDICINE

## 2024-02-02 PROCEDURE — 84443 ASSAY THYROID STIM HORMONE: CPT | Performed by: INTERNAL MEDICINE

## 2024-02-02 RX ORDER — ACYCLOVIR 400 MG/1
1 TABLET ORAL
Qty: 3 EACH | Refills: 6 | Status: SHIPPED | OUTPATIENT
Start: 2024-02-02

## 2024-02-02 RX ORDER — INSULIN ASPART 100 [IU]/ML
15 INJECTION, SOLUTION INTRAVENOUS; SUBCUTANEOUS
Qty: 15 ML | Refills: 6 | Status: SHIPPED | OUTPATIENT
Start: 2024-02-02

## 2024-02-02 NOTE — PATIENT INSTRUCTIONS
Diabetes Treatment Recommendations  Patient     Laron Pandey     Date:        02/02/24     ADA General Goals: A1c: < 7%                                                                                   Your A1C is   Lab Results   Component Value Date    HGBA1C 9.1 (A) 11/15/2023    HGBA1C 8.9 08/11/2023    HGBA1C 9.3 04/26/2023       Fasting/before meal glucose: <150 mg/dL                                    2 Hour after meal glucoses: < 180 mg/dL                                        Bedtime glucose:120-180                                                                Glucose testing frequency: Dexcom G7    Insulin dosing:  Basal insulin Levemir , Toujeo , Lantus , or Tresiba (U-100) 40 Units nightly. If you have high glucose in the morning, increase the dose by 2 units. Continue titrating up by 2 units every 3 days until glucose is < 150.              Meal Insulin Humalog (U-100) or Novolog  10 units before meals.   Correction insulin (add to meal insulin)   0 unit  if glucose  less than 150   0 unit   if glucose  150 - 199   2 units if glucose 200 - 249   4 units if glucose 250 - 299   6 units if glucose 300 - 349   8  units if glucose Greater than 350       Nutritional Recommendations:  4-5 carb portions per meal for male (60-75 gm of carbs)    Physical Activity Goals:  Walk at least 15 min every day, ideally exercise 45-60 min most days (could be done in short bouts of 10-15 minutes.    Keep records of your glucose levels and insulin adjustments. We may ask you to keep records on the content of your meals with insulin doses and before/after meal glucose levels to evaluate your ratios.  Call for advice if you have unexplained or unexpected hypoglycemia  (glucose < 60) or persistent high glucose > 300.  Office: [phone]      Meka Swan MD

## 2024-02-02 NOTE — PROGRESS NOTES
Subjective:     Chief Complaint   Patient presents with    Diabetes      Laron Pandey is a 63 y.o. male who is is being seen for follow-up for management of  Type 2 diabetes mellitus.  The initial diagnosis of diabetes was made in 2003. He was initially controlled with metformin and started insulin in 2010.   Diabetic complications: cardiovascular disease s/p CABG. He has neuropathy sx with tingling and pain in the legs bilaterally.       Current diabetic medications include novolog 70/30.Jardiance   Past meds: Trulicity -  heartburn with it. Metformin - diarrhea.     Monitoring  -Dexcom sensor.  He is not using it now, insurance stopped covering it.     Other med problems:CAD/CABG, HL, HTN    HL- intolerant to statins in the past. On zetia.   Hypothyroidism - 250 mcg of levothyroxine. TSH remained 22 despite dose increase. He is compliant with the med. We have changed to Tirosint   HTN -BP is normal.       MEDICATIONS    Current Outpatient Medications:     acetaminophen (TYLENOL) 500 MG tablet, Take 1 tablet by mouth Every 6 (Six) Hours As Needed for Mild Pain., Disp: , Rfl:     BD ULTRA-FINE PEN NEEDLES 29G X 12.7MM misc, Use with Insulin 3 times daily, Disp: 300 each, Rfl: 2    famotidine (PEPCID) 20 MG tablet, Take 1 tablet by mouth 2 (Two) Times a Day Before Meals., Disp: 30 tablet, Rfl: 1    gabapentin (NEURONTIN) 100 MG capsule, TAKE 2 CAPSULES BY MOUTH 3 TIMES A DAY, Disp: 180 capsule, Rfl: 3    glucose blood (OneTouch Verio) test strip, Use as instructed, Disp: 300 each, Rfl: 2    hydroCHLOROthiazide (MICROZIDE) 12.5 MG capsule, TAKE 1 CAPSULE BY MOUTH EVERY DAY, Disp: 90 capsule, Rfl: 1    insulin aspart prot & aspart (NovoLOG Mix 70/30 FlexPen) (70-30) 100 UNIT/ML suspension pen-injector injection, INJECT 50 UNITS TWICE A DAY BEFORE MEALS, Disp: 90 mL, Rfl: 3    Jardiance 25 MG tablet tablet, TAKE 1 TABLET BY MOUTH EVERY DAY, Disp: 30 tablet, Rfl: 2    Lancets (OneTouch Delica Plus [id number]) misc, 1  "each 3 (Three) Times a Day., Disp: 300 each, Rfl: 2    Levothyroxine Sodium (Tirosint) 200 MCG capsule, Take 200 mcg by mouth Daily., Disp: 30 capsule, Rfl: 11    metoprolol succinate XL (TOPROL-XL) 50 MG 24 hr tablet, TAKE 1 TABLET BY MOUTH EVERY DAY, Disp: 90 tablet, Rfl: 7    nicotine (NICODERM CQ) 21 MG/24HR patch, Place 1 patch on the skin as directed by provider Daily., Disp: 30 patch, Rfl: 1    Rivaroxaban (XARELTO) 2.5 MG tablet, Take 1 tablet by mouth 2 (Two) Times a Day., Disp: 60 tablet, Rfl: 11    rosuvastatin (CRESTOR) 20 MG tablet, TAKE 1 TABLET BY MOUTH EVERY NIGHT, Disp: 90 tablet, Rfl: 3    valsartan (DIOVAN) 80 MG tablet, TAKE 1 TABLET BY MOUTH EVERY DAY, Disp: 90 tablet, Rfl: 1    Continuous Blood Gluc Sensor (Dexcom G7 Sensor) misc, Use 1 each Every 10 (Ten) Days., Disp: 3 each, Rfl: 6    insulin aspart (NovoLOG FlexPen) 100 UNIT/ML solution pen-injector sc pen, Inject 15 Units under the skin into the appropriate area as directed 3 (Three) Times a Day With Meals., Disp: 15 mL, Rfl: 6    Insulin Glargine (LANTUS SOLOSTAR) 100 UNIT/ML injection pen, Inject 40 Units under the skin into the appropriate area as directed Every Night., Disp: 15 mL, Rfl: 6    Review of Systems  Review of Systems   Constitutional:  Positive for fatigue.   Neurological:  Positive for weakness and numbness (tingling).          Objective:      /68   Pulse 66   Ht 172.7 cm (67.99\")   Wt 85.7 kg (189 lb)   BMI 28.74 kg/m² Body mass index is 28.74 kg/m².  Physical Exam   Constitutional: He is oriented to person, place, and time. He appears well-developed.   HENT:   Head: Normocephalic and atraumatic.   Eyes: Conjunctivae are normal.   Neck: No thyroid mass present.   Cardiovascular: Normal rate, regular rhythm, normal heart sounds and normal pulses.   Pulmonary/Chest: Effort normal and breath sounds normal.   Neurological: He is alert and oriented to person, place, and time. He has normal reflexes.   Psychiatric: " Thought content normal.   Vitals reviewed.          LABS AND IMAGING      Lab Results   Component Value Date    HGBA1C 8.2 (A) 02/05/2024    HGBA1C 9.1 (A) 11/15/2023    HGBA1C 8.9 08/11/2023     Office Visit on 02/02/2024   Component Date Value Ref Range Status    Glucose 02/05/2024 196 (A)  70 - 130 mg/dL Final    Lot Number 02/05/2024 [id number]   Final    Expiration Date 02/05/2024 05/02/2025   Final    Hemoglobin A1C 02/05/2024 8.2 (A)  4.5 - 5.7 % Final    Lot Number 02/05/2024 [id number]   Final    Expiration Date 02/05/2024 03/17/2024   Final    Glucose 02/02/2024 139 (H)  65 - 99 mg/dL Final    BUN 02/02/2024 18  8 - 23 mg/dL Final    Creatinine 02/02/2024 0.96  0.76 - 1.27 mg/dL Final    Sodium 02/02/2024 141  136 - 145 mmol/L Final    Potassium 02/02/2024 4.4  3.5 - 5.2 mmol/L Final    Chloride 02/02/2024 104  98 - 107 mmol/L Final    CO2 02/02/2024 27.0  22.0 - 29.0 mmol/L Final    Calcium 02/02/2024 9.9  8.6 - 10.5 mg/dL Final    Total Protein 02/02/2024 7.0  6.0 - 8.5 g/dL Final    Albumin 02/02/2024 4.4  3.5 - 5.2 g/dL Final    ALT (SGPT) 02/02/2024 16  1 - 41 U/L Final    AST (SGOT) 02/02/2024 23  1 - 40 U/L Final    Alkaline Phosphatase 02/02/2024 96  39 - 117 U/L Final    Total Bilirubin 02/02/2024 0.5  0.0 - 1.2 mg/dL Final    Globulin 02/02/2024 2.6  gm/dL Final    A/G Ratio 02/02/2024 1.7  g/dL Final    BUN/Creatinine Ratio 02/02/2024 18.8  7.0 - 25.0 Final    Anion Gap 02/02/2024 10.0  5.0 - 15.0 mmol/L Final    eGFR 02/02/2024 88.8  >60.0 mL/min/1.73 Final    TSH 02/02/2024 44.800 (H)  0.270 - 4.200 uIU/mL Final    Free T4 02/02/2024 0.65 (L)  0.93 - 1.70 ng/dL Final   Office Visit on 11/15/2023   Component Date Value Ref Range Status    Glucose 11/15/2023 114  70 - 130 mg/dL Final    Lot Number 11/15/2023 [id number]   Final    Expiration Date 11/15/2023 03/17/2024   Final    Hemoglobin A1C 11/15/2023 9.1 (A)  4.5 - 5.7 % Final    Lot Number 11/15/2023 [id number]   Final    Expiration Date  11/15/2023 05/02/2025   Final    Free T4 11/15/2023 1.23  0.93 - 1.70 ng/dL Final    TSH 11/15/2023 23.300 (H)  0.270 - 4.200 uIU/mL Final    Glucose 11/15/2023 51 (L)  65 - 99 mg/dL Final    BUN 11/15/2023 16  8 - 23 mg/dL Final    Creatinine 11/15/2023 0.88  0.76 - 1.27 mg/dL Final    Sodium 11/15/2023 142  136 - 145 mmol/L Final    Potassium 11/15/2023 3.8  3.5 - 5.2 mmol/L Final    Chloride 11/15/2023 103  98 - 107 mmol/L Final    CO2 11/15/2023 28.6  22.0 - 29.0 mmol/L Final    Calcium 11/15/2023 9.7  8.6 - 10.5 mg/dL Final    Total Protein 11/15/2023 7.4  6.0 - 8.5 g/dL Final    Albumin 11/15/2023 4.4  3.5 - 5.2 g/dL Final    ALT (SGPT) 11/15/2023 23  1 - 41 U/L Final    AST (SGOT) 11/15/2023 25  1 - 40 U/L Final    Alkaline Phosphatase 11/15/2023 121 (H)  39 - 117 U/L Final    Total Bilirubin 11/15/2023 0.5  0.0 - 1.2 mg/dL Final    Globulin 11/15/2023 3.0  gm/dL Final    A/G Ratio 11/15/2023 1.5  g/dL Final    BUN/Creatinine Ratio 11/15/2023 18.2  7.0 - 25.0 Final    Anion Gap 11/15/2023 10.4  5.0 - 15.0 mmol/L Final    eGFR 11/15/2023 96.6  >60.0 mL/min/1.73 Final             Assessment:         Diagnoses and all orders for this visit:    Uncontrolled type 2 diabetes mellitus with hyperglycemia, with long-term current use of insulin  -     POC Glucose, Blood  -     POC Glycosylated Hemoglobin (Hb A1C)  -     Comprehensive Metabolic Panel  -     TSH  -     T4, Free    Hypothyroidism  -     Comprehensive Metabolic Panel  -     TSH  -     T4, Free    Other orders  -     Insulin Glargine (LANTUS SOLOSTAR) 100 UNIT/ML injection pen; Inject 40 Units under the skin into the appropriate area as directed Every Night.  -     insulin aspart (NovoLOG FlexPen) 100 UNIT/ML solution pen-injector sc pen; Inject 15 Units under the skin into the appropriate area as directed 3 (Three) Times a Day With Meals.  -     Continuous Blood Gluc Sensor (Dexcom G7 Sensor) misc; Use 1 each Every 10 (Ten) Days.        Plan:       Patient  has poorly controlled diabetes with hyperglycemia . Dietary recommendations reviewed. Insulin titration instructions provided.   Cont Jardiance 25 mg daily.  I have recommended changing it to the MDI regimen and your instructions provided   Novolog 70/30 50 units twice a day until current supply is over, then changed to basal and bolus insulin.  I have also sent prescription for sensors  Patient Instructions   Diabetes Treatment Recommendations  Patient     Laron Pandey     Date:        02/02/24     ADA General Goals: A1c: < 7%                                                                                   Your A1C is   Lab Results   Component Value Date    HGBA1C 9.1 (A) 11/15/2023    HGBA1C 8.9 08/11/2023    HGBA1C 9.3 04/26/2023       Fasting/before meal glucose: <150 mg/dL                                    2 Hour after meal glucoses: < 180 mg/dL                                        Bedtime glucose:120-180                                                                Glucose testing frequency: Dexcom G7    Insulin dosing:  Basal insulin Levemir , Toujeo , Lantus , or Tresiba (U-100) 40 Units nightly. If you have high glucose in the morning, increase the dose by 2 units. Continue titrating up by 2 units every 3 days until glucose is < 150.              Meal Insulin Humalog (U-100) or Novolog  10 units before meals.   Correction insulin (add to meal insulin)   0 unit  if glucose  less than 150   0 unit   if glucose  150 - 199   2 units if glucose 200 - 249   4 units if glucose 250 - 299   6 units if glucose 300 - 349   8  units if glucose Greater than 350       Nutritional Recommendations:  4-5 carb portions per meal for male (60-75 gm of carbs)    Physical Activity Goals:  Walk at least 15 min every day, ideally exercise 45-60 min most days (could be done in short bouts of 10-15 minutes.    Keep records of your glucose levels and insulin adjustments. We may ask you to keep records on the content of your  meals with insulin doses and before/after meal glucose levels to evaluate your ratios.  Call for advice if you have unexplained or unexpected hypoglycemia  (glucose < 60) or persistent high glucose > 300.  Office: [phone]      Meka Swan MD          -gabapentin for neuropathy. He had a lot if neuropathy sx.      -cont Tirosint and increase the dose 200 --->250mcg, repeat thyroid labs showed low thyroid function. He has been consistent with the medication in the last few months.        Follow up:  3 months.

## 2024-02-03 LAB
ALBUMIN SERPL-MCNC: 4.4 G/DL (ref 3.5–5.2)
ALBUMIN/GLOB SERPL: 1.7 G/DL
ALP SERPL-CCNC: 96 U/L (ref 39–117)
ALT SERPL W P-5'-P-CCNC: 16 U/L (ref 1–41)
ANION GAP SERPL CALCULATED.3IONS-SCNC: 10 MMOL/L (ref 5–15)
AST SERPL-CCNC: 23 U/L (ref 1–40)
BILIRUB SERPL-MCNC: 0.5 MG/DL (ref 0–1.2)
BUN SERPL-MCNC: 18 MG/DL (ref 8–23)
BUN/CREAT SERPL: 18.8 (ref 7–25)
CALCIUM SPEC-SCNC: 9.9 MG/DL (ref 8.6–10.5)
CHLORIDE SERPL-SCNC: 104 MMOL/L (ref 98–107)
CO2 SERPL-SCNC: 27 MMOL/L (ref 22–29)
CREAT SERPL-MCNC: 0.96 MG/DL (ref 0.76–1.27)
EGFRCR SERPLBLD CKD-EPI 2021: 88.8 ML/MIN/1.73
GLOBULIN UR ELPH-MCNC: 2.6 GM/DL
GLUCOSE SERPL-MCNC: 139 MG/DL (ref 65–99)
POTASSIUM SERPL-SCNC: 4.4 MMOL/L (ref 3.5–5.2)
PROT SERPL-MCNC: 7 G/DL (ref 6–8.5)
SODIUM SERPL-SCNC: 141 MMOL/L (ref 136–145)
T4 FREE SERPL-MCNC: 0.65 NG/DL (ref 0.93–1.7)
TSH SERPL DL<=0.05 MIU/L-ACNC: 44.8 UIU/ML (ref 0.27–4.2)

## 2024-02-05 ENCOUNTER — PRIOR AUTHORIZATION (OUTPATIENT)
Dept: ENDOCRINOLOGY | Facility: CLINIC | Age: 64
End: 2024-02-05
Payer: COMMERCIAL

## 2024-02-05 LAB
EXPIRATION DATE: ABNORMAL
EXPIRATION DATE: ABNORMAL
GLUCOSE BLDC GLUCOMTR-MCNC: 196 MG/DL (ref 70–130)
HBA1C MFR BLD: 8.2 % (ref 4.5–5.7)
Lab: ABNORMAL
Lab: ABNORMAL

## 2024-02-05 RX ORDER — LEVOTHYROXINE SODIUM 200 UG/1
200 CAPSULE ORAL DAILY
Qty: 30 CAPSULE | Refills: 11 | Status: SHIPPED | OUTPATIENT
Start: 2024-02-05

## 2024-02-05 RX ORDER — LEVOTHYROXINE SODIUM 50 UG/1
50 CAPSULE ORAL DAILY
Qty: 30 CAPSULE | Refills: 11 | Status: SHIPPED | OUTPATIENT
Start: 2024-02-05

## 2024-02-06 ENCOUNTER — TELEPHONE (OUTPATIENT)
Dept: ENDOCRINOLOGY | Facility: CLINIC | Age: 64
End: 2024-02-06
Payer: COMMERCIAL

## 2024-02-06 NOTE — TELEPHONE ENCOUNTER
Approvedon February 5  PA Case: [id number], Status: Approved, Coverage Starts on: 2/5/2024 12:00:00 AM, Coverage Ends on: 2/4/2025 12:00:00 AM.  Drug  Dexcom G6 Sensor  Form  [employer] Electronic PA Form (2017 [employer])

## 2024-02-19 ENCOUNTER — OFFICE VISIT (OUTPATIENT)
Dept: CARDIOLOGY | Facility: CLINIC | Age: 64
End: 2024-02-19
Payer: COMMERCIAL

## 2024-02-19 VITALS
SYSTOLIC BLOOD PRESSURE: 130 MMHG | DIASTOLIC BLOOD PRESSURE: 80 MMHG | OXYGEN SATURATION: 98 % | HEART RATE: 43 BPM | HEIGHT: 68 IN | WEIGHT: 188.6 LBS | BODY MASS INDEX: 28.58 KG/M2

## 2024-02-19 DIAGNOSIS — Z95.810 PRESENCE OF IMPLANTABLE CARDIOVERTER-DEFIBRILLATOR (ICD): ICD-10-CM

## 2024-02-19 DIAGNOSIS — I49.01 VF (VENTRICULAR FIBRILLATION): Primary | ICD-10-CM

## 2024-02-19 DIAGNOSIS — I10 PRIMARY HYPERTENSION: ICD-10-CM

## 2024-02-19 DIAGNOSIS — Z79.01 CHRONIC ANTICOAGULATION: ICD-10-CM

## 2024-02-19 PROBLEM — R29.898 LEFT ARM WEAKNESS: Status: RESOLVED | Noted: 2022-06-13 | Resolved: 2024-02-19

## 2024-02-19 PROBLEM — M75.122 NONTRAUMATIC COMPLETE TEAR OF LEFT ROTATOR CUFF: Status: RESOLVED | Noted: 2022-10-19 | Resolved: 2024-02-19

## 2024-02-19 PROCEDURE — 93260 PRGRMG DEV EVAL IMPLTBL SYS: CPT | Performed by: PHYSICIAN ASSISTANT

## 2024-02-19 PROCEDURE — 99213 OFFICE O/P EST LOW 20 MIN: CPT | Performed by: PHYSICIAN ASSISTANT

## 2024-02-19 NOTE — PROGRESS NOTES
Encounter Date:02/19/2024      Patient ID: Laron Pandey is a 63 y.o. male.    Nacho Omer MD    Cheif Complaint EP: Atrial Fibrillation and ICD Check    PROBLEM LIST:  Patient Active Problem List    Diagnosis Date Noted    VF (ventricular fibrillation) 10/19/2022     Priority: High     Note Last Updated: 8/14/2023     Echocardiogram, 6/13/2022: Left ventricular ejection fraction appears to be 56 - 60%. Left ventricular systolic function is normal. Left ventricular wall thickness is consistent with mild to moderate concentric hypertrophy.  Implantation of a [employer] SICD A219 [id number] number pulse generator with [employer] SICD lead 3501. (5/12/2023)      Presence of implantable cardioverter-defibrillator (ICD) 08/14/2023     Priority: Medium    Coronary artery disease involving coronary bypass graft of native heart without angina pectoris 02/09/2018     Priority: Medium     Note Last Updated: 10/19/2022     Status post three-vessel CABG in 2003 with Dr. Myers.  Status post PRINCE x2 to the circumflex with Dr. Block, 11/2020      Chronic anticoagulation 02/19/2024     Priority: Low    PAD (peripheral artery disease) 09/15/2020     Priority: Low     Note Last Updated: 10/19/2022     H/O Balloon angioplasty of the left SFA and stenting and balloon angioplasty of the left common iliac artery extending into the external iliac artery 12/2020      Hypertension 10/23/2019     Priority: Low    GERD (gastroesophageal reflux disease)     Alcohol use 06/15/2022    Left carotid stenosis 06/13/2022     Note Last Updated: 6/15/2022     6/13/22 Bilateral carotid duplex - Right internal carotid artery stenosis of >70%.  Left internal carotid artery stenosis of >70%.  s/p Rt Carotid Stent 6/15/22 6/15/22 by Dr. Flores      Current every day smoker 09/15/2020    Lumbar postlaminectomy syndrome 09/11/2020    History of lumbar surgery 09/11/2020    Lumbar stenosis with neurogenic claudication  07/31/2020    Erectile dysfunction 03/30/2018    Uncontrolled type 2 diabetes mellitus with hyperglycemia, with long-term current use of insulin 02/09/2018    Hyperlipidemia 02/09/2018    Hypothyroidism 02/09/2018               History of Present Illness  Patient presents today for follow-up with a history of VT with an S ICD.  Returns today for scheduled EP follow-up.  He is doing quite well.  He said no awareness of palpitations, no dizziness no syncope.  He does not check his blood pressure regularly at home.  He states compliance with his current medical regimen    Allergies   Allergen Reactions    Celebrex [Celecoxib] Swelling     Hands    Morphine And Related Nausea Only       Current Outpatient Medications   Medication Instructions    acetaminophen (TYLENOL) 500 mg, Oral, Every 6 Hours PRN    BD ULTRA-FINE PEN NEEDLES 29G X 12.7MM misc Use with Insulin 3 times daily    Continuous Blood Gluc Sensor (Dexcom G7 Sensor) misc 1 each, Does not apply, Every 10 Days    famotidine (PEPCID) 20 mg, Oral, 2 Times Daily Before Meals    gabapentin (NEURONTIN) 200 mg, Oral, 3 Times Daily    glucose blood (OneTouch Verio) test strip Use as instructed    hydroCHLOROthiazide (MICROZIDE) 12.5 MG capsule TAKE 1 CAPSULE BY MOUTH EVERY DAY    insulin aspart prot & aspart (NovoLOG Mix 70/30 FlexPen) (70-30) 100 UNIT/ML suspension pen-injector injection INJECT 50 UNITS TWICE A DAY BEFORE MEALS    Insulin Glargine (LANTUS SOLOSTAR) 40 Units, Subcutaneous, Nightly    Jardiance 25 MG tablet tablet TAKE 1 TABLET BY MOUTH EVERY DAY    Lancets (OneTouch Delica Plus [id number]) misc 1 each, Does not apply, 3 Times Daily    levothyroxine sodium (TIROSINT) 50 mcg, Oral, Daily, Total dose is 250 mcg (take 1 capsule of 200 and 1 capsule of 50 mcg)    Levothyroxine Sodium (TIROSINT) 200 mcg, Oral, Daily, Take 200 mcg + 50 mcg daily - total dose of 250 mcg    metoprolol succinate XL (TOPROL-XL) 50 mg, Oral, Daily    nicotine (NICODERM CQ) 21  "MG/24HR patch 1 patch, Transdermal, Every 24 Hours    NovoLOG FlexPen 15 Units, Subcutaneous, 3 Times Daily With Meals    Rivaroxaban (XARELTO) 2.5 mg, Oral, 2 Times Daily    rosuvastatin (CRESTOR) 20 mg, Oral, Nightly    valsartan (DIOVAN) 80 MG tablet TAKE 1 TABLET BY MOUTH EVERY DAY       .    Objective:     /80 (BP Location: Left arm, Patient Position: Sitting, Cuff Size: Adult)   Pulse (!) 43   Ht 172.7 cm (68\")   Wt 85.5 kg (188 lb 9.6 oz)   SpO2 98%   BMI 28.68 kg/m²    Body mass index is 28.68 kg/m².     Constitutional:       Appearance: Well-developed.   Pulmonary:      Effort: Pulmonary effort is normal. No respiratory distress.      Breath sounds: Normal breath sounds. No wheezing. No rales.      Comments: Bases clear  Chest:      Chest wall: Not tender to palpatation.   Cardiovascular:      Normal rate. Regular rhythm.      Murmurs: There is no murmur.      No gallop.  No click. No rub.   Pulses:     Intact distal pulses.   Edema:     Peripheral edema absent.   Musculoskeletal: Normal range of motion.       Lab Review:     Lab Results   Component Value Date    GLUCOSE 139 (H) 02/02/2024    BUN 18 02/02/2024    CREATININE 0.96 02/02/2024    EGFR 88.8 02/02/2024    BCR 18.8 02/02/2024    K 4.4 02/02/2024    CO2 27.0 02/02/2024    CALCIUM 9.9 02/02/2024    ALBUMIN 4.4 02/02/2024    BILITOT 0.5 02/02/2024    AST 23 02/02/2024    ALT 16 02/02/2024     Lab Results   Component Value Date    WBC 8.32 05/03/2023    HGB 14.2 05/03/2023    HCT 43.9 05/03/2023    MCV 98.9 (H) 05/03/2023     05/03/2023     Lab Results   Component Value Date    TSH 44.800 (H) 02/02/2024           Procedures               Assessment:      Diagnosis Plan   1. VF (ventricular fibrillation)  Stable, no VT on device interrogation      2. Presence of implantable cardioverter-defibrillator (ICD)    This patient's Cardiac Implanted Electronic Device was manually interrogated and reprogrammed during the patient encounter " today.  Iterative programming changes were manually made to determine the sensing threshold, pacing threshold, lead impedance as well as underlying cardiac rhythm.  These programming changes were not limited to but included some or all of the following when appropriate: pacing mode, programmed AV delays, blanking periods, and refractory periods.  Data obtained as a result of these manual programing changes informed the patient's CIED permanent programming.  93% battery life remaining.      3. Primary hypertension  Well-managed, continue current medical regimen      4. Chronic anticoagulation  He is on Xarelto 2.5 mg daily for PAD.  He is running out and I gave him 2 bottles of samples.  He will discuss changing to a different more cost effective medication with his interventional cardiologist whom he sees within the next 1 or 2 weeks.        Plan:     Stable cardiac status.  Continue current medications.   in 6 months, sooner as needed.  Thank you for allowing us to participate in the care of your patient.     Electronically signed by JENAE Haines, 02/19/24, 1:51 PM EST.

## 2024-02-21 ENCOUNTER — OFFICE VISIT (OUTPATIENT)
Dept: CARDIOLOGY | Facility: CLINIC | Age: 64
End: 2024-02-21
Payer: COMMERCIAL

## 2024-02-21 VITALS
HEIGHT: 68 IN | SYSTOLIC BLOOD PRESSURE: 134 MMHG | HEART RATE: 87 BPM | OXYGEN SATURATION: 98 % | BODY MASS INDEX: 28.61 KG/M2 | DIASTOLIC BLOOD PRESSURE: 80 MMHG | WEIGHT: 188.8 LBS

## 2024-02-21 DIAGNOSIS — I73.9 PAD (PERIPHERAL ARTERY DISEASE): ICD-10-CM

## 2024-02-21 DIAGNOSIS — I10 PRIMARY HYPERTENSION: ICD-10-CM

## 2024-02-21 DIAGNOSIS — I49.01 VF (VENTRICULAR FIBRILLATION): ICD-10-CM

## 2024-02-21 DIAGNOSIS — I25.810 CORONARY ARTERY DISEASE INVOLVING CORONARY BYPASS GRAFT OF NATIVE HEART WITHOUT ANGINA PECTORIS: Primary | ICD-10-CM

## 2024-02-21 DIAGNOSIS — E78.2 MIXED HYPERLIPIDEMIA: ICD-10-CM

## 2024-02-21 PROCEDURE — 99214 OFFICE O/P EST MOD 30 MIN: CPT | Performed by: INTERNAL MEDICINE

## 2024-02-21 PROCEDURE — 93000 ELECTROCARDIOGRAM COMPLETE: CPT | Performed by: INTERNAL MEDICINE

## 2024-02-21 RX ORDER — ASPIRIN 81 MG/1
81 TABLET ORAL DAILY
Qty: 90 TABLET | Refills: 3 | Status: SHIPPED | OUTPATIENT
Start: 2024-02-21

## 2024-02-21 RX ORDER — CILOSTAZOL 50 MG/1
50 TABLET ORAL 2 TIMES DAILY
Qty: 180 TABLET | Refills: 3 | Status: SHIPPED | OUTPATIENT
Start: 2024-02-21

## 2024-02-21 NOTE — PROGRESS NOTES
[facility] Cardiology   [address], [address]  [place], [place], [address]    [phone]  [email or web address]           OUTPATIENT CLINIC FOLLOW UP NOTE    Patient Care Team:  Patient Care Team:  Nacho Omer MD as PCP - General (Family Medicine)  Meka Swan MD as Consulting Physician (Endocrinology)  Mychal Block MD as Consulting Physician (Cardiology)  Jarret Flores MD as Consulting Physician (Obstetrics and Gynecology)    Subjective:      Chief Complaint   Patient presents with    Coronary artery disease involving coronary bypass graft of     Claudication       HPI:    Laron Pandey is a 63 y.o. male.  Problem list:  CAD  Status post three-vessel CABG in 2003 with Dr. Myers.  Status post PRINCE x2 to the circumflex with Dr. Block, 12/2020  [facility] 10/2022, patent LIMA, patent stents to circumflex, stable collaterals to occluded RCA, previously noted occluded vein grafts.  No significant change compared to 12/2020 [facility]  VT/VF arrest  Postop left arthroscopic rotator cuff repair, 10/19/2022, VF arrest during transport from operating room to PACU. Shock x 1 at 150J.   [facility] 10/25/2022:  Severe multivessel CAD. Patent LIMA to an occluded LAD, patent stents to the circumflex, stable collaterals to an occluded RCA and know occluded vein grafts. No significant changes from previous cath 12/2020.  Subcutaneous ICD, 5/2023, Dr. Santillan  Carotid artery disease  Status post bilateral CEA in 2009 with Dr. Myers  MRA head/neck 3/13/2022 revealing 50-70% atherosclerotic narrowing of the ICA's bilaterally.   Status post right carotid stent placement with Dr. Flores, 6/15/2022  Carotid ultrasound 6/2023, patent right carotid stent, left ICA greater than 70%, antegrade vertebral arteries, no significant change compared to 11/2022  CVA  6/13/2022: acute cortical infarct in the right pre and postcentral gyri near the vertex as well as a small area of the right frontal lobe.  Thought to be  due to carotid stenosis.  Status post R ICA stent as noted above  PAD  Peripheral angiogram 12/3/2020: Status post 7 x 27 mm VISI pro to the left common iliac artery.  Left SFA status post angioplasty with a Chocolate balloon   Back pain significant improved post procedurally  Lower extremity arterial duplex 2/2023: Right KARISSA 0.6, mid SFA near occlusion.  Left KARISSA 0.6, mid SFA severe stenosis.  Lower back pain  History of lumbar surgery, Dr. Dobbs, 1999  Acute worsening spring 2020, severe disc space narrowing and L4/L5, moderate to severe canal and neuroforaminal stenosis  Hypertension  Hyperlipidemia  Diabetes mellitus  Hypothyroidism  Tobacco dependence    The patient presents today for follow-up.    No chest pain.  Stable dyspnea.  Persistent back pain, leg pain, worse with ambulation.  Can walk for about 5 minutes before having claudication    Ran out of aspirin and Pletal.    Review of Systems:  As noted in above HPI.      PFSH:  Patient Active Problem List   Diagnosis    Uncontrolled type 2 diabetes mellitus with hyperglycemia, with long-term current use of insulin    Hyperlipidemia    Hypothyroidism    Coronary artery disease involving coronary bypass graft of native heart without angina pectoris    Erectile dysfunction    Hypertension    Lumbar stenosis with neurogenic claudication    Lumbar postlaminectomy syndrome    History of lumbar surgery    PAD (peripheral artery disease)    Current every day smoker    Left carotid stenosis    Alcohol use    GERD (gastroesophageal reflux disease)    VF (ventricular fibrillation)    Presence of implantable cardioverter-defibrillator (ICD)    Chronic anticoagulation         Current Outpatient Medications:     acetaminophen (TYLENOL) 500 MG tablet, Take 1 tablet by mouth Every 6 (Six) Hours As Needed for Mild Pain., Disp: , Rfl:     BD ULTRA-FINE PEN NEEDLES 29G X 12.7MM misc, Use with Insulin 3 times daily, Disp: 300 each, Rfl: 2    Continuous Blood Gluc Sensor (Dexcom  G7 Sensor) misc, Use 1 each Every 10 (Ten) Days., Disp: 3 each, Rfl: 6    famotidine (PEPCID) 20 MG tablet, Take 1 tablet by mouth 2 (Two) Times a Day Before Meals., Disp: 30 tablet, Rfl: 1    gabapentin (NEURONTIN) 100 MG capsule, TAKE 2 CAPSULES BY MOUTH 3 TIMES A DAY, Disp: 180 capsule, Rfl: 3    glucose blood (OneTouch Verio) test strip, Use as instructed, Disp: 300 each, Rfl: 2    hydroCHLOROthiazide (MICROZIDE) 12.5 MG capsule, TAKE 1 CAPSULE BY MOUTH EVERY DAY, Disp: 90 capsule, Rfl: 1    insulin aspart (NovoLOG FlexPen) 100 UNIT/ML solution pen-injector sc pen, Inject 15 Units under the skin into the appropriate area as directed 3 (Three) Times a Day With Meals., Disp: 15 mL, Rfl: 6    insulin aspart prot & aspart (NovoLOG Mix 70/30 FlexPen) (70-30) 100 UNIT/ML suspension pen-injector injection, INJECT 50 UNITS TWICE A DAY BEFORE MEALS, Disp: 90 mL, Rfl: 3    Insulin Glargine (LANTUS SOLOSTAR) 100 UNIT/ML injection pen, Inject 40 Units under the skin into the appropriate area as directed Every Night., Disp: 15 mL, Rfl: 6    Jardiance 25 MG tablet tablet, TAKE 1 TABLET BY MOUTH EVERY DAY, Disp: 30 tablet, Rfl: 2    Lancets (OneTouch Delica Plus [id number]) misc, 1 each 3 (Three) Times a Day., Disp: 300 each, Rfl: 2    Levothyroxine Sodium (Tirosint) 200 MCG capsule, Take 200 mcg by mouth Daily. Take 200 mcg + 50 mcg daily - total dose of 250 mcg, Disp: 30 capsule, Rfl: 11    levothyroxine sodium (Tirosint) 50 MCG capsule, Take 1 capsule by mouth Daily. Total dose is 250 mcg (take 1 capsule of 200 and 1 capsule of 50 mcg), Disp: 30 capsule, Rfl: 11    metoprolol succinate XL (TOPROL-XL) 50 MG 24 hr tablet, TAKE 1 TABLET BY MOUTH EVERY DAY, Disp: 90 tablet, Rfl: 7    Rivaroxaban (XARELTO) 2.5 MG tablet, Take 1 tablet by mouth 2 (Two) Times a Day. (Patient taking differently: Take 1 tablet by mouth 2 (Two) Times a Day. Has SAMPLES), Disp: 60 tablet, Rfl: 11    rosuvastatin (CRESTOR) 20 MG tablet, TAKE 1 TABLET BY  "MOUTH EVERY NIGHT, Disp: 90 tablet, Rfl: 3    valsartan (DIOVAN) 80 MG tablet, TAKE 1 TABLET BY MOUTH EVERY DAY, Disp: 90 tablet, Rfl: 1    nicotine (NICODERM CQ) 21 MG/24HR patch, Place 1 patch on the skin as directed by provider Daily. (Patient not taking: Reported on 2/21/2024), Disp: 30 patch, Rfl: 1    Allergies   Allergen Reactions    Celebrex [Celecoxib] Swelling     Hands    Morphine And Related Nausea Only        reports that he has been smoking cigarettes. He started smoking about 46 years ago. He has a 12.50 pack-year smoking history. He has been exposed to tobacco smoke. He has never used smokeless tobacco.      Objective:   Physical exam:  /80 (BP Location: Left arm, Patient Position: Sitting)   Pulse 87   Ht 172.7 cm (68\")   Wt 85.6 kg (188 lb 12.8 oz)   SpO2 98%   BMI 28.71 kg/m²   CONSTITUTIONAL: No acute distress  CARDIOVASCULAR: Regular rate and rhythm with normal S1 and S2. Without murmur.  PERIPHERAL VASCULAR: Bilateral carotid bruit. Normal radial pulse.     Exam, 12/2020: 2+ PT pulses bilaterally post procedurally  Exam 6/16/2021: Difficult to palpate pedal pulses  Exam 1/5/2022: Right PT pulse 1+, left PT pulse difficult to palpate.  Exam 8/2023: 2+ right ulnar, nonpalpable right radial, 1+ left radial    Labs:      Lab Results   Component Value Date    CHOL 99 10/26/2022     Lab Results   Component Value Date    TRIG 110 10/26/2022     Lab Results   Component Value Date    HDL 29 (L) 10/26/2022     Lab Results   Component Value Date    LDL 49 10/26/2022     No components found for: \"LDLDIRECTC\"    Diagnostic Data:      ECG 12 Lead    Date/Time: 2/21/2024 3:39 PM  Performed by: Mychal Block MD    Authorized by: Mychal Blokc MD  Comparison: compared with previous ECG from 10/21/2022  Comparison to previous ECG: PVCs now present.  Stable inferolateral ST segment abnormality, possible lateral ischemia  Rhythm: sinus rhythm          KARISSA 7/27/2021  Right Conclusion: The right KARISSA is " moderately reduced at 0.58. Moderate digital ischemia with a TBI 0.32.  Left Conclusion: The left KARISSA is severely reduced at 0.48. Moderate digital ischemia with a TBI 0.24.    Results for orders placed during the hospital encounter of 10/18/22    Adult Transthoracic Echo Complete w/ Color, Spectral and Contrast if Necessary Per Protocol    Interpretation Summary    Left ventricular ejection fraction appears to be 56 - 60%. Left ventricular systolic function is normal.    Left ventricular wall thickness is consistent with moderate to severe concentric hypertrophy.    Left ventricular diastolic function is consistent with (grade II w/high LAP) pseudonormalization.    Systolic and diastolic flattening of the interventricular septum consistent with right ventricle pressure and volume overload.    There is mild calcification of the aortic valve.    Moderate tricuspid valve regurgitation is present.    Estimated right ventricular systolic pressure from tricuspid regurgitation is markedly elevated (>55 mmHg).      Assessment and Plan:     Coronary artery disease  Mixed hyperlipidemia  -Without recurrent angina.    -Restart aspirin  -If Xarelto affordable, we will continue.  If not, will increase Pletal as noted below  -Continue other current medications  -Smoking cessation advised.  Encouraged exercise  -Repeat FLP    VT cardiac arrest status post SubQ ICD  -Followed by Dr. Santillan    Peripheral vascular disease with claudication   Spinal stenosis  Diabetic neuropathy  Tobacco dependence  -Improved symptoms with Pletal  -Xarelto expensive.  $85 per month for [employer] select program and option.  Patient to investigate cost further.  If able to restart, we will continue.  If not, would recommend increasing Pletal to 100 mg twice daily  -Restarting aspirin as noted above  -Continue current related medications  -Discussed increased exercise and smoking cessation    -Symptoms seem fairly stable  -Continue to urged smoking  cessation (down to quarter pack per day)  -Discussed that if he has lifestyle limiting claudication despite medical optimization and less smoking, peripheral angiography with intervention is an option.  Patient to continue to consider symptom course    CVA, 6/2022  Carotid stenosis, bilateral  -Followed by Dr. Flores; follow-up next month    Essential hypertension  -Continue current medications      - Return in about 6 months (around 8/21/2024) for Next scheduled follow up, or sooner if needed.    Mychal Block MD, MSc, FACC, [facility]  Interventional Cardiology  [facility]    The patient was engaged in a continuous and active collaborative plan of care related to an identified health condition (in this case, the cardiology team has served as the focal point for this patient's needs related to longitudinal care of the patient's hypertension, dyslipidemia, tobacco dependence, as discussed above). The management of this/these conditions involved our direction with specialized clinical knowledge, skill and experience. Such collaborative care includes patient education, expectations and responsibilities, shared decision-making around therapeutic goals, and shared commitments to achieve those goals.

## 2024-03-18 ENCOUNTER — OFFICE VISIT (OUTPATIENT)
Dept: NEUROSURGERY | Facility: CLINIC | Age: 64
End: 2024-03-18
Payer: COMMERCIAL

## 2024-03-18 ENCOUNTER — HOSPITAL ENCOUNTER (OUTPATIENT)
Dept: CARDIOLOGY | Facility: HOSPITAL | Age: 64
Discharge: HOME | End: 2024-03-18
Admitting: NEUROLOGICAL SURGERY
Payer: COMMERCIAL

## 2024-03-18 VITALS — BODY MASS INDEX: 28.6 KG/M2 | HEIGHT: 68 IN | WEIGHT: 188.71 LBS

## 2024-03-18 VITALS — WEIGHT: 189 LBS | BODY MASS INDEX: 27.99 KG/M2 | TEMPERATURE: 96.9 F | HEIGHT: 69 IN

## 2024-03-18 DIAGNOSIS — Z98.890 HISTORY OF BILATERAL CAROTID ENDARTERECTOMY: ICD-10-CM

## 2024-03-18 DIAGNOSIS — F17.200 CURRENT EVERY DAY SMOKER: ICD-10-CM

## 2024-03-18 DIAGNOSIS — I65.23 BILATERAL CAROTID ARTERY STENOSIS: ICD-10-CM

## 2024-03-18 DIAGNOSIS — I73.9 PAD (PERIPHERAL ARTERY DISEASE): Primary | ICD-10-CM

## 2024-03-18 DIAGNOSIS — Z95.828 INTERNAL CAROTID ARTERY STENT PRESENT: ICD-10-CM

## 2024-03-18 LAB
BH CV DISTAL RIGHT ICA HIDDEN LRR: 1 CM
BH CV MID RIGHT ICA HIDDEN LRR: 1 CM
BH CV VAS CAROTID RIGHT DISTAL STENT EDV: 34.2 CM/S
BH CV VAS CAROTID RIGHT DISTAL STENT PSV: 103 CM/S
BH CV VAS CAROTID RIGHT DISTAL TO STENT NATIVE VESSEL E: 30.7 CM/S
BH CV VAS CAROTID RIGHT DISTAL TO STENT PSV: 101 CM/S
BH CV VAS CAROTID RIGHT MID STENT EDV: 43.2 CM/S
BH CV VAS CAROTID RIGHT MID STENT PSV: 136 CM/S
BH CV VAS CAROTID RIGHT PROXIMAL STENT EDV: 29.5 CM/S
BH CV VAS CAROTID RIGHT PROXIMAL STENT PSV: 93.8 CM/S
BH CV VAS CAROTID RIGHT STENT NATIVE VESSEL PROXIMAL EDV: 27.5 CM/S
BH CV VAS CAROTID RIGHT STENT NATIVE VESSEL PROXIMAL PS: 89.9 CM/S
BH CV XLRA MEAS LEFT DIST CCA EDV: 25.1 CM/SEC
BH CV XLRA MEAS LEFT DIST CCA PSV: 142 CM/SEC
BH CV XLRA MEAS LEFT DIST ICA EDV: 25.1 CM/SEC
BH CV XLRA MEAS LEFT DIST ICA PSV: 82.4 CM/SEC
BH CV XLRA MEAS LEFT ICA/CCA RATIO: 3.79
BH CV XLRA MEAS LEFT MID CCA EDV: 18.7 CM/SEC
BH CV XLRA MEAS LEFT MID CCA PSV: 88.4 CM/SEC
BH CV XLRA MEAS LEFT MID ICA EDV: 59.1 CM/SEC
BH CV XLRA MEAS LEFT MID ICA PSV: 204 CM/SEC
BH CV XLRA MEAS LEFT PROX CCA EDV: 23.6 CM/SEC
BH CV XLRA MEAS LEFT PROX CCA PSV: 124 CM/SEC
BH CV XLRA MEAS LEFT PROX ECA EDV: 27.7 CM/SEC
BH CV XLRA MEAS LEFT PROX ECA PSV: 225 CM/SEC
BH CV XLRA MEAS LEFT PROX ICA EDV: 119 CM/SEC
BH CV XLRA MEAS LEFT PROX ICA PSV: 335 CM/SEC
BH CV XLRA MEAS LEFT PROX SCLA PSV: 189.8 CM/SEC
BH CV XLRA MEAS LEFT VERTEBRAL A EDV: 15.7 CM/SEC
BH CV XLRA MEAS LEFT VERTEBRAL A PSV: 62.4 CM/SEC
BH CV XLRA MEAS RIGHT DIST CCA EDV: 30.9 CM/SEC
BH CV XLRA MEAS RIGHT DIST CCA PSV: 87.4 CM/SEC
BH CV XLRA MEAS RIGHT DIST ICA EDV: 28.6 CM/SEC
BH CV XLRA MEAS RIGHT DIST ICA PSV: 93.6 CM/SEC
BH CV XLRA MEAS RIGHT ICA/CCA RATIO: 1.94
BH CV XLRA MEAS RIGHT MID CCA EDV: 19.2 CM/SEC
BH CV XLRA MEAS RIGHT MID CCA PSV: 70.2 CM/SEC
BH CV XLRA MEAS RIGHT MID ICA EDV: 43.2 CM/SEC
BH CV XLRA MEAS RIGHT MID ICA PSV: 136 CM/SEC
BH CV XLRA MEAS RIGHT PROX CCA EDV: 19.6 CM/SEC
BH CV XLRA MEAS RIGHT PROX CCA PSV: 108.2 CM/SEC
BH CV XLRA MEAS RIGHT PROX ECA EDV: 11.8 CM/SEC
BH CV XLRA MEAS RIGHT PROX ECA PSV: 75.1 CM/SEC
BH CV XLRA MEAS RIGHT PROX ICA EDV: 29.5 CM/SEC
BH CV XLRA MEAS RIGHT PROX ICA PSV: 93.8 CM/SEC
BH CV XLRA MEAS RIGHT PROX SCLA PSV: 133 CM/SEC
BH CV XLRA MEAS RIGHT VERTEBRAL A EDV: 16.8 CM/SEC
BH CV XLRA MEAS RIGHT VERTEBRAL A PSV: 51.3 CM/SEC
BH CVPROX RIGHT ICA HIDDEN LRR: 1 CM
RIGHT ARM BP: NORMAL MMHG

## 2024-03-18 PROCEDURE — 93880 EXTRACRANIAL BILAT STUDY: CPT

## 2024-03-18 PROCEDURE — 99214 OFFICE O/P EST MOD 30 MIN: CPT | Performed by: NEUROLOGICAL SURGERY

## 2024-03-18 PROCEDURE — 93880 EXTRACRANIAL BILAT STUDY: CPT | Performed by: INTERNAL MEDICINE

## 2024-03-18 NOTE — PROGRESS NOTES
"Subjective     Chief Complaint: Bilateral carotid stenosis    Patient ID: Laron Pandey is a 63 y.o. male is here today for follow-up.    History of Present Illness    This is a 63-year-old man with a complicated cerebrovascular history.  He has a history of a prior right-sided carotid endarterectomy which restenosed and required a stent in June 2022.    He is also being followed for moderate bordering on severe carotid stenosis of his left internal carotid artery.    The following portions of the patient's history were reviewed and updated as appropriate: allergies, current medications, past family history, past medical history, past social history, past surgical history and problem list.    Family history:   Family History   Problem Relation Age of Onset    Diabetes Mother     Heart disease Mother     Diabetes Father     Heart disease Father     Stroke Father     Heart attack Neg Hx     Hypertension Neg Hx     Thyroid disease Neg Hx        Social history:   Social History     Socioeconomic History    Marital status:    Tobacco Use    Smoking status: Every Day     Current packs/day: 0.25     Average packs/day: 0.3 packs/day for 50.0 years (12.5 ttl pk-yrs)     Types: Cigarettes     Start date: 1/1/1978     Passive exposure: Current    Smokeless tobacco: Never   Vaping Use    Vaping status: Every Day    Substances: Nicotine   Substance and Sexual Activity    Alcohol use: Yes     Alcohol/week: 8.0 standard drinks of alcohol     Types: 8 Cans of beer per week     Comment: social    Drug use: Never    Sexual activity: Not Currently     Partners: Female       Review of Systems    Objective   Temperature 96.9 °F (36.1 °C), temperature source Temporal, height 175.3 cm (69\"), weight 85.7 kg (189 lb).  Body mass index is 27.91 kg/m².    Physical Exam    Assessment & Plan     Independent Review of Radiographic Studies:      He underwent a diagnostic angiogram with stenting of his right ICA in June 2022.  At that " time, his left internal carotid artery demonstrated moderate stenosis on angiogram.  He had a an ultrasound which demonstrated velocities on the left side of 350 cm/s, end-diastolic of 106 with a ratio of 2.65.    Medical Decision Making:      Signs and symptoms of stroke and TIA were once again reviewed with the patient.  I would like to follow-up with him in 1 year after his next carotid ultrasound, or sooner if clinically indicated.  The importance of tobacco cessation was once again discussed with the patient.    Diagnoses and all orders for this visit:    1. PAD (peripheral artery disease) (Primary)  -     Duplex Carotid Ultrasound CAR    2. Current every day smoker    3. Bilateral carotid artery stenosis    4. History of bilateral carotid endarterectomy    5. S/p R ICA stent 2022        No follow-ups on file.           This document signed by RIRI Flores MD March 18, 2024 15:24 EDT

## 2024-04-05 ENCOUNTER — TELEPHONE (OUTPATIENT)
Dept: ENDOCRINOLOGY | Facility: CLINIC | Age: 64
End: 2024-04-05
Payer: COMMERCIAL

## 2024-04-05 NOTE — TELEPHONE ENCOUNTER
Pt called stating he has to pay for both doses of his levothyroxine when he goes to the pharm. He requested we send in RX for 125MCG two times a day gel capsule for levothyroxine in to [facility] in [place], [place].

## 2024-04-07 RX ORDER — LEVOTHYROXINE SODIUM 125 UG/1
250 CAPSULE ORAL DAILY
Qty: 60 CAPSULE | Refills: 11 | Status: SHIPPED | OUTPATIENT
Start: 2024-04-07

## 2024-04-16 ENCOUNTER — TELEPHONE (OUTPATIENT)
Dept: CARDIOLOGY | Facility: CLINIC | Age: 64
End: 2024-04-16
Payer: COMMERCIAL

## 2024-04-16 NOTE — TELEPHONE ENCOUNTER
LEFT MESSAGE W/ LEO ABOUT RELOCATION, ADDRESS CHANGE ONLY DATE AND TIME STAY THE SAME.     HUB OK TO MAKE CHANGES. 4/16/24

## 2024-05-28 RX ORDER — BLOOD SUGAR DIAGNOSTIC
STRIP MISCELLANEOUS
Qty: 1 EACH | Refills: 2 | Status: SHIPPED | OUTPATIENT
Start: 2024-05-28

## 2024-05-28 NOTE — TELEPHONE ENCOUNTER
Rx Refill Note  Requested Prescriptions     Pending Prescriptions Disp Refills    OneTouch Verio test strip [Pharmacy Med Name: ONE TOUCH VERIO TEST STRIP] 1 each 2     Sig: USE AS INSTRUCTED      Last office visit with prescribing clinician: 2/2/2024   Last telemedicine visit with prescribing clinician: Visit date not found   Next office visit with prescribing clinician: 7/11/2024                         Would you like a call back once the refill request has been completed: [] Yes [] No    If the office needs to give you a call back, can they leave a voicemail: [] Yes [] No    Vickie Covarrubias MA  05/28/24, 08:35 EDT

## 2024-06-19 ENCOUNTER — TELEPHONE (OUTPATIENT)
Dept: ENDOCRINOLOGY | Facility: CLINIC | Age: 64
End: 2024-06-19
Payer: COMMERCIAL

## 2024-06-19 RX ORDER — PROCHLORPERAZINE 25 MG/1
1 SUPPOSITORY RECTAL AS NEEDED
Qty: 1 EACH | Refills: 6 | Status: SHIPPED | OUTPATIENT
Start: 2024-06-19

## 2024-06-19 NOTE — TELEPHONE ENCOUNTER
Patient called office, he is needing a refill on his Dexcom transmitter. He would like this sent to [facility] in [facility].

## 2024-07-11 ENCOUNTER — OFFICE VISIT (OUTPATIENT)
Dept: ENDOCRINOLOGY | Facility: CLINIC | Age: 64
End: 2024-07-11
Payer: COMMERCIAL

## 2024-07-11 VITALS
OXYGEN SATURATION: 97 % | HEIGHT: 69 IN | WEIGHT: 183.3 LBS | BODY MASS INDEX: 27.15 KG/M2 | DIASTOLIC BLOOD PRESSURE: 78 MMHG | SYSTOLIC BLOOD PRESSURE: 132 MMHG | HEART RATE: 90 BPM

## 2024-07-11 DIAGNOSIS — E11.65 UNCONTROLLED TYPE 2 DIABETES MELLITUS WITH HYPERGLYCEMIA, WITH LONG-TERM CURRENT USE OF INSULIN: Primary | ICD-10-CM

## 2024-07-11 DIAGNOSIS — E78.2 MIXED HYPERLIPIDEMIA: ICD-10-CM

## 2024-07-11 DIAGNOSIS — Z79.4 UNCONTROLLED TYPE 2 DIABETES MELLITUS WITH HYPERGLYCEMIA, WITH LONG-TERM CURRENT USE OF INSULIN: Primary | ICD-10-CM

## 2024-07-11 DIAGNOSIS — E03.9 HYPOTHYROIDISM, ADULT: ICD-10-CM

## 2024-07-11 LAB
EXPIRATION DATE: ABNORMAL
EXPIRATION DATE: ABNORMAL
GLUCOSE BLDC GLUCOMTR-MCNC: 157 MG/DL (ref 70–130)
HBA1C MFR BLD: 8 % (ref 4.5–5.7)
Lab: ABNORMAL
Lab: ABNORMAL

## 2024-07-11 PROCEDURE — 80053 COMPREHEN METABOLIC PANEL: CPT | Performed by: INTERNAL MEDICINE

## 2024-07-11 PROCEDURE — 84439 ASSAY OF FREE THYROXINE: CPT | Performed by: INTERNAL MEDICINE

## 2024-07-11 PROCEDURE — 84443 ASSAY THYROID STIM HORMONE: CPT | Performed by: INTERNAL MEDICINE

## 2024-07-11 PROCEDURE — 80061 LIPID PANEL: CPT | Performed by: INTERNAL MEDICINE

## 2024-07-11 RX ORDER — PROCHLORPERAZINE 25 MG/1
1 SUPPOSITORY RECTAL ONCE
Qty: 1 EACH | Refills: 0 | Status: SHIPPED | OUTPATIENT
Start: 2024-07-11 | End: 2024-07-11

## 2024-07-11 NOTE — PROGRESS NOTES
Subjective:     Chief Complaint   Patient presents with    Diabetes      Laron Pandey is a 63 y.o. male who is is being seen for follow-up for management of  Type 2 diabetes mellitus.  The initial diagnosis of diabetes was made in 2003. He was initially controlled with metformin and started insulin in 2010.   Diabetic complications: cardiovascular disease s/p CABG. He has neuropathy sx with tingling and pain in the legs bilaterally.       Current diabetic medications include novolog 70/30.Jardiance   Past meds: Trulicity -  heartburn with it. Metformin - diarrhea.     Monitoring  -Dexcom sensor.  He is not using it now, insurance stopped covering it.     Other med problems:CAD/CABG, HL, HTN    HL- intolerant to statins in the past. On zetia.   Hypothyroidism - 250 mcg of levothyroxine. TSH remained 22 despite dose increase. He is compliant with the med. We have changed to Tirosint   HTN -BP is normal.     Pt reported forgetting insulin. He still uses premixed insulin, has large supply of this. He often uses first dose after meal, which often results in hyperglycemia followed by hypoglycemia. He  is reluctant to starting MDI.    MEDICATIONS    Current Outpatient Medications:     empagliflozin (Jardiance) 25 MG tablet tablet, Take 1 tablet by mouth Daily., Disp: 30 tablet, Rfl: 11    acetaminophen (TYLENOL) 500 MG tablet, Take 1 tablet by mouth Every 6 (Six) Hours As Needed for Mild Pain., Disp: , Rfl:     aspirin 81 MG EC tablet, Take 1 tablet by mouth Daily., Disp: 90 tablet, Rfl: 3    BD ULTRA-FINE PEN NEEDLES 29G X 12.7MM misc, Use with Insulin 3 times daily, Disp: 300 each, Rfl: 2    cilostazol (PLETAL) 50 MG tablet, Take 1 tablet by mouth 2 (Two) Times a Day., Disp: 180 tablet, Rfl: 3    Continuous Blood Gluc Sensor (Dexcom G7 Sensor) misc, Use 1 each Every 10 (Ten) Days., Disp: 3 each, Rfl: 6    Continuous Glucose  (Dexcom G6 ) device, Use 1 each 1 (One) Time for 1 dose., Disp: 1 each, Rfl:  0    Continuous Glucose Transmitter (Dexcom G6 Transmitter) misc, Use 1 each As Needed (E11.65)., Disp: 1 each, Rfl: 6    famotidine (PEPCID) 20 MG tablet, Take 1 tablet by mouth 2 (Two) Times a Day Before Meals., Disp: 30 tablet, Rfl: 1    gabapentin (NEURONTIN) 100 MG capsule, TAKE 2 CAPSULES BY MOUTH 3 TIMES A DAY, Disp: 180 capsule, Rfl: 3    hydroCHLOROthiazide (MICROZIDE) 12.5 MG capsule, TAKE 1 CAPSULE BY MOUTH EVERY DAY, Disp: 90 capsule, Rfl: 1    insulin aspart (NovoLOG FlexPen) 100 UNIT/ML solution pen-injector sc pen, Inject 15 Units under the skin into the appropriate area as directed 3 (Three) Times a Day With Meals., Disp: 15 mL, Rfl: 6    insulin aspart prot & aspart (NovoLOG Mix 70/30 FlexPen) (70-30) 100 UNIT/ML suspension pen-injector injection, INJECT 50 UNITS TWICE A DAY BEFORE MEALS, Disp: 90 mL, Rfl: 3    Insulin Glargine (LANTUS SOLOSTAR) 100 UNIT/ML injection pen, Inject 40 Units under the skin into the appropriate area as directed Every Night., Disp: 15 mL, Rfl: 6    Lancets (OneTouch Delica Plus [id number]) misc, 1 each 3 (Three) Times a Day., Disp: 300 each, Rfl: 2    metoprolol succinate XL (TOPROL-XL) 50 MG 24 hr tablet, TAKE 1 TABLET BY MOUTH EVERY DAY, Disp: 90 tablet, Rfl: 7    nicotine (NICODERM CQ) 21 MG/24HR patch, Place 1 patch on the skin as directed by provider Daily., Disp: 30 patch, Rfl: 1    OneTouch Verio test strip, USE AS INSTRUCTED, Disp: 1 each, Rfl: 2    Rivaroxaban (XARELTO) 2.5 MG tablet, Take 1 tablet by mouth 2 (Two) Times a Day. (Patient taking differently: Take 1 tablet by mouth 2 (Two) Times a Day. Has SAMPLES), Disp: 60 tablet, Rfl: 11    rosuvastatin (CRESTOR) 20 MG tablet, TAKE 1 TABLET BY MOUTH EVERY NIGHT, Disp: 90 tablet, Rfl: 3    Tirosint 125 MCG capsule capsule, Take 2 capsules by mouth Daily., Disp: 60 capsule, Rfl: 11    valsartan (DIOVAN) 80 MG tablet, TAKE 1 TABLET BY MOUTH EVERY DAY, Disp: 90 tablet, Rfl: 1    Review of Systems  Review of Systems  "  Constitutional:  Positive for fatigue.   Neurological:  Positive for weakness and numbness (tingling).   Psychiatric/Behavioral:  Positive for decreased concentration (memory problems.).           Objective:      /78   Pulse 90   Ht 175.3 cm (69.02\")   Wt 83.1 kg (183 lb 4.8 oz)   SpO2 97%   BMI 27.06 kg/m² Body mass index is 27.06 kg/m².  Physical Exam   Constitutional: He is oriented to person, place, and time. He appears well-developed.   HENT:   Head: Normocephalic and atraumatic.   Eyes: Conjunctivae are normal.   Neck: No thyroid mass present.   Cardiovascular: Normal rate, regular rhythm, normal heart sounds and normal pulses.   Pulmonary/Chest: Effort normal and breath sounds normal.   Neurological: He is alert and oriented to person, place, and time. He has normal reflexes.   Psychiatric: Thought content normal.   Vitals reviewed.          LABS AND IMAGING      Lab Results   Component Value Date    HGBA1C 8.0 (A) 07/11/2024    HGBA1C 8.2 (A) 02/05/2024    HGBA1C 9.1 (A) 11/15/2023     Office Visit on 07/11/2024   Component Date Value Ref Range Status    Glucose 07/11/2024 157 (A)  70 - 130 mg/dL Final    Lot Number 07/11/2024 [id number]   Final    Expiration Date 07/11/2024 12/7/2024   Final    Hemoglobin A1C 07/11/2024 8.0 (A)  4.5 - 5.7 % Final    Lot Number 07/11/2024 [id number]   Final    Expiration Date 07/11/2024 4/4/2026   Final             Assessment:         Diagnoses and all orders for this visit:    Uncontrolled type 2 diabetes mellitus with hyperglycemia, with long-term current use of insulin  -     POC Glucose, Blood  -     POC Glycosylated Hemoglobin (Hb A1C)  -     Comprehensive Metabolic Panel    Hypothyroidism  -     TSH  -     T4, Free    Mixed hyperlipidemia  -     Lipid Panel    Other orders  -     Continuous Glucose  (Dexcom G6 ) device; Use 1 each 1 (One) Time for 1 dose.  -     empagliflozin (Jardiance) 25 MG tablet tablet; Take 1 tablet by mouth " Daily.        Plan:       Patient has poorly controlled diabetes with hyperglycemia . Dietary recommendations reviewed. Insulin titration instructions provided.   Cont Jardiance 25 mg daily.   Novolog 70/30 50 units twice a day until current supply is over, then changed to basal and bolus insulin.    Dexcom downloaded and analyzed: 54% in range, 2 % hypoglycemia, 41 % hyperglycemia. He has hyperglycemia middle of the day.        -gabapentin for neuropathy. He had a lot if neuropathy sx.      -cont Tirosint 250 mcg, repeat thyroid labs showed low thyroid function. He has been consistent with the medication in the last few months.        Follow up:  3 months.

## 2024-07-12 LAB
ALBUMIN SERPL-MCNC: 4.2 G/DL (ref 3.5–5.2)
ALBUMIN/GLOB SERPL: 1.4 G/DL
ALP SERPL-CCNC: 111 U/L (ref 39–117)
ALT SERPL W P-5'-P-CCNC: 15 U/L (ref 1–41)
ANION GAP SERPL CALCULATED.3IONS-SCNC: 13.3 MMOL/L (ref 5–15)
AST SERPL-CCNC: 22 U/L (ref 1–40)
BILIRUB SERPL-MCNC: 0.5 MG/DL (ref 0–1.2)
BUN SERPL-MCNC: 13 MG/DL (ref 8–23)
BUN/CREAT SERPL: 15.7 (ref 7–25)
CALCIUM SPEC-SCNC: 9.7 MG/DL (ref 8.6–10.5)
CHLORIDE SERPL-SCNC: 103 MMOL/L (ref 98–107)
CHOLEST SERPL-MCNC: 156 MG/DL (ref 0–200)
CO2 SERPL-SCNC: 23.7 MMOL/L (ref 22–29)
CREAT SERPL-MCNC: 0.83 MG/DL (ref 0.76–1.27)
EGFRCR SERPLBLD CKD-EPI 2021: 98.3 ML/MIN/1.73
GLOBULIN UR ELPH-MCNC: 3.1 GM/DL
GLUCOSE SERPL-MCNC: 172 MG/DL (ref 65–99)
HDLC SERPL-MCNC: 38 MG/DL (ref 40–60)
LDLC SERPL CALC-MCNC: 100 MG/DL (ref 0–100)
LDLC/HDLC SERPL: 2.61 {RATIO}
POTASSIUM SERPL-SCNC: 4.5 MMOL/L (ref 3.5–5.2)
PROT SERPL-MCNC: 7.3 G/DL (ref 6–8.5)
SODIUM SERPL-SCNC: 140 MMOL/L (ref 136–145)
T4 FREE SERPL-MCNC: 1.83 NG/DL (ref 0.92–1.68)
TRIGL SERPL-MCNC: 95 MG/DL (ref 0–150)
TSH SERPL DL<=0.05 MIU/L-ACNC: 1.08 UIU/ML (ref 0.27–4.2)
VLDLC SERPL-MCNC: 18 MG/DL (ref 5–40)

## 2024-07-14 RX ORDER — LEVOTHYROXINE SODIUM 112 UG/1
224 CAPSULE ORAL DAILY
Qty: 60 CAPSULE | Refills: 11 | Status: SHIPPED | OUTPATIENT
Start: 2024-07-14

## 2024-07-25 RX ORDER — VALSARTAN 80 MG/1
TABLET ORAL
Qty: 90 TABLET | Refills: 3 | Status: SHIPPED | OUTPATIENT
Start: 2024-07-25

## 2024-08-26 ENCOUNTER — OFFICE VISIT (OUTPATIENT)
Dept: CARDIOLOGY | Facility: CLINIC | Age: 64
End: 2024-08-26
Payer: COMMERCIAL

## 2024-08-26 VITALS
WEIGHT: 181.4 LBS | HEART RATE: 90 BPM | OXYGEN SATURATION: 98 % | HEIGHT: 69 IN | BODY MASS INDEX: 26.87 KG/M2 | SYSTOLIC BLOOD PRESSURE: 124 MMHG | DIASTOLIC BLOOD PRESSURE: 76 MMHG

## 2024-08-26 DIAGNOSIS — I10 PRIMARY HYPERTENSION: ICD-10-CM

## 2024-08-26 DIAGNOSIS — I49.01 VF (VENTRICULAR FIBRILLATION): Primary | ICD-10-CM

## 2024-08-26 DIAGNOSIS — Z95.810 PRESENCE OF IMPLANTABLE CARDIOVERTER-DEFIBRILLATOR (ICD): ICD-10-CM

## 2024-08-26 PROBLEM — Z79.01 CHRONIC ANTICOAGULATION: Status: RESOLVED | Noted: 2024-02-19 | Resolved: 2024-08-26

## 2024-08-26 PROCEDURE — 99213 OFFICE O/P EST LOW 20 MIN: CPT | Performed by: PHYSICIAN ASSISTANT

## 2024-08-26 PROCEDURE — 93260 PRGRMG DEV EVAL IMPLTBL SYS: CPT | Performed by: PHYSICIAN ASSISTANT

## 2024-08-26 RX ORDER — HYDROCHLOROTHIAZIDE 12.5 MG/1
12.5 CAPSULE ORAL DAILY
Qty: 90 CAPSULE | Refills: 2 | Status: SHIPPED | OUTPATIENT
Start: 2024-08-26

## 2024-08-26 NOTE — PROGRESS NOTES
Encounter Date:08/26/2024      Patient ID: Laron Pandey is a 63 y.o. male.    Nacho Omer MD    Cheif Complaint EP: VT and ICD Check    PROBLEM LIST:  Patient Active Problem List    Diagnosis Date Noted    VF (ventricular fibrillation) 10/19/2022     Priority: High     Note Last Updated: 8/14/2023     Echocardiogram, 6/13/2022: Left ventricular ejection fraction appears to be 56 - 60%. Left ventricular systolic function is normal. Left ventricular wall thickness is consistent with mild to moderate concentric hypertrophy.  Implantation of a [employer] SICD A219 [id number] number pulse generator with [employer] SICD lead 3501. (5/12/2023)      Presence of implantable cardioverter-defibrillator (ICD) 08/14/2023     Priority: Medium    Coronary artery disease involving coronary bypass graft of native heart without angina pectoris 02/09/2018     Priority: Medium     Note Last Updated: 10/19/2022     Status post three-vessel CABG in 2003 with Dr. Myers.  Status post PRINCE x2 to the circumflex with Dr. Block, 11/2020      PAD (peripheral artery disease) 09/15/2020     Priority: Low     Note Last Updated: 10/19/2022     H/O Balloon angioplasty of the left SFA and stenting and balloon angioplasty of the left common iliac artery extending into the external iliac artery 12/2020      Hypertension 10/23/2019     Priority: Low    History of bilateral carotid endarterectomy 03/18/2024    S/p R ICA stent 2022 03/18/2024    GERD (gastroesophageal reflux disease)     Alcohol use 06/15/2022    Bilateral carotid artery stenosis 06/13/2022     Note Last Updated: 6/15/2022     6/13/22 Bilateral carotid duplex - Right internal carotid artery stenosis of >70%.  Left internal carotid artery stenosis of >70%.  s/p Rt Carotid Stent 6/15/22 6/15/22 by Dr. Flores      Current every day smoker 09/15/2020    Lumbar postlaminectomy syndrome 09/11/2020    Lumbar stenosis with neurogenic claudication 07/31/2020     Erectile dysfunction 03/30/2018    Uncontrolled type 2 diabetes mellitus with hyperglycemia, with long-term current use of insulin 02/09/2018    Hyperlipidemia 02/09/2018    Hypothyroidism 02/09/2018             History of Present Illness  Patient presents today for follow-up with a history of VT with an S ICD.  He returns today for scheduled electrophysiology follow-up.  Overall he is doing quite well.  He has no current complaint of orthopnea PND or lower extremity edema.  He said no awareness of palpitations, no dizziness no syncope.  He does not check his blood pressure regularly at home.  He states compliance with his current medical regimen.    Allergies   Allergen Reactions    Celebrex [Celecoxib] Swelling     Hands    Morphine And Codeine Nausea Only       Current Outpatient Medications   Medication Instructions    acetaminophen (TYLENOL) 500 mg, Oral, Every 6 Hours PRN    aspirin 81 mg, Oral, Daily    BD ULTRA-FINE PEN NEEDLES 29G X 12.7MM misc Use with Insulin 3 times daily    cilostazol (PLETAL) 50 mg, Oral, 2 Times Daily    Continuous Blood Gluc Sensor (Dexcom G7 Sensor) misc 1 each, Does not apply, Every 10 Days    Continuous Glucose Transmitter (Dexcom G6 Transmitter) misc 1 each, Does not apply, As Needed    empagliflozin (JARDIANCE) 25 mg, Oral, Daily    famotidine (PEPCID) 20 mg, Oral, 2 Times Daily Before Meals    gabapentin (NEURONTIN) 200 mg, Oral, 3 Times Daily    hydroCHLOROthiazide (MICROZIDE) 12.5 MG capsule TAKE 1 CAPSULE BY MOUTH EVERY DAY    insulin aspart prot & aspart (NovoLOG Mix 70/30 FlexPen) (70-30) 100 UNIT/ML suspension pen-injector injection INJECT 50 UNITS TWICE A DAY BEFORE MEALS    Insulin Glargine (LANTUS SOLOSTAR) 40 Units, Subcutaneous, Nightly    Lancets (OneTouch Delica Plus [id number]) misc 1 each, Does not apply, 3 Times Daily    metoprolol succinate XL (TOPROL-XL) 50 mg, Oral, Daily    NovoLOG FlexPen 15 Units, Subcutaneous, 3 Times Daily With Meals    OneTouch Verio  "test strip USE AS INSTRUCTED    rosuvastatin (CRESTOR) 20 mg, Oral, Nightly    Tirosint 224 mcg, Oral, Daily    valsartan (DIOVAN) 80 MG tablet TAKE 1 TABLET BY MOUTH EVERY DAY       .    Objective:     /76   Pulse 90   Ht 175.3 cm (69.02\")   Wt 82.3 kg (181 lb 6.4 oz)   SpO2 98%   BMI 26.78 kg/m²    Body mass index is 26.78 kg/m².     Vitals reviewed.   Constitutional:       Appearance: Well-developed.   Pulmonary:      Effort: Pulmonary effort is normal. No respiratory distress.      Breath sounds: Normal breath sounds. No wheezing. No rales.      Comments: Bases clear  Chest:      Chest wall: Not tender to palpatation.   Cardiovascular:      Normal rate. Regular rhythm.      Murmurs: There is no murmur.      No gallop.  No click. No rub.   Pulses:     Intact distal pulses.   Edema:     Peripheral edema absent.   Musculoskeletal: Normal range of motion.       Lab Review:     Lab Results   Component Value Date    GLUCOSE 172 (H) 07/11/2024    BUN 13 07/11/2024    CREATININE 0.83 07/11/2024    EGFR 98.3 07/11/2024    BCR 15.7 07/11/2024    K 4.5 07/11/2024    CO2 23.7 07/11/2024    CALCIUM 9.7 07/11/2024    ALBUMIN 4.2 07/11/2024    BILITOT 0.5 07/11/2024    AST 22 07/11/2024    ALT 15 07/11/2024     Lab Results   Component Value Date    WBC 8.32 05/03/2023    HGB 14.2 05/03/2023    HCT 43.9 05/03/2023    MCV 98.9 (H) 05/03/2023     05/03/2023     Lab Results   Component Value Date    TSH 1.080 07/11/2024           Procedures               Assessment:      Diagnosis Plan   1. VF (ventricular fibrillation)  No known reoccurrence of VT.  Continue current medical regimen      2. Presence of implantable cardioverter-defibrillator (ICD)    This patient's Cardiac Implanted Electronic Device was manually interrogated and reprogrammed during the patient encounter today.  Iterative programming changes were manually made to determine the sensing threshold, pacing threshold, lead impedance as well as " underlying cardiac rhythm.  These programming changes were not limited to but included some or all of the following when appropriate: pacing mode, programmed AV delays, blanking periods, and refractory periods.  Data obtained as a result of these manual programing changes informed the patient's CIED permanent programming.      Normal functioning S ICD with 87% battery life remaining      3. Primary hypertension  Well-managed on current medical regimen.  Refilled hydrochlorothiazide at current dose        Plan:     Advance Care Planning   ACP discussion was declined by the patient. Patient has an advance directive (not in EMR), copy requested.           Stable cardiac status.  Continue current medications.   in 1 year, sooner as needed.  Thank you for allowing us to participate in the care of your patient.     Electronically signed by JENAE Haines, 08/26/24, 3:24 PM EDT.

## 2024-09-04 ENCOUNTER — OFFICE VISIT (OUTPATIENT)
Dept: CARDIOLOGY | Facility: CLINIC | Age: 64
End: 2024-09-04
Payer: COMMERCIAL

## 2024-09-04 VITALS
RESPIRATION RATE: 20 BRPM | OXYGEN SATURATION: 98 % | BODY MASS INDEX: 26.96 KG/M2 | WEIGHT: 182 LBS | DIASTOLIC BLOOD PRESSURE: 78 MMHG | SYSTOLIC BLOOD PRESSURE: 154 MMHG | HEART RATE: 84 BPM | HEIGHT: 69 IN

## 2024-09-04 DIAGNOSIS — E78.2 MIXED HYPERLIPIDEMIA: ICD-10-CM

## 2024-09-04 DIAGNOSIS — I25.810 CORONARY ARTERY DISEASE INVOLVING CORONARY BYPASS GRAFT OF NATIVE HEART WITHOUT ANGINA PECTORIS: Primary | ICD-10-CM

## 2024-09-04 DIAGNOSIS — I10 PRIMARY HYPERTENSION: ICD-10-CM

## 2024-09-04 DIAGNOSIS — I73.9 PAD (PERIPHERAL ARTERY DISEASE): ICD-10-CM

## 2024-09-04 PROCEDURE — 99214 OFFICE O/P EST MOD 30 MIN: CPT | Performed by: HOSPITALIST

## 2024-09-04 RX ORDER — CILOSTAZOL 100 MG/1
100 TABLET ORAL 2 TIMES DAILY
Qty: 180 TABLET | Refills: 3 | Status: SHIPPED | OUTPATIENT
Start: 2024-09-04

## 2024-09-04 NOTE — PROGRESS NOTES
[facility]   [address], [address]  [place], [place], [address]    [phone]  [email or web address]           OUTPATIENT CLINIC FOLLOW UP NOTE    Patient Care Team:  Patient Care Team:  Nacho Omer MD as PCP - General (Family Medicine)  Meka Swan MD as Consulting Physician (Endocrinology)  Mychal Block MD as Consulting Physician (Cardiology)  Jarret Flores MD as Consulting Physician (Obstetrics and Gynecology)  Mahin Santillan DO as Consulting Physician (Cardiology)  Alcira Warren APRN as Nurse Practitioner (Cardiology)    Subjective:      Chief Complaint   Patient presents with    Coronary Artery Disease     6 month f/u       HPI:    Laron Pandey is a 63 y.o. male.  Problem list:  CAD  Status post three-vessel CABG in 2003 with Dr. Myers.  Status post PRINCE x2 to the circumflex with Dr. Block, 12/2020  [facility] 10/2022, patent LIMA, patent stents to circumflex, stable collaterals to occluded RCA, previously noted occluded vein grafts.  No significant change compared to 12/2020 [facility]  VT/VF arrest  Postop left arthroscopic rotator cuff repair, 10/19/2022, VF arrest during transport from operating room to PACU. Shock x 1 at 150J.   [facility] 10/25/2022:  Severe multivessel CAD. Patent LIMA to an occluded LAD, patent stents to the circumflex, stable collaterals to an occluded RCA and know occluded vein grafts. No significant changes from previous cath 12/2020.  Subcutaneous ICD, 5/2023, Dr. Santillan  Carotid artery disease  Status post bilateral CEA in 2009 with Dr. Myers  MRA head/neck 3/13/2022 revealing 50-70% atherosclerotic narrowing of the ICA's bilaterally.   Status post right carotid stent placement with Dr. Flores, 6/15/2022  Carotid ultrasound 6/2023, patent right carotid stent, left ICA greater than 70%, antegrade vertebral arteries, no significant change compared to 11/2022  CVA  6/13/2022: acute cortical infarct in the right pre and postcentral gyri  near the vertex as well as a small area of the right frontal lobe.  Thought to be due to carotid stenosis.  Status post R ICA stent as noted above  PAD  Peripheral angiogram 12/3/2020: Status post 7 x 27 mm VISI pro to the left common iliac artery.  Left SFA status post angioplasty with a Chocolate balloon   Back pain significant improved post procedurally  Lower extremity arterial duplex 2/2023: Right KARISSA 0.6, mid SFA near occlusion.  Left KARISSA 0.6, mid SFA severe stenosis.  Lower back pain  History of lumbar surgery, Dr. Dobbs, 1999  Acute worsening spring 2020, severe disc space narrowing and L4/L5, moderate to severe canal and neuroforaminal stenosis  Hypertension  Hyperlipidemia  Diabetes mellitus  Hypothyroidism  Tobacco dependence    The patient presents today for follow-up. Continues to have claudication symptoms but stable. Dyspnea stable as well.  Continues to smoke.  Denies chest pain, palpitations, lower extremity edema, lightheadedness or syncope.     Review of Systems:  As noted in above HPI.      PFSH:  Patient Active Problem List   Diagnosis    Uncontrolled type 2 diabetes mellitus with hyperglycemia, with long-term current use of insulin    Hyperlipidemia    Hypothyroidism    Coronary artery disease involving coronary bypass graft of native heart without angina pectoris    Erectile dysfunction    Hypertension    Lumbar stenosis with neurogenic claudication    Lumbar postlaminectomy syndrome    PAD (peripheral artery disease)    Current every day smoker    Bilateral carotid artery stenosis    Alcohol use    GERD (gastroesophageal reflux disease)    VF (ventricular fibrillation)    Presence of implantable cardioverter-defibrillator (ICD)    History of bilateral carotid endarterectomy    S/p R ICA stent 2022         Current Outpatient Medications:     acetaminophen (TYLENOL) 500 MG tablet, Take 1 tablet by mouth Every 6 (Six) Hours As Needed for Mild Pain., Disp: , Rfl:     aspirin 81 MG EC tablet, Take  1 tablet by mouth Daily., Disp: 90 tablet, Rfl: 3    BD ULTRA-FINE PEN NEEDLES 29G X 12.7MM misc, Use with Insulin 3 times daily, Disp: 300 each, Rfl: 2    cilostazol (PLETAL) 50 MG tablet, Take 1 tablet by mouth 2 (Two) Times a Day., Disp: 180 tablet, Rfl: 3    Continuous Glucose Transmitter (Dexcom G6 Transmitter) misc, Use 1 each As Needed (E11.65)., Disp: 1 each, Rfl: 6    empagliflozin (Jardiance) 25 MG tablet tablet, Take 1 tablet by mouth Daily., Disp: 30 tablet, Rfl: 11    famotidine (PEPCID) 20 MG tablet, Take 1 tablet by mouth 2 (Two) Times a Day Before Meals., Disp: 30 tablet, Rfl: 1    gabapentin (NEURONTIN) 100 MG capsule, TAKE 2 CAPSULES BY MOUTH 3 TIMES A DAY (Patient taking differently: Take 2 capsules by mouth As Needed.), Disp: 180 capsule, Rfl: 3    hydroCHLOROthiazide (MICROZIDE) 12.5 MG capsule, Take 1 capsule by mouth Daily., Disp: 90 capsule, Rfl: 2    insulin aspart (NovoLOG FlexPen) 100 UNIT/ML solution pen-injector sc pen, Inject 15 Units under the skin into the appropriate area as directed 3 (Three) Times a Day With Meals., Disp: 15 mL, Rfl: 6    insulin aspart prot & aspart (NovoLOG Mix 70/30 FlexPen) (70-30) 100 UNIT/ML suspension pen-injector injection, INJECT 50 UNITS TWICE A DAY BEFORE MEALS, Disp: 90 mL, Rfl: 3    Insulin Glargine (LANTUS SOLOSTAR) 100 UNIT/ML injection pen, Inject 40 Units under the skin into the appropriate area as directed Every Night., Disp: 15 mL, Rfl: 6    Lancets (OneTouch Delica Plus [id number]) misc, 1 each 3 (Three) Times a Day., Disp: 300 each, Rfl: 2    metoprolol succinate XL (TOPROL-XL) 50 MG 24 hr tablet, TAKE 1 TABLET BY MOUTH EVERY DAY, Disp: 90 tablet, Rfl: 7    OneTouch Verio test strip, USE AS INSTRUCTED, Disp: 1 each, Rfl: 2    rosuvastatin (CRESTOR) 20 MG tablet, TAKE 1 TABLET BY MOUTH EVERY NIGHT, Disp: 90 tablet, Rfl: 3    Tirosint 112 MCG capsule, Take 2 capsules by mouth Daily., Disp: 60 capsule, Rfl: 11    valsartan (DIOVAN) 80 MG tablet,  "TAKE 1 TABLET BY MOUTH EVERY DAY, Disp: 90 tablet, Rfl: 3    Allergies   Allergen Reactions    Celebrex [Celecoxib] Swelling     Hands    Morphine And Codeine Nausea Only        reports that he has been smoking cigarettes. He started smoking about 46 years ago. He has a 12.6 pack-year smoking history. He has been exposed to tobacco smoke. He has never used smokeless tobacco.      Objective:   Physical exam:  /78 (BP Location: Right arm, Patient Position: Sitting, Cuff Size: Adult)   Pulse 84   Resp 20   Ht 175.3 cm (69\")   Wt 82.6 kg (182 lb)   SpO2 98%   BMI 26.88 kg/m²   CONSTITUTIONAL: No acute distress  CARDIOVASCULAR: Regular rate and rhythm with normal S1 and S2. Without murmur.  PERIPHERAL VASCULAR: Bilateral carotid bruit. Normal radial pulse.     Exam, 12/2020: 2+ PT pulses bilaterally post procedurally  Exam 6/16/2021: Difficult to palpate pedal pulses  Exam 1/5/2022: Right PT pulse 1+, left PT pulse difficult to palpate.  Exam 8/2023: 2+ right ulnar, nonpalpable right radial, 1+ left radial    Labs:      Lab Results   Component Value Date    CHOL 156 07/11/2024     Lab Results   Component Value Date    TRIG 95 07/11/2024     Lab Results   Component Value Date    HDL 38 (L) 07/11/2024     Lab Results   Component Value Date     07/11/2024     No components found for: \"LDLDIRECTC\"    Diagnostic Data:    Procedures    KARISSA 7/27/2021  Right Conclusion: The right KARISSA is moderately reduced at 0.58. Moderate digital ischemia with a TBI 0.32.  Left Conclusion: The left KARISSA is severely reduced at 0.48. Moderate digital ischemia with a TBI 0.24.    Results for orders placed during the hospital encounter of 10/18/22    Adult Transthoracic Echo Complete w/ Color, Spectral and Contrast if Necessary Per Protocol    Interpretation Summary    Left ventricular ejection fraction appears to be 56 - 60%. Left ventricular systolic function is normal.    Left ventricular wall thickness is consistent with " moderate to severe concentric hypertrophy.    Left ventricular diastolic function is consistent with (grade II w/high LAP) pseudonormalization.    Systolic and diastolic flattening of the interventricular septum consistent with right ventricle pressure and volume overload.    There is mild calcification of the aortic valve.    Moderate tricuspid valve regurgitation is present.    Estimated right ventricular systolic pressure from tricuspid regurgitation is markedly elevated (>55 mmHg).      Assessment and Plan:     Coronary artery disease  Mixed hyperlipidemia  -Without recurrent angina.    -Continue aspirin.   -Currently Xarelto not affordable.  Will increase cilostazol to 100 mg twice daily.  -Patient getting Medicare in December.  If Xarelto affordable, we will restart and decrease cilostazol back to 50 mg twice daily.    -Continue other current medications  -Smoking cessation advised.  Encouraged exercise    VT cardiac arrest status post SubQ ICD  -Followed by Dr. Santillan    Peripheral vascular disease with claudication   Spinal stenosis  Diabetic neuropathy  Tobacco dependence  -Improved symptoms with Pletal  -Xarelto currently not affordable. Increasing cilostazol as noted above.   -Continue other related medications.   -Discussed increased exercise and smoking cessation    -Symptoms remain fairly stable  -Continue to urged smoking cessation (down to quarter pack per day)  -Discussed that if he has lifestyle limiting claudication despite medical optimization and less smoking, peripheral angiography with intervention is an option.  Patient to continue to consider symptom course    CVA, 6/2022  Carotid stenosis, bilateral  -Followed by Dr. Flores; follow-up next month    Essential hypertension  -Continue current medications      - Return in about 1 year (around 9/4/2025) for Next scheduled follow up with EKG .    Electronically signed by CARLI Lipscomb, 09/04/24, 2:54 PM EDT.

## 2024-10-14 ENCOUNTER — TELEPHONE (OUTPATIENT)
Dept: ENDOCRINOLOGY | Facility: CLINIC | Age: 64
End: 2024-10-14
Payer: COMMERCIAL

## 2024-10-30 ENCOUNTER — OFFICE VISIT (OUTPATIENT)
Dept: ENDOCRINOLOGY | Facility: CLINIC | Age: 64
End: 2024-10-30
Payer: COMMERCIAL

## 2024-10-30 ENCOUNTER — DOCUMENTATION (OUTPATIENT)
Dept: ENDOCRINOLOGY | Facility: CLINIC | Age: 64
End: 2024-10-30

## 2024-10-30 VITALS
HEART RATE: 85 BPM | SYSTOLIC BLOOD PRESSURE: 134 MMHG | BODY MASS INDEX: 27.11 KG/M2 | HEIGHT: 69 IN | WEIGHT: 183 LBS | DIASTOLIC BLOOD PRESSURE: 72 MMHG

## 2024-10-30 DIAGNOSIS — E11.65 UNCONTROLLED TYPE 2 DIABETES MELLITUS WITH HYPERGLYCEMIA, WITH LONG-TERM CURRENT USE OF INSULIN: Primary | ICD-10-CM

## 2024-10-30 DIAGNOSIS — E03.9 HYPOTHYROIDISM, ADULT: ICD-10-CM

## 2024-10-30 DIAGNOSIS — Z79.4 UNCONTROLLED TYPE 2 DIABETES MELLITUS WITH HYPERGLYCEMIA, WITH LONG-TERM CURRENT USE OF INSULIN: Primary | ICD-10-CM

## 2024-10-30 DIAGNOSIS — E78.2 MIXED HYPERLIPIDEMIA: ICD-10-CM

## 2024-10-30 LAB
EXPIRATION DATE: ABNORMAL
EXPIRATION DATE: NORMAL
GLUCOSE BLDC GLUCOMTR-MCNC: 105 MG/DL (ref 70–130)
HBA1C MFR BLD: 8.3 % (ref 4.5–5.7)
Lab: ABNORMAL
Lab: NORMAL

## 2024-10-30 PROCEDURE — 84443 ASSAY THYROID STIM HORMONE: CPT | Performed by: INTERNAL MEDICINE

## 2024-10-30 PROCEDURE — 84439 ASSAY OF FREE THYROXINE: CPT | Performed by: INTERNAL MEDICINE

## 2024-10-30 RX ORDER — PROCHLORPERAZINE 25 MG/1
SUPPOSITORY RECTAL
COMMUNITY
Start: 2024-10-03

## 2024-10-30 NOTE — PATIENT INSTRUCTIONS
Diabetes Treatment Recommendations  Patient     Laron Pandey     Date:        10/30/24     ADA General Goals: A1c: < 7%                                                                                   Your A1C is   Lab Results   Component Value Date    HGBA1C 8.3 (A) 10/30/2024    HGBA1C 8.0 (A) 07/11/2024    HGBA1C 8.2 (A) 02/05/2024       Fasting/before meal glucose: <150 mg/dL                                    2 Hour after meal glucoses: < 180 mg/dL                                        Bedtime glucose:120-180                                                                Glucose testing frequency: Dexcom G7    Insulin dosing:  Basal insulin Levemir , Toujeo , Lantus , or Tresiba (U-100) 40 Units nightly. If you have high glucose in the morning, increase the dose by 2 units. Continue titrating up by 2 units every 3 days until glucose is < 150.              Meal Insulin Humalog (U-100) or Novolog  10 units before meals.   Correction insulin (add to meal insulin)   0 unit  if glucose  less than 150   0 unit   if glucose  150 - 199   2 units if glucose 200 - 249   4 units if glucose 250 - 299   6 units if glucose 300 - 349   8  units if glucose Greater than 350       Nutritional Recommendations:  4-5 carb portions per meal for male (60-75 gm of carbs)    Physical Activity Goals:  Walk at least 15 min every day, ideally exercise 45-60 min most days (could be done in short bouts of 10-15 minutes.    Keep records of your glucose levels and insulin adjustments. We may ask you to keep records on the content of your meals with insulin doses and before/after meal glucose levels to evaluate your ratios.  Call for advice if you have unexplained or unexpected hypoglycemia  (glucose < 60) or persistent high glucose > 300.  Office: [phone]      Meka Swan MD

## 2024-10-30 NOTE — PROGRESS NOTES
Subjective:     Chief Complaint   Patient presents with    Diabetes      Laron Pandey is a 64 y.o. male who is is being seen for follow-up for management of  Type 2 diabetes mellitus.  The initial diagnosis of diabetes was made in 2003. He was initially controlled with metformin and started insulin in 2010.   Diabetic complications: cardiovascular disease s/p CABG. He has neuropathy sx with tingling and pain in the legs bilaterally.       Current diabetic medications include MDI started 3 weeks ago and Jardiance   Past meds: Trulicity -  heartburn with it. Metformin - diarrhea.     Monitoring  -Dexcom sensor.    Other med problems:CAD/CABG, HL, HTN    HL- intolerant to statins. On zetia.   Hypothyroidism - 250 mcg of levothyroxine. TSH remained 22 despite dose increase. He is compliant with the med. We have changed to Tirosint. Levels improved and we have adjusted the dose to 125 +112 mcg (237 mcg daily)  HTN -BP is normal.     Pt has changed to MDI and he reported that the glucose levels are improved. He uses Dexcom but we were not able to download during the visit     MEDICATIONS    Current Outpatient Medications:     acetaminophen (TYLENOL) 500 MG tablet, Take 1 tablet by mouth Every 6 (Six) Hours As Needed for Mild Pain., Disp: , Rfl:     aspirin 81 MG EC tablet, Take 1 tablet by mouth Daily., Disp: 90 tablet, Rfl: 3    BD ULTRA-FINE PEN NEEDLES 29G X 12.7MM misc, Use with Insulin 3 times daily, Disp: 300 each, Rfl: 2    cilostazol (PLETAL) 100 MG tablet, Take 1 tablet by mouth 2 (Two) Times a Day., Disp: 180 tablet, Rfl: 3    Continuous Glucose Sensor (Dexcom G6 Sensor), USE EVERY 10 (TEN) DAYS., Disp: , Rfl:     Continuous Glucose Transmitter (Dexcom G6 Transmitter) misc, Use 1 each As Needed (E11.65)., Disp: 1 each, Rfl: 6    empagliflozin (Jardiance) 25 MG tablet tablet, Take 1 tablet by mouth Daily., Disp: 30 tablet, Rfl: 11    famotidine (PEPCID) 20 MG tablet, Take 1 tablet by mouth 2 (Two) Times a Day  "Before Meals., Disp: 30 tablet, Rfl: 1    gabapentin (NEURONTIN) 100 MG capsule, TAKE 2 CAPSULES BY MOUTH 3 TIMES A DAY (Patient taking differently: Take 2 capsules by mouth As Needed.), Disp: 180 capsule, Rfl: 3    hydroCHLOROthiazide (MICROZIDE) 12.5 MG capsule, Take 1 capsule by mouth Daily., Disp: 90 capsule, Rfl: 2    insulin aspart (NovoLOG FlexPen) 100 UNIT/ML solution pen-injector sc pen, Inject 15 Units under the skin into the appropriate area as directed 3 (Three) Times a Day With Meals., Disp: 15 mL, Rfl: 6    insulin aspart prot & aspart (NovoLOG Mix 70/30 FlexPen) (70-30) 100 UNIT/ML suspension pen-injector injection, INJECT 50 UNITS TWICE A DAY BEFORE MEALS, Disp: 90 mL, Rfl: 3    Insulin Glargine (LANTUS SOLOSTAR) 100 UNIT/ML injection pen, Inject 40 Units under the skin into the appropriate area as directed Every Night., Disp: 15 mL, Rfl: 6    Lancets (OneTouch Delica Plus [id number]) misc, 1 each 3 (Three) Times a Day., Disp: 300 each, Rfl: 2    metoprolol succinate XL (TOPROL-XL) 50 MG 24 hr tablet, TAKE 1 TABLET BY MOUTH EVERY DAY, Disp: 90 tablet, Rfl: 7    OneTouch Verio test strip, USE AS INSTRUCTED, Disp: 1 each, Rfl: 2    rosuvastatin (CRESTOR) 20 MG tablet, TAKE 1 TABLET BY MOUTH EVERY NIGHT, Disp: 90 tablet, Rfl: 3    Tirosint 112 MCG capsule, Take 2 capsules by mouth Daily., Disp: 60 capsule, Rfl: 11    valsartan (DIOVAN) 80 MG tablet, TAKE 1 TABLET BY MOUTH EVERY DAY, Disp: 90 tablet, Rfl: 3    Review of Systems  Review of Systems   Constitutional:  Positive for fatigue.   Neurological:  Positive for weakness and numbness (tingling).   Psychiatric/Behavioral:  Positive for decreased concentration (memory problems.).           Objective:      /72   Pulse 85   Ht 175.3 cm (69.02\")   Wt 83 kg (183 lb)   BMI 27.01 kg/m² Body mass index is 27.01 kg/m².  Physical Exam   Constitutional: He is oriented to person, place, and time. He appears well-developed.   HENT:   Head: Normocephalic " and atraumatic.   Eyes: Conjunctivae are normal.   Neck: No thyroid mass present.   Cardiovascular: Normal rate, regular rhythm, normal heart sounds and normal pulses.   Pulmonary/Chest: Effort normal and breath sounds normal.   Neurological: He is alert and oriented to person, place, and time. He has normal reflexes.   Psychiatric: Thought content normal.   Vitals reviewed.          LABS AND IMAGING      Lab Results   Component Value Date    HGBA1C 8.3 (A) 10/30/2024    HGBA1C 8.0 (A) 07/11/2024    HGBA1C 8.2 (A) 02/05/2024     Office Visit on 10/30/2024   Component Date Value Ref Range Status    Glucose 10/30/2024 105  70 - 130 mg/dL Final    Lot Number 10/30/2024 [id number]   Final    Expiration Date 10/30/2024 05/30/2025   Final    Hemoglobin A1C 10/30/2024 8.3 (A)  4.5 - 5.7 % Final    Lot Number 10/30/2024 [id number]   Final    Expiration Date 10/30/2024 06/20/2026   Final             Assessment:         Diagnoses and all orders for this visit:    Uncontrolled type 2 diabetes mellitus with hyperglycemia, with long-term current use of insulin  -     POC Glucose, Blood  -     POC Glycosylated Hemoglobin (Hb A1C)    Hypothyroidism    Mixed hyperlipidemia    Other orders  -     Continuous Glucose Sensor (Dexcom G6 Sensor); USE EVERY 10 (TEN) DAYS.        Plan:       Patient has poorly controlled diabetes with hyperglycemia . Dietary recommendations reviewed. Insulin titration instructions provided.   Cont Jardiance 25 mg daily.     Cont same dose insulin. After change to long actine/ short acting MDI glucose is improved,   Will attempt to download sensor data and adjust insulin further.    Recent labs reviewed: lipid panel and CMP are normal.       -cont Tirosint 224 mcg, repeat thyroid labs today and adjust the dose if needed       Follow up:  3 months.

## 2024-10-30 NOTE — PROGRESS NOTES
Attempted to help with Clarity, unable to resolve today as he doesn't have access to email on phone. Pt to call office so I can assist over the phone with Clarity pairing.

## 2024-10-31 LAB
T4 FREE SERPL-MCNC: 1.34 NG/DL (ref 0.92–1.68)
TSH SERPL DL<=0.05 MIU/L-ACNC: 7.4 UIU/ML (ref 0.27–4.2)

## 2024-11-01 ENCOUNTER — TELEPHONE (OUTPATIENT)
Dept: ENDOCRINOLOGY | Facility: CLINIC | Age: 64
End: 2024-11-01
Payer: COMMERCIAL

## 2024-11-01 NOTE — TELEPHONE ENCOUNTER
"Caller: Laron Pandey \"Arben\"    Relationship to patient: Self    Best call back number: [phone]     Patient is needing: PT REACHING OUT TO DARIO FOR SETUP HELP FOR DEXCOM CLARITY. PLEASE CALL TO ADVISE.   "

## 2024-11-04 ENCOUNTER — PRIOR AUTHORIZATION (OUTPATIENT)
Dept: ENDOCRINOLOGY | Facility: CLINIC | Age: 64
End: 2024-11-04
Payer: COMMERCIAL

## 2024-11-04 RX ORDER — ROSUVASTATIN CALCIUM 20 MG/1
20 TABLET, COATED ORAL NIGHTLY
Qty: 90 TABLET | Refills: 3 | Status: SHIPPED | OUTPATIENT
Start: 2024-11-04

## 2024-11-04 NOTE — TELEPHONE ENCOUNTER
Laron Pandey (Key: [id number])  Need Help? Call us at [phone]  Outcome  Additional Information Required  The member recently filled this medication and will be able to return for their next refill according to their plan limits.  Drug  Dexcom G7  device  ePA cloud logo  Form  [employer] Electronic PA Form (2017 [employer])

## 2024-11-04 NOTE — TELEPHONE ENCOUNTER
Returned call, assisted with Clarity.  Pt also inquired re lab results - read Dr. Valentin notes re thyroid fx.  Pt states he has been taking 125mcg daily. Please confirm what dose he should increase to.

## 2024-11-04 NOTE — TELEPHONE ENCOUNTER
Patient called office, was requesting to speak to Jessy. Stated that the share code she gave him is not working. Wanting to know if there is another code he can use. He would like a call back.

## 2024-11-20 NOTE — TELEPHONE ENCOUNTER
Rx Refill Note  Requested Prescriptions     Pending Prescriptions Disp Refills    insulin aspart (NovoLOG FlexPen) 100 UNIT/ML solution pen-injector sc pen 15 mL 6     Sig: Inject 15 Units under the skin into the appropriate area as directed 3 (Three) Times a Day With Meals.    Insulin Glargine (LANTUS SOLOSTAR) 100 UNIT/ML injection pen 15 mL 6     Sig: Inject 40 Units under the skin into the appropriate area as directed Every Night.      Last office visit with prescribing clinician: 10/30/2024   Last telemedicine visit with prescribing clinician: Visit date not found   Next office visit with prescribing clinician: 2/11/2025                         Would you like a call back once the refill request has been completed: [] Yes [] No    If the office needs to give you a call back, can they leave a voicemail: [] Yes [] No    Vickie Covarrubias MA  11/20/24, 14:14 EST

## 2024-11-21 RX ORDER — INSULIN ASPART 100 [IU]/ML
15 INJECTION, SOLUTION INTRAVENOUS; SUBCUTANEOUS
Qty: 45 ML | Refills: 1 | Status: SHIPPED | OUTPATIENT
Start: 2024-11-21

## 2024-11-21 NOTE — TELEPHONE ENCOUNTER
Rx Refill Note  Requested Prescriptions     Pending Prescriptions Disp Refills    insulin aspart (NovoLOG FlexPen) 100 UNIT/ML solution pen-injector sc pen 15 mL 6     Sig: Inject 15 Units under the skin into the appropriate area as directed 3 (Three) Times a Day With Meals.    Insulin Glargine (LANTUS SOLOSTAR) 100 UNIT/ML injection pen 15 mL 6     Sig: Inject 40 Units under the skin into the appropriate area as directed Every Night.      Last office visit with prescribing clinician: 10/30/2024     Next office visit with prescribing clinician: 2/11/2025

## 2025-01-07 RX ORDER — BLOOD SUGAR DIAGNOSTIC
STRIP MISCELLANEOUS
Qty: 100 EACH | Refills: 2 | Status: SHIPPED | OUTPATIENT
Start: 2025-01-07

## 2025-01-08 ENCOUNTER — TELEPHONE (OUTPATIENT)
Dept: ENDOCRINOLOGY | Facility: CLINIC | Age: 65
End: 2025-01-08
Payer: COMMERCIAL

## 2025-01-08 NOTE — TELEPHONE ENCOUNTER
Last office visit with prescribing clinician: 10/30/2024       Next office visit with prescribing clinician: 2/11/2025     {

## 2025-01-08 NOTE — TELEPHONE ENCOUNTER
"Caller: Laron Pandey \"Arben\"    Relationship to patient: Self    Best call back number: [phone]    Patient is needing: PT CALLED NEEDING TO SPEAK TO DR LACEY'S NURSE ABOUT HIS SENSOR. HE SAID HE WAS WANTING TO SWITCH FROM THE G6 SENSOR TO THE G7 BUT HIS INSURANCE WOULDN'T APPROVE IT BUT NOW PT HAS NEW INSURANCE. PLEASE CALL BACK.      "

## 2025-01-09 RX ORDER — ACYCLOVIR 400 MG/1
1 TABLET ORAL
Qty: 3 EACH | Refills: 2 | Status: SHIPPED | OUTPATIENT
Start: 2025-01-09

## 2025-01-09 RX ORDER — ACYCLOVIR 400 MG/1
1 TABLET ORAL CONTINUOUS
Qty: 1 EACH | Refills: 0 | Status: SHIPPED | OUTPATIENT
Start: 2025-01-09

## 2025-01-14 ENCOUNTER — TELEPHONE (OUTPATIENT)
Dept: ENDOCRINOLOGY | Facility: CLINIC | Age: 65
End: 2025-01-14
Payer: COMMERCIAL

## 2025-01-15 NOTE — TELEPHONE ENCOUNTER
"    Hub staff attempted to follow warm transfer process and was unsuccessful     Caller: Laron Pandey \"Arben\"    Relationship to patient: Self    Best call back number:[phone]     Patient is needing: NO SIGNATURE PT NEEDS CGM APPROVED BY INSURANCE, HAS BEEN A WEEK HES BEEN WITHOUT IT. PLEASE ADVISE PT ON WHAT TO DO, STATES D7 SENSORS NEED TO BE SENT TO INSURANCE FOR APPROVAL.       "
Called the pt and told him he needs to call his phar fax the denial.  
Left a message at his pharmacy to fax the denial from insurance for dexcom g7 sensor and   
PT RETURNED CALL, UNABLE TO WT. PLEASE CALL BACK WHEN NEXT AVAILABLE.   
Pt in NAD, resting in bed. VSS. Will continue to monitor.

## 2025-02-24 RX ORDER — ASPIRIN 81 MG/1
81 TABLET, COATED ORAL DAILY
Qty: 90 TABLET | Refills: 3 | Status: SHIPPED | OUTPATIENT
Start: 2025-02-24

## 2025-03-10 ENCOUNTER — OFFICE VISIT (OUTPATIENT)
Dept: NEUROSURGERY | Facility: CLINIC | Age: 65
End: 2025-03-10
Payer: MEDICARE

## 2025-03-10 ENCOUNTER — HOSPITAL ENCOUNTER (OUTPATIENT)
Dept: CARDIOLOGY | Facility: HOSPITAL | Age: 65
Discharge: HOME | End: 2025-03-10
Admitting: NEUROLOGICAL SURGERY
Payer: MEDICARE

## 2025-03-10 VITALS — HEIGHT: 68 IN | BODY MASS INDEX: 27.61 KG/M2 | TEMPERATURE: 96.4 F | WEIGHT: 182.2 LBS

## 2025-03-10 DIAGNOSIS — Z95.828 INTERNAL CAROTID ARTERY STENT PRESENT: ICD-10-CM

## 2025-03-10 DIAGNOSIS — I65.23 BILATERAL CAROTID ARTERY STENOSIS: ICD-10-CM

## 2025-03-10 DIAGNOSIS — I73.9 PAD (PERIPHERAL ARTERY DISEASE): Primary | ICD-10-CM

## 2025-03-10 LAB
BH CV VAS CAROTID RIGHT DISTAL STENT EDV: 44.6 CM/S
BH CV VAS CAROTID RIGHT DISTAL STENT PSV: 126.7 CM/S
BH CV VAS CAROTID RIGHT DISTAL TO STENT NATIVE VESSEL E: 28 CM/S
BH CV VAS CAROTID RIGHT DISTAL TO STENT PSV: 98.2 CM/S
BH CV VAS CAROTID RIGHT MID STENT EDV: 35.7 CM/S
BH CV VAS CAROTID RIGHT MID STENT PSV: 100 CM/S
BH CV VAS CAROTID RIGHT PROXIMAL STENT EDV: 20.5 CM/S
BH CV VAS CAROTID RIGHT PROXIMAL STENT PSV: 64.3 CM/S
BH CV VAS CAROTID RIGHT STENT NATIVE VESSEL PROXIMAL EDV: 22.9 CM/S
BH CV VAS CAROTID RIGHT STENT NATIVE VESSEL PROXIMAL PS: 62.1 CM/S
BH CV XLRA MEAS LEFT DIST CCA EDV: 21.7 CM/SEC
BH CV XLRA MEAS LEFT DIST CCA PSV: 94.6 CM/SEC
BH CV XLRA MEAS LEFT DIST ICA EDV: 28.6 CM/SEC
BH CV XLRA MEAS LEFT DIST ICA PSV: 141.1 CM/SEC
BH CV XLRA MEAS LEFT ICA/CCA RATIO: 4.38
BH CV XLRA MEAS LEFT MID CCA EDV: 15.9 CM/SEC
BH CV XLRA MEAS LEFT MID CCA PSV: 67.4 CM/SEC
BH CV XLRA MEAS LEFT MID ICA EDV: 72.5 CM/SEC
BH CV XLRA MEAS LEFT MID ICA PSV: 220.8 CM/SEC
BH CV XLRA MEAS LEFT PROX CCA EDV: 15.5 CM/SEC
BH CV XLRA MEAS LEFT PROX CCA PSV: 79.3 CM/SEC
BH CV XLRA MEAS LEFT PROX ECA EDV: 17 CM/SEC
BH CV XLRA MEAS LEFT PROX ECA PSV: 140.1 CM/SEC
BH CV XLRA MEAS LEFT PROX ICA EDV: 146.6 CM/SEC
BH CV XLRA MEAS LEFT PROX ICA PSV: 415.1 CM/SEC
BH CV XLRA MEAS LEFT PROX SCLA PSV: 100.6 CM/SEC
BH CV XLRA MEAS LEFT VERTEBRAL A EDV: 12.7 CM/SEC
BH CV XLRA MEAS LEFT VERTEBRAL A PSV: 41.9 CM/SEC
BH CV XLRA MEAS RIGHT DIST CCA EDV: 23.8 CM/SEC
BH CV XLRA MEAS RIGHT DIST CCA PSV: 65.6 CM/SEC
BH CV XLRA MEAS RIGHT DIST ICA EDV: 44.6 CM/SEC
BH CV XLRA MEAS RIGHT DIST ICA PSV: 126.7 CM/SEC
BH CV XLRA MEAS RIGHT ICA/CCA RATIO: 1.93
BH CV XLRA MEAS RIGHT MID CCA EDV: 19.8 CM/SEC
BH CV XLRA MEAS RIGHT MID CCA PSV: 51.1 CM/SEC
BH CV XLRA MEAS RIGHT MID ICA EDV: 35.7 CM/SEC
BH CV XLRA MEAS RIGHT MID ICA PSV: 100 CM/SEC
BH CV XLRA MEAS RIGHT PROX CCA EDV: 14.8 CM/SEC
BH CV XLRA MEAS RIGHT PROX CCA PSV: 74.3 CM/SEC
BH CV XLRA MEAS RIGHT PROX ECA EDV: 8.1 CM/SEC
BH CV XLRA MEAS RIGHT PROX ECA PSV: 40.3 CM/SEC
BH CV XLRA MEAS RIGHT PROX ICA EDV: 22.9 CM/SEC
BH CV XLRA MEAS RIGHT PROX ICA PSV: 62.1 CM/SEC
BH CV XLRA MEAS RIGHT PROX SCLA PSV: 92 CM/SEC
BH CV XLRA MEAS RIGHT VERTEBRAL A EDV: 19 CM/SEC
BH CV XLRA MEAS RIGHT VERTEBRAL A PSV: 77.3 CM/SEC
BH CVPROX RIGHT ICA HIDDEN LRR: 1 CM
LEFT ARM BP: NORMAL MMHG
RIGHT ARM BP: NORMAL MMHG

## 2025-03-10 PROCEDURE — 99214 OFFICE O/P EST MOD 30 MIN: CPT | Performed by: NEUROLOGICAL SURGERY

## 2025-03-10 PROCEDURE — 93880 EXTRACRANIAL BILAT STUDY: CPT | Performed by: INTERNAL MEDICINE

## 2025-03-10 PROCEDURE — 93880 EXTRACRANIAL BILAT STUDY: CPT

## 2025-03-10 RX ORDER — CLOPIDOGREL BISULFATE 75 MG/1
75 TABLET ORAL DAILY
Qty: 30 TABLET | Refills: 0 | Status: SHIPPED | OUTPATIENT
Start: 2025-03-10

## 2025-03-10 NOTE — PROGRESS NOTES
Subjective     Chief Complaint: Follow-up carotid stent    Patient ID: Laron Pandey is a 64 y.o. male is here today for follow-up.    History of Present Illness    This is a 64-year-old man with a history of bilateral carotid endarterectomies.  I placed a right ICA stent in October 2022.  Since then,-following him with annual surveillance carotid ultrasounds and he presents today to discuss the results of the most recent study.  He denies any new TIA or strokelike symptoms.    The following portions of the patient's history were reviewed and updated as appropriate: allergies, current medications, past family history, past medical history, past social history, past surgical history and problem list.    Family history:   Family History   Problem Relation Age of Onset    Diabetes Mother     Heart disease Mother     Diabetes Father     Heart disease Father     Stroke Father     Heart attack Neg Hx     Hypertension Neg Hx     Thyroid disease Neg Hx        Social history:   Social History     Socioeconomic History    Marital status:    Tobacco Use    Smoking status: Every Day     Current packs/day: 0.25     Average packs/day: 0.3 packs/day for 51.1 years (12.8 ttl pk-yrs)     Types: Cigarettes     Start date: 1/1/1978     Passive exposure: Current    Smokeless tobacco: Never   Vaping Use    Vaping status: Never Used   Substance and Sexual Activity    Alcohol use: Yes     Alcohol/week: 8.0 standard drinks of alcohol     Types: 8 Cans of beer per week     Comment: social    Drug use: Never    Sexual activity: Not Currently     Partners: Female       Review of Systems   Constitutional:  Negative for activity change, appetite change, chills, diaphoresis, fatigue, fever and unexpected weight change.   HENT:  Negative for congestion, dental problem, drooling, ear discharge, ear pain, facial swelling, hearing loss, mouth sores, nosebleeds, postnasal drip, rhinorrhea, sinus pressure, sinus pain, sneezing, sore throat,  "tinnitus, trouble swallowing and voice change.    Eyes:  Negative for photophobia, pain, discharge, redness, itching and visual disturbance.   Respiratory:  Negative for apnea, cough, choking, chest tightness, shortness of breath, wheezing and stridor.    Cardiovascular:  Negative for chest pain, palpitations and leg swelling.   Gastrointestinal:  Negative for abdominal distention, abdominal pain, anal bleeding, blood in stool, constipation, diarrhea, nausea, rectal pain and vomiting.   Endocrine: Negative for cold intolerance, heat intolerance, polydipsia, polyphagia and polyuria.   Genitourinary:  Negative for decreased urine volume, difficulty urinating, dysuria, enuresis, flank pain, frequency, genital sores, hematuria and urgency.   Musculoskeletal:  Negative for arthralgias, back pain, gait problem, joint swelling, myalgias, neck pain and neck stiffness.   Skin:  Negative for color change, pallor, rash and wound.   Allergic/Immunologic: Negative for environmental allergies, food allergies and immunocompromised state.   Neurological:  Negative for dizziness, tremors, seizures, syncope, facial asymmetry, speech difficulty, weakness, light-headedness, numbness and headaches.   Hematological:  Negative for adenopathy. Does not bruise/bleed easily.   Psychiatric/Behavioral:  Negative for agitation, behavioral problems, confusion, decreased concentration, dysphoric mood, hallucinations, self-injury, sleep disturbance and suicidal ideas. The patient is not nervous/anxious and is not hyperactive.        Objective   Temperature 96.4 °F (35.8 °C), temperature source Temporal, height 172.7 cm (68\"), weight 82.6 kg (182 lb 3.2 oz).  Body mass index is 27.7 kg/m².    Physical Exam  Vitals reviewed.   Constitutional:       General: He is not in acute distress.     Appearance: He is well-developed. He is not diaphoretic.   HENT:      Head: Normocephalic and atraumatic.      Comments: Bilateral carotid endarterectomy scars " are well-healed.  Pulmonary:      Effort: Pulmonary effort is normal.   Skin:     General: Skin is warm and dry.   Neurological:      Mental Status: He is alert and oriented to person, place, and time.   Psychiatric:         Behavior: Behavior normal.         Assessment & Plan     Independent Review of Radiographic Studies:      Available for my review is a carotid duplex which was performed earlier today.  A comparison study from 1 year ago unfortunately demonstrates modest worsening of his left ICA stenosis.    Medical Decision Making:      Informed consent for a diagnostic angiogram with planned left ICA stenting was obtained from the patient.  He acknowledges the attendant risks and benefits, and consents to proceed with surgery.  Will schedule him on an elective basis in the near future.    Diagnoses and all orders for this visit:    1. PAD (peripheral artery disease) (Primary)  -     Case Request Cath Lab: Carotid Cerebral Angiogram; Standing  -     Case Request Cath Lab: Carotid Cerebral Angiogram    2. Bilateral carotid artery stenosis  -     Case Request Cath Lab: Carotid Cerebral Angiogram; Standing  -     Case Request Cath Lab: Carotid Cerebral Angiogram    3. Internal carotid artery stent present  -     Case Request Cath Lab: Carotid Cerebral Angiogram; Standing  -     Case Request Cath Lab: Carotid Cerebral Angiogram    Other orders  -     clopidogrel (Plavix) 75 MG tablet; Take 1 tablet by mouth Daily.  Dispense: 30 tablet; Refill: 0        No follow-ups on file.           This document signed by RIRI Flores MD March 10, 2025 14:47 EDT

## 2025-03-12 ENCOUNTER — TELEPHONE (OUTPATIENT)
Dept: ENDOCRINOLOGY | Facility: CLINIC | Age: 65
End: 2025-03-12
Payer: MEDICARE

## 2025-03-12 NOTE — TELEPHONE ENCOUNTER
"Provider: DR LACEY    Caller: Laron Pandey \"Arben\"    Relationship to Patient: Self    Pharmacy: [facility]    Phone Number: [phone]    Reason for Call: PATIENT WOULD LIKE TO KNOW IF HE COULD HAVE A PRESCRIPTION FOR THE JS 3 DUE TO BEING DENIED FOR THE G7. PLEASE ADVISE. ANTH MED ADV INSURANCE THAT'S IN THE SYSTEM IS CORRECT     "

## 2025-03-12 NOTE — TELEPHONE ENCOUNTER
Last office visit with prescribing clinician: 10/30/2024       Next office visit with prescribing clinician: 7/16/2025     {

## 2025-03-13 RX ORDER — KETOROLAC TROMETHAMINE 30 MG/ML
1 INJECTION, SOLUTION INTRAMUSCULAR; INTRAVENOUS CONTINUOUS
Qty: 1 EACH | Refills: 0 | Status: SHIPPED | OUTPATIENT
Start: 2025-03-13

## 2025-03-13 RX ORDER — HYDROCHLOROTHIAZIDE 12.5 MG/1
CAPSULE ORAL
Qty: 6 EACH | Refills: 1 | Status: SHIPPED | OUTPATIENT
Start: 2025-03-13

## 2025-03-14 ENCOUNTER — PRIOR AUTHORIZATION (OUTPATIENT)
Dept: ENDOCRINOLOGY | Facility: CLINIC | Age: 65
End: 2025-03-14
Payer: MEDICARE

## 2025-03-14 NOTE — TELEPHONE ENCOUNTER
PA started on CMM for FreeStyle Jude 3 Sensor    Laron Pandey (Key: [id number])  PA Case ID #: [id number]  Rx #: [id number]  Need Help? Call us at [phone]  Outcome  Additional Information Required  This plan does not accept appeal requests via ePA. A request for this medication was denied within the last 65 days. To request an appeal, please refer to the information provided on the initial denial letter or call customer service for assistance(number can be found on back of member's card).  Drug  FreeStyle Jude 3 Sensor  ePA cloud logo  Form  [employer] Electronic PA Form (2017 NCP[employer])  Original Claim Info  [phone]

## 2025-03-27 PROCEDURE — 93295 DEV INTERROG REMOTE 1/2/MLT: CPT | Performed by: INTERNAL MEDICINE

## 2025-03-27 PROCEDURE — 93296 REM INTERROG EVL PM/IDS: CPT | Performed by: INTERNAL MEDICINE

## 2025-03-28 ENCOUNTER — TELEPHONE (OUTPATIENT)
Dept: ENDOCRINOLOGY | Facility: CLINIC | Age: 65
End: 2025-03-28
Payer: MEDICARE

## 2025-03-28 ENCOUNTER — PRIOR AUTHORIZATION (OUTPATIENT)
Dept: ENDOCRINOLOGY | Facility: CLINIC | Age: 65
End: 2025-03-28
Payer: MEDICARE

## 2025-03-28 NOTE — TELEPHONE ENCOUNTER
"  Caller: Laron Pandey \"Arben\"    Relationship: Self    Best call back number:   Telephone Information:   Mobile [phone]     What is the best time to reach you: ANYTIME    Who are you requesting to speak with (clinical staff, provider,  specific staff member): CLINICAL STAFF / PROVIDER     What was the call regarding: PT CALLED CHECKING STATUS OF PRIOR AUTH FOR JS 3. PLEASE ADVISE.   "

## 2025-03-28 NOTE — TELEPHONE ENCOUNTER
PA started on CMM for FreeStyle Jude 3 [place] device    Laron Pandey (Pierre: [id number])  PA Case ID #: [id number]  Rx #: [id number]  Need Help? Call us at [phone]  Outcome  Approved today by [employer] 2017  PA Case: [id number], Status: Approved, Coverage Starts on: 1/1/2025 12:00:00 AM, Coverage Ends on: 3/28/2026 12:00:00 AM.  Effective Date: 1/1/2025  Authorization Expiration Date: 3/28/2026  Drug  FreeStyle Jude 3 [place] device  ePA cloud logo  Form  [employer] Electronic PA Form (2017 NCPDP)  Original Claim Info  [id number] INSULIN USE REQ FOR COVERAGE; NO RXINSULIN HX FOUND IN THIS PLAN WITHINLAST 180 DAYS. USE COVERMYMEDS OR MDCALL FOR REVIEW [phone].[phone] REQUIRES PRIOR AUTHORIZATION    Pt notified

## 2025-04-14 RX ORDER — CLOPIDOGREL BISULFATE 75 MG/1
75 TABLET ORAL DAILY
Qty: 30 TABLET | Refills: 0 | Status: SHIPPED | OUTPATIENT
Start: 2025-04-14

## 2025-04-16 ENCOUNTER — OFFICE VISIT (OUTPATIENT)
Dept: CARDIOLOGY | Facility: CLINIC | Age: 65
End: 2025-04-16
Payer: MEDICARE

## 2025-04-16 VITALS
SYSTOLIC BLOOD PRESSURE: 128 MMHG | BODY MASS INDEX: 27.46 KG/M2 | OXYGEN SATURATION: 99 % | DIASTOLIC BLOOD PRESSURE: 74 MMHG | HEIGHT: 68 IN | WEIGHT: 181.2 LBS | HEART RATE: 68 BPM

## 2025-04-16 DIAGNOSIS — I25.810 CORONARY ARTERY DISEASE INVOLVING CORONARY BYPASS GRAFT OF NATIVE HEART WITHOUT ANGINA PECTORIS: Primary | ICD-10-CM

## 2025-04-16 DIAGNOSIS — I73.9 PAD (PERIPHERAL ARTERY DISEASE): ICD-10-CM

## 2025-04-16 DIAGNOSIS — E78.2 MIXED HYPERLIPIDEMIA: ICD-10-CM

## 2025-04-16 DIAGNOSIS — I73.9 PVD (PERIPHERAL VASCULAR DISEASE) WITH CLAUDICATION: ICD-10-CM

## 2025-04-16 DIAGNOSIS — I10 PRIMARY HYPERTENSION: ICD-10-CM

## 2025-04-16 DIAGNOSIS — F17.200 TOBACCO DEPENDENCE: ICD-10-CM

## 2025-04-16 RX ORDER — ROSUVASTATIN CALCIUM 40 MG/1
40 TABLET, COATED ORAL NIGHTLY
Qty: 90 TABLET | Refills: 3 | Status: SHIPPED | OUTPATIENT
Start: 2025-04-16

## 2025-04-16 NOTE — PROGRESS NOTES
[facility]   [address], [address]  [place], [place], [address]    [phone]  [email or web address]           OUTPATIENT CLINIC FOLLOW UP NOTE    Patient Care Team:  Patient Care Team:  Nacho Omer MD as PCP - General (Family Medicine)  Meka Swan MD as Consulting Physician (Endocrinology)  Mychal Block MD as Consulting Physician (Cardiology)  Jarret Flores MD as Consulting Physician (Obstetrics and Gynecology)  Mahin Santillan DO as Consulting Physician (Cardiology)  Alcira Warren APRN as Nurse Practitioner (Cardiology)    Subjective:      Chief Complaint   Patient presents with    Follow-up     6 month follow up       HPI:    Laron Pandey is a 64 y.o. male.  Problem list:  CAD  Status post three-vessel CABG in 2003 with Dr. Myers.  Status post PRINCE x2 to the circumflex with Dr. Block, 12/2020  [facility] 10/2022, patent LIMA, patent stents to circumflex, stable collaterals to occluded RCA, previously noted occluded vein grafts.  No significant change compared to 12/2020 [facility]  VT/VF arrest  Postop left arthroscopic rotator cuff repair, 10/19/2022, VF arrest during transport from operating room to PACU. Shock x 1 at 150J.   [facility] 10/25/2022:  Severe multivessel CAD. Patent LIMA to an occluded LAD, patent stents to the circumflex, stable collaterals to an occluded RCA and know occluded vein grafts. No significant changes from previous cath 12/2020.  Subcutaneous ICD, 5/2023, Dr. Santillan  Carotid artery disease  Status post bilateral CEA in 2009 with Dr. Myers  MRA head/neck 3/13/2022 revealing 50-70% atherosclerotic narrowing of the ICA's bilaterally.   Status post right carotid stent placement with Dr. Flores, 6/15/2022  Carotid ultrasound 6/2023, patent right carotid stent, left ICA greater than 70%, antegrade vertebral arteries, no significant change compared to 11/2022  CVA  6/13/2022: acute cortical infarct in the right pre and postcentral gyri near the  vertex as well as a small area of the right frontal lobe.  Thought to be due to carotid stenosis.  Status post R ICA stent as noted above  PAD  Peripheral angiogram 12/3/2020: Status post 7 x 27 mm VISI pro to the left common iliac artery.  Left SFA status post angioplasty with a Chocolate balloon   Back pain significant improved post procedurally  Lower extremity arterial duplex 2/2023: Right KARISSA 0.6, mid SFA near occlusion.  Left KARISSA 0.6, mid SFA severe stenosis.  Lower back pain  History of lumbar surgery, Dr. Dobbs, 1999  Acute worsening spring 2020, severe disc space narrowing and L4/L5, moderate to severe canal and neuroforaminal stenosis  Hypertension  Hyperlipidemia  Diabetes mellitus  Hypothyroidism  Tobacco dependence    The patient presents today for follow-up.     Continues to have claudication symptoms but stable.  Able to walk for about 10 to 15 minutes before having some discomfort.  After a few minutes of rest, is able to proceed.  Dyspnea stable as well.  Continues to smoke.  Denies chest pain, significant palpitations    Review of Systems:  As noted in above HPI.      PFSH:  Patient Active Problem List   Diagnosis    Uncontrolled type 2 diabetes mellitus with hyperglycemia, with long-term current use of insulin    Hyperlipidemia    Hypothyroidism    Coronary artery disease involving coronary bypass graft of native heart without angina pectoris    Erectile dysfunction    Hypertension    Lumbar stenosis with neurogenic claudication    Lumbar postlaminectomy syndrome    PAD (peripheral artery disease)    Tobacco dependence    Bilateral carotid artery stenosis    Alcohol use    GERD (gastroesophageal reflux disease)    VF (ventricular fibrillation)    Presence of implantable cardioverter-defibrillator (ICD)    History of bilateral carotid endarterectomy    S/p R ICA stent 2022         Current Outpatient Medications:     acetaminophen (TYLENOL) 500 MG tablet, Take 1 tablet by mouth Every 6 (Six) Hours As  Needed for Mild Pain., Disp: , Rfl:     Aspirin Low Dose 81 MG EC tablet, TAKE 1 TABLET BY MOUTH EVERY DAY, Disp: 90 tablet, Rfl: 3    BD ULTRA-FINE PEN NEEDLES 29G X 12.7MM misc, Use with Insulin 3 times daily, Disp: 300 each, Rfl: 2    cilostazol (PLETAL) 100 MG tablet, Take 1 tablet by mouth 2 (Two) Times a Day., Disp: 180 tablet, Rfl: 3    clopidogrel (PLAVIX) 75 MG tablet, TAKE 1 TABLET BY MOUTH EVERY DAY, Disp: 30 tablet, Rfl: 0    Continuous Glucose  (FreeStyle Jude 3 [place]) device, Use 1 each Continuous., Disp: 1 each, Rfl: 0    Continuous Glucose Sensor (FreeStyle Jude 3 Plus Sensor), Use Every 14 (Fourteen) Days., Disp: 6 each, Rfl: 1    empagliflozin (Jardiance) 25 MG tablet tablet, Take 1 tablet by mouth Daily., Disp: 30 tablet, Rfl: 11    famotidine (PEPCID) 20 MG tablet, Take 1 tablet by mouth 2 (Two) Times a Day Before Meals., Disp: 30 tablet, Rfl: 1    glucose blood (OneTouch Verio) test strip, USE AS INSTRUCTED, Disp: 100 each, Rfl: 2    hydroCHLOROthiazide (MICROZIDE) 12.5 MG capsule, Take 1 capsule by mouth Daily., Disp: 90 capsule, Rfl: 2    insulin aspart (NovoLOG FlexPen) 100 UNIT/ML solution pen-injector sc pen, Inject 15 Units under the skin into the appropriate area as directed 3 (Three) Times a Day With Meals., Disp: 45 mL, Rfl: 1    Insulin Glargine (LANTUS SOLOSTAR) 100 UNIT/ML injection pen, Inject 40 Units under the skin into the appropriate area as directed Every Night., Disp: 45 mL, Rfl: 1    Lancets (OneTouch Delica Plus [id number]) misc, 1 each 3 (Three) Times a Day., Disp: 300 each, Rfl: 2    metoprolol succinate XL (TOPROL-XL) 50 MG 24 hr tablet, TAKE 1 TABLET BY MOUTH EVERY DAY, Disp: 90 tablet, Rfl: 7    rosuvastatin (CRESTOR) 40 MG tablet, Take 1 tablet by mouth Every Night., Disp: 90 tablet, Rfl: 3    Tirosint 112 MCG capsule, Take 2 capsules by mouth Daily., Disp: 60 capsule, Rfl: 11    valsartan (DIOVAN) 80 MG tablet, TAKE 1 TABLET BY MOUTH EVERY DAY, Disp: 90  "tablet, Rfl: 3    gabapentin (NEURONTIN) 100 MG capsule, TAKE 2 CAPSULES BY MOUTH 3 TIMES A DAY (Patient not taking: Reported on 4/16/2025), Disp: 180 capsule, Rfl: 3    Allergies   Allergen Reactions    Celebrex [Celecoxib] Swelling     Hands    Morphine And Codeine Nausea Only        reports that he has been smoking cigarettes. He started smoking about 47 years ago. He has a 15.4 pack-year smoking history. He has been exposed to tobacco smoke. He has never used smokeless tobacco.      Objective:   Physical exam:  /74 (BP Location: Right arm, Patient Position: Sitting, Cuff Size: Adult)   Pulse 68   Ht 172.7 cm (67.99\")   Wt 82.2 kg (181 lb 3.2 oz)   SpO2 99%   BMI 27.56 kg/m²   CONSTITUTIONAL: No acute distress  CARDIOVASCULAR: Regular rate and rhythm with normal S1 and S2. Without murmur.  PERIPHERAL VASCULAR: Bilateral carotid bruit. Normal radial pulse.     Exam, 12/2020: 2+ PT pulses bilaterally post procedurally  Exam 6/16/2021: Difficult to palpate pedal pulses  Exam 1/5/2022: Right PT pulse 1+, left PT pulse difficult to palpate.  Exam 8/2023: 2+ right ulnar, nonpalpable right radial, 1+ left radial    Labs:      Lab Results   Component Value Date    CHOL 156 07/11/2024     Lab Results   Component Value Date    TRIG 95 07/11/2024     Lab Results   Component Value Date    HDL 38 (L) 07/11/2024     Lab Results   Component Value Date     07/11/2024     No components found for: \"LDLDIRECTC\"    Diagnostic Data:      ECG 12 Lead    Date/Time: 4/16/2025 3:12 PM  Performed by: Mychal Block MD    Authorized by: Mychal Block MD  Comparison: compared with previous ECG from 2/21/2024  Similar to previous ECG  Comparison to previous ECG: Nonspecific ST-T wave abnormalities, unchanged.  PVCs present, unchanged  Rhythm: sinus rhythm            Results for orders placed during the hospital encounter of 10/18/22    Adult Transthoracic Echo Complete w/ Color, Spectral and Contrast if Necessary Per " Protocol    Interpretation Summary    Left ventricular ejection fraction appears to be 56 - 60%. Left ventricular systolic function is normal.    Left ventricular wall thickness is consistent with moderate to severe concentric hypertrophy.    Left ventricular diastolic function is consistent with (grade II w/high LAP) pseudonormalization.    Systolic and diastolic flattening of the interventricular septum consistent with right ventricle pressure and volume overload.    There is mild calcification of the aortic valve.    Moderate tricuspid valve regurgitation is present.    Estimated right ventricular systolic pressure from tricuspid regurgitation is markedly elevated (>55 mmHg).      Assessment and Plan:     Coronary artery disease  Mixed hyperlipidemia  -Without recurrent angina.    -Continue aspirin, cilostazol, statin.   -Currently Xarelto 2.5 mg twice daily previously if needed, not affordable.  Can reconsider now that he has a new insurance plan as of 12/2024  -Smoking cessation advised.  Encouraged exercise    VT cardiac arrest status post SubQ ICD  -Followed by Dr. Santillan    Peripheral vascular disease with claudication   Spinal stenosis  Diabetic neuropathy  Tobacco dependence  -Symptoms remain fairly stable  -Repeat arterial duplex.  With that said, unless with critical disease, we will continue lifestyle modification and medical therapy for now    -Improved symptoms with Pletal  -Xarelto as discussed above  -Continue other related medications.   - Recommended smoking cessation    -Discussed that if he has lifestyle limiting claudication despite medical optimization and less smoking, peripheral angiography with intervention is an option.  Patient to continue to consider symptom course    CVA, 6/2022  Carotid stenosis, bilateral  -Followed by Dr. Flores.  Considering the left ICA intervention due to worsening stenosis    Essential hypertension  -Continue current medications      - Return in about 6 months  (around 10/16/2025) for Follow up; either CHITO Warren or CARLI Cooney.

## 2025-04-28 RX ORDER — KETOROLAC TROMETHAMINE 30 MG/ML
INJECTION, SOLUTION INTRAMUSCULAR; INTRAVENOUS SEE ADMIN INSTRUCTIONS
Qty: 1 EACH | Refills: 0 | Status: SHIPPED | OUTPATIENT
Start: 2025-04-28

## 2025-04-28 NOTE — TELEPHONE ENCOUNTER
Rx Refill Note  Requested Prescriptions     Pending Prescriptions Disp Refills    Continuous Glucose  (FreeStyle Jude 3 [place]) device [Pharmacy Med Name: FREESTYLE JUDE 3 READER] 1 each 0     Sig: USE AS DIRECTED      Last office visit with prescribing clinician: 10/30/2024   Last telemedicine visit with prescribing clinician: Visit date not found   Next office visit with prescribing clinician: 7/16/2025                         Would you like a call back once the refill request has been completed: [] Yes [] No    If the office needs to give you a call back, can they leave a voicemail: [] Yes [] No    Barber Asif MA  04/28/25, 11:09 EDT

## 2025-05-20 ENCOUNTER — TELEPHONE (OUTPATIENT)
Dept: ENDOCRINOLOGY | Facility: CLINIC | Age: 65
End: 2025-05-20
Payer: MEDICARE

## 2025-05-20 NOTE — TELEPHONE ENCOUNTER
"  Caller: Laron Pandey \"Arben\"    Relationship: Self    Best call back number:   Telephone Information:   Mobile [phone]       What was the call regarding: PT CALLED NEEDING A CALL BACK FORM CLINICAL STAFF REGARDING PT MEDICATION AND APPROVAL TO PHARMACY. PLEASE ADVISE.     "

## 2025-05-21 NOTE — TELEPHONE ENCOUNTER
Called the pt and he stated he got a message from the phar about Novolog. I told him the last refill was 11-24. He asked for refills be sent.    Script is pending.    Last office visit with prescribing clinician: 10/30/2024       Next office visit with prescribing clinician: 7/16/2025     {     None

## 2025-05-22 ENCOUNTER — HOSPITAL ENCOUNTER (INPATIENT)
Facility: HOSPITAL | Age: 65
LOS: 1 days | Discharge: HOME | End: 2025-05-23
Attending: NEUROLOGICAL SURGERY | Admitting: NEUROLOGICAL SURGERY
Payer: MEDICARE

## 2025-05-22 ENCOUNTER — APPOINTMENT (OUTPATIENT)
Dept: CARDIOLOGY | Facility: HOSPITAL | Age: 65
End: 2025-05-22
Payer: MEDICARE

## 2025-05-22 DIAGNOSIS — I73.9 PAD (PERIPHERAL ARTERY DISEASE): ICD-10-CM

## 2025-05-22 DIAGNOSIS — Z95.828 INTERNAL CAROTID ARTERY STENT PRESENT: ICD-10-CM

## 2025-05-22 DIAGNOSIS — I65.23 BILATERAL CAROTID ARTERY STENOSIS: ICD-10-CM

## 2025-05-22 PROBLEM — I65.22 LEFT CAROTID STENOSIS: Status: ACTIVE | Noted: 2025-05-22

## 2025-05-22 LAB
ANION GAP SERPL CALCULATED.3IONS-SCNC: 12 MMOL/L (ref 5–15)
BUN SERPL-MCNC: 15 MG/DL (ref 8–23)
BUN/CREAT SERPL: 15 (ref 7–25)
CALCIUM SPEC-SCNC: 9.3 MG/DL (ref 8.6–10.5)
CHLORIDE SERPL-SCNC: 102 MMOL/L (ref 98–107)
CO2 SERPL-SCNC: 26 MMOL/L (ref 22–29)
CREAT BLDA-MCNC: 1.1 MG/DL (ref 0.6–1.3)
CREAT SERPL-MCNC: 1 MG/DL (ref 0.76–1.27)
DEPRECATED RDW RBC AUTO: 50.4 FL (ref 37–54)
EGFRCR SERPLBLD CKD-EPI 2021: 84 ML/MIN/1.73
ERYTHROCYTE [DISTWIDTH] IN BLOOD BY AUTOMATED COUNT: 14.4 % (ref 12.3–15.4)
GLUCOSE BLDC GLUCOMTR-MCNC: 146 MG/DL (ref 70–130)
GLUCOSE BLDC GLUCOMTR-MCNC: 166 MG/DL (ref 70–130)
GLUCOSE BLDC GLUCOMTR-MCNC: 202 MG/DL (ref 70–130)
GLUCOSE SERPL-MCNC: 212 MG/DL (ref 65–99)
HBA1C MFR BLD: 9.2 % (ref 4.8–5.6)
HCT VFR BLD AUTO: 45.7 % (ref 37.5–51)
HGB BLD-MCNC: 15 G/DL (ref 13–17.7)
MCH RBC QN AUTO: 31.1 PG (ref 26.6–33)
MCHC RBC AUTO-ENTMCNC: 32.8 G/DL (ref 31.5–35.7)
MCV RBC AUTO: 94.6 FL (ref 79–97)
PLATELET # BLD AUTO: 241 10*3/MM3 (ref 140–450)
PMV BLD AUTO: 10.6 FL (ref 6–12)
POTASSIUM SERPL-SCNC: 3.7 MMOL/L (ref 3.5–5.2)
RBC # BLD AUTO: 4.83 10*6/MM3 (ref 4.14–5.8)
SODIUM SERPL-SCNC: 140 MMOL/L (ref 136–145)
WBC NRBC COR # BLD AUTO: 7.38 10*3/MM3 (ref 3.4–10.8)

## 2025-05-22 PROCEDURE — B314YZZ FLUOROSCOPY OF LEFT COMMON CAROTID ARTERY USING OTHER CONTRAST: ICD-10-PCS | Performed by: NEUROLOGICAL SURGERY

## 2025-05-22 PROCEDURE — C1884 EMBOLIZATION PROTECT SYST: HCPCS | Performed by: NEUROLOGICAL SURGERY

## 2025-05-22 PROCEDURE — 25010000002 LIDOCAINE PF 1% 1 % SOLUTION: Performed by: NEUROLOGICAL SURGERY

## 2025-05-22 PROCEDURE — 25810000003 SODIUM CHLORIDE 0.9 % SOLUTION 250 ML FLEX CONT: Performed by: NEUROLOGICAL SURGERY

## 2025-05-22 PROCEDURE — 83036 HEMOGLOBIN GLYCOSYLATED A1C: CPT

## 2025-05-22 PROCEDURE — 82948 REAGENT STRIP/BLOOD GLUCOSE: CPT

## 2025-05-22 PROCEDURE — 037L3DZ DILATION OF LEFT INTERNAL CAROTID ARTERY WITH INTRALUMINAL DEVICE, PERCUTANEOUS APPROACH: ICD-10-PCS | Performed by: NEUROLOGICAL SURGERY

## 2025-05-22 PROCEDURE — 93880 EXTRACRANIAL BILAT STUDY: CPT

## 2025-05-22 PROCEDURE — C1894 INTRO/SHEATH, NON-LASER: HCPCS | Performed by: NEUROLOGICAL SURGERY

## 2025-05-22 PROCEDURE — C1725 CATH, TRANSLUMIN NON-LASER: HCPCS | Performed by: NEUROLOGICAL SURGERY

## 2025-05-22 PROCEDURE — C1769 GUIDE WIRE: HCPCS | Performed by: NEUROLOGICAL SURGERY

## 2025-05-22 PROCEDURE — 63710000001 INSULIN LISPRO (HUMAN) PER 5 UNITS

## 2025-05-22 PROCEDURE — 25010000002 NICARDIPINE 2.5 MG/ML SOLUTION 10 ML VIAL: Performed by: NEUROLOGICAL SURGERY

## 2025-05-22 PROCEDURE — 76937 US GUIDE VASCULAR ACCESS: CPT | Performed by: NEUROLOGICAL SURGERY

## 2025-05-22 PROCEDURE — 82565 ASSAY OF CREATININE: CPT

## 2025-05-22 PROCEDURE — 63710000001 INSULIN GLARGINE PER 5 UNITS

## 2025-05-22 PROCEDURE — 99232 SBSQ HOSP IP/OBS MODERATE 35: CPT

## 2025-05-22 PROCEDURE — C1876 STENT, NON-COA/NON-COV W/DEL: HCPCS | Performed by: NEUROLOGICAL SURGERY

## 2025-05-22 PROCEDURE — 93880 EXTRACRANIAL BILAT STUDY: CPT | Performed by: INTERNAL MEDICINE

## 2025-05-22 PROCEDURE — 25510000002 IODIXANOL PER 1 ML: Performed by: NEUROLOGICAL SURGERY

## 2025-05-22 PROCEDURE — C1760 CLOSURE DEV, VASC: HCPCS | Performed by: NEUROLOGICAL SURGERY

## 2025-05-22 PROCEDURE — 80048 BASIC METABOLIC PNL TOTAL CA: CPT | Performed by: NEUROLOGICAL SURGERY

## 2025-05-22 PROCEDURE — 85027 COMPLETE CBC AUTOMATED: CPT | Performed by: NEUROLOGICAL SURGERY

## 2025-05-22 PROCEDURE — 25010000002 HEPARIN (PORCINE) PER 1000 UNITS: Performed by: NEUROLOGICAL SURGERY

## 2025-05-22 PROCEDURE — 37215 TRANSCATH STENT CCA W/EPS: CPT | Performed by: NEUROLOGICAL SURGERY

## 2025-05-22 DEVICE — XACT CAROTID STENT SYSTEM 10.0 MM X 30 MM STRAIGHT
Type: IMPLANTABLE DEVICE | Site: CAROTID | Status: FUNCTIONAL
Brand: XACT

## 2025-05-22 RX ORDER — ACETAMINOPHEN 500 MG
500 TABLET ORAL EVERY 6 HOURS PRN
Status: DISCONTINUED | OUTPATIENT
Start: 2025-05-22 | End: 2025-05-23 | Stop reason: HOSPADM

## 2025-05-22 RX ORDER — SODIUM CHLORIDE 0.9 % (FLUSH) 0.9 %
10 SYRINGE (ML) INJECTION EVERY 12 HOURS SCHEDULED
Status: DISCONTINUED | OUTPATIENT
Start: 2025-05-22 | End: 2025-05-23 | Stop reason: HOSPADM

## 2025-05-22 RX ORDER — LIDOCAINE HYDROCHLORIDE 10 MG/ML
INJECTION, SOLUTION EPIDURAL; INFILTRATION; INTRACAUDAL; PERINEURAL
Status: DISCONTINUED | OUTPATIENT
Start: 2025-05-22 | End: 2025-05-22 | Stop reason: HOSPADM

## 2025-05-22 RX ORDER — METOPROLOL SUCCINATE 50 MG/1
50 TABLET, EXTENDED RELEASE ORAL DAILY
Status: DISCONTINUED | OUTPATIENT
Start: 2025-05-23 | End: 2025-05-23 | Stop reason: HOSPADM

## 2025-05-22 RX ORDER — FAMOTIDINE 20 MG/1
20 TABLET, FILM COATED ORAL
Status: DISCONTINUED | OUTPATIENT
Start: 2025-05-22 | End: 2025-05-23 | Stop reason: HOSPADM

## 2025-05-22 RX ORDER — HYDROCHLOROTHIAZIDE 25 MG/1
12.5 TABLET ORAL DAILY
Status: DISCONTINUED | OUTPATIENT
Start: 2025-05-23 | End: 2025-05-23 | Stop reason: HOSPADM

## 2025-05-22 RX ORDER — INSULIN LISPRO 100 [IU]/ML
3-14 INJECTION, SOLUTION INTRAVENOUS; SUBCUTANEOUS
Status: DISCONTINUED | OUTPATIENT
Start: 2025-05-22 | End: 2025-05-23 | Stop reason: HOSPADM

## 2025-05-22 RX ORDER — INSULIN ASPART 100 [IU]/ML
15 INJECTION, SOLUTION INTRAVENOUS; SUBCUTANEOUS
Qty: 45 ML | Refills: 0 | Status: SHIPPED | OUTPATIENT
Start: 2025-05-22

## 2025-05-22 RX ORDER — ASPIRIN 325 MG
325 TABLET ORAL DAILY
Status: DISCONTINUED | OUTPATIENT
Start: 2025-05-22 | End: 2025-05-22 | Stop reason: SDUPTHER

## 2025-05-22 RX ORDER — LEVOTHYROXINE SODIUM 112 UG/1
112 TABLET ORAL
Status: DISCONTINUED | OUTPATIENT
Start: 2025-05-23 | End: 2025-05-23 | Stop reason: HOSPADM

## 2025-05-22 RX ORDER — CILOSTAZOL 50 MG/1
100 TABLET ORAL 2 TIMES DAILY
Status: DISCONTINUED | OUTPATIENT
Start: 2025-05-22 | End: 2025-05-23 | Stop reason: HOSPADM

## 2025-05-22 RX ORDER — IODIXANOL 320 MG/ML
INJECTION, SOLUTION INTRAVASCULAR
Status: DISCONTINUED | OUTPATIENT
Start: 2025-05-22 | End: 2025-05-22 | Stop reason: HOSPADM

## 2025-05-22 RX ORDER — CLOPIDOGREL BISULFATE 75 MG/1
75 TABLET ORAL DAILY
Status: DISCONTINUED | OUTPATIENT
Start: 2025-05-22 | End: 2025-05-22 | Stop reason: SDUPTHER

## 2025-05-22 RX ORDER — VALSARTAN 80 MG/1
80 TABLET ORAL DAILY
Status: DISCONTINUED | OUTPATIENT
Start: 2025-05-23 | End: 2025-05-23 | Stop reason: HOSPADM

## 2025-05-22 RX ORDER — SODIUM CHLORIDE 0.9 % (FLUSH) 0.9 %
10 SYRINGE (ML) INJECTION AS NEEDED
Status: DISCONTINUED | OUTPATIENT
Start: 2025-05-22 | End: 2025-05-23 | Stop reason: HOSPADM

## 2025-05-22 RX ORDER — SODIUM CHLORIDE 9 MG/ML
40 INJECTION, SOLUTION INTRAVENOUS AS NEEDED
Status: DISCONTINUED | OUTPATIENT
Start: 2025-05-22 | End: 2025-05-23 | Stop reason: HOSPADM

## 2025-05-22 RX ORDER — ROSUVASTATIN CALCIUM 20 MG/1
40 TABLET, COATED ORAL NIGHTLY
Status: DISCONTINUED | OUTPATIENT
Start: 2025-05-22 | End: 2025-05-23 | Stop reason: HOSPADM

## 2025-05-22 RX ORDER — HEPARIN SODIUM 1000 [USP'U]/ML
INJECTION, SOLUTION INTRAVENOUS; SUBCUTANEOUS
Status: DISCONTINUED | OUTPATIENT
Start: 2025-05-22 | End: 2025-05-22 | Stop reason: HOSPADM

## 2025-05-22 RX ORDER — NICOTINE POLACRILEX 4 MG
15 LOZENGE BUCCAL
Status: DISCONTINUED | OUTPATIENT
Start: 2025-05-22 | End: 2025-05-23 | Stop reason: HOSPADM

## 2025-05-22 RX ORDER — DEXTROSE MONOHYDRATE 25 G/50ML
25 INJECTION, SOLUTION INTRAVENOUS
Status: DISCONTINUED | OUTPATIENT
Start: 2025-05-22 | End: 2025-05-23 | Stop reason: HOSPADM

## 2025-05-22 RX ORDER — CLOPIDOGREL BISULFATE 75 MG/1
75 TABLET ORAL DAILY
Status: DISCONTINUED | OUTPATIENT
Start: 2025-05-23 | End: 2025-05-23 | Stop reason: HOSPADM

## 2025-05-22 RX ORDER — IBUPROFEN 600 MG/1
1 TABLET ORAL
Status: DISCONTINUED | OUTPATIENT
Start: 2025-05-22 | End: 2025-05-23 | Stop reason: HOSPADM

## 2025-05-22 RX ORDER — ASPIRIN 81 MG/1
81 TABLET ORAL DAILY
Status: DISCONTINUED | OUTPATIENT
Start: 2025-05-23 | End: 2025-05-23 | Stop reason: HOSPADM

## 2025-05-22 RX ADMIN — ROSUVASTATIN 40 MG: 20 TABLET, FILM COATED ORAL at 20:08

## 2025-05-22 RX ADMIN — CLOPIDOGREL BISULFATE 75 MG: 75 TABLET, FILM COATED ORAL at 09:28

## 2025-05-22 RX ADMIN — ACETAMINOPHEN 500 MG: 500 TABLET ORAL at 20:08

## 2025-05-22 RX ADMIN — Medication 10 ML: at 20:09

## 2025-05-22 RX ADMIN — ASPIRIN 325 MG: 325 TABLET ORAL at 09:28

## 2025-05-22 RX ADMIN — FAMOTIDINE 20 MG: 20 TABLET, FILM COATED ORAL at 17:28

## 2025-05-22 RX ADMIN — Medication 10 ML: at 10:58

## 2025-05-22 RX ADMIN — CILOSTAZOL 100 MG: 50 TABLET ORAL at 20:08

## 2025-05-22 RX ADMIN — INSULIN LISPRO 3 UNITS: 100 INJECTION, SOLUTION INTRAVENOUS; SUBCUTANEOUS at 13:16

## 2025-05-22 RX ADMIN — INSULIN LISPRO 5 UNITS: 100 INJECTION, SOLUTION INTRAVENOUS; SUBCUTANEOUS at 20:08

## 2025-05-22 RX ADMIN — INSULIN GLARGINE 20 UNITS: 100 INJECTION, SOLUTION SUBCUTANEOUS at 20:08

## 2025-05-22 NOTE — PROGRESS NOTES
Intensive Care Follow-up     Hospital:  LOS: 0 days   Mr. Laron Pandey, 64 y.o. male is followed for: Glycemic, Electrolyte, Respiratory, and Medical management         Subjective   Interval History:  Chart reviewed. Labs unremarkable.     Patient seen in ICU post-procedure. Lying in bed. No acute distress. Denies pain, shortness of breath, nausea/vomiting, abdominal discomfort. Right groin site C/D/I. No hematoma. No focal deficits.      The patient's past medical, surgical and social history were reviewed and updated in Epic as appropriate.     Objective   Infusions:  niCARdipine, 5-15 mg/hr, Last Rate: 5 mg/hr (05/22/25 1050)    Medications:  [START ON 5/23/2025] aspirin, 81 mg, Oral, Daily  cilostazol, 100 mg, Oral, BID  [START ON 5/23/2025] clopidogrel, 75 mg, Oral, Daily  [START ON 5/23/2025] empagliflozin, 25 mg, Oral, Daily  famotidine, 20 mg, Oral, BID AC  [START ON 5/23/2025] hydroCHLOROthiazide, 12.5 mg, Oral, Daily  insulin glargine, 20 Units, Subcutaneous, Nightly  insulin lispro, 3-14 Units, Subcutaneous, 4x Daily AC & at Bedtime  [START ON 5/23/2025] levothyroxine, 112 mcg, Oral, Q AM  [START ON 5/23/2025] metoprolol succinate XL, 50 mg, Oral, Daily  rosuvastatin, 40 mg, Oral, Nightly  sodium chloride, 10 mL, Intravenous, Q12H  [START ON 5/23/2025] valsartan, 80 mg, Oral, Daily    I reviewed the patient's medications.    Vital Sign Min/Max for last 24 hours  No data recorded   BP  Min: 96/56  Max: 179/95   Pulse  Min: 45  Max: 69   Resp  Min: 16  Max: 16   SpO2  Min: 96 %  Max: 97 %   Flow (L/min) (Oxygen Therapy)  Min: 2  Max: 2       Input/Output for last 24 hour shift  No intake/output data recorded.       Physical Exam:  GENERAL: Patient lying in bed and conversant. No acute distress.   HEENT: Normocephalic and atraumatic. Trachea midline. PER. EOM WNL.   LUNGS: Chest rise of normal depth and symmetric. Lungs clear to auscultation bilaterally. No wheezes, rhonchi, or rales.   HEART: S1,S2  detected. Bradycardia. No rub, murmur, or gallop.   ABDOMEN: Soft, round, nondistended, and nontender. Bowel sounds present.   EXTREMITIES: No clubbing, edema, or cyanosis. Peripheral pulses present. Skin warm and dry.  Right groin site C/D/I. No hematoma.    NEURO/PSYCH: Alert and oriented. Follows commands. Moves all extremities. No focal deficits.      Results from last 7 days   Lab Units 05/22/25  0721   WBC 10*3/mm3 7.38   HEMOGLOBIN g/dL 15.0   PLATELETS 10*3/mm3 241     Results from last 7 days   Lab Units 05/22/25  0726 05/22/25  0721   SODIUM mmol/L  --  140   POTASSIUM mmol/L  --  3.7   CO2 mmol/L  --  26.0   BUN mg/dL  --  15   CREATININE mg/dL 1.10 1.00   GLUCOSE mg/dL  --  212*     Estimated Creatinine Clearance: 76.8 mL/min (by C-G formula based on SCr of 1.1 mg/dL).      I reviewed the patient's new clinical results.  I reviewed the patient's new imaging results/reports including actual images and agree with reports.     Assessment & Plan   Impression      Left carotid stenosis    Uncontrolled type 2 diabetes mellitus with hyperglycemia, with long-term current use of insulin    Hyperlipidemia    Hypothyroidism    Hypertension    PAD (peripheral artery disease)    Bilateral carotid artery stenosis    Presence of implantable cardioverter-defibrillator (ICD)    S/p R ICA stent 2022       Plan        Laron Pandey is a 64 y.o. male with PMHx significant for bilateral carotid stenosis, CVA, CAD s/p stenting and CABG in 2023, VF arrest s/p ICD placement, PAD s/p stenting, HLD, HTN, and T2DM who presents to [facility] on [facility]22[facility]2025 for an elective left carotid stent per Dr. Flores.    He has a history of bilateral endarterectomies and underwent right ICA stent per Dr. Flores in 2022 and is followed with annual surveillance ultrasounds. In March, carotid duplex demonstrated worsening of left ICA stenosis, at which time he elected to undergo stenting.     For left carotid stenosis: S/p stenting per Dr. Flores on  5/22/2025. Postoperative orders per Neurosurgery. Post-procedure carotid duplex pending. SBP <140. Nicardipine PRN. Continue ASA/Plavix.   For T2DM: Last Hgb A1c 8.3. ACHS AccuChecks. Long acting and SSI.  For hypertension: Continue home medications. Nicardipine PRN for SBP <140.  For hyperlipidemia: Continue Crestor.   For Hypothyroidism: Continue Synthroid.  Continue home medications as appropriate.  AM labs. Replace electrolytes per protocol.    DVT Prophylaxis: Mechanical  GI Prophylaxis: Pepcid  Dispo: ICU    Critical Care time spent in direct patient care: 34 minutes (excluding procedure time, if applicable) including high complexity decision making to assess, manipulate, and support vital organ system failure in this individual who has impairment of one or more vital organ systems such that there is a high probability of imminent or life threatening deterioration in the patient's condition.     Nyla Otoole, MSN, APRN, ACNPC-AG  Pulmonary and Critical Care Medicine  Electronically signed by CARLI Ferreira, 05/22/25, 11:34 AM EDT.

## 2025-05-22 NOTE — H&P
Chief Complaint: Follow-up carotid stent     Patient ID: Laron Pandey is a 64 y.o. male is here today for follow-up.     History of Present Illness     This is a 64-year-old man with a history of bilateral carotid endarterectomies.  I placed a right ICA stent in October 2022.  Since then,-following him with annual surveillance carotid ultrasounds and he presents today to discuss the results of the most recent study.  He denies any new TIA or strokelike symptoms.     The following portions of the patient's history were reviewed and updated as appropriate: allergies, current medications, past family history, past medical history, past social history, past surgical history and problem list.     Family history:         Family History   Problem Relation Age of Onset    Diabetes Mother      Heart disease Mother      Diabetes Father      Heart disease Father      Stroke Father      Heart attack Neg Hx      Hypertension Neg Hx      Thyroid disease Neg Hx           Social history:   Social History   Social History            Socioeconomic History    Marital status:    Tobacco Use    Smoking status: Every Day       Current packs/day: 0.25       Average packs/day: 0.3 packs/day for 51.1 years (12.8 ttl pk-yrs)       Types: Cigarettes       Start date: 1/1/1978       Passive exposure: Current    Smokeless tobacco: Never   Vaping Use    Vaping status: Never Used   Substance and Sexual Activity    Alcohol use: Yes       Alcohol/week: 8.0 standard drinks of alcohol       Types: 8 Cans of beer per week       Comment: social    Drug use: Never    Sexual activity: Not Currently       Partners: Female            Review of Systems   Constitutional:  Negative for activity change, appetite change, chills, diaphoresis, fatigue, fever and unexpected weight change.   HENT:  Negative for congestion, dental problem, drooling, ear discharge, ear pain, facial swelling, hearing loss, mouth sores, nosebleeds, postnasal drip, rhinorrhea,  "sinus pressure, sinus pain, sneezing, sore throat, tinnitus, trouble swallowing and voice change.    Eyes:  Negative for photophobia, pain, discharge, redness, itching and visual disturbance.   Respiratory:  Negative for apnea, cough, choking, chest tightness, shortness of breath, wheezing and stridor.    Cardiovascular:  Negative for chest pain, palpitations and leg swelling.   Gastrointestinal:  Negative for abdominal distention, abdominal pain, anal bleeding, blood in stool, constipation, diarrhea, nausea, rectal pain and vomiting.   Endocrine: Negative for cold intolerance, heat intolerance, polydipsia, polyphagia and polyuria.   Genitourinary:  Negative for decreased urine volume, difficulty urinating, dysuria, enuresis, flank pain, frequency, genital sores, hematuria and urgency.   Musculoskeletal:  Negative for arthralgias, back pain, gait problem, joint swelling, myalgias, neck pain and neck stiffness.   Skin:  Negative for color change, pallor, rash and wound.   Allergic/Immunologic: Negative for environmental allergies, food allergies and immunocompromised state.   Neurological:  Negative for dizziness, tremors, seizures, syncope, facial asymmetry, speech difficulty, weakness, light-headedness, numbness and headaches.   Hematological:  Negative for adenopathy. Does not bruise/bleed easily.   Psychiatric/Behavioral:  Negative for agitation, behavioral problems, confusion, decreased concentration, dysphoric mood, hallucinations, self-injury, sleep disturbance and suicidal ideas. The patient is not nervous/anxious and is not hyperactive.          Objective  Temperature 96.4 °F (35.8 °C), temperature source Temporal, height 172.7 cm (68\"), weight 82.6 kg (182 lb 3.2 oz).  Body mass index is 27.7 kg/m².     Physical Exam  Vitals reviewed.   Constitutional:       General: He is not in acute distress.     Appearance: He is well-developed. He is not diaphoretic.   HENT:      Head: Normocephalic and atraumatic.     "  Comments: Bilateral carotid endarterectomy scars are well-healed.  Pulmonary:      Effort: Pulmonary effort is normal.   Skin:     General: Skin is warm and dry.   Neurological:      Mental Status: He is alert and oriented to person, place, and time.   Psychiatric:         Behavior: Behavior normal.            Assessment & Plan  Independent Review of Radiographic Studies:       Available for my review is a carotid duplex which was performed earlier today.  A comparison study from 1 year ago unfortunately demonstrates modest worsening of his left ICA stenosis.     Medical Decision Making:       Informed consent for a diagnostic angiogram with planned left ICA stenting was once again obtained from the patient.  He acknowledges the attendant risks and benefits, and consents to proceed with surgery.

## 2025-05-23 ENCOUNTER — TELEPHONE (OUTPATIENT)
Dept: NEUROSURGERY | Facility: CLINIC | Age: 65
End: 2025-05-23
Payer: MEDICARE

## 2025-05-23 VITALS
HEIGHT: 68 IN | RESPIRATION RATE: 16 BRPM | WEIGHT: 176 LBS | SYSTOLIC BLOOD PRESSURE: 131 MMHG | TEMPERATURE: 98 F | OXYGEN SATURATION: 98 % | HEART RATE: 80 BPM | DIASTOLIC BLOOD PRESSURE: 82 MMHG | BODY MASS INDEX: 26.67 KG/M2

## 2025-05-23 LAB
ANION GAP SERPL CALCULATED.3IONS-SCNC: 12 MMOL/L (ref 5–15)
BASOPHILS # BLD AUTO: 0.04 10*3/MM3 (ref 0–0.2)
BASOPHILS NFR BLD AUTO: 0.4 % (ref 0–1.5)
BH CV LEFT CCA HIDDEN LRR: 1 CM/S
BH CV MID LEFT ICA HIDDEN LRR: 1 CM
BH CV MID RIGHT ICA HIDDEN LRR: 1 CM
BH CV RIGHT CCA HIDDEN LRR: 1 CM/S
BH CV VAS CAROTID LEFT DISTAL STENT EDV: 23.1 CM/S
BH CV VAS CAROTID LEFT DISTAL STENT: 79.6 CM/S
BH CV VAS CAROTID LEFT DISTAL TO STENT EDV: 17.7 CM/S
BH CV VAS CAROTID LEFT DISTAL TO STENT: 66.3 CM/S
BH CV VAS CAROTID LEFT MID STENT EDV: 20.3 CM/S
BH CV VAS CAROTID LEFT MID STENT: 112 CM/S
BH CV VAS CAROTID LEFT PROXIMAL STENT EDV: 28.6 CM/S
BH CV VAS CAROTID LEFT PROXIMAL STENT: 165 CM/S
BH CV VAS CAROTID LEFT PROXIMAL TO STENT: 159 CM/S
BH CV VAS CAROTID LEFT STENT NATIVE VESSEL PROXIMAL ED: 26.9 CM/S
BH CV VAS CAROTID RIGHT DISTAL STENT EDV: 26.6 CM/S
BH CV VAS CAROTID RIGHT DISTAL STENT PSV: 115 CM/S
BH CV VAS CAROTID RIGHT DISTAL TO STENT NATIVE VESSEL E: 27.3 CM/S
BH CV VAS CAROTID RIGHT DISTAL TO STENT PSV: 111 CM/S
BH CV VAS CAROTID RIGHT MID STENT EDV: 33.8 CM/S
BH CV VAS CAROTID RIGHT MID STENT PSV: 143 CM/S
BH CV VAS CAROTID RIGHT PROXIMAL STENT EDV: 23.6 CM/S
BH CV VAS CAROTID RIGHT PROXIMAL STENT PSV: 93.4 CM/S
BH CV VAS CAROTID RIGHT STENT NATIVE VESSEL PROXIMAL EDV: 15.3 CM/S
BH CV VAS CAROTID RIGHT STENT NATIVE VESSEL PROXIMAL PS: 67 CM/S
BH CV XLRA MEAS CAROTID RIGHT ICA/CCA DIASTOLIC RATIO: 1.92
BH CV XLRA MEAS LEFT CAROTID BULB EDV: 28.6 CM/SEC
BH CV XLRA MEAS LEFT CAROTID BULB PSV: 165 CM/SEC
BH CV XLRA MEAS LEFT DIST CCA EDV: 20.5 CM/SEC
BH CV XLRA MEAS LEFT DIST CCA PSV: 125 CM/SEC
BH CV XLRA MEAS LEFT DIST ICA EDV: 17.7 CM/SEC
BH CV XLRA MEAS LEFT DIST ICA PSV: 72.7 CM/SEC
BH CV XLRA MEAS LEFT ICA/CCA DIASTOLIC RATIO: 1.59
BH CV XLRA MEAS LEFT ICA/CCA RATIO: 1.76
BH CV XLRA MEAS LEFT MID CCA EDV: 18 CM/SEC
BH CV XLRA MEAS LEFT MID CCA PSV: 93.8 CM/SEC
BH CV XLRA MEAS LEFT MID ICA EDV: 23.1 CM/SEC
BH CV XLRA MEAS LEFT MID ICA PSV: 112 CM/SEC
BH CV XLRA MEAS LEFT PROX CCA EDV: 24.3 CM/SEC
BH CV XLRA MEAS LEFT PROX CCA PSV: 134 CM/SEC
BH CV XLRA MEAS LEFT PROX ECA EDV: 16.5 CM/SEC
BH CV XLRA MEAS LEFT PROX ECA PSV: 176 CM/SEC
BH CV XLRA MEAS LEFT PROX ICA EDV: 22.5 CM/SEC
BH CV XLRA MEAS LEFT PROX ICA PSV: 152 CM/SEC
BH CV XLRA MEAS LEFT PROX SCLA PSV: 195 CM/SEC
BH CV XLRA MEAS LEFT VERTEBRAL A EDV: 9.8 CM/SEC
BH CV XLRA MEAS LEFT VERTEBRAL A PSV: 50.2 CM/SEC
BH CV XLRA MEAS RIGHT CAROTID BULB EDV: 15.3 CM/SEC
BH CV XLRA MEAS RIGHT CAROTID BULB PSV: 67 CM/SEC
BH CV XLRA MEAS RIGHT DIST CCA EDV: 17.6 CM/SEC
BH CV XLRA MEAS RIGHT DIST CCA PSV: 63.1 CM/SEC
BH CV XLRA MEAS RIGHT DIST ICA EDV: 30.1 CM/SEC
BH CV XLRA MEAS RIGHT DIST ICA PSV: 111 CM/SEC
BH CV XLRA MEAS RIGHT ICA/CCA RATIO: 2.27
BH CV XLRA MEAS RIGHT MID CCA EDV: 15.7 CM/SEC
BH CV XLRA MEAS RIGHT MID CCA PSV: 67.8 CM/SEC
BH CV XLRA MEAS RIGHT MID ICA EDV: 39 CM/SEC
BH CV XLRA MEAS RIGHT MID ICA PSV: 143 CM/SEC
BH CV XLRA MEAS RIGHT PROX CCA EDV: 16.1 CM/SEC
BH CV XLRA MEAS RIGHT PROX CCA PSV: 92.5 CM/SEC
BH CV XLRA MEAS RIGHT PROX ECA EDV: -10.9 CM/SEC
BH CV XLRA MEAS RIGHT PROX ECA PSV: -25.2 CM/SEC
BH CV XLRA MEAS RIGHT PROX ICA EDV: 23.6 CM/SEC
BH CV XLRA MEAS RIGHT PROX ICA PSV: 93.4 CM/SEC
BH CV XLRA MEAS RIGHT PROX SCLA PSV: 135 CM/SEC
BH CV XLRA MEAS RIGHT VERTEBRAL A EDV: 18.9 CM/SEC
BH CV XLRA MEAS RIGHT VERTEBRAL A PSV: 64.1 CM/SEC
BH CVPROX LEFT ICA HIDDEN LRR: 1 CM
BH CVPROX RIGHT ICA HIDDEN LRR: 1 CM
BUN SERPL-MCNC: 19 MG/DL (ref 8–23)
BUN/CREAT SERPL: 22.1 (ref 7–25)
CALCIUM SPEC-SCNC: 9 MG/DL (ref 8.6–10.5)
CHLORIDE SERPL-SCNC: 104 MMOL/L (ref 98–107)
CO2 SERPL-SCNC: 22 MMOL/L (ref 22–29)
CREAT SERPL-MCNC: 0.86 MG/DL (ref 0.76–1.27)
DEPRECATED RDW RBC AUTO: 49.7 FL (ref 37–54)
EGFRCR SERPLBLD CKD-EPI 2021: 96.7 ML/MIN/1.73
EOSINOPHIL # BLD AUTO: 0.28 10*3/MM3 (ref 0–0.4)
EOSINOPHIL NFR BLD AUTO: 2.6 % (ref 0.3–6.2)
ERYTHROCYTE [DISTWIDTH] IN BLOOD BY AUTOMATED COUNT: 14.5 % (ref 12.3–15.4)
GLUCOSE BLDC GLUCOMTR-MCNC: 123 MG/DL (ref 70–130)
GLUCOSE SERPL-MCNC: 71 MG/DL (ref 65–99)
HCT VFR BLD AUTO: 41.4 % (ref 37.5–51)
HGB BLD-MCNC: 13.6 G/DL (ref 13–17.7)
IMM GRANULOCYTES # BLD AUTO: 0.03 10*3/MM3 (ref 0–0.05)
IMM GRANULOCYTES NFR BLD AUTO: 0.3 % (ref 0–0.5)
LYMPHOCYTES # BLD AUTO: 1.74 10*3/MM3 (ref 0.7–3.1)
LYMPHOCYTES NFR BLD AUTO: 16.3 % (ref 19.6–45.3)
MAGNESIUM SERPL-MCNC: 1.9 MG/DL (ref 1.6–2.4)
MCH RBC QN AUTO: 30.8 PG (ref 26.6–33)
MCHC RBC AUTO-ENTMCNC: 32.9 G/DL (ref 31.5–35.7)
MCV RBC AUTO: 93.7 FL (ref 79–97)
MONOCYTES # BLD AUTO: 0.89 10*3/MM3 (ref 0.1–0.9)
MONOCYTES NFR BLD AUTO: 8.3 % (ref 5–12)
NEUTROPHILS NFR BLD AUTO: 7.7 10*3/MM3 (ref 1.7–7)
NEUTROPHILS NFR BLD AUTO: 72.1 % (ref 42.7–76)
NRBC BLD AUTO-RTO: 0 /100 WBC (ref 0–0.2)
PHOSPHATE SERPL-MCNC: 3.9 MG/DL (ref 2.5–4.5)
PLATELET # BLD AUTO: 212 10*3/MM3 (ref 140–450)
PMV BLD AUTO: 10.5 FL (ref 6–12)
POTASSIUM SERPL-SCNC: 3.9 MMOL/L (ref 3.5–5.2)
RBC # BLD AUTO: 4.42 10*6/MM3 (ref 4.14–5.8)
RIGHT ARM BP: NORMAL MMHG
SODIUM SERPL-SCNC: 138 MMOL/L (ref 136–145)
WBC NRBC COR # BLD AUTO: 10.68 10*3/MM3 (ref 3.4–10.8)

## 2025-05-23 PROCEDURE — 84100 ASSAY OF PHOSPHORUS: CPT

## 2025-05-23 PROCEDURE — 85025 COMPLETE CBC W/AUTO DIFF WBC: CPT | Performed by: NEUROLOGICAL SURGERY

## 2025-05-23 PROCEDURE — 80048 BASIC METABOLIC PNL TOTAL CA: CPT | Performed by: NEUROLOGICAL SURGERY

## 2025-05-23 PROCEDURE — 82948 REAGENT STRIP/BLOOD GLUCOSE: CPT

## 2025-05-23 PROCEDURE — 83735 ASSAY OF MAGNESIUM: CPT

## 2025-05-23 RX ADMIN — LEVOTHYROXINE SODIUM 112 MCG: 0.11 TABLET ORAL at 06:22

## 2025-05-23 RX ADMIN — HYDROCHLOROTHIAZIDE 12.5 MG: 25 TABLET ORAL at 08:23

## 2025-05-23 RX ADMIN — FAMOTIDINE 20 MG: 20 TABLET, FILM COATED ORAL at 08:23

## 2025-05-23 RX ADMIN — ACETAMINOPHEN 500 MG: 500 TABLET ORAL at 06:21

## 2025-05-23 RX ADMIN — MUPIROCIN 1 APPLICATION: 20 OINTMENT TOPICAL at 08:23

## 2025-05-23 RX ADMIN — Medication 10 ML: at 08:25

## 2025-05-23 RX ADMIN — CLOPIDOGREL BISULFATE 75 MG: 75 TABLET, FILM COATED ORAL at 08:23

## 2025-05-23 RX ADMIN — ASPIRIN 81 MG: 81 TABLET, COATED ORAL at 08:23

## 2025-05-23 RX ADMIN — EMPAGLIFLOZIN 25 MG: 25 TABLET, FILM COATED ORAL at 08:23

## 2025-05-23 RX ADMIN — CILOSTAZOL 100 MG: 50 TABLET ORAL at 08:23

## 2025-05-23 NOTE — DISCHARGE SUMMARY
PATIENT:  PAIGE MIMS  YOB: 1960  VISIT ID:  [id number]  PRIMARY CARE:  Hola Jaffe MD  ADMITTING PHYSICIAN:  Tucker Flores*    DATE OF ADMISSION:  5/22/2025  DATE OF DISCHARGE:  05/23/25      ADMITTING DIAGNOSIS:  Carotid stenosis, asymptomatic, left    DISCHARGE DIAGNOSIS:  Carotid stenosis, asymptomatic, left, s/p angioplasty/stent placement    Past Medical History:   Diagnosis Date    Anesthesia complication 10/2022    code 500 post op after shoulder surgery [facility]    Arthritis     Back pain     Carotid artery occlusion 04-09-22    Coronary artery disease 2004    Diabetes mellitus 2004    GERD (gastroesophageal reflux disease)     Hyperlipidemia     Hypertension     Hypothyroidism 02/09/2018    Low back pain 06-20    Lumbar post-laminectomy syndrome     Lumbar stenosis with neurogenic claudication     Lumbosacral disc disease 1999    Myocardial infarction     PAD (peripheral artery disease)     Peripheral neuropathy 2020    Pulmonary arterial hypertension     Shoulder injury     Stroke 06/13/2022    [facility]    Thoracic disc disorder 06-20    Thyroid disease     Type 2 diabetes mellitus     Wears dentures     upper plate    Wears reading eyeglasses          PROCEDURES:  1.  Diagnostic catheter angiogram  2.  Left carotid angioplasty/stent placement, with embolic protection device  3.  Carotid duplex    BRIEF HOSPITAL COURSE:  Mr. Mims is a 64 y.o. male with a history of bilateral carotid endarterectomies.  He is s/p right ICA stent placement in October 2022 with Dr. Flores.  He has been followed annually with surveillance carotid ultrasounds, and recently underwent carotid duplex that demonstrated progression to a high-grade left ICA stenosis.    Mr. Mims subsequently presented to [facility] on 5/22/2025 and underwent diagnostic angiogram which confirmed presence of a high-grade left ICA stenosis.  This lesion responded well to angioplasty/stent placement, restoring a near normal caliber  lumen.    Mr. Pandey made an uneventful recovery in the neuro ICU and was discharged home at his neurologic baseline on the morning of 5/23/2025.  Prior to discharge, he was tolerating p.o. intake very well and ambulating unassisted.  Right femoral access site was soft, dry, and without hematoma.  Carotid duplex demonstrates a patent left carotid stent.  Mr. Pandey will follow-up with Dr. Flores in approximately 4 weeks time via a telephone visit.  He will remain on his Plavix/aspirin regimen.  Discharge instructions, including activity, medications, follow-up instructions, and signs/symptoms of stroke were discussed in detail with Mr. Pandey and he expressed an understanding.    DISCHARGE MEDICATIONS:     Discharge Medications        Changes to Medications        Instructions Start Date   NovoLOG FlexPen 100 UNIT/ML solution pen-injector sc pen  Generic drug: insulin aspart  What changed: additional instructions   15 Units, Subcutaneous, 3 Times Daily With Meals             Continue These Medications        Instructions Start Date   acetaminophen 500 MG tablet  Commonly known as: TYLENOL   500 mg, Every 6 Hours PRN      Aspirin Low Dose 81 MG EC tablet  Generic drug: aspirin   81 mg, Oral, Daily      BD ULTRA-FINE PEN NEEDLES 29G X 12.7MM misc  Generic drug: Insulin Pen Needle   Use with Insulin 3 times daily      cilostazol 100 MG tablet  Commonly known as: PLETAL   100 mg, Oral, 2 Times Daily      clopidogrel 75 MG tablet  Commonly known as: PLAVIX   75 mg, Oral, Daily      empagliflozin 25 MG tablet tablet  Commonly known as: Jardiance   25 mg, Oral, Daily      famotidine 20 MG tablet  Commonly known as: PEPCID   20 mg, Oral, 2 Times Daily Before Meals      FreeStyle Jude 3 Plus Sensor   Not Applicable, Every 14 Days      FreeStyle Jude 3 Myton device   See Admin Instructions      hydroCHLOROthiazide 12.5 MG capsule  Commonly known as: MICROZIDE   12.5 mg, Oral, Daily      Insulin Glargine 100 UNIT/ML injection  pen  Commonly known as: LANTUS SOLOSTAR   40 Units, Subcutaneous, Nightly      metoprolol succinate XL 50 MG 24 hr tablet  Commonly known as: TOPROL-XL   50 mg, Oral, Daily      OneTouch Delica Plus [id number] misc   1 each, Not Applicable, 3 Times Daily      OneTouch Verio test strip  Generic drug: glucose blood   USE AS INSTRUCTED      rosuvastatin 40 MG tablet  Commonly known as: CRESTOR   40 mg, Oral, Nightly      Tirosint 112 MCG capsule  Generic drug: levothyroxine sodium   224 mcg, Oral, Daily      valsartan 80 MG tablet  Commonly known as: DIOVAN   TAKE 1 TABLET BY MOUTH EVERY DAY                 ACTIVITY:  No heavy lifting or strenuous activity for 5 to 7 days    DIET:  Cardiac diet    FOLLOW UP:       Follow-up Information       Hola Jaffe MD .    Specialty: Adolescent Medicine  Contact information:  [address]  [facility] [address] [phone]               Tucker Flores MD Follow up in 1 month(s).    Specialties: Neurosurgery, Cardiology, Interventional Radiology  Why: telephone visit  Contact information:  [address]  [facility] [address] [phone]

## 2025-05-23 NOTE — PLAN OF CARE
Problem: Adult Inpatient Plan of Care  Goal: Plan of Care Review  Outcome: Met  Flowsheets (Taken 5/23/2025 1003)  Outcome Evaluation: PT VSS, Ambulates in room, being DC  Goal: Patient-Specific Goal (Individualized)  Outcome: Met  Goal: Absence of Hospital-Acquired Illness or Injury  Outcome: Met  Intervention: Identify and Manage Fall Risk  Recent Flowsheet Documentation  Taken 5/23/2025 0800 by Michael Hedrick RN  Safety Promotion/Fall Prevention:   safety round/check completed   room organization consistent   lighting adjusted   fall prevention program maintained   clutter free environment maintained  Intervention: Prevent Skin Injury  Recent Flowsheet Documentation  Taken 5/23/2025 0800 by Michael Hedrick RN  Body Position: position changed independently  Skin Protection: incontinence pads utilized  Intervention: Prevent Infection  Recent Flowsheet Documentation  Taken 5/23/2025 0800 by Michael Hedrick RN  Infection Prevention:   visitors restricted/screened   single patient room provided   rest/sleep promoted   hand hygiene promoted   environmental surveillance performed  Goal: Optimal Comfort and Wellbeing  Outcome: Met  Intervention: Monitor Pain and Promote Comfort  Recent Flowsheet Documentation  Taken 5/23/2025 0800 by Michael Hedrick RN  Pain Management Interventions:   care clustered   pillow support provided   position adjusted   quiet environment facilitated  Intervention: Provide Person-Centered Care  Recent Flowsheet Documentation  Taken 5/23/2025 0800 by Michael Hedrick RN  Trust Relationship/Rapport:   care explained   questions answered   reassurance provided  Goal: Readiness for Transition of Care  Outcome: Met   Goal Outcome Evaluation:

## 2025-05-24 ENCOUNTER — READMISSION MANAGEMENT (OUTPATIENT)
Dept: CALL CENTER | Facility: HOSPITAL | Age: 65
End: 2025-05-24
Payer: MEDICARE

## 2025-05-24 NOTE — OUTREACH NOTE
Prep Survey      Flowsheet Row Responses   [facility] facility patient discharged from? [place]   Is LACE score < 7 ? No   Eligibility Readm Mgmt   Discharge diagnosis Carotid Cerebral Angiogram   Does the patient have one of the following disease processes/diagnoses(primary or secondary)? Other   Prep survey completed? Yes            Criss KELLY - Registered Nurse

## 2025-05-27 RX ORDER — INSULIN ASPART 100 [IU]/ML
INJECTION, SOLUTION INTRAVENOUS; SUBCUTANEOUS
Qty: 45 ML | Refills: 0 | Status: CANCELLED | OUTPATIENT
Start: 2025-05-27

## 2025-05-27 NOTE — TELEPHONE ENCOUNTER
Pharmacy and pt called stated Novolog flexpen is no longer covered Insurance prefers Humalog kwikpen or Insulin Aspart. Pt is just about out send to Local pharmacy [place], [place]

## 2025-05-27 NOTE — TELEPHONE ENCOUNTER
Pended humalog      Rx Refill Note  Requested Prescriptions     Pending Prescriptions Disp Refills    insulin aspart (NovoLOG FlexPen) 100 UNIT/ML solution pen-injector sc pen 15 mL      Sig: 15 units 3 times a day before meals        Last office visit with prescribing clinician: 10/30/2024      Next office visit with prescribing clinician: 7/16/2025       Glendy Rosado (Jodi)  05/27/25, 13:25 EDT

## 2025-05-28 ENCOUNTER — TELEPHONE (OUTPATIENT)
Dept: ENDOCRINOLOGY | Facility: CLINIC | Age: 65
End: 2025-05-28
Payer: MEDICARE

## 2025-05-28 RX ORDER — INSULIN LISPRO 100 [IU]/ML
INJECTION, SOLUTION INTRAVENOUS; SUBCUTANEOUS
Qty: 45 ML | Refills: 0 | Status: SHIPPED | OUTPATIENT
Start: 2025-05-28

## 2025-05-28 NOTE — TELEPHONE ENCOUNTER
PHARMACY REQUESTING ALTERNATIVE INSURANCE IS NOT COVERING INSULIN ASPART. THEY WILL COVER HUMALOG OR LISPRO. PATIENT IS COMPLETELY OUT OF MEDICATION.

## 2025-05-29 ENCOUNTER — TELEPHONE (OUTPATIENT)
Dept: ENDOCRINOLOGY | Facility: CLINIC | Age: 65
End: 2025-05-29
Payer: MEDICARE

## 2025-05-29 NOTE — TELEPHONE ENCOUNTER
PATIENT IS COMPLETELY OUT MEDICATION. INSURANCE IS NOT COVERING INSULIN ASPART. ALTERNATIVES ARE HUMALOG OR INSULIN LISPRO. NEEDS ALTERNATIVE PRESCRIPTION

## 2025-06-03 ENCOUNTER — READMISSION MANAGEMENT (OUTPATIENT)
Dept: CALL CENTER | Facility: HOSPITAL | Age: 65
End: 2025-06-03
Payer: MEDICARE

## 2025-06-03 NOTE — OUTREACH NOTE
Medical Week 1 Survey      Flowsheet Row Responses   [facility] patient discharged from? [place]   Does the patient have one of the following disease processes/diagnoses(primary or secondary)? Other   Week 1 attempt successful? No   Unsuccessful attempts Attempt 1            Criss KELLY - Registered Nurse

## 2025-06-10 ENCOUNTER — READMISSION MANAGEMENT (OUTPATIENT)
Dept: CALL CENTER | Facility: HOSPITAL | Age: 65
End: 2025-06-10
Payer: MEDICARE

## 2025-06-10 NOTE — OUTREACH NOTE
Medical Week 3 Survey      Flowsheet Row Responses   [facility] patient discharged from? [place]   Does the patient have one of the following disease processes/diagnoses(primary or secondary)? Other   Week 3 attempt successful? Yes   Call start time 0816   Call end time 0819   Discharge diagnosis Carotid stenosis, asymptomatic, left, s/p angioplasty/stent placement   Person spoke with today (if not patient) and relationship Patient   Meds reviewed with patient/caregiver? Yes  [resumed usual home meds.]   Does the patient have all medications ordered at discharge? N/A  [No new meds ordered at discharge.]   Is the patient taking all medications as directed (includes completed medication regime)? Yes   Comments regarding appointments Video visit with neurosurgery Dr Flores on 6/23  330pm   Does the patient have a primary care provider?  Yes   Comments regarding PCP Follow up with Hola Jaffe PCP   Does the patient have an appointment with their PCP within 7 days of discharge? No   Nursing Interventions Educated patient on importance of making appointment, Advised patient to make appointment   Has the patient kept scheduled appointments due by today? N/A   Has home health visited the patient within 72 hours of discharge? N/A   Psychosocial issues? No   Did the patient receive a copy of their discharge instructions? Yes   Nursing interventions Reviewed instructions with patient   What is the patient's perception of their health status since discharge? Improving   Is the patient/caregiver able to teach back the hierarchy of who to call/visit for symptoms/problems? PCP, Specialist, Home health nurse, Urgent Care, ED, 911 Yes   Week 3 Call Completed? Yes   Graduated Yes   Is the patient interested in additional calls from an ambulatory ? No   Would this patient benefit from a Referral to Amb Social Work? No   Graduated/Revoked comments Patient reports healing well. Denies any questions or needs today    Call end time 0819            RADHA LAIRD - Registered Nurse

## 2025-06-23 ENCOUNTER — TELEPHONE (OUTPATIENT)
Dept: NEUROSURGERY | Facility: CLINIC | Age: 65
End: 2025-06-23

## 2025-06-23 ENCOUNTER — TELEMEDICINE (OUTPATIENT)
Dept: NEUROSURGERY | Facility: CLINIC | Age: 65
End: 2025-06-23
Payer: MEDICARE

## 2025-06-23 DIAGNOSIS — I73.9 PAD (PERIPHERAL ARTERY DISEASE): Primary | ICD-10-CM

## 2025-06-23 DIAGNOSIS — I66.8 OCCLUSION AND STENOSIS OF OTHER CEREBRAL ARTERIES: ICD-10-CM

## 2025-06-23 PROCEDURE — 99024 POSTOP FOLLOW-UP VISIT: CPT | Performed by: NEUROLOGICAL SURGERY

## 2025-06-23 NOTE — TELEPHONE ENCOUNTER
Dr. Flores did a telephone visit with Mr. Pandey.     Please schedule Mr. Pandey for another telephone visit with Dr. Flores in 4 weeks.     Also, please schedule him with a duplex carotid ultrasound in 5 months. FU with Dr. Flores afterwards.   Patient can do both same day if he would like.     Thank you,   Reema PEDRO CMA

## 2025-06-23 NOTE — PROGRESS NOTES
This is a 64-year-old man in whom I placed a carotid stent about a month ago.  Video consultation was established today to discuss prescription drug management.  Since being discharged from the hospital, he denies any TIAs or strokelike symptoms.  He is currently taking aspirin, Plavix, and Xarelto.    About 11 minutes was spent on today's call including time spent reviewing his prior records, and the video visit itself.    I have asked him to stop taking his aspirin and he will be maintained on Xarelto and Plavix for the next 4 weeks, after which time we will set up another video visit.  Signs and symptoms of stroke were once again reviewed with the patient, and I directed him to call 911 if he thinks he is having a stroke and/or TIA.    I will plan on switching him to aspirin and Xarelto indefinitely in about 4 weeks, and he will need a surveillance carotid ultrasound in 5 months, or sooner if clinically indicated.

## 2025-06-26 LAB
MC_CV_MDC_IDC_RATE_1: 200
MC_CV_MDC_IDC_RATE_1: 250
MC_CV_MDC_IDC_SHOCK_MEASURED_IMPEDANCE: 70
MC_CV_MDC_IDC_THERAPIES: NORMAL
MC_CV_MDC_IDC_THERAPIES: NORMAL
MC_CV_MDC_IDC_ZONE_ID: 1
MC_CV_MDC_IDC_ZONE_ID: 2
MDC_IDC_MSMT_BATTERY_REMAINING_PERCENTAGE: 77 %
MDC_IDC_MSMT_BATTERY_STATUS: NORMAL
MDC_IDC_PG_IMPLANT_DTM: NORMAL
MDC_IDC_PG_MFG: NORMAL
MDC_IDC_PG_MODEL: NORMAL
MDC_IDC_PG_SERIAL: NORMAL
MDC_IDC_PG_TYPE: NORMAL
MDC_IDC_SESS_DTM: NORMAL
MDC_IDC_SESS_TYPE: NORMAL
MDC_IDC_SET_ZONE_STATUS: NORMAL
MDC_IDC_SET_ZONE_STATUS: NORMAL
MDC_IDC_SET_ZONE_TYPE: NORMAL
MDC_IDC_SET_ZONE_TYPE: NORMAL
MDC_IDC_STAT_TACHYTHERAPY_SHOCKS_DELIVERED_RECENT: 0

## 2025-07-01 RX ORDER — LEVOTHYROXINE SODIUM 125 UG/1
250 CAPSULE ORAL DAILY
Qty: 60 CAPSULE | Refills: 11 | OUTPATIENT
Start: 2025-07-01

## 2025-07-01 NOTE — TELEPHONE ENCOUNTER
"    Hub staff attempted to follow warm transfer process and was unsuccessful     Caller: Laron Pandey \"Arben\"    Relationship to patient: Self    Best call back number: [phone]     Patient is needing: PT WANTS TO KNOW WHY IT WAS DENIED.       "

## 2025-07-02 RX ORDER — LEVOTHYROXINE SODIUM 112 UG/1
224 CAPSULE ORAL DAILY
Qty: 60 CAPSULE | Refills: 0 | Status: SHIPPED | OUTPATIENT
Start: 2025-07-02

## 2025-07-02 NOTE — TELEPHONE ENCOUNTER
Rx Refill Note  Requested Prescriptions     Refused Prescriptions Disp Refills    levothyroxine sodium (TIROSINT) 125 MCG capsule capsule [Pharmacy Med Name: LEVOTHYROXINE 125 MCG CAPSULE] 60 capsule 11     Sig: TAKE 2 CAPSULES BY MOUTH EVERY DAY     Refused By: EULOGIO BAIRD     Reason for Refusal: Refill not appropriate      Last office visit with prescribing clinician: 10/30/2024     Next office visit with prescribing clinician: 7/16/2025

## 2025-07-16 ENCOUNTER — OFFICE VISIT (OUTPATIENT)
Dept: ENDOCRINOLOGY | Facility: CLINIC | Age: 65
End: 2025-07-16
Payer: MEDICARE

## 2025-07-16 ENCOUNTER — TELEPHONE (OUTPATIENT)
Dept: ENDOCRINOLOGY | Facility: CLINIC | Age: 65
End: 2025-07-16

## 2025-07-16 VITALS
WEIGHT: 187.4 LBS | OXYGEN SATURATION: 96 % | SYSTOLIC BLOOD PRESSURE: 132 MMHG | BODY MASS INDEX: 28.4 KG/M2 | HEIGHT: 68 IN | HEART RATE: 89 BPM | DIASTOLIC BLOOD PRESSURE: 90 MMHG

## 2025-07-16 DIAGNOSIS — E11.65 UNCONTROLLED TYPE 2 DIABETES MELLITUS WITH HYPERGLYCEMIA, WITH LONG-TERM CURRENT USE OF INSULIN: Primary | ICD-10-CM

## 2025-07-16 DIAGNOSIS — Z79.4 UNCONTROLLED TYPE 2 DIABETES MELLITUS WITH HYPERGLYCEMIA, WITH LONG-TERM CURRENT USE OF INSULIN: Primary | ICD-10-CM

## 2025-07-16 DIAGNOSIS — E03.9 HYPOTHYROIDISM, ADULT: ICD-10-CM

## 2025-07-16 DIAGNOSIS — E78.2 MIXED HYPERLIPIDEMIA: ICD-10-CM

## 2025-07-16 LAB
ALBUMIN SERPL-MCNC: 4 G/DL (ref 3.5–5.2)
ALBUMIN/GLOB SERPL: 1.2 G/DL
ALP SERPL-CCNC: 109 U/L (ref 39–117)
ALT SERPL W P-5'-P-CCNC: 15 U/L (ref 1–41)
ANION GAP SERPL CALCULATED.3IONS-SCNC: 11.1 MMOL/L (ref 5–15)
AST SERPL-CCNC: 23 U/L (ref 1–40)
BILIRUB SERPL-MCNC: 0.8 MG/DL (ref 0–1.2)
BUN SERPL-MCNC: 13 MG/DL (ref 8–23)
BUN/CREAT SERPL: 16 (ref 7–25)
CALCIUM SPEC-SCNC: 9.6 MG/DL (ref 8.6–10.5)
CHLORIDE SERPL-SCNC: 105 MMOL/L (ref 98–107)
CO2 SERPL-SCNC: 23.9 MMOL/L (ref 22–29)
CREAT SERPL-MCNC: 0.81 MG/DL (ref 0.76–1.27)
EGFRCR SERPLBLD CKD-EPI 2021: 98.5 ML/MIN/1.73
EXPIRATION DATE: ABNORMAL
GLOBULIN UR ELPH-MCNC: 3.3 GM/DL
GLUCOSE BLDC GLUCOMTR-MCNC: 210 MG/DL (ref 70–130)
GLUCOSE SERPL-MCNC: 165 MG/DL (ref 65–99)
Lab: ABNORMAL
POTASSIUM SERPL-SCNC: 4.2 MMOL/L (ref 3.5–5.2)
PROT SERPL-MCNC: 7.3 G/DL (ref 6–8.5)
SODIUM SERPL-SCNC: 140 MMOL/L (ref 136–145)
T4 FREE SERPL-MCNC: 1.42 NG/DL (ref 0.92–1.68)
TSH SERPL DL<=0.05 MIU/L-ACNC: 3.98 UIU/ML (ref 0.27–4.2)

## 2025-07-16 PROCEDURE — 84443 ASSAY THYROID STIM HORMONE: CPT | Performed by: INTERNAL MEDICINE

## 2025-07-16 PROCEDURE — 84439 ASSAY OF FREE THYROXINE: CPT | Performed by: INTERNAL MEDICINE

## 2025-07-16 PROCEDURE — 80053 COMPREHEN METABOLIC PANEL: CPT | Performed by: INTERNAL MEDICINE

## 2025-07-16 NOTE — PATIENT INSTRUCTIONS
Diabetes Treatment Recommendations  Patient     Laron Pandey     Date:        07/16/25     ADA General Goals: A1c: < 7%                                                                                   Your A1C is   Lab Results   Component Value Date    HGBA1C 9.20 (H) 05/22/2025    HGBA1C 8.3 (A) 10/30/2024    HGBA1C 8.0 (A) 07/11/2024       Fasting/before meal glucose: <150 mg/dL                                    2 Hour after meal glucoses: < 180 mg/dL                                        Bedtime glucose:120-180                                                                Glucose testing frequency: Dexcom G7    Insulin dosing:  Basal insulin Lantus 40 Units every 24 hours.             Meal Insulin Humalog (U-100) or Novolog  12 units before meals.   Correction insulin (add to meal insulin)   0 unit  if glucose  less than 150   0 unit   if glucose  150 - 199   2 units if glucose 200 - 249   4 units if glucose 250 - 299   6 units if glucose 300 - 349   8  units if glucose Greater than 350       Nutritional Recommendations:  4-5 carb portions per meal for male (60-75 gm of carbs)    Physical Activity Goals:  Walk at least 15 min every day, ideally exercise 45-60 min most days (could be done in short bouts of 10-15 minutes.    Keep records of your glucose levels and insulin adjustments. We may ask you to keep records on the content of your meals with insulin doses and before/after meal glucose levels to evaluate your ratios.  Call for advice if you have unexplained or unexpected hypoglycemia  (glucose < 60) or persistent high glucose > 300.  Office: [phone]      Meka Swan MD

## 2025-07-16 NOTE — PROGRESS NOTES
Subjective:     Chief Complaint   Patient presents with    Uncontrolled type 2 diabetes mellitus with hyperglycemia, w     Follow up  Hb a1c: 9.20 (05/22/2025)      Laron Pandey is a 64 y.o. male who is is being seen for follow-up for management of  Type 2 diabetes mellitus.  The initial diagnosis of diabetes was made in 2003. He was initially controlled with metformin and started insulin in 2010.   Diabetic complications: cardiovascular disease s/p CABG. He has neuropathy sx with tingling and pain in the legs bilaterally.       Current diabetic medications include MDI started 10/2024  and Jardiance   Past meds: Trulicity -  heartburn with it. Metformin - diarrhea.     Monitoring  -Dexcom sensor.    He has large variability with postprandial hyperglycemia and occasional hypoglycemia after bolus. He has erratic meal and sleep schedule.     Other med problems:CAD/CABG, HL, HTN    HL- intolerant to statins. On zetia.   Hypothyroidism - 250 mcg of levothyroxine. TSH remained 22 despite dose increase. He is compliant with the med. We have changed to Tirosint.   PCP has changed the dose to 125 mcg daily from 250 mcg, he has been taking only 125 mcg for the last 6 weeks.       HTN -BP is normal.     Patient had chronic cough and canceled his last appt. Glucose has been     MEDICATIONS    Current Outpatient Medications:     acetaminophen (TYLENOL) 500 MG tablet, Take 1 tablet by mouth Every 6 (Six) Hours As Needed for Mild Pain., Disp: , Rfl:     Aspirin Low Dose 81 MG EC tablet, TAKE 1 TABLET BY MOUTH EVERY DAY, Disp: 90 tablet, Rfl: 3    BD ULTRA-FINE PEN NEEDLES 29G X 12.7MM misc, Use with Insulin 3 times daily, Disp: 300 each, Rfl: 2    cilostazol (PLETAL) 100 MG tablet, Take 1 tablet by mouth 2 (Two) Times a Day., Disp: 180 tablet, Rfl: 3    clopidogrel (PLAVIX) 75 MG tablet, TAKE 1 TABLET BY MOUTH EVERY DAY, Disp: 30 tablet, Rfl: 0    Continuous Glucose  (FreeStyle Jude 3 [place]) device, USE AS DIRECTED,  Disp: 1 each, Rfl: 0    empagliflozin (Jardiance) 25 MG tablet tablet, Take 1 tablet by mouth Daily., Disp: 30 tablet, Rfl: 11    famotidine (PEPCID) 20 MG tablet, Take 1 tablet by mouth 2 (Two) Times a Day Before Meals., Disp: 30 tablet, Rfl: 1    glucose blood (OneTouch Verio) test strip, USE AS INSTRUCTED, Disp: 100 each, Rfl: 2    hydroCHLOROthiazide (MICROZIDE) 12.5 MG capsule, Take 1 capsule by mouth Daily., Disp: 90 capsule, Rfl: 2    Insulin Glargine (LANTUS SOLOSTAR) 100 UNIT/ML injection pen, Inject 40 Units under the skin into the appropriate area as directed Every Night., Disp: 45 mL, Rfl: 1    Insulin Lispro, 1 Unit Dial, (HumaLOG KwikPen) 100 UNIT/ML solution pen-injector, Inject 15 units 3 times a day before meals, Disp: 45 mL, Rfl: 0    Lancets (OneTouch Delica Plus Tarzfa94C) misc, 1 each 3 (Three) Times a Day., Disp: 300 each, Rfl: 2    metoprolol succinate XL (TOPROL-XL) 50 MG 24 hr tablet, TAKE 1 TABLET BY MOUTH EVERY DAY, Disp: 90 tablet, Rfl: 7    rosuvastatin (CRESTOR) 40 MG tablet, Take 1 tablet by mouth Every Night., Disp: 90 tablet, Rfl: 3    Tirosint 112 MCG capsule, Take 2 capsules by mouth Daily. (Patient taking differently: Take 1 capsule by mouth Daily.), Disp: 60 capsule, Rfl: 0    valsartan (DIOVAN) 80 MG tablet, TAKE 1 TABLET BY MOUTH EVERY DAY, Disp: 90 tablet, Rfl: 3    Continuous Glucose Sensor (FreeStyle Jude 3 Plus Sensor), Use Every 14 (Fourteen) Days. (Patient not taking: Reported on 7/16/2025), Disp: 6 each, Rfl: 1    insulin aspart (NovoLOG FlexPen) 100 UNIT/ML solution pen-injector sc pen, Inject 15 Units under the skin into the appropriate area as directed 3 (Three) Times a Day With Meals. (Patient not taking: Reported on 7/16/2025), Disp: 45 mL, Rfl: 0    Review of Systems  Review of Systems   Constitutional:  Positive for fatigue.   Neurological:  Positive for weakness and numbness (tingling).   Psychiatric/Behavioral:  Positive for decreased concentration (memory  "problems.).           Objective:      Ht 172.7 cm (67.99\")   Wt 85 kg (187 lb 6.4 oz)   BMI 28.50 kg/m² Body mass index is 28.5 kg/m².  Physical Exam   Constitutional: He is oriented to person, place, and time. He appears well-developed.   HENT:   Head: Normocephalic and atraumatic.   Eyes: Conjunctivae are normal.   Neck: No thyroid mass present.   Cardiovascular: Normal rate, regular rhythm, normal heart sounds and normal pulses.   Pulmonary/Chest: Effort normal and breath sounds normal.   Neurological: He is alert and oriented to person, place, and time. He has normal reflexes.   Psychiatric: Thought content normal.   Vitals reviewed.          LABS AND IMAGING      Lab Results   Component Value Date    HGBA1C 9.20 (H) 05/22/2025    HGBA1C 8.3 (A) 10/30/2024    HGBA1C 8.0 (A) 07/11/2024     Orders Only on 06/26/2025   Component Date Value Ref Range Status    Date Time Interrogation Session 06/26/2025 [id number]   Preliminary    Type Interrogation Session 06/26/2025 Remote Scheduled   Preliminary    Implantable Pulse Generator Manufa* 06/26/2025 [place] [employer]   Preliminary    Implantable Pulse Generator Type 06/26/2025 S-ICD   Preliminary    Implantable Pulse Generator Model 06/26/2025 A219   Preliminary    Implantable Pulse Generator Serial* 06/26/2025 [id number]   Preliminary    Implantable Pulse Generator Implan* 06/26/2025 [id number]   Preliminary    Battery Remaining Percentage 06/26/2025 77.00  % Preliminary    Battery Status 06/26/2025 Beginning of Service   Preliminary    Therapy Statistic Recent Shocks De* 06/26/2025 0   Preliminary    SHOCK MEASURED IMPEDANCE 06/26/2025 70   Preliminary    Zone Setting Type Category 06/26/2025 Shock   Preliminary    IDC RATE 1 06/26/2025 250   Preliminary    THERAPIES 06/26/2025 80J   Preliminary    Zone Setting Status 06/26/2025 On   Preliminary    Zone ID 06/26/2025 1   Preliminary    Zone Setting Type Category 06/26/2025 Conditional   Preliminary    IDC RATE 1 " 06/26/2025 200   Preliminary    THERAPIES 06/26/2025 80J   Preliminary    Zone Setting Status 06/26/2025 On   Preliminary    Zone ID 06/26/2025 2   Preliminary   Admission on 05/22/2025, Discharged on 05/23/2025   Component Date Value Ref Range Status    WBC 05/22/2025 7.38  3.40 - 10.80 10*3/mm3 Final    RBC 05/22/2025 4.83  4.14 - 5.80 10*6/mm3 Final    Hemoglobin 05/22/2025 15.0  13.0 - 17.7 g/dL Final    Hematocrit 05/22/2025 45.7  37.5 - 51.0 % Final    MCV 05/22/2025 94.6  79.0 - 97.0 fL Final    MCH 05/22/2025 31.1  26.6 - 33.0 pg Final    MCHC 05/22/2025 32.8  31.5 - 35.7 g/dL Final    RDW 05/22/2025 14.4  12.3 - 15.4 % Final    RDW-SD 05/22/2025 50.4  37.0 - 54.0 fl Final    MPV 05/22/2025 10.6  6.0 - 12.0 fL Final    Platelets 05/22/2025 241  140 - 450 10*3/mm3 Final    Glucose 05/22/2025 212 (H)  65 - 99 mg/dL Final    BUN 05/22/2025 15  8 - 23 mg/dL Final    Creatinine 05/22/2025 1.00  0.76 - 1.27 mg/dL Final    Sodium 05/22/2025 140  136 - 145 mmol/L Final    Potassium 05/22/2025 3.7  3.5 - 5.2 mmol/L Final    Chloride 05/22/2025 102  98 - 107 mmol/L Final    CO2 05/22/2025 26.0  22.0 - 29.0 mmol/L Final    Calcium 05/22/2025 9.3  8.6 - 10.5 mg/dL Final    BUN/Creatinine Ratio 05/22/2025 15.0  7.0 - 25.0 Final    Anion Gap 05/22/2025 12.0  5.0 - 15.0 mmol/L Final    eGFR 05/22/2025 84.0  >60.0 mL/min/1.73 Final    Creatinine 05/22/2025 1.10  0.60 - 1.30 mg/dL Final    Serial Number: [id number]:  [id number]    Prox CCA PSV 05/22/2025 92.5  cm/sec Final    Prox CCA EDV 05/22/2025 16.1  cm/sec Final    Right Mid CCA PSV 05/22/2025 67.8  cm/sec Final    right Mid CCA EDV 05/22/2025 15.7  cm/sec Final    Dist CCA PSV 05/22/2025 63.1  cm/sec Final    Dist CCA EDV 05/22/2025 17.6  cm/sec Final    Prox ICA PSV 05/22/2025 93.4  cm/sec Final    Prox ICA EDV 05/22/2025 23.6  cm/sec Final    Mid ICA PSV 05/22/2025 143.0  cm/sec Final    Mid ICA EDV 05/22/2025 39.0  cm/sec Final    Dist ICA PSV 05/22/2025 111.0   cm/sec Final    Dist ICA EDV 05/22/2025 30.1  cm/sec Final    Prox ECA PSV 05/22/2025 -25.2  cm/sec Final    Prox ECA EDV 05/22/2025 -10.9  cm/sec Final    Vertebral A PSV 05/22/2025 64.1  cm/sec Final    Vertebral A EDV 05/22/2025 18.9  cm/sec Final    Prox SCLA PSV 05/22/2025 135.0  cm/sec Final    Prox CCA PSV 05/22/2025 134.0  cm/sec Final    Prox CCA EDV 05/22/2025 24.3  cm/sec Final    left Mid CCA PSV 05/22/2025 93.8  cm/sec Final    left Mid CCA EDV 05/22/2025 18.0  cm/sec Final    Dist CCA PSV 05/22/2025 125.0  cm/sec Final    Dist CCA EDV 05/22/2025 20.5  cm/sec Final    Prox ICA PSV 05/22/2025 152.0  cm/sec Final    Prox ICA EDV 05/22/2025 22.5  cm/sec Final    Mid ICA PSV 05/22/2025 112.0  cm/sec Final    Mid ICA EDV 05/22/2025 23.1  cm/sec Final    Dist ICA PSV 05/22/2025 72.7  cm/sec Final    Dist ICA EDV 05/22/2025 17.7  cm/sec Final    Prox ECA PSV 05/22/2025 176.0  cm/sec Final    Prox ECA EDV 05/22/2025 16.5  cm/sec Final    Vertebral A PSV 05/22/2025 50.2  cm/sec Final    Vertebral A EDV 05/22/2025 9.8  cm/sec Final    Prox SCLA PSV 05/22/2025 195.0  cm/sec Final    XLRA DIANE RIGHT CAROTID BULB PSV 05/22/2025 67.0  cm/sec Final    XLRA DIANE RIGHT CAROTID BULB EDV 05/22/2025 15.3  cm/sec Final    XLRA DIANE LEFT CAROTID BULB PSV 05/22/2025 165.0  cm/sec Final    XLRA DIANE LEFT CAROTID BULB EDV 05/22/2025 28.6  cm/sec Final    Right arm BP 05/22/2025 167/80  mmHg Final    ICA/CCA ratio 05/22/2025 2.27   Final    Diastolic ICA/CCA Ratio 05/22/2025 1.92   Final    Rt.native proximal to stent PSV 05/22/2025 67.0  cm/s Final    Rt Prx to Stent EDV 05/22/2025 15.3  cm/s Final    Rt. Proximal Stent PSV 05/22/2025 93.4  cm/s Final    Rt Prx Stent EDV 05/22/2025 23.6  cm/s Final    Rt. Mid Stent PSV 05/22/2025 143.0  cm/s Final    Rt Mid Stent EDV 05/22/2025 33.8  cm/s Final    Rt Distal Stent PSV 05/22/2025 115.0  cm/s Final    Rt. Distal Stent EDV 05/22/2025 26.6  cm/s Final    Rt Dis To Stent PSV  05/22/2025 111.0  cm/s Final    Rt distal to stent EDV 05/22/2025 27.3  cm/s Final    ICA/CCA ratio 05/22/2025 1.76   Final    ICA/CCA diastolic ratio 05/22/2025 1.59   Final    Lt. Prx to Stent 05/22/2025 159.0  cm/s Final    Lt Prx to stent EDV 05/22/2025 26.9  cm/s Final    Lt. Prox Stent 05/22/2025 165.0  cm/s Final    Lt. Prx stent 05/22/2025 28.6  cm/s Final    Lt Mid Stent 05/22/2025 112.0  cm/s Final    Lt Mid Stent 05/22/2025 20.3  cm/s Final    Lt Distal Stent 05/22/2025 79.6  cm/s Final    BH CV VAS CAROTID LEFT DISTAL STEN* 05/22/2025 23.1  cm/s Final    Lt Dist To Stent 05/22/2025 66.3  cm/s Final    BH CV VAS CAROTID LEFT DISTAL TO S* 05/22/2025 17.7  cm/s Final    Right CCA STENT 05/22/2025 1.00  cm/s Final    Left CCA Stent hidden 05/22/2025 1.00  cm/s Final    BH CVPROX RIGHT ICA HIDDEN LRR 05/22/2025 1.00  CM Final    BH CVPROX LEFT ICA HIDDEN LRR 05/22/2025 1.00  cm Final    BH CV MID RIGHT ICA HIDDEN LRR 05/22/2025 1.00  cm Final    BH CV MID LEFT ICA HIDDEN LRR 05/22/2025 1.00  cm Final    Hemoglobin A1C 05/22/2025 9.20 (H)  4.80 - 5.60 % Final    Glucose 05/22/2025 166 (H)  70 - 130 mg/dL Final    Glucose 05/22/2025 146 (H)  70 - 130 mg/dL Final    Glucose 05/22/2025 202 (H)  70 - 130 mg/dL Final    Glucose 05/23/2025 71  65 - 99 mg/dL Final    BUN 05/23/2025 19  8 - 23 mg/dL Final    Creatinine 05/23/2025 0.86  0.76 - 1.27 mg/dL Final    Sodium 05/23/2025 138  136 - 145 mmol/L Final    Potassium 05/23/2025 3.9  3.5 - 5.2 mmol/L Final    Slight hemolysis detected by analyzer. Result may be falsely elevated.    Chloride 05/23/2025 104  98 - 107 mmol/L Final    CO2 05/23/2025 22.0  22.0 - 29.0 mmol/L Final    Calcium 05/23/2025 9.0  8.6 - 10.5 mg/dL Final    BUN/Creatinine Ratio 05/23/2025 22.1  7.0 - 25.0 Final    Anion Gap 05/23/2025 12.0  5.0 - 15.0 mmol/L Final    eGFR 05/23/2025 96.7  >60.0 mL/min/1.73 Final    Magnesium 05/23/2025 1.9  1.6 - 2.4 mg/dL Final    Phosphorus 05/23/2025 3.9  2.5  - 4.5 mg/dL Final    WBC 05/23/2025 10.68  3.40 - 10.80 10*3/mm3 Final    RBC 05/23/2025 4.42  4.14 - 5.80 10*6/mm3 Final    Hemoglobin 05/23/2025 13.6  13.0 - 17.7 g/dL Final    Hematocrit 05/23/2025 41.4  37.5 - 51.0 % Final    MCV 05/23/2025 93.7  79.0 - 97.0 fL Final    MCH 05/23/2025 30.8  26.6 - 33.0 pg Final    MCHC 05/23/2025 32.9  31.5 - 35.7 g/dL Final    RDW 05/23/2025 14.5  12.3 - 15.4 % Final    RDW-SD 05/23/2025 49.7  37.0 - 54.0 fl Final    MPV 05/23/2025 10.5  6.0 - 12.0 fL Final    Platelets 05/23/2025 212  140 - 450 10*3/mm3 Final    Neutrophil % 05/23/2025 72.1  42.7 - 76.0 % Final    Lymphocyte % 05/23/2025 16.3 (L)  19.6 - 45.3 % Final    Monocyte % 05/23/2025 8.3  5.0 - 12.0 % Final    Eosinophil % 05/23/2025 2.6  0.3 - 6.2 % Final    Basophil % 05/23/2025 0.4  0.0 - 1.5 % Final    Immature Grans % 05/23/2025 0.3  0.0 - 0.5 % Final    Neutrophils, Absolute 05/23/2025 7.70 (H)  1.70 - 7.00 10*3/mm3 Final    Lymphocytes, Absolute 05/23/2025 1.74  0.70 - 3.10 10*3/mm3 Final    Monocytes, Absolute 05/23/2025 0.89  0.10 - 0.90 10*3/mm3 Final    Eosinophils, Absolute 05/23/2025 0.28  0.00 - 0.40 10*3/mm3 Final    Basophils, Absolute 05/23/2025 0.04  0.00 - 0.20 10*3/mm3 Final    Immature Grans, Absolute 05/23/2025 0.03  0.00 - 0.05 10*3/mm3 Final    nRBC 05/23/2025 0.0  0.0 - 0.2 /100 WBC Final    Glucose 05/23/2025 123  70 - 130 mg/dL Final             Assessment:         Diagnoses and all orders for this visit:    Uncontrolled type 2 diabetes mellitus with hyperglycemia, with long-term current use of insulin  -     POC Glucose, Blood    Hypothyroidism    Mixed hyperlipidemia        Plan:       1. Patient has poorly controlled diabetes with hyperglycemia . Dietary recommendations reviewed. Insulin titration instructions provided.   Cont Jardiance 25 mg daily.     Cont same dose insulin. After change to long actine/ short acting MDI glucose is improved,   CGM was downloaded and analyzed: 43 % in  range with 56 % hyperglycemia. 1 % hypoglycemia. He has large variability in glucose. Insulin dose changes were made.       2. Hypothyroidism -cont Tirosint and recheck the levels.   PCP has changed the dose to 125 mcg daily from 250 mcg, he has been taking only 125 mcg for the last 6 weeks.   - consider changing to 200 mcg daily after review of labs.        Follow up:  3 months.

## 2025-07-16 NOTE — TELEPHONE ENCOUNTER
----- Message from Meka Swan sent at 7/16/2025  2:19 PM EDT -----  Please obtain records from PCP (Dr Jaffe) with labs form 2 months ago.

## 2025-07-25 RX ORDER — LEVOTHYROXINE SODIUM 137 UG/1
137 TABLET ORAL
Qty: 30 TABLET | Refills: 2 | Status: SHIPPED | OUTPATIENT
Start: 2025-07-25

## 2025-07-25 NOTE — TELEPHONE ENCOUNTER
Pt called to say that his insurance will not cover the tirosint or capsules  He wants us to send in the generic levothyroxine  137 mcg  To [facility]

## 2025-07-25 NOTE — TELEPHONE ENCOUNTER
Script for Levothyroxine 137  mcg I spending review.    Last office visit with prescribing clinician: 7/16/2025       Next office visit with prescribing clinician: 9/26/2025     {

## 2025-08-04 ENCOUNTER — TELEMEDICINE (OUTPATIENT)
Dept: NEUROSURGERY | Facility: CLINIC | Age: 65
End: 2025-08-04
Payer: MEDICARE

## 2025-08-04 DIAGNOSIS — I65.23 BILATERAL CAROTID ARTERY STENOSIS: Primary | ICD-10-CM

## 2025-08-04 PROCEDURE — 99212 OFFICE O/P EST SF 10 MIN: CPT | Performed by: NEUROLOGICAL SURGERY

## 2025-08-05 RX ORDER — VALSARTAN 80 MG/1
80 TABLET ORAL DAILY
Qty: 90 TABLET | Refills: 0 | Status: SHIPPED | OUTPATIENT
Start: 2025-08-05

## 2025-08-12 RX ORDER — INSULIN GLARGINE 100 [IU]/ML
INJECTION, SOLUTION SUBCUTANEOUS
Qty: 15 ML | Refills: 0 | Status: SHIPPED | OUTPATIENT
Start: 2025-08-12

## (undated) DEVICE — MEDI-VAC YANKAUER SUCTION HANDLE W/BULBOUS TIP: Brand: CARDINAL HEALTH

## (undated) DEVICE — RADIFOCUS GLIDEWIRE: Brand: GLIDEWIRE

## (undated) DEVICE — CATH DIAG EXPO .045 FL3.5 5F 100CM

## (undated) DEVICE — CATH TEMPO 5F BER 100CM: Brand: TEMPO

## (undated) DEVICE — DEV INFL MONARCH 25W

## (undated) DEVICE — TUBING, SUCTION, 1/4" X 10', STRAIGHT: Brand: MEDLINE

## (undated) DEVICE — CANN NASL CO2 DIVIDED A/

## (undated) DEVICE — DRSNG TELFA PAD NONADH STR 1S 3X8IN

## (undated) DEVICE — DRAPE,TOP,102X53,STERILE: Brand: MEDLINE

## (undated) DEVICE — GW J TP FIX CORE .035 150

## (undated) DEVICE — KT VLV HEMO MAP ACC PLS LG/BORE MTL/INTRO W/TORQ/DEV

## (undated) DEVICE — SOL NACL 0.9PCT 1000ML

## (undated) DEVICE — EMBOSHIELD NAV6 EMBOLIC PROTECTION SYSTEM 7.2 MM X 190 CM: Brand: EMBOSHIELD  NAV6

## (undated) DEVICE — RADIFOCUS TORQUE DEVICE MULTI-TORQUE VISE: Brand: RADIFOCUS TORQUE DEVICE

## (undated) DEVICE — LEX NEURO ANGIOGRAPHY: Brand: MEDLINE INDUSTRIES, INC.

## (undated) DEVICE — CATH DIAG EXPO .045 AL1 5F 100CM

## (undated) DEVICE — VIPERTRACK, 50 PACK: Brand: VIPERTRACK

## (undated) DEVICE — PINNACLE INTRODUCER SHEATH: Brand: PINNACLE

## (undated) DEVICE — LIMB HOLDER, WRIST/ANKLE: Brand: DEROYAL

## (undated) DEVICE — SUT MONOCRYL PLS ANTIB UND 3/0  PS1 27IN

## (undated) DEVICE — Device: Brand: ASAHI SION BLUE

## (undated) DEVICE — GW PERIPH VASC ADX J/TP SS .035 150CM 3MM

## (undated) DEVICE — SET PRIMARY GRVTY 10DP MALE LL 104IN

## (undated) DEVICE — Device

## (undated) DEVICE — CAUTERY TIP POLISHER: Brand: DEVON

## (undated) DEVICE — RADIFOCUS GLIDEWIRE ADVANTAGE GUIDEWIRE: Brand: GLIDEWIRE ADVANTAGE

## (undated) DEVICE — MODEL AT P65, P/N 701554-001KIT CONTENTS: HAND CONTROLLER, 3-WAY HIGH-PRESSURE STOPCOCK WITH ROTATING END AND PREMIUM HIGH-PRESSURE TUBING: Brand: ANGIOTOUCH® KIT

## (undated) DEVICE — GLV SURG PREMIERPRO MIC LTX PF SZ7 BRN

## (undated) DEVICE — GLV SURG PREMIERPRO MIC LTX PF SZ8 BRN

## (undated) DEVICE — ADULT, W/LG. BACK PAD, RADIOTRANSPARENT ELEMENT AND LEAD WIRE COMPATIBLE W/: Brand: DEFIBRILLATION ELECTRODES

## (undated) DEVICE — PK CATH CARD 10

## (undated) DEVICE — DESTINATION RENAL GUIDING SHEATH: Brand: DESTINATION

## (undated) DEVICE — GW STARTER ROSEN PTFE/COAT J/TP/1.5MM 0.035IN 3X260CM

## (undated) DEVICE — VIATRAC 14 PLUS PERIPHERAL DILATATION CATHETER 5.0 MM X 20 MM X 135 CM: Brand: VIATRAC

## (undated) DEVICE — VIATRAC 14 PLUS PERIPHERAL DILATATION CATHETER 6.0 MM X 30 MM X 135 CM: Brand: VIATRAC

## (undated) DEVICE — TRAP FLD MINIVAC MEGADYNE 100ML

## (undated) DEVICE — STPCK LP 1WY RA 200PSI

## (undated) DEVICE — INTRO SHEATH ART/FEM ENGAGE .038 5F12CM

## (undated) DEVICE — ST ACC MICROPUNCTURE .018 TRANSLSS/SS/TP 5F/10CM 21G/7CM

## (undated) DEVICE — DRSNG PAD ABD 8X10IN STRL

## (undated) DEVICE — ANGIO-SEAL VIP VASCULAR CLOSURE DEVICE: Brand: ANGIO-SEAL

## (undated) DEVICE — TBG PENCL TELESCP MEGADYNE SMOKE EVAC 10FT

## (undated) DEVICE — SKIN PREP TRAY W/CHG: Brand: MEDLINE INDUSTRIES, INC.

## (undated) DEVICE — ST INF PRI SMRTSTE 20DRP 2VLV 24ML 117

## (undated) DEVICE — CATH DIAG EXPO M/ PK 5F FL4/FR4 PIG

## (undated) DEVICE — DRSNG GZ PETROLTM XEROFORM CURAD 1X8IN STRL

## (undated) DEVICE — HI-TORQUE COMMAND ES GUIDE WIRE .014" 300 CM: Brand: HI-TORQUE COMMAND

## (undated) DEVICE — ROTATING HEMOSTATIC VALVE .096": Brand: RHV

## (undated) DEVICE — VAPR COOLPULSE 90 ELECTRODE WITH HAND CONTROLS 90 DEGREES SUCTION WITH INTEGRATED HANDPIECE: Brand: VAPR COOLPULSE

## (undated) DEVICE — BALN EVERCROSS OTW .035 6F 8X20 80

## (undated) DEVICE — MINI TREK CORONARY DILATATION CATHETER 2.0 MM X 25 MM / RAPID-EXCHANGE: Brand: MINI TREK

## (undated) DEVICE — GW STARTER FXD CORE STR .035 3X260CM

## (undated) DEVICE — 4.5 MM BONECUTTER PLATINUM BLADE,                                    YELLOW, 10000 MAXIMUM RPM, PACKAGED                                    6 PER BOX, STERILE

## (undated) DEVICE — SHOULDER STABILIZATION KIT,                                    DISPOSABLE 12 PER BOX

## (undated) DEVICE — MODEL BT2000 P/N 700287-012KIT CONTENTS: MANIFOLD WITH SALINE AND CONTRAST PORTS, SALINE TUBING WITH SPIKE AND HAND SYRINGE, TRANSDUCER: Brand: BT2000 AUTOMATED MANIFOLD KIT

## (undated) DEVICE — DRSNG SURESITE123 4X4.8IN

## (undated) DEVICE — 3M™ MEDIPORE™ H SOFT CLOTH SURGICAL TAPE, 2863, 3 IN X 10 YD, 12/CASE: Brand: 3M™ MEDIPORE™

## (undated) DEVICE — 3M™ STERI-DRAPE™ U-DRAPE 1015: Brand: STERI-DRAPE™

## (undated) DEVICE — SLNG ULTRSLING3 13TO15IN LG

## (undated) DEVICE — CATH DIAG EXPO .045 RCB 5F 100CM

## (undated) DEVICE — DECANT BG O JET

## (undated) DEVICE — GLV SURG PREMIERPRO GAMMEX NEOPRN PF SZ8 GRN

## (undated) DEVICE — THREADED CLEAR CANNULA WITH OBTURATOR 7MM X 75MM

## (undated) DEVICE — BLANKT WARM LOWR/BDY 100X120CM

## (undated) DEVICE — PAD ARMBRD SURG CONVOL 7.5X20X2IN

## (undated) DEVICE — GUIDE CATHETER: Brand: MACH1™

## (undated) DEVICE — PRESSURE MONITORING SET: Brand: TRUWAVE, VAMP

## (undated) DEVICE — CVR PROB ULTRASND/TRANSD W/GEL 7X11IN STRL

## (undated) DEVICE — ELECTRD RETRN/GRND MEGADYNE SGL/PLT W/CORD 9FT DISP

## (undated) DEVICE — STPCK 3/WY HP M/RA W/OFF/HNDL 1050PSI STRL

## (undated) DEVICE — PK ARTHSCP SHLDR 10

## (undated) DEVICE — PATIENT RETURN ELECTRODE, SINGLE-USE, CONTACT QUALITY MONITORING, ADULT, WITH 9FT CORD, FOR PATIENTS WEIGING OVER 33LBS. (15KG): Brand: MEGADYNE

## (undated) DEVICE — INTRAOPERATIVE COVER KIT, 10 PACK: Brand: SITE-RITE

## (undated) DEVICE — T-MAX DISPOSABLE FACE MASK 8 PER BOX

## (undated) DEVICE — STERILE PVP: Brand: MEDLINE INDUSTRIES, INC.

## (undated) DEVICE — GLV SURG SENSICARE PI MIC PF SZ7 LF STRL

## (undated) DEVICE — INTRO FLEXOR RB AQ TB SDARM .100 SHTL 7FR 80CM

## (undated) DEVICE — CATH OMNI FLUSH 5FR

## (undated) DEVICE — VAPR TRIPOLAR 90 DEGREES SUCTION ELECTRODE 90 DEGREES SUCTION WITH INTEGRATED HANDPIECE AND HAND CONTROLS: Brand: VAPR

## (undated) DEVICE — ST EXT IV SMRTSTE 2VLV FIX M LL 6ML 41

## (undated) DEVICE — DYONICS 25 INFLOW TUBE SET, 3 PER BOX

## (undated) DEVICE — STRIP CLS WND CURAD MEDI/STRIP HYPOALLERG 0.25X4IN PK/10

## (undated) DEVICE — RADIFOCUS GLIDECATH: Brand: GLIDECATH

## (undated) DEVICE — NC TREK™ CORONARY DILATATION CATHETER 2.25 MM X 12 MM / RAPID-EXCHANGE: Brand: NC TREK™

## (undated) DEVICE — ST INF 2NDARY 20DRP VNT/NOVNT 30IN

## (undated) DEVICE — CATH DIAG EXPO .045 AR1 5F 100CM

## (undated) DEVICE — PLASMABLADE PS210-030S 3.0S LOCK: Brand: PLASMABLADE™

## (undated) DEVICE — Device: Brand: OMNIWIRE PRESSURE GUIDE WIRE

## (undated) DEVICE — GW AMPLTZ SUPERSTIFF STR .035IN 260CM